# Patient Record
Sex: FEMALE | Race: WHITE | NOT HISPANIC OR LATINO | Employment: OTHER | URBAN - NONMETROPOLITAN AREA
[De-identification: names, ages, dates, MRNs, and addresses within clinical notes are randomized per-mention and may not be internally consistent; named-entity substitution may affect disease eponyms.]

---

## 2017-06-09 ENCOUNTER — OFFICE VISIT (OUTPATIENT)
Dept: ONCOLOGY | Facility: CLINIC | Age: 77
End: 2017-06-09

## 2017-06-09 VITALS
HEART RATE: 63 BPM | RESPIRATION RATE: 18 BRPM | DIASTOLIC BLOOD PRESSURE: 57 MMHG | TEMPERATURE: 98.3 F | SYSTOLIC BLOOD PRESSURE: 134 MMHG | HEIGHT: 66 IN | BODY MASS INDEX: 27.64 KG/M2 | WEIGHT: 172 LBS

## 2017-06-09 DIAGNOSIS — C85.90 LYMPHOMA, LOW GRADE (HCC): Primary | ICD-10-CM

## 2017-06-09 PROCEDURE — 99213 OFFICE O/P EST LOW 20 MIN: CPT | Performed by: NURSE PRACTITIONER

## 2017-06-09 RX ORDER — FERROUS SULFATE 325(65) MG
325 TABLET ORAL DAILY
Status: ON HOLD | COMMUNITY
End: 2022-07-28

## 2017-06-09 NOTE — PROGRESS NOTES
CHIEF COMPLAINT: Follow-up for lymphoma    Problem List:  Oncology/Hematology History    1. Low-grade lymphoma:  a) Splenomegaly on hospitalization 02/2016. She had a week of nausea, vomiting  and diarrhea with dehydration. The nausea, vomiting, and dehydration have  resolved but on CT they found retroperitoneal adenopathy with some splenomegaly  that was not bulky. She denies any ongoing fevers or chills or bed drenching  night sweats or unexplained weight loss and no change in the color, caliber or  consistency of her stools. She had a slightly elevated beta-2 microglobulin of  3.5 but no monoclonal spike and a white count 5300, hemoglobin 11.5, platelets  158,000, sedimentation rate 45, C-reactive protein 13.6 and a normal LDH of 318  with normal liver enzymes. Slight hyponatremia with sodium of 134 and  hypokalemia with potassium of 3.3 on 02/18/2016. She had slight decrease in  immunoglobulin G to 580 and immunoglobulin A to 35 on testing while in the  hospital.   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 There were no features to suggest hairy  cell lymphoma or a mantle cell lymphoma and no large cell population. This was  CD5 negative, CD10 negative, kappa clonal negative and bright for CD20 with dim  surface light chain expression and bright cytoplasm, CD45 bright. The CD38  negative for plasmacytic differentiation with a hemoglobin of 11.4, white count  7200 with normal differential, and platelets of 163,000.   c) Liver, spleen ultrasound showed splenomegaly of 19.6 cm maximum dimension  with 1.6 cm slightly dilated portal vein.  2. History of iron deficiency anemia with last colonoscopy 11/2014, according  to the patient, with a history of ulcerative colitis treated by Dr. Edge.     3.  History of basal cell carcinomas.   4. Cataracts.   5. Remote history of deep vein thrombosis with vena cava filter in place.   6. Diverticulitis by history.   7. History of gout.   8. Hypertension.   9. History of 3 different kidney surgeries.   10. Cholecystectomy.   11. Chronic neuropathy.   12. History of ARMAAN.   13. History of back surgeries.   14. History of total hip replacement.   15. History of ankle fracture.   16. History of appendectomy.   17. 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 Edge.        Lymphoma, low grade    4/25/2016 Initial Diagnosis    Lymphoma, low grade      5/30/2017 Imaging    CT chest/abdomen/pelvis no obvious pulmonary nodules identified, no pleural effusions.  Stable appearance of adenopathy within the abdomen compared to earlier study as well as splenomegaly.         HISTORY OF PRESENT ILLNESS:  The patient is a 76 y.o. female, here for follow up on management of low-grade lymphoma.  The patient returns today for follow-up and to go over the results of her recent CT scans and laboratory results.  Since we saw her last, she states that she has been doing overall fairly well.  She has had no frequent infections although she is currently being treated for a sinus infection, she has no bed drenching night sweats.  Appetite is fair, she has not had any unusual weight loss.  She denies any change in her bowel habits, no constipation diarrhea hematochezia or melena.      Past Medical History:   Diagnosis Date   • Anemia 09/18/2015   • Cataract     h/o   • Diverticulitis    • DVT (deep venous thrombosis)    • Gout    • History of basal cell carcinoma    • Hypertension           Past Surgical History:   Procedure Laterality Date   • APPENDECTOMY     • BACK SURGERY      h/o   • CHOLECYSTECTOMY     • HYSTERECTOMY      armaan   • KIDNEY SURGERY      x3    • ORIF ANKLE FRACTURE      h/o   • TOTAL HIP ARTHROPLASTY      h/o  "      Allergies   Allergen Reactions   • Hydrocodone    • Keflex [Cephalexin]    • Lortab [Hydrocodone-Acetaminophen]    • Magnesium Citrate    • Penicillins        Family History and Social History reviewed and changed as necessary      REVIEW OF SYSTEM:   Review of Systems   Constitutional: Negative for appetite change, chills, diaphoresis, fatigue, fever and unexpected weight change.   HENT:   Negative for mouth sores, sore throat and trouble swallowing.    Eyes: Negative for icterus.   Respiratory: Negative for cough, hemoptysis and shortness of breath.    Cardiovascular: Negative for chest pain, leg swelling and palpitations.   Gastrointestinal: Negative for abdominal distention, abdominal pain, blood in stool, constipation, diarrhea, nausea and vomiting.   Endocrine: Negative for hot flashes.   Genitourinary: Negative for bladder incontinence, difficulty urinating, dysuria, frequency and hematuria.    Musculoskeletal: Negative for gait problem, neck pain and neck stiffness.   Skin: Negative for rash.   Neurological: Negative for dizziness, gait problem, headaches, light-headedness and numbness.   Hematological: Negative for adenopathy. Does not bruise/bleed easily.   Psychiatric/Behavioral: Negative for depression. The patient is not nervous/anxious.    All other systems reviewed and are negative, except as stated above in the history of present illness.       PHYSICAL EXAM    Vitals:    06/09/17 1027   BP: 134/57   Pulse: 63   Resp: 18   Temp: 98.3 °F (36.8 °C)   Weight: 172 lb (78 kg)   Height: 66\" (167.6 cm)     Constitutional: Appears well-developed and well-nourished. No distress.   ECOG: (1) Restricted in physically strenuous activity, ambulatory and able to do work of light nature  HENT:   Head: Normocephalic.   Mouth/Throat: Oropharynx is clear and moist.   Eyes: Conjunctivae are normal. Pupils are equal, round, and reactive to light. No scleral icterus.   Neck: Neck supple. No JVD present. No " thyromegaly present.   Cardiovascular: Normal rate, regular rhythm and normal heart sounds.    Pulmonary/Chest: Breath sounds normal. No respiratory distress.   Abdominal: Soft. Exhibits no distension and no mass. There is no hepatosplenomegaly. There is no tenderness.   Musculoskeletal:Exhibits no edema, tenderness or deformity.   Neurological: Alert and oriented to person, place, and time. Exhibits normal muscle tone.   Skin: No ecchymosis, no petechiae and no rash noted. Not diaphoretic. No cyanosis. Nails show no clubbing.   Psychiatric: Normal mood and affect.   Vitals reviewed.    Diagnostic data: All previous office notes, current radiology reports and laboratory data were reviewed at time of visit and are outlined above in the oncology history, these were accessed via physician portal through [facility].  Current CBC WBC 6400, hemoglobin 12.3, hematocrit 37.1%, platelet count 115,000.  CMP sodium 132 potassium 4.4 CO2 27 BUN 12 creatinine 1.0 glucose 92 calcium 9.1 total bilirubin 0.5 AST 35 ALT 27 alkaline phosphatase 75.      Assessment/Plan     1.  Low-grade lymphoma: Current labs are overall stable, her platelet count is a little bit lower at 115,000.  CMP was normal.  Her CT scans are stable with no progression.  We will continue with 6 month interval with continued surveillance.  The patient will notify us sooner if she has any new concerns prior to return.    10 minutes of today's 15 minute appointment was spent in counseling and education regarding the above diagnosis and plan of care.        Chantell Kyle, IVÁN    06/09/2017

## 2017-06-12 ENCOUNTER — TELEPHONE (OUTPATIENT)
Dept: ONCOLOGY | Facility: CLINIC | Age: 77
End: 2017-06-12

## 2017-06-12 NOTE — TELEPHONE ENCOUNTER
----- Message from Tiarra Torre sent at 6/12/2017  9:30 AM EDT -----  Regarding: SIL - PATIENT IS BLEEDING  Contact: [phone]  PATIENT CALLED AND SAID SHE STARTED BLEEDING THIS MORNING.     PLEASE CALL PATIENT TO LET HER KNOW WHAT SHE SHOULD DO. PATIENT ASKED FOR WES

## 2017-06-12 NOTE — TELEPHONE ENCOUNTER
Chantell would like for patient to call PCP related to bright red vaginal bleeding that patient is reporting.  Patient did state that it could be possible for irritated hemorrhoids.  Patient to call PCP.

## 2017-12-08 ENCOUNTER — OFFICE VISIT (OUTPATIENT)
Dept: ONCOLOGY | Facility: CLINIC | Age: 77
End: 2017-12-08

## 2017-12-08 VITALS
HEART RATE: 86 BPM | HEIGHT: 66 IN | TEMPERATURE: 101.3 F | DIASTOLIC BLOOD PRESSURE: 61 MMHG | BODY MASS INDEX: 27.8 KG/M2 | RESPIRATION RATE: 16 BRPM | SYSTOLIC BLOOD PRESSURE: 148 MMHG | WEIGHT: 173 LBS

## 2017-12-08 DIAGNOSIS — R50.9 FEVER, UNSPECIFIED FEVER CAUSE: ICD-10-CM

## 2017-12-08 DIAGNOSIS — C85.90 LYMPHOMA, LOW GRADE (HCC): Primary | ICD-10-CM

## 2017-12-08 PROCEDURE — 99214 OFFICE O/P EST MOD 30 MIN: CPT | Performed by: INTERNAL MEDICINE

## 2017-12-08 RX ORDER — SULFAMETHOXAZOLE AND TRIMETHOPRIM 800; 160 MG/1; MG/1
1 TABLET ORAL 2 TIMES DAILY
Qty: 14 TABLET | Refills: 0 | Status: SHIPPED | OUTPATIENT
Start: 2017-12-08 | End: 2020-07-23 | Stop reason: ALTCHOICE

## 2017-12-08 RX ORDER — DOXYCYCLINE HYCLATE 100 MG/1
CAPSULE ORAL
COMMUNITY
Start: 2017-12-04 | End: 2017-12-08 | Stop reason: SDUPTHER

## 2017-12-08 RX ORDER — POTASSIUM CHLORIDE 750 MG/1
20 CAPSULE, EXTENDED RELEASE ORAL DAILY
COMMUNITY
Start: 2017-11-13 | End: 2022-08-04 | Stop reason: HOSPADM

## 2017-12-08 RX ORDER — RANITIDINE 150 MG/1
TABLET ORAL
COMMUNITY
Start: 2017-11-06 | End: 2020-07-23 | Stop reason: ALTCHOICE

## 2017-12-08 NOTE — PROGRESS NOTES
CHIEF COMPLAINT: Low-grade fevers    Problem List:  Oncology/Hematology History    1. Low-grade lymphoma:  a) Splenomegaly on hospitalization 02/2016. She had a week of nausea, vomiting  and diarrhea with dehydration. The nausea, vomiting, and dehydration have  resolved but on CT they found retroperitoneal adenopathy with some splenomegaly  that was not bulky. She denies any ongoing fevers or chills or bed drenching  night sweats or unexplained weight loss and no change in the color, caliber or  consistency of her stools. She had a slightly elevated beta-2 microglobulin of  3.5 but no monoclonal spike and a white count 5300, hemoglobin 11.5, platelets  158,000, sedimentation rate 45, C-reactive protein 13.6 and a normal LDH of 318  with normal liver enzymes. Slight hyponatremia with sodium of 134 and  hypokalemia with potassium of 3.3 on 02/18/2016. She had slight decrease in  immunoglobulin G to 580 and immunoglobulin A to 35 on testing while in the  hospital.   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 There were no features to suggest hairy  cell lymphoma or a mantle cell lymphoma and no large cell population. This was  CD5 negative, CD10 negative, kappa clonal negative and bright for CD20 with dim  surface light chain expression and bright cytoplasm, CD45 bright. The CD38  negative for plasmacytic differentiation with a hemoglobin of 11.4, white count  7200 with normal differential, and platelets of 163,000.   c) Liver, spleen ultrasound showed splenomegaly of 19.6 cm maximum dimension  with 1.6 cm slightly dilated portal vein.  2. History of iron deficiency anemia with last colonoscopy 11/2014, according  to the patient, with a history of ulcerative colitis treated by Dr. Edge.     3. History  of basal cell carcinomas.   4. Cataracts.   5. Remote history of deep vein thrombosis with vena cava filter in place.   6. Diverticulitis by history.   7. History of gout.   8. Hypertension.   9. History of 3 different kidney surgeries.   10. Cholecystectomy.   11. Chronic neuropathy.   12. History of ARMAAN.   13. History of back surgeries.   14. History of total hip replacement.   15. History of ankle fracture.   16. History of appendectomy.   17. 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 Edge.        Lymphoma, low grade    4/25/2016 Initial Diagnosis     Lymphoma, low grade         5/30/2017 Imaging     CT chest/abdomen/pelvis no obvious pulmonary nodules identified, no pleural effusions.  Stable appearance of adenopathy within the abdomen compared to earlier study as well as splenomegaly.         12/6/2017 Imaging     CT chest/abdomen/pelvis stable, essentially unchanged prominent mediastinal lymph nodes, unchanged right lower lobe medial basal segmental focal fibrotic findings.  Scattered noncalcified small lung nodules appear unchanged.  Stable spine or megaly.  Scattered prominent retroperitoneal lymph nodes now appear smaller.  Unchanged spinal findings consistent with diffuse idiopathic skeletal hyperostosis and prior operative intervention at L4-5.              HISTORY OF PRESENT ILLNESS:  The patient is a 77 y.o. female, here for follow up on management of Low-grade fevers with history of lymphoma.  Been on some doxycycline and is slowly improving but still has some low-grade temps and nonspecific fatigue.  No recent blood work but current CAT scans and images thereof I reviewed above and there is no significant changes.      Past Medical History:   Diagnosis Date   • Anemia 09/18/2015   • Cataract     h/o   • Diverticulitis    • DVT (deep venous thrombosis)    • Gout    • History of basal cell carcinoma    • Hypertension        "    Past Surgical History:   Procedure Laterality Date   • APPENDECTOMY     • BACK SURGERY      h/o   • CHOLECYSTECTOMY     • HYSTERECTOMY      jaja   • KIDNEY SURGERY      x3    • ORIF ANKLE FRACTURE      h/o   • TOTAL HIP ARTHROPLASTY      h/o       Allergies   Allergen Reactions   • Hydrocodone    • Keflex [Cephalexin]    • Lortab [Hydrocodone-Acetaminophen]    • Magnesium Citrate    • Penicillins        Family History and Social History reviewed and changed as necessary      REVIEW OF SYSTEM:   Review of Systems   Constitutional: Negative for appetite change, chills, diaphoresis, fatigue, fever and unexpected weight change.   HENT:   Negative for mouth sores, sore throat and trouble swallowing.    Eyes: Negative for icterus.   Respiratory: Negative for cough, hemoptysis and shortness of breath.    Cardiovascular: Negative for chest pain, leg swelling and palpitations.   Gastrointestinal: Negative for abdominal distention, abdominal pain, blood in stool, constipation, diarrhea, nausea and vomiting.   Endocrine: Negative for hot flashes.   Genitourinary: Negative for bladder incontinence, difficulty urinating, dysuria, frequency and hematuria.    Musculoskeletal: Negative for gait problem, neck pain and neck stiffness.   Skin: Negative for rash.   Neurological: Negative for dizziness, gait problem, headaches, light-headedness and numbness.   Hematological: Negative for adenopathy. Does not bruise/bleed easily.   Psychiatric/Behavioral: Negative for depression. The patient is not nervous/anxious.    All other systems reviewed and are negative.       PHYSICAL EXAM    Vitals:    12/08/17 1433   BP: 148/61   Pulse: 86   Resp: 16   Temp: (!) 101.3 °F (38.5 °C)   Weight: 78.5 kg (173 lb)   Height: 167.6 cm (66\")     Constitutional: Appears well-developed and well-nourished. No distress.   ECOG: (2) Ambulatory and capable of self care, unable to carry out work activity, up and about > 50% or waking hours  HENT:   Head: " Normocephalic. Facial redness  Mouth/Throat: Oropharynx is clear and moist.   Eyes: Conjunctivae are normal. Pupils are equal, round, and reactive to light. No scleral icterus.   Neck: Neck supple. No JVD present. No thyromegaly present.   Cardiovascular: Normal rate, regular rhythm and normal heart sounds.    Pulmonary/Chest: Breath sounds normal. No respiratory distress.   Abdominal: Soft. Exhibits no distension and no mass. There is no hepatosplenomegaly. There is no tenderness. There is no rebound and no guarding.   Musculoskeletal:Exhibits no edema, tenderness or deformity.   Neurological: Alert and oriented to person, place, and time. Exhibits normal muscle tone.   Skin: No ecchymosis, no petechiae and no rash noted. Not diaphoretic. No cyanosis. Nails show no clubbing.   Psychiatric: Normal mood and affect.   Vitals reviewed.      No results found for any previous visit.    Assessment/Plan     1. Low Grade lymphoma  2. Fevers  3. Facial erythema    Discussion: she will finish her doxycycline and I'm going to get her blood culture and urine cultures and give her Bactrim as she is still fevers coming to the end of her doxycycline.  I'll check her quantitative immunoglobulins along with blood count chemistry now.  We'll repeat her CTs and blood work prior to return in 6 months otherwise.  Consider intravenous immunoglobulins as has recurrent frequent infections with an immunoglobulin level below 300.  Her face is quite red.  She is on steroids and I instructed her to stop her doxycycline today.  I'm not sure if this is a son reaction or reaction just to the doxycycline itself or whether this could even be an early desquamative process.  If this worsens in the next day I asked her to move on to the emergency room.  She states that she has tolerated Bactrim in the past though it is been a long time ago.      Nelson Castañeda MD    12/08/2017

## 2017-12-13 ENCOUNTER — TELEPHONE (OUTPATIENT)
Dept: ONCOLOGY | Facility: CLINIC | Age: 77
End: 2017-12-13

## 2017-12-13 NOTE — TELEPHONE ENCOUNTER
SPOKE WITH PATIENT AND REVIEW LAB RESULTS (CBC AND CMP)  ALSO REVIEW BLOOD CULTURES AND IMMUNOGLOBLIN LEVELS.  EXPLAINED THAT URINALYSIS RESULTS IS NOT BACK YET.  EXPLAINED I HAVE REQUESTED MEDICAL RECORDS TO CALL [facility] TO GET RESULTS.  WILL CALL IF ABNORMAL.  PATIENT STATED THAT SHE IS STILL RUNNING A FEVER.

## 2017-12-13 NOTE — TELEPHONE ENCOUNTER
----- Message from Tiarra Torre sent at 12/13/2017 10:56 AM EST -----  Regarding: SIL - LAB RESULTS   Contact: [phone]  PATIENT CALLED FOR LAB AND URINALYSIS RESULTS

## 2017-12-13 NOTE — TELEPHONE ENCOUNTER
----- Message from Tiarra Torre sent at 12/13/2017 10:56 AM EST -----  Regarding: SIL - LAB RESULTS   Contact: [phone]  PATIENT CALLED FOR LAB AND URINALYSIS RESULTS

## 2017-12-20 ENCOUNTER — TELEPHONE (OUTPATIENT)
Dept: ONCOLOGY | Facility: CLINIC | Age: 77
End: 2017-12-20

## 2017-12-20 NOTE — TELEPHONE ENCOUNTER
Notified patient that all urine and blood cultures were negative and IgG level normal at 729.  She is feeling much better.

## 2018-06-13 ENCOUNTER — OFFICE VISIT (OUTPATIENT)
Dept: ONCOLOGY | Facility: CLINIC | Age: 78
End: 2018-06-13

## 2018-06-13 VITALS
RESPIRATION RATE: 18 BRPM | DIASTOLIC BLOOD PRESSURE: 62 MMHG | HEIGHT: 66 IN | TEMPERATURE: 98.2 F | SYSTOLIC BLOOD PRESSURE: 143 MMHG | BODY MASS INDEX: 28.28 KG/M2 | WEIGHT: 176 LBS | HEART RATE: 59 BPM

## 2018-06-13 DIAGNOSIS — C85.90 LYMPHOMA, LOW GRADE (HCC): Primary | ICD-10-CM

## 2018-06-13 PROCEDURE — 99213 OFFICE O/P EST LOW 20 MIN: CPT | Performed by: NURSE PRACTITIONER

## 2018-06-13 RX ORDER — ALLOPURINOL 300 MG/1
300 TABLET ORAL DAILY
COMMUNITY
End: 2019-03-27 | Stop reason: DRUGHIGH

## 2018-06-13 RX ORDER — LEVOTHYROXINE SODIUM 0.05 MG/1
50 TABLET ORAL DAILY
COMMUNITY

## 2018-06-13 NOTE — PROGRESS NOTES
CHIEF COMPLAINT:   Follow-up for lymphoma    Problem List:  Oncology/Hematology History    1. Low-grade lymphoma:  a) Splenomegaly on hospitalization 02/2016. She had a week of nausea, vomiting  and diarrhea with dehydration. The nausea, vomiting, and dehydration have  resolved but on CT they found retroperitoneal adenopathy with some splenomegaly  that was not bulky. She denies any ongoing fevers or chills or bed drenching  night sweats or unexplained weight loss and no change in the color, caliber or  consistency of her stools. She had a slightly elevated beta-2 microglobulin of  3.5 but no monoclonal spike and a white count 5300, hemoglobin 11.5, platelets  158,000, sedimentation rate 45, C-reactive protein 13.6 and a normal LDH of 318  with normal liver enzymes. Slight hyponatremia with sodium of 134 and  hypokalemia with potassium of 3.3 on 02/18/2016. She had slight decrease in  immunoglobulin G to 580 and immunoglobulin A to 35 on testing while in the  hospital.   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 There were no features to suggest hairy  cell lymphoma or a mantle cell lymphoma and no large cell population. This was  CD5 negative, CD10 negative, kappa clonal negative and bright for CD20 with dim  surface light chain expression and bright cytoplasm, CD45 bright. The CD38  negative for plasmacytic differentiation with a hemoglobin of 11.4, white count  7200 with normal differential, and platelets of 163,000.   c) Liver, spleen ultrasound showed splenomegaly of 19.6 cm maximum dimension  with 1.6 cm slightly dilated portal vein.  2. History of iron deficiency anemia with last colonoscopy 11/2014, according  to the patient, with a history of ulcerative colitis treated by Dr. Edge.     3.  History of basal cell carcinomas.   4. Cataracts.   5. Remote history of deep vein thrombosis with vena cava filter in place.   6. Diverticulitis by history.   7. History of gout.   8. Hypertension.   9. History of 3 different kidney surgeries.   10. Cholecystectomy.   11. Chronic neuropathy.   12. History of ARMAAN.   13. History of back surgeries.   14. History of total hip replacement.   15. History of ankle fracture.   16. History of appendectomy.   17. 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 Edge.        Lymphoma, low grade    4/25/2016 Initial Diagnosis     Lymphoma, low grade         5/30/2017 Imaging     CT chest/abdomen/pelvis no obvious pulmonary nodules identified, no pleural effusions.  Stable appearance of adenopathy within the abdomen compared to earlier study as well as splenomegaly.         12/6/2017 Imaging     CT chest/abdomen/pelvis stable, essentially unchanged prominent mediastinal lymph nodes, unchanged right lower lobe medial basal segmental focal fibrotic findings.  Scattered noncalcified small lung nodules appear unchanged.  Stable spine or megaly.  Scattered prominent retroperitoneal lymph nodes now appear smaller.  Unchanged spinal findings consistent with diffuse idiopathic skeletal hyperostosis and prior operative intervention at L4-5.           6/8/2018 Imaging     CT chest abdomen and pelvis without contrast: Stable, no significant change in the appearance compared to earlier study from December 6, 2017.            HISTORY OF PRESENT ILLNESS:  The patient is a 77 y.o. female, here for follow up on management of low-grade lymphoma.  The patient returns today for follow-up and to go over the results of her recent CT scans and laboratory results.  Since we saw her last, she states that she has been doing overall fairly well.  She states that she has been dealing with gout in her left ankle and is now on  allopurinol and is feeling a little better.  She also has recently been diagnosed with hypothyroidism and has started levothyroxine.  Had some fatigue but feels that it is improving.  She has had no frequent infections, she has no bed drenching night sweats.  Appetite is fair, she has not had any unusual weight loss.  She denies any change in her bowel habits, no constipation diarrhea hematochezia or melena.  Denies any pain other than for the pain associated with gout in her left ankle.    Past Medical History:   Diagnosis Date   • Anemia 09/18/2015   • Cataract     h/o   • Diverticulitis    • DVT (deep venous thrombosis)    • Gout    • History of basal cell carcinoma    • Hypertension           Past Surgical History:   Procedure Laterality Date   • APPENDECTOMY     • BACK SURGERY      h/o   • CHOLECYSTECTOMY     • HYSTERECTOMY      jaja   • KIDNEY SURGERY      x3    • ORIF ANKLE FRACTURE      h/o   • TOTAL HIP ARTHROPLASTY      h/o       Allergies   Allergen Reactions   • Hydrocodone    • Keflex [Cephalexin]    • Lortab [Hydrocodone-Acetaminophen]    • Magnesium Citrate    • Penicillins        Family History and Social History reviewed and changed as necessary      REVIEW OF SYSTEM:   Review of Systems   Constitutional: Negative for appetite change, chills, diaphoresis, fatigue, fever and unexpected weight change.   HENT:   Negative for mouth sores, sore throat and trouble swallowing.    Eyes: Negative for icterus.   Respiratory: Negative for cough, hemoptysis and shortness of breath.    Cardiovascular: Negative for chest pain, leg swelling and palpitations.   Gastrointestinal: Negative for abdominal distention, abdominal pain, blood in stool, constipation, diarrhea, nausea and vomiting.   Endocrine: Negative for hot flashes.   Genitourinary: Negative for bladder incontinence, difficulty urinating, dysuria, frequency and hematuria.    Musculoskeletal: Negative for gait problem, neck pain and neck stiffness.   Skin:  "Negative for rash.   Neurological: Negative for dizziness, gait problem, headaches, light-headedness and numbness.   Hematological: Negative for adenopathy. Does not bruise/bleed easily.   Psychiatric/Behavioral: Negative for depression. The patient is not nervous/anxious.    All other systems reviewed and are negative, except as stated above in the history of present illness.       PHYSICAL EXAM    Vitals:    06/13/18 1145   BP: 143/62   Pulse: 59   Resp: 18   Temp: 98.2 °F (36.8 °C)   Weight: 79.8 kg (176 lb)   Height: 167.6 cm (66\")     Constitutional: Appears well-developed and well-nourished. No distress.   ECOG: (1) Restricted in physically strenuous activity, ambulatory and able to do work of light nature  HENT:   Head: Normocephalic.   Mouth/Throat: Oropharynx is clear and moist.   Eyes: Conjunctivae are normal. Pupils are equal, round, and reactive to light. No scleral icterus.   Neck: Neck supple. No JVD present. No thyromegaly present.   Cardiovascular: Normal rate, regular rhythm and normal heart sounds.    Pulmonary/Chest: Breath sounds normal. No respiratory distress.   Nodes: No palpable lymphadenopathy  Abdominal: Soft. Exhibits no distension and no mass. There is no hepatosplenomegaly. There is no tenderness.   Musculoskeletal:Exhibits no edema, tenderness or deformity.   Neurological: Alert and oriented to person, place, and time. Exhibits normal muscle tone.   Skin: No ecchymosis, no petechiae and no rash noted. Not diaphoretic. No cyanosis. Nails show no clubbing.   Psychiatric: Normal mood and affect.   Vitals reviewed.    Diagnostic data: All previous office notes, current radiology reports and laboratory data were reviewed at time of visit and are outlined above in the oncology history.  6/8/2018 labs: CMP sodium 134 potassium 4.6 chloride 95 CO2 26 BUN 20 creatinine 1.1 glucose 89 calcium 9.3 total bilirubin 0.5 AST 38 ALT 29 albumin 4.2 alkaline phosphatase 73.  CBC WBC 8100, hemoglobin " "11.8, hematocrit 34.7%, platelet count 131,000.    Assessment/Plan     Low-grade lymphoma: Current labs are overall stable, her platelet count 131,000, normal white blood cell count, no significant anemia.  CMP was unremarkable.  Her CT scans are stable with no progression.  She has been stable and required no treatment on observation for over 2 years now, I will now go to 9 months for follow-up with repeat CBC, CMP and CT scans without contrast prior to return.  We reviewed the indications for treatment which would be:  if she has 3 more than 3 cm nodes, 1 more than 7 cm node or an enlarging spleen that is symptomatic,\"B\"  Symptoms, anemia, thrombocytopenia, leukopenia, or conversely rapidly rising white blood cell count.  The patient will notify us sooner if she has any new concerns prior to return.     I spent > 50% percent of today's 15 minute appointment in counseling and education discussing diagnosis, diagnostic testing and current status.      Chantell Kyle, IVÁN    06/13/2018        "

## 2019-03-27 ENCOUNTER — OFFICE VISIT (OUTPATIENT)
Dept: ONCOLOGY | Facility: CLINIC | Age: 79
End: 2019-03-27

## 2019-03-27 VITALS
DIASTOLIC BLOOD PRESSURE: 53 MMHG | SYSTOLIC BLOOD PRESSURE: 122 MMHG | BODY MASS INDEX: 27.48 KG/M2 | TEMPERATURE: 98.8 F | RESPIRATION RATE: 18 BRPM | WEIGHT: 171 LBS | HEART RATE: 72 BPM | HEIGHT: 66 IN

## 2019-03-27 DIAGNOSIS — R50.9 FEVER AND CHILLS: ICD-10-CM

## 2019-03-27 DIAGNOSIS — R16.1 SPLENOMEGALY: ICD-10-CM

## 2019-03-27 DIAGNOSIS — C85.87: ICD-10-CM

## 2019-03-27 DIAGNOSIS — R10.12 ABDOMINAL PAIN, LEFT UPPER QUADRANT: ICD-10-CM

## 2019-03-27 DIAGNOSIS — C85.90 LYMPHOMA, LOW GRADE (HCC): Primary | ICD-10-CM

## 2019-03-27 PROCEDURE — 99215 OFFICE O/P EST HI 40 MIN: CPT | Performed by: NURSE PRACTITIONER

## 2019-03-27 RX ORDER — ALLOPURINOL 100 MG/1
TABLET ORAL
COMMUNITY
Start: 2019-02-25 | End: 2019-04-09 | Stop reason: DRUGHIGH

## 2019-03-27 NOTE — PROGRESS NOTES
CHIEF COMPLAINT:   1.  Abdominal pain  2.  Fatigue  3.  Fevers and chills at night  4.  Nose bleeds  5.  Follow-up for low grade lymphoma    Problem List:  Oncology/Hematology History    1. Low-grade lymphoma:  a) Splenomegaly on hospitalization 02/2016. She had a week of nausea, vomiting  and diarrhea with dehydration. The nausea, vomiting, and dehydration have  resolved but on CT they found retroperitoneal adenopathy with some splenomegaly  that was not bulky. She denies any ongoing fevers or chills or bed drenching  night sweats or unexplained weight loss and no change in the color, caliber or  consistency of her stools. She had a slightly elevated beta-2 microglobulin of  3.5 but no monoclonal spike and a white count 5300, hemoglobin 11.5, platelets  158,000, sedimentation rate 45, C-reactive protein 13.6 and a normal LDH of 318  with normal liver enzymes. Slight hyponatremia with sodium of 134 and  hypokalemia with potassium of 3.3 on 02/18/2016. She had slight decrease in  immunoglobulin G to 580 and immunoglobulin A to 35 on testing while in the  hospital.   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 There were no features to suggest hairy  cell lymphoma or a mantle cell lymphoma and no large cell population. This was  CD5 negative, CD10 negative, kappa clonal negative and bright for CD20 with dim  surface light chain expression and bright cytoplasm, CD45 bright. The CD38  negative for plasmacytic differentiation with a hemoglobin of 11.4, white count  7200 with normal differential, and platelets of 163,000.   c) Liver, spleen ultrasound showed splenomegaly of 19.6 cm maximum dimension  with 1.6 cm slightly dilated portal vein.  2. History of iron deficiency anemia with last colonoscopy 11/2014,  according  to the patient, with a history of ulcerative colitis treated by Dr. Edge.     3. History of basal cell carcinomas.   4. Cataracts.   5. Remote history of deep vein thrombosis with vena cava filter in place.   6. Diverticulitis by history.   7. History of gout.   8. Hypertension.   9. History of 3 different kidney surgeries.   10. Cholecystectomy.   11. Chronic neuropathy.   12. History of ARMAAN.   13. History of back surgeries.   14. History of total hip replacement.   15. History of ankle fracture.   16. History of appendectomy.   17. 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 Edge.        Lymphoma, low grade (CMS/HCC)    4/25/2016 Initial Diagnosis     Lymphoma, low grade         5/30/2017 Imaging     CT chest/abdomen/pelvis no obvious pulmonary nodules identified, no pleural effusions.  Stable appearance of adenopathy within the abdomen compared to earlier study as well as splenomegaly.         12/6/2017 Imaging     CT chest/abdomen/pelvis stable, essentially unchanged prominent mediastinal lymph nodes, unchanged right lower lobe medial basal segmental focal fibrotic findings.  Scattered noncalcified small lung nodules appear unchanged.  Stable spine or megaly.  Scattered prominent retroperitoneal lymph nodes now appear smaller.  Unchanged spinal findings consistent with diffuse idiopathic skeletal hyperostosis and prior operative intervention at L4-5.           6/8/2018 Imaging     CT chest abdomen and pelvis without contrast: Stable, no significant change in the appearance compared to earlier study from December 6, 2017.         3/1/2019 Imaging     CT chest, abdomen and pelvis impression: No significant interval change from last year.  Stable appearance of the significantly enlarged spleen which measures up to nearly 20 x 12 cm.  Moderate, scattered upper abdominal adenopathy with innumerable scattered gastrohepatic,  retroperitoneal, and mesenteric nodes measuring roughly between 1-2 cm are stable as well.  No significant progression.  Mild mediastinal adenopathy appears stable as well.            HISTORY OF PRESENT ILLNESS:  The patient is a 78 y.o. female, here for follow up on management of low-grade lymphoma.  The patient reports that she has been having increasing diffuse abdominal pain.  She underwent colonoscopy with Dr. Edge last week, awaiting results of biopsies.  States that she has had consistent low-grade temperatures almost every evening along with chilling, temperatures typically .  She did develop a fever of 103 after her colonoscopy last week and return to the hospital for further evaluation and states that on workup no other abnormalities were noted related to her colonoscopy.  She has had several nosebleeds in the past few months also, they are spontaneous and last for several minutes.  None for the past week.  Denies any headaches or dizziness or vision changes.  Has moderate fatigue.  States her appetite is fair.  No nausea or vomiting.  Also reports occasional night sweats where she wakes up and her pajamas and bed sheets are wet.    Past Medical History:   Diagnosis Date   • Anemia 09/18/2015   • Cataract     h/o   • Diverticulitis    • DVT (deep venous thrombosis) (CMS/HCC)    • Gout    • History of basal cell carcinoma    • Hypertension           Past Surgical History:   Procedure Laterality Date   • APPENDECTOMY     • BACK SURGERY      h/o   • CHOLECYSTECTOMY     • HYSTERECTOMY      jaja   • KIDNEY SURGERY      x3    • ORIF ANKLE FRACTURE      h/o   • TOTAL HIP ARTHROPLASTY      h/o       Allergies   Allergen Reactions   • Hydrocodone    • Keflex [Cephalexin]    • Lortab [Hydrocodone-Acetaminophen]    • Magnesium Citrate    • Penicillins        Family History and Social History reviewed and changed as necessary      REVIEW OF SYSTEM:   Review of Systems   Constitutional: .   HENT:   Negative  "for mouth sores, sore throat and trouble swallowing.    Eyes: Negative for icterus.   Respiratory: Negative for cough, hemoptysis and shortness of breath.    Cardiovascular: Negative for chest pain, leg swelling and palpitations.   Gastrointestinal: Negative for abdominal distention, abdominal pain, blood in stool, constipation, diarrhea, nausea and vomiting.   Endocrine: Negative for hot flashes.   Genitourinary: Negative for bladder incontinence, difficulty urinating, dysuria, frequency and hematuria.    Musculoskeletal: Negative for gait problem, neck pain and neck stiffness.   Skin: Negative for rash.   Neurological: Negative for dizziness, gait problem, headaches, light-headedness and numbness.   Hematological: Negative for adenopathy. Does not bruise/bleed easily.   Psychiatric/Behavioral: Negative for depression. The patient is not nervous/anxious.    All other systems reviewed and are negative, except as stated above in the history of present illness.       PHYSICAL EXAM    Vitals:    03/27/19 1006   BP: 122/53   Pulse: 72   Resp: 18   Temp: 98.8 °F (37.1 °C)   Weight: 77.6 kg (171 lb)   Height: 167.6 cm (66\")     Constitutional: Appears well-developed and well-nourished. No distress.   ECOG: (1) Restricted in physically strenuous activity, ambulatory and able to do work of light nature  HENT:   Head: Normocephalic.   Mouth/Throat: Oropharynx is clear and moist.   Eyes: Conjunctivae are normal. Pupils are equal, round, and reactive to light. No scleral icterus.   Neck: Neck supple. No JVD present. No thyromegaly present.   Cardiovascular: Normal rate, regular rhythm and normal heart sounds.    Pulmonary/Chest: Breath sounds normal. No respiratory distress.   Nodes: No palpable lymphadenopathy  Abdominal: Soft. Exhibits no distension and no mass.  Tenderness on the left upper and lower quadrants, splenomegaly.   Musculoskeletal:Exhibits bilateral lower extremity edema, patient wearing compression stockings. "   Neurological: Alert and oriented to person, place, and time. Exhibits normal muscle tone.   Skin: No ecchymosis, no petechiae and no rash noted. Not diaphoretic. No cyanosis. Nails show no clubbing.   Psychiatric: Normal mood and affect.   Vitals reviewed.  Diagnostic data: All previous office notes current radiology reports as outlined above in the oncology history that showed stable CT chest abdomen and pelvis along with labs that patient brought with her to today's visit were all reviewed at time of visit.  3/18/2019 CBC WBC 6300, hemoglobin 11.4, hematocrit 33.2%, platelet count 166,000.  CMP BUN 22 creatinine 0.98 sodium 133 potassium 4.6 calcium 9.3 AST 21 ALT 12 alkaline phosphatase 68.  Uric acid was elevated at 9.3.  I also retrieved records from our old computer system, [facility] to get her flow cytometry, bone marrow biopsy and ultrasound of the spleen and liver results that I also reviewed at today's visit.  I also called and discussed the patient with Dr. Nelson Castañeda via telephone today.    Assessment/Plan     1.  Low-grade lymphoma  2.  Frequent low-grade temperature  3.  Abdominal pain  4.  Splenomegaly  5.  Fatigue  6.  Nosebleeds  7.  Night sweats    Discussion: The patient is having frequent low grade fevers in the evening, states it happens most every night.  She is also having occasional night sweats.  More fatigue, she has left upper and lower quadrant abdominal pain and tenderness on exam along with splenomegaly that is stable on current CT.  She is also having intermittent nosebleeds, current platelet count is normal.  I am concerned with her new symptoms over possible progression of disease even though her CT scans are stable.  She does have significant splenomegaly.  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  She had a colonoscopy last week, I did call Dr. Edge's office however pathology reports from biopsy was not available at this time.  I will try to get a PET/CT for further evaluation, would like to evaluate see if her spleen lights up.  We will see her back in 1-2 weeks after her PET scan to go over the results.  We will should also have the results of her colonoscopy at that time.  She may end up needing to undergo EGD if that has not already been done for further assessment of her lymphoma to look for any gastric involvement.      I spent 40 minutes with the patient. I spent > 50% percent of this time counseling and discussing prognosis, diagnostic testing, evaluation, current status and management.    Chantell Kyle, APRN    03/27/2019

## 2019-04-09 ENCOUNTER — OFFICE VISIT (OUTPATIENT)
Dept: ONCOLOGY | Facility: CLINIC | Age: 79
End: 2019-04-09

## 2019-04-09 VITALS
BODY MASS INDEX: 27.64 KG/M2 | DIASTOLIC BLOOD PRESSURE: 53 MMHG | SYSTOLIC BLOOD PRESSURE: 128 MMHG | WEIGHT: 172 LBS | HEIGHT: 66 IN | TEMPERATURE: 98.6 F | HEART RATE: 66 BPM | RESPIRATION RATE: 18 BRPM

## 2019-04-09 DIAGNOSIS — C85.90 LYMPHOMA, LOW GRADE (HCC): Primary | ICD-10-CM

## 2019-04-09 PROCEDURE — 99214 OFFICE O/P EST MOD 30 MIN: CPT | Performed by: INTERNAL MEDICINE

## 2019-04-09 RX ORDER — ALLOPURINOL 300 MG/1
TABLET ORAL
COMMUNITY
Start: 2019-04-02 | End: 2020-07-23 | Stop reason: ALTCHOICE

## 2019-04-09 NOTE — PROGRESS NOTES
CHIEF COMPLAINT: Low-grade lymphoma with Negrete's esophagus    Problem List:  Oncology/Hematology History    1. Low-grade lymphoma:  a) Splenomegaly on hospitalization 02/2016. She had a week of nausea, vomiting  and diarrhea with dehydration. The nausea, vomiting, and dehydration have  resolved but on CT they found retroperitoneal adenopathy with some splenomegaly  that was not bulky. She denies any ongoing fevers or chills or bed drenching  night sweats or unexplained weight loss and no change in the color, caliber or  consistency of her stools. She had a slightly elevated beta-2 microglobulin of  3.5 but no monoclonal spike and a white count 5300, hemoglobin 11.5, platelets  158,000, sedimentation rate 45, C-reactive protein 13.6 and a normal LDH of 318  with normal liver enzymes. Slight hyponatremia with sodium of 134 and  hypokalemia with potassium of 3.3 on 02/18/2016. She had slight decrease in  immunoglobulin G to 580 and immunoglobulin A to 35 on testing while in the  hospital.   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 There were no features to suggest hairy  cell lymphoma or a mantle cell lymphoma and no large cell population. This was  CD5 negative, CD10 negative, kappa clonal negative and bright for CD20 with dim  surface light chain expression and bright cytoplasm, CD45 bright. The CD38  negative for plasmacytic differentiation with a hemoglobin of 11.4, white count  7200 with normal differential, and platelets of 163,000.   c) Liver, spleen ultrasound showed splenomegaly of 19.6 cm maximum dimension  with 1.6 cm slightly dilated portal vein.    D)-3/20/2019 Dr. 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  Colonoscopy showed hemorrhoids,  diverticuli, 11 mm proximal ascending colon flat polyp.  Dr. 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   -3/27/2019 PET shows mild hypermetabolism within the lymph nodes to the left of the proximal third of the esophagus as well as near the tracheal bifurcation and no other area is of abnormal hypermetabolism.  Splenomegaly stable.  She has significant carotid stenosis especially on the right.    2. History of iron deficiency anemia with last colonoscopy 11/2014, according  to the patient, with a history of ulcerative colitis treated by Dr. Edge.     3. History of basal cell carcinomas.   4. Cataracts.   5. Remote history of deep vein thrombosis with vena cava filter in place.   6. Diverticulitis by history.   7. History of gout.   8. Hypertension.   9. History of 3 different kidney surgeries.   10. Cholecystectomy.   11. Chronic neuropathy.   12. History of ARMAAN.   13. History of back surgeries.   14. History of total hip replacement.   15. History of ankle fracture.   16. History of appendectomy.   17. 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 Edge.        Lymphoma, low grade (CMS/HCC)    4/25/2016 Initial Diagnosis     Lymphoma, low grade         5/30/2017 Imaging     CT chest/abdomen/pelvis no obvious pulmonary nodules identified, no pleural effusions.  Stable appearance of adenopathy within the abdomen compared to earlier study as well as splenomegaly.         12/6/2017 Imaging     CT chest/abdomen/pelvis stable, essentially unchanged prominent mediastinal lymph nodes, unchanged right lower lobe medial basal segmental focal fibrotic findings.  Scattered noncalcified small lung nodules appear unchanged.  Stable spine or megaly.  Scattered prominent  retroperitoneal lymph nodes now appear smaller.  Unchanged spinal findings consistent with diffuse idiopathic skeletal hyperostosis and prior operative intervention at L4-5.           6/8/2018 Imaging     CT chest abdomen and pelvis without contrast: Stable, no significant change in the appearance compared to earlier study from December 6, 2017.         3/1/2019 Imaging     CT chest, abdomen and pelvis impression: No significant interval change from last year.  Stable appearance of the significantly enlarged spleen which measures up to nearly 20 x 12 cm.  Moderate, scattered upper abdominal adenopathy with innumerable scattered gastrohepatic, retroperitoneal, and mesenteric nodes measuring roughly between 1-2 cm are stable as well.  No significant progression.  Mild mediastinal adenopathy appears stable as well.         3/20/2019 -  Other Event     EGD showed Negrete's esophagus in the lower third of the esophagus 6 cm in length.  Colonoscopy showed hemorrhoids, diverticuli, 11 mm proximal ascending colon flat polyp.  Dr. 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         3/27/2019 Imaging     PET shows mild hypermetabolism within the lymph nodes to the left of the proximal third of the esophagus as well as near the tracheal bifurcation and no other area is of abnormal hypermetabolism.  Splenomegaly stable.  She has significant carotid stenosis especially on the right.            HISTORY OF PRESENT ILLNESS:  The patient is a 78 y.o. female, here for follow up on management of low-grade lymphoma with Negrete's esophagus.  3/20/2019 labs revealed hemoglobin of 10.2 slightly lower for her but with normochromic normocytic indices and a stable platelet count of 125,000 with white count of 6300 and normal differential.      Past Medical History:  "  Diagnosis Date   • Anemia 09/18/2015   • Cataract     h/o   • Diverticulitis    • DVT (deep venous thrombosis) (CMS/HCC)    • Gout    • History of basal cell carcinoma    • Hypertension           Past Surgical History:   Procedure Laterality Date   • APPENDECTOMY     • BACK SURGERY      h/o   • CHOLECYSTECTOMY     • HYSTERECTOMY      jaja   • KIDNEY SURGERY      x3    • ORIF ANKLE FRACTURE      h/o   • TOTAL HIP ARTHROPLASTY      h/o       Allergies   Allergen Reactions   • Hydrocodone    • Keflex [Cephalexin]    • Lortab [Hydrocodone-Acetaminophen]    • Magnesium Citrate    • Penicillins        Family History and Social History reviewed and changed as necessary      REVIEW OF SYSTEM:   Review of Systems   Constitutional: Negative for appetite change, chills, diaphoresis, fatigue, fever and unexpected weight change.   HENT:   Negative for mouth sores, sore throat and trouble swallowing.    Eyes: Negative for icterus.   Respiratory: Negative for cough, hemoptysis and shortness of breath.    Cardiovascular: Negative for chest pain, leg swelling and palpitations.   Gastrointestinal: Negative for abdominal distention, abdominal pain, blood in stool, constipation, diarrhea, nausea and vomiting.   Endocrine: Negative for hot flashes.   Genitourinary: Negative for bladder incontinence, difficulty urinating, dysuria, frequency and hematuria.    Musculoskeletal: Negative for gait problem, neck pain and neck stiffness.   Skin: Negative for rash.   Neurological: Negative for dizziness, gait problem, headaches, light-headedness and numbness.   Hematological: Negative for adenopathy. Does not bruise/bleed easily.   Psychiatric/Behavioral: Negative for depression. The patient is not nervous/anxious.    All other systems reviewed and are negative.       PHYSICAL EXAM    Vitals:    04/09/19 1044   BP: 128/53   Pulse: 66   Resp: 18   Temp: 98.6 °F (37 °C)   Weight: 78 kg (172 lb)   Height: 167.6 cm (66\")     Constitutional: Appears " well-developed and well-nourished. No distress.   ECOG: (1) Restricted in physically strenuous activity, ambulatory and able to do work of light nature  HENT:   Head: Normocephalic.   Mouth/Throat: Oropharynx is clear and moist.   Eyes: Conjunctivae are normal. Pupils are equal, round, and reactive to light. No scleral icterus.   Neck: Neck supple. No JVD present. No thyromegaly present.   Cardiovascular: Normal rate, regular rhythm and normal heart sounds.    Pulmonary/Chest: Breath sounds normal. No respiratory distress.   Abdominal: Soft. Exhibits no distension and no mass. There is no hepatosplenomegaly. There is no tenderness. There is no rebound and no guarding.   Musculoskeletal:Exhibits no edema, tenderness or deformity.   Neurological: Alert and oriented to person, place, and time. Exhibits normal muscle tone.   Skin: No ecchymosis, no petechiae and no rash noted. Not diaphoretic. No cyanosis. Nails show no clubbing.   Psychiatric: Normal mood and affect.   Vitals reviewed.      No results found for: HGB, HCT, MCV, PLT, WBC, NEUTROABS, LYMPHSABS, MONOSABS, EOSABS, BASOSABS    No results found for: GLUCOSE, BUN, CREATININE, NA, K, CL, CO2, CALCIUM, PROTEINTOT, ALBUMIN, BILITOT, ALKPHOS, AST, ALT                ASSESSMENT & PLAN:    1. Abdominal pain  2. Negrete's esophagus  3. 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 History of low-grade lymphoma    Discussion: I reviewed the PET and images thereof and went over those results.  Though there are some nodes next to the esophagus slightly PET avid, these may well be relatively inflamed nodes to the Negrete's esophagus.  Even if this is lymphoma, these are not bulky nodes and are not symptomatic.  She has no GI involvement from her lymphoma and her counts are stable with no significant thrombocytopenia and her anemia is a little worse now but we will watch that and I suspect in part may be related to anemia of inflammation and/or blood loss from this  Negrete's.  She is having a halo ablation as well as removal of tubulovillous adenoma scheduled at [facility].  We will see her back in 3 months with blood count just prior to return and no plans for scans except as dictated by symptoms.  Discussed with patient 25 minutes greater than 50% spent counseling      Nelson Castañeda MD    04/09/2019

## 2019-07-18 ENCOUNTER — OFFICE VISIT (OUTPATIENT)
Dept: ONCOLOGY | Facility: CLINIC | Age: 79
End: 2019-07-18

## 2019-07-18 VITALS
SYSTOLIC BLOOD PRESSURE: 119 MMHG | TEMPERATURE: 98.3 F | DIASTOLIC BLOOD PRESSURE: 58 MMHG | BODY MASS INDEX: 27.32 KG/M2 | WEIGHT: 170 LBS | HEART RATE: 61 BPM | HEIGHT: 66 IN | RESPIRATION RATE: 18 BRPM

## 2019-07-18 DIAGNOSIS — C85.90 LYMPHOMA, LOW GRADE (HCC): Primary | ICD-10-CM

## 2019-07-18 PROCEDURE — 99213 OFFICE O/P EST LOW 20 MIN: CPT | Performed by: NURSE PRACTITIONER

## 2019-07-18 NOTE — PROGRESS NOTES
CHIEF COMPLAINT: 1.  Low-grade lymphoma with Negrete's esophagus  2.  Scalp lesion    Problem List:  Oncology/Hematology History    1. Low-grade lymphoma:  a) Splenomegaly on hospitalization 02/2016. She had a week of nausea, vomiting  and diarrhea with dehydration. The nausea, vomiting, and dehydration have  resolved but on CT they found retroperitoneal adenopathy with some splenomegaly  that was not bulky. She denies any ongoing fevers or chills or bed drenching  night sweats or unexplained weight loss and no change in the color, caliber or  consistency of her stools. She had a slightly elevated beta-2 microglobulin of  3.5 but no monoclonal spike and a white count 5300, hemoglobin 11.5, platelets  158,000, sedimentation rate 45, C-reactive protein 13.6 and a normal LDH of 318  with normal liver enzymes. Slight hyponatremia with sodium of 134 and  hypokalemia with potassium of 3.3 on 02/18/2016. She had slight decrease in  immunoglobulin G to 580 and immunoglobulin A to 35 on testing while in the  hospital.   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 There were no features to suggest hairy  cell lymphoma or a mantle cell lymphoma and no large cell population. This was  CD5 negative, CD10 negative, kappa clonal negative and bright for CD20 with dim  surface light chain expression and bright cytoplasm, CD45 bright. The CD38  negative for plasmacytic differentiation with a hemoglobin of 11.4, white count  7200 with normal differential, and platelets of 163,000.   c) Liver, spleen ultrasound showed splenomegaly of 19.6 cm maximum dimension  with 1.6 cm slightly dilated portal vein.    D)-3/20/2019 Dr. 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  Colonoscopy  showed hemorrhoids, diverticuli, 11 mm proximal ascending colon flat polyp.  Dr. 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   -3/27/2019 PET shows mild hypermetabolism within the lymph nodes to the left of the proximal third of the esophagus as well as near the tracheal bifurcation and no other area is of abnormal hypermetabolism.  Splenomegaly stable.  She has significant carotid stenosis especially on the right.    2. History of iron deficiency anemia with last colonoscopy 11/2014, according  to the patient, with a history of ulcerative colitis treated by Dr. Edge.     3. History of basal cell carcinomas.   4. Cataracts.   5. Remote history of deep vein thrombosis with vena cava filter in place.   6. Diverticulitis by history.   7. History of gout.   8. Hypertension.   9. History of 3 different kidney surgeries.   10. Cholecystectomy.   11. Chronic neuropathy.   12. History of ARMAAN.   13. History of back surgeries.   14. History of total hip replacement.   15. History of ankle fracture.   16. History of appendectomy.   17. 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 Edge.        Lymphoma, low grade (CMS/HCC)    4/25/2016 Initial Diagnosis     Lymphoma, low grade         5/30/2017 Imaging     CT chest/abdomen/pelvis no obvious pulmonary nodules identified, no pleural effusions.  Stable appearance of adenopathy within the abdomen compared to earlier study as well as splenomegaly.         12/6/2017 Imaging     CT chest/abdomen/pelvis stable, essentially unchanged prominent mediastinal lymph nodes, unchanged right lower lobe medial basal segmental focal fibrotic findings.  Scattered noncalcified small lung nodules appear unchanged.  Stable spine or megaly.   Scattered prominent retroperitoneal lymph nodes now appear smaller.  Unchanged spinal findings consistent with diffuse idiopathic skeletal hyperostosis and prior operative intervention at L4-5.           6/8/2018 Imaging     CT chest abdomen and pelvis without contrast: Stable, no significant change in the appearance compared to earlier study from December 6, 2017.         3/1/2019 Imaging     CT chest, abdomen and pelvis impression: No significant interval change from last year.  Stable appearance of the significantly enlarged spleen which measures up to nearly 20 x 12 cm.  Moderate, scattered upper abdominal adenopathy with innumerable scattered gastrohepatic, retroperitoneal, and mesenteric nodes measuring roughly between 1-2 cm are stable as well.  No significant progression.  Mild mediastinal adenopathy appears stable as well.         3/20/2019 -  Other Event     EGD showed Negrete's esophagus in the lower third of the esophagus 6 cm in length.  Colonoscopy showed hemorrhoids, diverticuli, 11 mm proximal ascending colon flat polyp.  Dr. 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         3/27/2019 Imaging     PET shows mild hypermetabolism within the lymph nodes to the left of the proximal third of the esophagus as well as near the tracheal bifurcation and no other area is of abnormal hypermetabolism.  Splenomegaly stable.  She has significant carotid stenosis especially on the right.            HISTORY OF PRESENT ILLNESS:  The patient is a 78 y.o. female, here for follow up on management of low-grade lymphoma with Negrete's esophagus.  The patient states that she is feeling better than when we saw her last.  She did undergo halo procedure for her Negrete's esophagus, tolerated without any significant complications other than for mild  discomfort.  States that she has a repeat procedure planned in the upcoming months.  She has a lesion on her scalp, states that she had a squamous cell skin lesion removed a few months ago but feels that it has been oozing lately.    Past Medical History:   Diagnosis Date   • Anemia 09/18/2015   • Cataract     h/o   • Diverticulitis    • DVT (deep venous thrombosis) (CMS/HCC)    • Gout    • History of basal cell carcinoma    • Hypertension           Past Surgical History:   Procedure Laterality Date   • APPENDECTOMY     • BACK SURGERY      h/o   • CHOLECYSTECTOMY     • HYSTERECTOMY      jaja   • KIDNEY SURGERY      x3    • ORIF ANKLE FRACTURE      h/o   • TOTAL HIP ARTHROPLASTY      h/o       Allergies   Allergen Reactions   • Hydrocodone    • Keflex [Cephalexin]    • Lortab [Hydrocodone-Acetaminophen]    • Magnesium Citrate    • Penicillins        Family History and Social History reviewed and changed as necessary      REVIEW OF SYSTEM:   Review of Systems   Constitutional: Negative for appetite change, chills, diaphoresis, fatigue, fever and unexpected weight change.   HENT:   Negative for mouth sores, sore throat and trouble swallowing.    Eyes: Negative for icterus.   Respiratory: Negative for cough, hemoptysis and shortness of breath.    Cardiovascular: Negative for chest pain, leg swelling and palpitations.   Gastrointestinal: Negative for abdominal distention, abdominal pain, blood in stool, constipation, diarrhea, nausea and vomiting.   Endocrine: Negative for hot flashes.   Genitourinary: Negative for bladder incontinence, difficulty urinating, dysuria, frequency and hematuria.    Musculoskeletal: Negative for gait problem, neck pain and neck stiffness.   Skin: Negative for rash.   Neurological: Negative for dizziness, gait problem, headaches, light-headedness and numbness.   Hematological: Negative for adenopathy. Does not bruise/bleed easily.   Psychiatric/Behavioral: Negative for depression. The patient  "is not nervous/anxious.    All other systems reviewed and are negative.       PHYSICAL EXAM    Vitals:    07/18/19 1031   BP: 119/58   Pulse: 61   Resp: 18   Temp: 98.3 °F (36.8 °C)   Weight: 77.1 kg (170 lb)   Height: 167.6 cm (66\")     Constitutional: Appears well-developed and well-nourished. No distress.   ECOG: (1) Restricted in physically strenuous activity, ambulatory and able to do work of light nature  HENT:   Head: Normocephalic.   Mouth/Throat: Oropharynx is clear and moist.   Eyes: Conjunctivae are normal. Pupils are equal, round, and reactive to light. No scleral icterus.   Neck: Neck supple. No JVD present. No thyromegaly present.   Cardiovascular: Normal rate, regular rhythm and normal heart sounds.    Pulmonary/Chest: Breath sounds normal. No respiratory distress.   Nodes: No cervical, supraclavicular or axillary nodes palpable on exam.  Abdominal: Soft. Exhibits no distension and no mass. There is no hepatosplenomegaly. There is no tenderness. There is no rebound and no guarding.   Musculoskeletal:Exhibits no edema, tenderness or deformity.   Neurological: Alert and oriented to person, place, and time. Exhibits normal muscle tone.   Skin: No ecchymosis, no petechiae and no rash noted. Not diaphoretic. No cyanosis. Nails show no clubbing. There is an ulcerated lesion approximately 3 cm on the top of her scalp.  Psychiatric: Normal mood and affect.   Vitals reviewed.  Labs reviewed.  7/17/2019 CBC WBC 5700, hemoglobin 10.7, hematocrit 32.6%, platelet count 128,000.    ASSESSMENT & PLAN:    1. History of low-grade lymphoma  2. Negrete's esophagus  3. Scalp lesion    Discussion: The patient is doing well, CBC stable with WBC of 5700, hemoglobin 10.7 and platelet count of 128,000.  No bulky adenopathy on exam.  No indication for treatment at this time.  Indications for treatment would be: 3 more than 3 cm nodes, 1 more than 7 cm node or an enlarging spleen that is symptomatic,\"B\" symptoms, anemia, " thrombocytopenia, leukopenia, or conversely rapidly rising white blood cell count.   We will see her back in 6 months with blood count just prior to return and no plans for scans except as dictated by symptoms.    She will continue to follow with the [facility] regarding her Negrete's esophagus.  She has a second halo procedure planned in the upcoming months.  We have gotten her an appointment for follow-up with her dermatologist, Dr. Overton next week regarding scalp lesion.    I spent 15 minutes with the patient. I spent > 50% percent of this time counseling and discussing prognosis, diagnostic testing, evaluation, current status and management.    Chantell Kyle, APRN    07/18/2019

## 2020-01-21 ENCOUNTER — OFFICE VISIT (OUTPATIENT)
Dept: ONCOLOGY | Facility: CLINIC | Age: 80
End: 2020-01-21

## 2020-01-21 VITALS
BODY MASS INDEX: 27.97 KG/M2 | HEART RATE: 65 BPM | WEIGHT: 174 LBS | SYSTOLIC BLOOD PRESSURE: 127 MMHG | DIASTOLIC BLOOD PRESSURE: 61 MMHG | RESPIRATION RATE: 20 BRPM | TEMPERATURE: 98.9 F | HEIGHT: 66 IN

## 2020-01-21 DIAGNOSIS — C85.90 LYMPHOMA, LOW GRADE (HCC): Primary | ICD-10-CM

## 2020-01-21 PROCEDURE — 99213 OFFICE O/P EST LOW 20 MIN: CPT | Performed by: INTERNAL MEDICINE

## 2020-01-21 NOTE — PROGRESS NOTES
CHIEF COMPLAINT: Follow-up low-grade lymphoma    Problem List:  Oncology/Hematology History    1. Low-grade lymphoma:  a) Splenomegaly on hospitalization 02/2016. She had a week of nausea, vomiting  and diarrhea with dehydration. The nausea, vomiting, and dehydration have  resolved but on CT they found retroperitoneal adenopathy with some splenomegaly  that was not bulky. She denies any ongoing fevers or chills or bed drenching  night sweats or unexplained weight loss and no change in the color, caliber or  consistency of her stools. She had a slightly elevated beta-2 microglobulin of  3.5 but no monoclonal spike and a white count 5300, hemoglobin 11.5, platelets  158,000, sedimentation rate 45, C-reactive protein 13.6 and a normal LDH of 318  with normal liver enzymes. Slight hyponatremia with sodium of 134 and  hypokalemia with potassium of 3.3 on 02/18/2016. She had slight decrease in  immunoglobulin G to 580 and immunoglobulin A to 35 on testing while in the  hospital.   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 There were no features to suggest hairy  cell lymphoma or a mantle cell lymphoma and no large cell population. This was  CD5 negative, CD10 negative, kappa clonal negative and bright for CD20 with dim  surface light chain expression and bright cytoplasm, CD45 bright. The CD38  negative for plasmacytic differentiation with a hemoglobin of 11.4, white count  7200 with normal differential, and platelets of 163,000.   c) Liver, spleen ultrasound showed splenomegaly of 19.6 cm maximum dimension  with 1.6 cm slightly dilated portal vein.    D)-3/20/2019 Dr. 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  Colonoscopy showed hemorrhoids, diverticuli, 11  mm proximal ascending colon flat polyp.  Dr. 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   -3/27/2019 PET shows mild hypermetabolism within the lymph nodes to the left of the proximal third of the esophagus as well as near the tracheal bifurcation and no other area is of abnormal hypermetabolism.  Splenomegaly stable.  She has significant carotid stenosis especially on the right.    2. History of iron deficiency anemia with last colonoscopy 11/2014, according  to the patient, with a history of ulcerative colitis treated by Dr. Edge.     3. History of basal cell carcinomas.   4. Cataracts.   5. Remote history of deep vein thrombosis with vena cava filter in place.   6. Diverticulitis by history.   7. History of gout.   8. Hypertension.   9. History of 3 different kidney surgeries.   10. Cholecystectomy.   11. Chronic neuropathy.   12. History of ARMAAN.   13. History of back surgeries.   14. History of total hip replacement.   15. History of ankle fracture.   16. History of appendectomy.   17. 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 Edge.        Lymphoma, low grade (CMS/HCC)    4/25/2016 Initial Diagnosis     Lymphoma, low grade      5/30/2017 Imaging     CT chest/abdomen/pelvis no obvious pulmonary nodules identified, no pleural effusions.  Stable appearance of adenopathy within the abdomen compared to earlier study as well as splenomegaly.      12/6/2017 Imaging     CT chest/abdomen/pelvis stable, essentially unchanged prominent mediastinal lymph nodes, unchanged right lower lobe medial basal segmental focal fibrotic findings.  Scattered noncalcified small lung nodules appear unchanged.  Stable spine or megaly.  Scattered prominent retroperitoneal lymph nodes  now appear smaller.  Unchanged spinal findings consistent with diffuse idiopathic skeletal hyperostosis and prior operative intervention at L4-5.        6/8/2018 Imaging     CT chest abdomen and pelvis without contrast: Stable, no significant change in the appearance compared to earlier study from December 6, 2017.      3/1/2019 Imaging     CT chest, abdomen and pelvis impression: No significant interval change from last year.  Stable appearance of the significantly enlarged spleen which measures up to nearly 20 x 12 cm.  Moderate, scattered upper abdominal adenopathy with innumerable scattered gastrohepatic, retroperitoneal, and mesenteric nodes measuring roughly between 1-2 cm are stable as well.  No significant progression.  Mild mediastinal adenopathy appears stable as well.      3/20/2019 -  Other Event     EGD showed Negrete's esophagus in the lower third of the esophagus 6 cm in length.  Colonoscopy showed hemorrhoids, diverticuli, 11 mm proximal ascending colon flat polyp.  Dr. 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      3/27/2019 Imaging     PET shows mild hypermetabolism within the lymph nodes to the left of the proximal third of the esophagus as well as near the tracheal bifurcation and no other area is of abnormal hypermetabolism.  Splenomegaly stable.  She has significant carotid stenosis especially on the right.         HISTORY OF PRESENT ILLNESS:  The patient is a 79 y.o. female, here for follow up on management of low-grade lymphoma with splenomegaly.  12/30/2019 white count 7800 hemoglobin 10.9 with platelets 123,000.  These counts are virtually identical to July 2019.  The wound to her scalp treated by Dr. Overton in July has improved.  EGD 1/9/2020 at  showed rare goblet cells with pseudo-goblet cells consistent  with Negrete's esophagus with intestinal metaplasia but no dysplasia by Dr. Saba.      Past Medical History:   Diagnosis Date   • Anemia 09/18/2015   • Cataract     h/o   • Diverticulitis    • DVT (deep venous thrombosis) (CMS/HCC)    • Gout    • History of basal cell carcinoma    • Hypertension           Past Surgical History:   Procedure Laterality Date   • APPENDECTOMY     • BACK SURGERY      h/o   • CHOLECYSTECTOMY     • HYSTERECTOMY      jaja   • KIDNEY SURGERY      x3    • ORIF ANKLE FRACTURE      h/o   • TOTAL HIP ARTHROPLASTY      h/o       Allergies   Allergen Reactions   • Hydrocodone    • Keflex [Cephalexin]    • Lortab [Hydrocodone-Acetaminophen]    • Magnesium Citrate    • Penicillins        Family History and Social History reviewed and changed as necessary      REVIEW OF SYSTEM:   Review of Systems   Constitutional: Negative for appetite change, chills, diaphoresis, fatigue, fever and unexpected weight change.   HENT:   Negative for mouth sores, sore throat and trouble swallowing.    Eyes: Negative for icterus.   Respiratory: Negative for cough, hemoptysis and shortness of breath.    Cardiovascular: Negative for chest pain, leg swelling and palpitations.   Gastrointestinal: Negative for abdominal distention, abdominal pain, blood in stool, constipation, diarrhea, nausea and vomiting.   Endocrine: Negative for hot flashes.   Genitourinary: Negative for bladder incontinence, difficulty urinating, dysuria, frequency and hematuria.    Musculoskeletal: Negative for gait problem, neck pain and neck stiffness.   Skin: Negative for rash.   Neurological: Negative for dizziness, gait problem, headaches, light-headedness and numbness.   Hematological: Negative for adenopathy. Does not bruise/bleed easily.   Psychiatric/Behavioral: Negative for depression. The patient is not nervous/anxious.    All other systems reviewed and are negative.       PHYSICAL EXAM    Vitals:    01/21/20 1149   BP: 127/61   Pulse: 65  "  Resp: 20   Temp: 98.9 °F (37.2 °C)   Weight: 78.9 kg (174 lb)   Height: 167.6 cm (66\")     Constitutional: Appears well-developed and well-nourished. No distress.   ECOG: (1) Restricted in physically strenuous activity, ambulatory and able to do work of light nature  HENT:   Head: Normocephalic.   Mouth/Throat: Oropharynx is clear and moist.   Eyes: Conjunctivae are normal. Pupils are equal, round, and reactive to light. No scleral icterus.   Neck: Neck supple. No JVD present. No thyromegaly present.   Cardiovascular: Normal rate, regular rhythm and normal heart sounds.    Pulmonary/Chest: Breath sounds normal. No respiratory distress.   Abdominal: Soft. Exhibits no distension and no mass. There is no hepatosplenomegaly. There is no tenderness. There is no rebound and no guarding.   Musculoskeletal:Exhibits no edema, tenderness or deformity.   Neurological: Alert and oriented to person, place, and time. Exhibits normal muscle tone.   Skin: No ecchymosis, no petechiae and no rash noted. Not diaphoretic. No cyanosis. Nails show no clubbing.   Psychiatric: Normal mood and affect.   Vitals reviewed.      No results found for: HGB, HCT, MCV, PLT, WBC, NEUTROABS, LYMPHSABS, MONOSABS, EOSABS, BASOSABS    No results found for: GLUCOSE, BUN, CREATININE, NA, K, CL, CO2, CALCIUM, PROTEINTOT, ALBUMIN, BILITOT, ALKPHOS, AST, ALT                ASSESSMENT & PLAN:  1. Low-grade lymphoma as above with splenomegaly  2. 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 Tubulovillous adenoma    Discussion: I reviewed her blood counts as above.  While her hemoglobin and platelets are little low, they have not decreased in the last 6 months and she has no bulky adenopathy on exam and no symptoms from her splenomegaly.  As long as counts are stable and there is no progressive significant anemia, thrombocytopenia, bulky nodes, or constitutional complaints I will continue watchful waiting.  She will see my nurse " practitioner back in 6 months with CBC prior to return.  Continues to get every 6 months EGD at .  Discussed with patient face-to-face 15 minutes greater than 50% spent counseling        Nelson Castañeda MD    01/21/2020

## 2020-07-23 ENCOUNTER — OFFICE VISIT (OUTPATIENT)
Dept: ONCOLOGY | Facility: CLINIC | Age: 80
End: 2020-07-23

## 2020-07-23 VITALS
DIASTOLIC BLOOD PRESSURE: 67 MMHG | WEIGHT: 173 LBS | HEART RATE: 67 BPM | RESPIRATION RATE: 18 BRPM | SYSTOLIC BLOOD PRESSURE: 144 MMHG | HEIGHT: 66 IN | TEMPERATURE: 98.7 F | BODY MASS INDEX: 27.8 KG/M2

## 2020-07-23 DIAGNOSIS — C85.90 LYMPHOMA, LOW GRADE (HCC): Primary | ICD-10-CM

## 2020-07-23 PROCEDURE — 99213 OFFICE O/P EST LOW 20 MIN: CPT | Performed by: NURSE PRACTITIONER

## 2020-07-23 RX ORDER — ASPIRIN 81 MG/1
81 TABLET, CHEWABLE ORAL DAILY
COMMUNITY

## 2020-07-23 RX ORDER — SULFAMETHOXAZOLE AND TRIMETHOPRIM 800; 160 MG/1; MG/1
1 TABLET ORAL 2 TIMES DAILY
COMMUNITY
End: 2021-01-20

## 2020-07-23 NOTE — PROGRESS NOTES
CHIEF COMPLAINT: 1.  Scalp lesion  2.  Low-grade lymphoma    Problem List:  Oncology/Hematology History    1. Low-grade lymphoma:  a) Splenomegaly on hospitalization 02/2016. She had a week of nausea, vomiting  and diarrhea with dehydration. The nausea, vomiting, and dehydration have  resolved but on CT they found retroperitoneal adenopathy with some splenomegaly  that was not bulky. She denies any ongoing fevers or chills or bed drenching  night sweats or unexplained weight loss and no change in the color, caliber or  consistency of her stools. She had a slightly elevated beta-2 microglobulin of  3.5 but no monoclonal spike and a white count 5300, hemoglobin 11.5, platelets  158,000, sedimentation rate 45, C-reactive protein 13.6 and a normal LDH of 318  with normal liver enzymes. Slight hyponatremia with sodium of 134 and  hypokalemia with potassium of 3.3 on 02/18/2016. She had slight decrease in  immunoglobulin G to 580 and immunoglobulin A to 35 on testing while in the  hospital.   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 There were no features to suggest hairy  cell lymphoma or a mantle cell lymphoma and no large cell population. This was  CD5 negative, CD10 negative, kappa clonal negative and bright for CD20 with dim  surface light chain expression and bright cytoplasm, CD45 bright. The CD38  negative for plasmacytic differentiation with a hemoglobin of 11.4, white count  7200 with normal differential, and platelets of 163,000.   c) Liver, spleen ultrasound showed splenomegaly of 19.6 cm maximum dimension  with 1.6 cm slightly dilated portal vein.    D)-3/20/2019 Dr. 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  Colonoscopy showed hemorrhoids,  diverticuli, 11 mm proximal ascending colon flat polyp.  Dr. 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   -3/27/2019 PET shows mild hypermetabolism within the lymph nodes to the left of the proximal third of the esophagus as well as near the tracheal bifurcation and no other area is of abnormal hypermetabolism.  Splenomegaly stable.  She has significant carotid stenosis especially on the right.    2. History of iron deficiency anemia with last colonoscopy 11/2014, according  to the patient, with a history of ulcerative colitis treated by Dr. Edge.     3. History of basal cell carcinomas.   4. Cataracts.   5. Remote history of deep vein thrombosis with vena cava filter in place.   6. Diverticulitis by history.   7. History of gout.   8. Hypertension.   9. History of 3 different kidney surgeries.   10. Cholecystectomy.   11. Chronic neuropathy.   12. History of ARMAAN.   13. History of back surgeries.   14. History of total hip replacement.   15. History of ankle fracture.   16. History of appendectomy.   17. 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 Edge.        Lymphoma, low grade (CMS/HCC)    4/25/2016 Initial Diagnosis     Lymphoma, low grade      5/30/2017 Imaging     CT chest/abdomen/pelvis no obvious pulmonary nodules identified, no pleural effusions.  Stable appearance of adenopathy within the abdomen compared to earlier study as well as splenomegaly.      12/6/2017 Imaging     CT chest/abdomen/pelvis stable, essentially unchanged prominent mediastinal lymph nodes, unchanged right lower lobe medial basal segmental focal fibrotic findings.  Scattered noncalcified small lung nodules appear unchanged.  Stable spine or megaly.  Scattered prominent  retroperitoneal lymph nodes now appear smaller.  Unchanged spinal findings consistent with diffuse idiopathic skeletal hyperostosis and prior operative intervention at L4-5.        6/8/2018 Imaging     CT chest abdomen and pelvis without contrast: Stable, no significant change in the appearance compared to earlier study from December 6, 2017.      3/1/2019 Imaging     CT chest, abdomen and pelvis impression: No significant interval change from last year.  Stable appearance of the significantly enlarged spleen which measures up to nearly 20 x 12 cm.  Moderate, scattered upper abdominal adenopathy with innumerable scattered gastrohepatic, retroperitoneal, and mesenteric nodes measuring roughly between 1-2 cm are stable as well.  No significant progression.  Mild mediastinal adenopathy appears stable as well.      3/20/2019 -  Other Event     EGD showed Negrete's esophagus in the lower third of the esophagus 6 cm in length.  Colonoscopy showed hemorrhoids, diverticuli, 11 mm proximal ascending colon flat polyp.  Dr. 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      3/27/2019 Imaging     PET shows mild hypermetabolism within the lymph nodes to the left of the proximal third of the esophagus as well as near the tracheal bifurcation and no other area is of abnormal hypermetabolism.  Splenomegaly stable.  She has significant carotid stenosis especially on the right.         HISTORY OF PRESENT ILLNESS:  The patient is a 79 y.o. female, here for follow up on management of low-grade lymphoma.  The patient reports since we saw her last she is being treated for a lesion on her scalp that she has had for quite some time but reports that it is now infected.  She sees dermatology, followed by Barbara Corea PA-C.  Also reports a fall about a month ago  at her home due to peripheral neuropathy, she just lost her footing.  She is a little sore from that but otherwise no new complaints.  Appetite is normal, weight is stable.  No new pain.  No change in her bowel or bladder habits.      Past Medical History:   Diagnosis Date   • Anemia 09/18/2015   • Cataract     h/o   • Diverticulitis    • DVT (deep venous thrombosis) (CMS/HCC)    • Gout    • History of basal cell carcinoma    • Hypertension           Past Surgical History:   Procedure Laterality Date   • APPENDECTOMY     • BACK SURGERY      h/o   • CHOLECYSTECTOMY     • HYSTERECTOMY      jaja   • KIDNEY SURGERY      x3    • ORIF ANKLE FRACTURE      h/o   • TOTAL HIP ARTHROPLASTY      h/o       Allergies   Allergen Reactions   • Hydrocodone    • Keflex [Cephalexin]    • Lortab [Hydrocodone-Acetaminophen]    • Magnesium Citrate    • Penicillins        Family History and Social History reviewed and changed as necessary      REVIEW OF SYSTEM:   Review of Systems   Constitutional: Negative for appetite change, chills, diaphoresis, fatigue, fever and unexpected weight change.   HENT:   Negative for mouth sores, sore throat and trouble swallowing.    Eyes: Negative for icterus.   Respiratory: Negative for cough, hemoptysis and shortness of breath.    Cardiovascular: Negative for chest pain, leg swelling and palpitations.   Gastrointestinal: Negative for abdominal distention, abdominal pain, blood in stool, constipation, diarrhea, nausea and vomiting.   Endocrine: Negative for hot flashes.   Genitourinary: Negative for bladder incontinence, difficulty urinating, dysuria, frequency and hematuria.    Musculoskeletal: Negative for gait problem, neck pain and neck stiffness.   Skin: Negative for rash.  Positive for scalp lesion.  Neurological: Negative for dizziness, gait problem, headaches, light-headedness.  Positive for peripheral neuropathy of her feet.  Hematological: Negative for adenopathy. Does not bruise/bleed easily.  "  Psychiatric/Behavioral: Negative for depression. The patient is not nervous/anxious.    All other systems reviewed and are negative.       PHYSICAL EXAM    Vitals:    07/23/20 1124   BP: 144/67   Pulse: 67   Resp: 18   Temp: 98.7 °F (37.1 °C)   Weight: 78.5 kg (173 lb)   Height: 167.6 cm (66\")     Vitals:    07/23/20 1124   PainSc: 0-No pain        Constitutional: Appears well-developed and well-nourished. No distress.   ECOG: (1) Restricted in Physically Strenuous Activity, Ambulatory & Able to Do Work of Light Nature  HENT:   Head: Normocephalic.   Mouth/Throat: Oropharynx is clear and moist.   Eyes: Conjunctivae are normal. Pupils are equal, round, and reactive to light. No scleral icterus.   Neck: Neck supple. No JVD present. No thyromegaly present.   Cardiovascular: Normal rate, regular rhythm and normal heart sounds.    Pulmonary/Chest: Breath sounds normal. No respiratory distress.   Nodes: No cervical, supraclavicular or axillary nodes palpable on exam.  Abdominal: Soft. Exhibits no distension and no mass. There is no hepatosplenomegaly. There is no tenderness. There is no rebound and no guarding.   Musculoskeletal:Exhibits no edema, tenderness or deformity.   Neurological: Alert and oriented to person, place, and time. Exhibits normal muscle tone.   Skin: No ecchymosis, no petechiae and no rash noted. Not diaphoretic. No cyanosis. Nails show no clubbing.  Right scalp lesion approximately 4 cm with some excoriation of the skin, area is moist.  Psychiatric: Normal mood and affect.   Vitals reviewed.  Labs reviewed.  7/16/2020 CBC WBC 6400, hemoglobin 11.1, hematocrit 34.9%, platelet count 115,000, ANC 3200.      ASSESSMENT & PLAN:    1.  Low-grade lymphoma  2.  Splenomegaly  3.  Scalp lesion  4.  Negrete's esophagus    Discussion: Overall the patient is feeling well.  CBC is stable as shown above.  She has no bulky adenopathy on exam, and no worrisome symptoms from her splenomegaly.  No indications for " treatment as long as her counts are stable, she has no progressing or significant anemia, thrombocytopenia, no bulky nodes or constitutional complaints.  We will continue with watchful waiting.  She will continue to follow with dermatology regarding her scalp lesion.  Has follow-up with Dr. Edge regarding her Negrete's esophagus.  I will see her back in 6 months for follow-up with CBC prior to return and I have ordered that today.    I spent a total of 15 minutes in direct patient care, greater than 10  minutes face to face, time was spent in coordination of care, and counseling the patient regarding review of current labs, assessment and plan of care going forward.  Answered any questions patient had regarding medications and plan of care.     Chantell Kyle, APRN    07/23/2020

## 2021-01-12 ENCOUNTER — TELEPHONE (OUTPATIENT)
Dept: ONCOLOGY | Facility: CLINIC | Age: 81
End: 2021-01-12

## 2021-01-12 NOTE — TELEPHONE ENCOUNTER
CALLER: PT    REASON:    PT HAS AN UPCOMING F/U APPT WITH US AND HAS LABS SCHEDULED. PT IS WONDERING IF SHE HAD THE LABS THAT WERE DONE AT PCP OFFICE FAXED OVER WOULD SHE NEED TO STILL DO LABS FOR US.    PLEASE ADVISE    [facility] C/B: [phone]

## 2021-01-12 NOTE — TELEPHONE ENCOUNTER
Called patient back, she states labs were drawn on Saturday.  Informed her that as long as the results contained a CBC and a CMP that she did not have to have labs redrawn. She is going to contact her PCP's office and have them send results to us.

## 2021-01-20 ENCOUNTER — OFFICE VISIT (OUTPATIENT)
Dept: ONCOLOGY | Facility: CLINIC | Age: 81
End: 2021-01-20

## 2021-01-20 VITALS
HEART RATE: 69 BPM | RESPIRATION RATE: 20 BRPM | BODY MASS INDEX: 27.32 KG/M2 | DIASTOLIC BLOOD PRESSURE: 53 MMHG | TEMPERATURE: 98.5 F | WEIGHT: 170 LBS | SYSTOLIC BLOOD PRESSURE: 142 MMHG | HEIGHT: 66 IN

## 2021-01-20 DIAGNOSIS — R10.9 LEFT SIDED ABDOMINAL PAIN: ICD-10-CM

## 2021-01-20 DIAGNOSIS — R16.1 SPLENOMEGALY: ICD-10-CM

## 2021-01-20 DIAGNOSIS — C85.88 MARGINAL ZONE LYMPHOMA OF LYMPH NODES OF MULTIPLE SITES (HCC): ICD-10-CM

## 2021-01-20 DIAGNOSIS — R50.9 FEVER IN ADULT: ICD-10-CM

## 2021-01-20 DIAGNOSIS — C85.90 LYMPHOMA, LOW GRADE (HCC): Primary | ICD-10-CM

## 2021-01-20 DIAGNOSIS — R18.8 OTHER ASCITES: ICD-10-CM

## 2021-01-20 PROCEDURE — 99215 OFFICE O/P EST HI 40 MIN: CPT | Performed by: NURSE PRACTITIONER

## 2021-01-20 RX ORDER — MESALAMINE 400 MG/1
1 CAPSULE, DELAYED RELEASE ORAL DAILY
COMMUNITY

## 2021-01-20 RX ORDER — ALLOPURINOL 300 MG/1
300 TABLET ORAL DAILY
COMMUNITY
Start: 2020-10-14

## 2021-01-20 NOTE — PROGRESS NOTES
CHIEF COMPLAINT: 1.  Fevers   2.  Left sided abdominal pain   3.  Lymphoma    Problem List:  Oncology/Hematology History Overview Note   1. Low-grade lymphoma:  a) Splenomegaly on hospitalization 02/2016. She had a week of nausea, vomiting  and diarrhea with dehydration. The nausea, vomiting, and dehydration have  resolved but on CT they found retroperitoneal adenopathy with some splenomegaly  that was not bulky. She denies any ongoing fevers or chills or bed drenching  night sweats or unexplained weight loss and no change in the color, caliber or  consistency of her stools. She had a slightly elevated beta-2 microglobulin of  3.5 but no monoclonal spike and a white count 5300, hemoglobin 11.5, platelets  158,000, sedimentation rate 45, C-reactive protein 13.6 and a normal LDH of 318  with normal liver enzymes. Slight hyponatremia with sodium of 134 and  hypokalemia with potassium of 3.3 on 02/18/2016. She had slight decrease in  immunoglobulin G to 580 and immunoglobulin A to 35 on testing while in the  hospital.   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 There were no features to suggest hairy  cell lymphoma or a mantle cell lymphoma and no large cell population. This was  CD5 negative, CD10 negative, kappa clonal negative and bright for CD20 with dim  surface light chain expression and bright cytoplasm, CD45 bright. The CD38  negative for plasmacytic differentiation with a hemoglobin of 11.4, white count  7200 with normal differential, and platelets of 163,000.   c) Liver, spleen ultrasound showed splenomegaly of 19.6 cm maximum dimension  with 1.6 cm slightly dilated portal vein.    D)-3/20/2019 Dr. 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   Colonoscopy showed hemorrhoids, diverticuli, 11 mm proximal ascending colon flat polyp.  Dr. 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   -3/27/2019 PET shows mild hypermetabolism within the lymph nodes to the left of the proximal third of the esophagus as well as near the tracheal bifurcation and no other area is of abnormal hypermetabolism.  Splenomegaly stable.  She has significant carotid stenosis especially on the right.    2. History of iron deficiency anemia with last colonoscopy 11/2014, according  to the patient, with a history of ulcerative colitis treated by Dr. Edge.     3. History of basal cell carcinomas.   4. Cataracts.   5. Remote history of deep vein thrombosis with vena cava filter in place.   6. Diverticulitis by history.   7. History of gout.   8. Hypertension.   9. History of 3 different kidney surgeries.   10. Cholecystectomy.   11. Chronic neuropathy.   12. History of ARMAAN.   13. History of back surgeries.   14. History of total hip replacement.   15. History of ankle fracture.   16. History of appendectomy.   17. 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 Edge.     Lymphoma, low grade (CMS/HCC)   4/25/2016 Initial Diagnosis    Lymphoma, low grade     5/30/2017 Imaging    CT chest/abdomen/pelvis no obvious pulmonary nodules identified, no pleural effusions.  Stable appearance of adenopathy within the abdomen compared to earlier study as well as splenomegaly.     12/6/2017 Imaging    CT chest/abdomen/pelvis stable, essentially unchanged prominent mediastinal lymph nodes, unchanged right lower lobe medial basal segmental focal fibrotic findings.  Scattered noncalcified small lung nodules appear unchanged.  Stable spine or megaly.  Scattered  prominent retroperitoneal lymph nodes now appear smaller.  Unchanged spinal findings consistent with diffuse idiopathic skeletal hyperostosis and prior operative intervention at L4-5.       6/8/2018 Imaging    CT chest abdomen and pelvis without contrast: Stable, no significant change in the appearance compared to earlier study from December 6, 2017.     3/1/2019 Imaging    CT chest, abdomen and pelvis impression: No significant interval change from last year.  Stable appearance of the significantly enlarged spleen which measures up to nearly 20 x 12 cm.  Moderate, scattered upper abdominal adenopathy with innumerable scattered gastrohepatic, retroperitoneal, and mesenteric nodes measuring roughly between 1-2 cm are stable as well.  No significant progression.  Mild mediastinal adenopathy appears stable as well.     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  Colonoscopy showed hemorrhoids, diverticuli, 11 mm proximal ascending colon flat polyp.  Dr. 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     3/27/2019 Imaging    PET shows mild hypermetabolism within the lymph nodes to the left of the proximal third of the esophagus as well as near the tracheal bifurcation and no other area is of abnormal hypermetabolism.  Splenomegaly stable.  She has significant carotid stenosis especially on the right.         HISTORY OF PRESENT ILLNESS:  The patient is a 80 y.o. 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  And she has been on watchful waiting  since.  She reports over the last several weeks she has been having fevers in the evening, up to 101.  She saw her primary care provider last week and they did do Covid testing that was negative.  She also is having more left sided abdominal pain.  She saw Dr. Edge and she is scheduled for an EGD and colonoscopy on February 2.  She has not noted any black tarry stools or blood in her stools.  Appetite is about the same, she states that she eats small frequent meals as she has early satiety with large meals.  She feels more bloated, she thinks she has lost some weight.  She had a fall this summer while working outside in the garden when she lost her footing, had CT of the head in [place] that was negative.  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  No lymphadenopathy that she is aware of.  No nausea or vomiting.  No pain other than for the left abdominal pain.  She does have diarrhea most mornings but this is not new.    Past Medical History:   Diagnosis Date   • Anemia 09/18/2015   • Cataract     h/o   • Diverticulitis    • DVT (deep venous thrombosis) (CMS/HCC)    • Gout    • History of basal cell carcinoma    • Hypertension           Past Surgical History:   Procedure Laterality Date   • APPENDECTOMY     • BACK SURGERY      h/o   • CHOLECYSTECTOMY     • HYSTERECTOMY      jaja   • KIDNEY SURGERY      x3    • ORIF ANKLE FRACTURE      h/o   • TOTAL HIP ARTHROPLASTY      h/o       Allergies   Allergen Reactions   • Hydrocodone    • Keflex [Cephalexin]    • Lortab [Hydrocodone-Acetaminophen]    • Magnesium Citrate    • Penicillins        Family History and Social History reviewed and changed as necessary    REVIEW OF SYSTEM:   Positive for left sided abdominal pain and tenderness.    Positive for intermittent diarrhea.    Positive for fevers.    Positive for early satiety.    PHYSICAL EXAM:  General: Pleasant, elderly female in no distress.  She is here with her daughter  "today.  She appears well-developed, she is thin appearing from the chest up however she has some ascites of her abdomen which makes her appear heavier than she is.  She also has chronic at least 2+ lower extremity swelling, she is wearing bilateral compression stockings.  Abdomen:  Positive splenomegaly 3-4 fingerbreadths below rib cage, left upper quadrant tender to palpation over spleen.  There is fluid fluctuation consistent with mild ascites of the abdomen.  Skin: Patient with lesion on the top of her scalp with confluent area of erythema and nonhealing skin lesion, currently under treatment for squamous cell skin cancer.    Vitals:    01/20/21 1255   BP: 142/53   Pulse: 69   Resp: 20   Temp: 98.5 °F (36.9 °C)   Weight: 77.1 kg (170 lb)   Height: 167.6 cm (66\")     Vitals:    01/20/21 1255   PainSc:   2   PainLoc: Abdomen  Comment: left side of abdomen        Labs reviewed.  CBC from 1/12/2021 with WBC 4200, hemoglobin 10, hematocrit 31.3%, platelet count 114,000, ANC 1.9.  I also reviewed labs from her primary care provider from 1/9/2021 that included urinalysis, CMP with glucose 157, BUN 20, creatinine 1.04, sodium 133, calcium 9.2, total protein 6.0, total bilirubin 0.5, alkaline phosphatase 69, AST 17, ALT 9.  Vitals reviewed.    ECOG: (1) Restricted in Physically Strenuous Activity, Ambulatory & Able to Do Work of Light Nature        ASSESSMENT & PLAN:      1.  Low-grade lymphoma per bone marrow biopsy April 2016 that showed bone marrow involvement by low-grade B-cell lymphoma with indeterminate phenotype, pathologist favored low-grade marginal zone lymphoma.  2.  Splenomegaly  3.  Fevers of unknown origin  4.  Left-sided abdominal pain  5.  Squamous cell carcinoma of the scalp  6.  Negrete's esophagus    Discussion: The patient is having fevers of unknown origin, she did have Covid testing about a week ago that was negative.  She has no respiratory or infectious symptoms.  She has left-sided tenderness and " known splenomegaly and some ascites.  I am concerned for progression of her low-grade lymphoma.  I will get a PET/CT for further evaluation, if there were something to light up on PET scan we would want to biopsy to make sure this is not converting to high-grade lymphoma.  She is scheduled for an EGD and colonoscopy on February 2 with Dr. Edge.  And mild thrombocytopenia, white count was normal.  She has a squamous cell carcinoma of her scalp that was biopsied by dermatology, this has been present since at least when I saw her in July, currently being treated with topical cream.    We will see her back for follow-up in about 2 weeks to go over the results of her PET scan.    This was a level 5 [facility] visit with 1 chronic illness that potentially poses a threat to life or bodily function with low-grade lymphoma possibly converting to high-grade with new symptoms and fevers, discussion of management with the patient and her daughter today and ordering of PET/CT for further evaluation.  Chantell Kyle, APRN    01/20/2021

## 2021-02-01 ENCOUNTER — HOSPITAL ENCOUNTER (OUTPATIENT)
Dept: PET IMAGING | Facility: HOSPITAL | Age: 81
Discharge: HOME | End: 2021-02-01

## 2021-02-01 DIAGNOSIS — C85.88 MARGINAL ZONE LYMPHOMA OF LYMPH NODES OF MULTIPLE SITES (HCC): ICD-10-CM

## 2021-02-01 DIAGNOSIS — R50.9 FEVER IN ADULT: ICD-10-CM

## 2021-02-01 DIAGNOSIS — R10.9 LEFT SIDED ABDOMINAL PAIN: ICD-10-CM

## 2021-02-01 DIAGNOSIS — R18.8 OTHER ASCITES: ICD-10-CM

## 2021-02-01 DIAGNOSIS — R16.1 SPLENOMEGALY: ICD-10-CM

## 2021-02-01 DIAGNOSIS — C85.90 LYMPHOMA, LOW GRADE (HCC): ICD-10-CM

## 2021-02-01 LAB — GLUCOSE BLDC GLUCOMTR-MCNC: 99 MG/DL (ref 70–130)

## 2021-02-01 PROCEDURE — 82962 GLUCOSE BLOOD TEST: CPT

## 2021-02-01 PROCEDURE — 0 FLUDEOXYGLUCOSE F18 SOLUTION: Performed by: NURSE PRACTITIONER

## 2021-02-01 PROCEDURE — 78815 PET IMAGE W/CT SKULL-THIGH: CPT

## 2021-02-01 PROCEDURE — A9552 F18 FDG: HCPCS | Performed by: NURSE PRACTITIONER

## 2021-02-01 RX ADMIN — FLUDEOXYGLUCOSE F18 1 DOSE: 300 INJECTION INTRAVENOUS at 14:19

## 2021-02-09 ENCOUNTER — OFFICE VISIT (OUTPATIENT)
Dept: ONCOLOGY | Facility: CLINIC | Age: 81
End: 2021-02-09

## 2021-02-09 VITALS
RESPIRATION RATE: 18 BRPM | TEMPERATURE: 97.6 F | HEIGHT: 66 IN | BODY MASS INDEX: 27 KG/M2 | DIASTOLIC BLOOD PRESSURE: 56 MMHG | HEART RATE: 71 BPM | SYSTOLIC BLOOD PRESSURE: 146 MMHG | WEIGHT: 168 LBS

## 2021-02-09 DIAGNOSIS — C85.90 LYMPHOMA, LOW GRADE (HCC): Primary | ICD-10-CM

## 2021-02-09 PROCEDURE — 99215 OFFICE O/P EST HI 40 MIN: CPT | Performed by: INTERNAL MEDICINE

## 2021-02-09 NOTE — PROGRESS NOTES
CHIEF COMPLAINT: History of low-grade lymphoma with stable splenomegaly    Problem List:  Oncology/Hematology History Overview Note   1. Low-grade lymphoma:  a) Splenomegaly on hospitalization 02/2016. She had a week of nausea, vomiting  and diarrhea with dehydration. The nausea, vomiting, and dehydration have  resolved but on CT they found retroperitoneal adenopathy with some splenomegaly  that was not bulky. She denies any ongoing fevers or chills or bed drenching  night sweats or unexplained weight loss and no change in the color, caliber or  consistency of her stools. She had a slightly elevated beta-2 microglobulin of  3.5 but no monoclonal spike and a white count 5300, hemoglobin 11.5, platelets  158,000, sedimentation rate 45, C-reactive protein 13.6 and a normal LDH of 318  with normal liver enzymes. Slight hyponatremia with sodium of 134 and  hypokalemia with potassium of 3.3 on 02/18/2016. She had slight decrease in  immunoglobulin G to 580 and immunoglobulin A to 35 on testing while in the  hospital.   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 There were no features to suggest hairy  cell lymphoma or a mantle cell lymphoma and no large cell population. This was  CD5 negative, CD10 negative, kappa clonal negative and bright for CD20 with dim  surface light chain expression and bright cytoplasm, CD45 bright. The CD38  negative for plasmacytic differentiation with a hemoglobin of 11.4, white count  7200 with normal differential, and platelets of 163,000.   c) Liver, spleen ultrasound showed splenomegaly of 19.6 cm maximum dimension  with 1.6 cm slightly dilated portal vein.    D)-3/20/2019 Dr. 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   Colonoscopy showed hemorrhoids, diverticuli, 11 mm proximal ascending colon flat polyp.  Dr. 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   -3/27/2019 PET shows mild hypermetabolism within the lymph nodes to the left of the proximal third of the esophagus as well as near the tracheal bifurcation and no other area is of abnormal hypermetabolism.  Splenomegaly stable.  She has significant carotid stenosis especially on the right.  -1/12/2021 white count 4200 hemoglobin 10 platelets 114,000 unremarkable differential.  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    -2/1/2021 PET negative.  Spleen is unremarkable with homogeneous uptake.    -2/5/2021 EGD and colonoscopy with Dr. Edge showed Negrete's esophagus, hemorrhoids, diverticuli, proximal ascending colon polyp.  No mention of any colitis.    2. History of iron deficiency anemia with last colonoscopy 11/2014, according  to the patient, with a history of ulcerative colitis treated by Dr. Edge.     3. History of basal cell carcinomas.   4. Cataracts.   5. Remote history of deep vein thrombosis with vena cava filter in place.   6. Diverticulitis by history.   7. History of gout.   8. Hypertension.   9. History of 3 different kidney surgeries.   10. Cholecystectomy.   11. Chronic neuropathy.   12. History of ARMAAN.   13. History of back surgeries.   14. History of total hip replacement.   15. History of ankle fracture.   16. History of appendectomy.   17. 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 Edge.     Lymphoma, low grade (CMS/HCC)   4/25/2016 Initial Diagnosis    Lymphoma, low grade     5/30/2017 Imaging    CT  chest/abdomen/pelvis no obvious pulmonary nodules identified, no pleural effusions.  Stable appearance of adenopathy within the abdomen compared to earlier study as well as splenomegaly.     12/6/2017 Imaging    CT chest/abdomen/pelvis stable, essentially unchanged prominent mediastinal lymph nodes, unchanged right lower lobe medial basal segmental focal fibrotic findings.  Scattered noncalcified small lung nodules appear unchanged.  Stable spine or megaly.  Scattered prominent retroperitoneal lymph nodes now appear smaller.  Unchanged spinal findings consistent with diffuse idiopathic skeletal hyperostosis and prior operative intervention at L4-5.       6/8/2018 Imaging    CT chest abdomen and pelvis without contrast: Stable, no significant change in the appearance compared to earlier study from December 6, 2017.     3/1/2019 Imaging    CT chest, abdomen and pelvis impression: No significant interval change from last year.  Stable appearance of the significantly enlarged spleen which measures up to nearly 20 x 12 cm.  Moderate, scattered upper abdominal adenopathy with innumerable scattered gastrohepatic, retroperitoneal, and mesenteric nodes measuring roughly between 1-2 cm are stable as well.  No significant progression.  Mild mediastinal adenopathy appears stable as well.     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  Colonoscopy showed hemorrhoids, diverticuli, 11 mm proximal ascending colon flat polyp.  Dr. 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     3/27/2019 Imaging    PET shows mild hypermetabolism within the lymph nodes to the left of the proximal third of the esophagus as well as near the tracheal bifurcation and no other area is of abnormal hypermetabolism.   Splenomegaly stable.  She has significant carotid stenosis especially on the right.     2/1/2021 Imaging    PET IMPRESSION:  Negative PET/CT scan. No evidence of recurrent or active  lymphoma. Enlargement seen of the spleen.         HISTORY OF PRESENT ILLNESS:  The patient is a 80 y.o. female, here for follow up on management of history of low-grade lymphoma as outlined above with stable splenomegaly and no uptake on PET.  CBC unremarkable.  No significant change in splenomegaly since March of last year though it is quite large at 24 cm.  It is not PET avid.    Past Medical History:   Diagnosis Date   • Anemia 09/18/2015   • Cataract     h/o   • Diverticulitis    • DVT (deep venous thrombosis) (CMS/HCC)    • Gout    • History of basal cell carcinoma    • Hypertension           Past Surgical History:   Procedure Laterality Date   • APPENDECTOMY     • BACK SURGERY      h/o   • CHOLECYSTECTOMY     • HYSTERECTOMY      jaja   • KIDNEY SURGERY      x3    • ORIF ANKLE FRACTURE      h/o   • TOTAL HIP ARTHROPLASTY      h/o       Allergies   Allergen Reactions   • Hydrocodone    • Keflex [Cephalexin]    • Lortab [Hydrocodone-Acetaminophen]    • Magnesium Citrate    • Penicillins        Family History and Social History reviewed and changed as necessary    REVIEW OF SYSTEM:   Early satiety but less pain now when she saw my nurse practitioner    PHYSICAL EXAM:  No palpable adenopathy    There were no vitals filed for this visit.  There were no vitals filed for this visit.                   Vitals reviewed.    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  Spleen has slowly enlarged from about 16 cm at 24 cm over the last 5 years but no significant change over the last year and it is not PET avid nor does she have additional sites of PET avidity.  Endoscopic evaluation did not show lymphomatous involvement but there is abnormality that Dr. Edge could not remove with the scope and she is due to see Dr. Augustine with whom I spoke.  He cannot get the colonic abnormality removed endoscopically, we will likely perform a partial colon resection and I suggested considering splenectomy at that junction given that she does have some early satiety and abdominal fullness on her splenomegaly.  She would not consider Rituxan or chemotherapy.  She would not consider surgery unless she otherwise needed her colon operated on.  I did discuss the possibility of palliative radiation which sometimes can be beneficial for a brief time or we can just go to continue watchful waiting.  If she is however otherwise going to go to surgery because of prolonged reasons independently lymphoma, then I will order doing splenectomy at that time.  Asked to, I am not sure that she is going to be a great deal there, and terms of her immunocompetence with removal of the splint versus having her spleen likely replaced by low-grade lymphoma.  Total time of care today between discussion with Dr. Augustine, patient, reviewing her scans and data from Dr. Edge took over an hour of patient care time today  Nelson Castañeda MD    02/09/2021

## 2021-03-16 ENCOUNTER — OFFICE VISIT (OUTPATIENT)
Dept: ONCOLOGY | Facility: CLINIC | Age: 81
End: 2021-03-16

## 2021-03-16 VITALS — HEIGHT: 66 IN | WEIGHT: 168 LBS | BODY MASS INDEX: 27 KG/M2

## 2021-03-16 DIAGNOSIS — C85.90 LYMPHOMA, LOW GRADE (HCC): Primary | ICD-10-CM

## 2021-03-16 PROCEDURE — 99214 OFFICE O/P EST MOD 30 MIN: CPT | Performed by: INTERNAL MEDICINE

## 2021-03-16 RX ORDER — GABAPENTIN 600 MG/1
600 TABLET ORAL
COMMUNITY
Start: 2021-01-12

## 2021-03-16 NOTE — PROGRESS NOTES
Telehealth follow-up visit  This was an audio and video enabled telemedicine encounter.  Verbal consent given.  Video attempted including Doximitybut failed so converted to audio for COVID-19 risk reduction.  CHIEF COMPLAINT: Low-grade lymphoma/colitis    Problem List:  Oncology/Hematology History Overview Note   1. Low-grade lymphoma:  a) Splenomegaly on hospitalization 02/2016. She had a week of nausea, vomiting  and diarrhea with dehydration. The nausea, vomiting, and dehydration have  resolved but on CT they found retroperitoneal adenopathy with some splenomegaly  that was not bulky. She denies any ongoing fevers or chills or bed drenching  night sweats or unexplained weight loss and no change in the color, caliber or  consistency of her stools. She had a slightly elevated beta-2 microglobulin of  3.5 but no monoclonal spike and a white count 5300, hemoglobin 11.5, platelets  158,000, sedimentation rate 45, C-reactive protein 13.6 and a normal LDH of 318  with normal liver enzymes. Slight hyponatremia with sodium of 134 and  hypokalemia with potassium of 3.3 on 02/18/2016. She had slight decrease in  immunoglobulin G to 580 and immunoglobulin A to 35 on testing while in the  hospital.   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 There were no features to suggest hairy  cell lymphoma or a mantle cell lymphoma and no large cell population. This was  CD5 negative, CD10 negative, kappa clonal negative and bright for CD20 with dim  surface light chain expression and bright cytoplasm, CD45 bright. The CD38  negative for plasmacytic differentiation with a hemoglobin of 11.4, white count  7200 with normal differential, and platelets of 163,000.   c) Liver, spleen ultrasound showed splenomegaly of 19.6 cm  maximum dimension  with 1.6 cm slightly dilated portal vein.    D)-3/20/2019 Dr. 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  Colonoscopy showed hemorrhoids, diverticuli, 11 mm proximal ascending colon flat polyp.  Dr. 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   -3/27/2019 PET shows mild hypermetabolism within the lymph nodes to the left of the proximal third of the esophagus as well as near the tracheal bifurcation and no other area is of abnormal hypermetabolism.  Splenomegaly stable.  She has significant carotid stenosis especially on the right.  -1/12/2021 white count 4200 hemoglobin 10 platelets 114,000 unremarkable differential.  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    -2/1/2021 PET negative.  Spleen is unremarkable with homogeneous uptake.    -2/5/2021 EGD and colonoscopy with Dr. Edge showed Negrete's esophagus, hemorrhoids, diverticuli, proximal ascending colon polyp.  No mention of any colitis.    2. History of iron deficiency anemia with last colonoscopy 11/2014, according  to the patient, with a history of ulcerative colitis treated by Dr. Edge.     3. History of basal cell carcinomas.   4. Cataracts.   5. Remote history of deep vein thrombosis with vena cava filter in place.   6. Diverticulitis by history.   7. History of gout.   8. Hypertension.   9. History of 3 different kidney surgeries.   10. Cholecystectomy.   11. Chronic neuropathy.   12. History of ARMAAN.   13. History of back surgeries.   14. History of total hip replacement.   15. History of ankle fracture.   16. History of appendectomy.   17. History of iron deficiency anemia, resolved as of 09/18/2015 with a history  of  Negrete's esophagus on EGD by Dr. Edge.     Lymphoma, low grade (CMS/HCC)   4/25/2016 Initial Diagnosis    Lymphoma, low grade     5/30/2017 Imaging    CT chest/abdomen/pelvis no obvious pulmonary nodules identified, no pleural effusions.  Stable appearance of adenopathy within the abdomen compared to earlier study as well as splenomegaly.     12/6/2017 Imaging    CT chest/abdomen/pelvis stable, essentially unchanged prominent mediastinal lymph nodes, unchanged right lower lobe medial basal segmental focal fibrotic findings.  Scattered noncalcified small lung nodules appear unchanged.  Stable spine or megaly.  Scattered prominent retroperitoneal lymph nodes now appear smaller.  Unchanged spinal findings consistent with diffuse idiopathic skeletal hyperostosis and prior operative intervention at L4-5.       6/8/2018 Imaging    CT chest abdomen and pelvis without contrast: Stable, no significant change in the appearance compared to earlier study from December 6, 2017.     3/1/2019 Imaging    CT chest, abdomen and pelvis impression: No significant interval change from last year.  Stable appearance of the significantly enlarged spleen which measures up to nearly 20 x 12 cm.  Moderate, scattered upper abdominal adenopathy with innumerable scattered gastrohepatic, retroperitoneal, and mesenteric nodes measuring roughly between 1-2 cm are stable as well.  No significant progression.  Mild mediastinal adenopathy appears stable as well.     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  Colonoscopy showed hemorrhoids, diverticuli, 11 mm proximal ascending colon flat polyp.  Dr. Edge relayed to the patient that this was a high-grade dysplasia in the Negrete's for which he is sending to [facility] for ablation as well as needing removal of a tubulovillous adenoma unable to be removed endoscopically.     3/27/2019 Imaging    PET shows mild hypermetabolism within  "the lymph nodes to the left of the proximal third of the esophagus as well as near the tracheal bifurcation and no other area is of abnormal hypermetabolism.  Splenomegaly stable.  She has significant carotid stenosis especially on the right.     2/1/2021 Imaging    PET IMPRESSION:  Negative PET/CT scan. No evidence of recurrent or active  lymphoma. Enlargement seen of the spleen.         HISTORY OF PRESENT ILLNESS:  The patient is a 80 y.o. female, here for follow up on management of low-grade lymphoma/colitis    Past Medical History:   Diagnosis Date   • Anemia 09/18/2015   • Cataract     h/o   • Diverticulitis    • DVT (deep venous thrombosis) (CMS/HCC)    • Gout    • History of basal cell carcinoma    • Hypertension           Past Surgical History:   Procedure Laterality Date   • APPENDECTOMY     • BACK SURGERY      h/o   • CHOLECYSTECTOMY     • HYSTERECTOMY      jaja   • KIDNEY SURGERY      x3    • ORIF ANKLE FRACTURE      h/o   • TOTAL HIP ARTHROPLASTY      h/o       Allergies   Allergen Reactions   • Hydrocodone    • Keflex [Cephalexin]    • Lortab [Hydrocodone-Acetaminophen]    • Magnesium Citrate    • Penicillins        Family History and Social History reviewed and changed as necessary    REVIEW OF SYSTEM:   Overall feeling fairly fit    PHYSICAL EXAM:  Phone visit    Vitals:    03/16/21 1026   Weight: 76.2 kg (168 lb)   Height: 167.6 cm (66\")     Vitals:    03/16/21 1026   PainSc: 0-No pain              No results found for: HGB, HCT, MCV, PLT, WBC, NEUTROABS, LYMPHSABS, MONOSABS, EOSABS, BASOSABS    No results found for: GLUCOSE, BUN, CREATININE, NA, K, CL, CO2, CALCIUM, PROTEINTOT, ALBUMIN, BILITOT, ALKPHOS, AST, ALT          ASSESSMENT & PLAN:  1.  Marginal zone lymphoma with splenomegaly  2.  Ulcerative colitis    Discussion: She has seen Dr. Augustine.  He does not think he can safely come consult to remove the spleen and do a partial colonic resection in the same operation.  She gets her second Covid " vaccination in 11 days and then following that Dr. Augustine will proceed with surgery.  I will see her back in a month to see what he finds pathologically.  I doubt we will find anything in the way of lymphoma in the bowel on colonic resection but we shall see.  It is entirely possible that the splenomegaly is splenic marginal zone lymphoma but as long as the risk of splenectomy at the time of colonic resection is too high, I would not put her through splenectomy at this junction if that is considered a high risk per Dr. Augustine.  As stated previously, she is not desirous of considering Rituxan or systemic chemotherapy.  Total time of care today and discussion with her as well as communicating with Dr. Augustine was 30 minutes.  Nelson Castañeda MD    03/16/2021

## 2021-05-18 ENCOUNTER — OFFICE VISIT (OUTPATIENT)
Dept: ONCOLOGY | Facility: CLINIC | Age: 81
End: 2021-05-18

## 2021-05-18 VITALS
RESPIRATION RATE: 20 BRPM | HEIGHT: 66 IN | HEART RATE: 64 BPM | DIASTOLIC BLOOD PRESSURE: 65 MMHG | TEMPERATURE: 98.4 F | SYSTOLIC BLOOD PRESSURE: 107 MMHG | BODY MASS INDEX: 26.36 KG/M2 | WEIGHT: 164 LBS

## 2021-05-18 DIAGNOSIS — C85.90 LYMPHOMA, LOW GRADE (HCC): Primary | ICD-10-CM

## 2021-05-18 PROCEDURE — 99213 OFFICE O/P EST LOW 20 MIN: CPT | Performed by: INTERNAL MEDICINE

## 2021-05-18 NOTE — PROGRESS NOTES
CHIEF COMPLAINT: Low-grade marginal zone lymphoma possibly splenic    Problem List:  Oncology/Hematology History Overview Note   1. Low-grade lymphoma:  a) Splenomegaly on hospitalization 02/2016. She had a week of nausea, vomiting  and diarrhea with dehydration. The nausea, vomiting, and dehydration have  resolved but on CT they found retroperitoneal adenopathy with some splenomegaly  that was not bulky. She denies any ongoing fevers or chills or bed drenching  night sweats or unexplained weight loss and no change in the color, caliber or  consistency of her stools. She had a slightly elevated beta-2 microglobulin of  3.5 but no monoclonal spike and a white count 5300, hemoglobin 11.5, platelets  158,000, sedimentation rate 45, C-reactive protein 13.6 and a normal LDH of 318  with normal liver enzymes. Slight hyponatremia with sodium of 134 and  hypokalemia with potassium of 3.3 on 02/18/2016. She had slight decrease in  immunoglobulin G to 580 and immunoglobulin A to 35 on testing while in the  hospital.   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 There were no features to suggest hairy  cell lymphoma or a mantle cell lymphoma and no large cell population. This was  CD5 negative, CD10 negative, kappa clonal negative and bright for CD20 with dim  surface light chain expression and bright cytoplasm, CD45 bright. The CD38  negative for plasmacytic differentiation with a hemoglobin of 11.4, white count  7200 with normal differential, and platelets of 163,000.   c) Liver, spleen ultrasound showed splenomegaly of 19.6 cm maximum dimension  with 1.6 cm slightly dilated portal vein.    D)-3/20/2019 Dr. 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  Colonoscopy  showed hemorrhoids, diverticuli, 11 mm proximal ascending colon flat polyp.  Dr. 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   -3/27/2019 PET shows mild hypermetabolism within the lymph nodes to the left of the proximal third of the esophagus as well as near the tracheal bifurcation and no other area is of abnormal hypermetabolism.  Splenomegaly stable.  She has significant carotid stenosis especially on the right.  -1/12/2021 white count 4200 hemoglobin 10 platelets 114,000 unremarkable differential.  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    -2/1/2021 PET negative.  Spleen is unremarkable with homogeneous uptake.    -2/5/2021 EGD and colonoscopy with Dr. Edge showed Negrete's esophagus, hemorrhoids, diverticuli, proximal ascending colon polyp.  No mention of any colitis.    -4/28/2021 attempted laparoscopic or colectomy with subsequent open right hemicolectomy with ileocolonic stenosis but no residual polyp found in the proximal ascending colon after resection of the right colon and a small polyp in the distal part of the ascending colon.  Pathology showed residual adenomatous polyp tubular adenoma without high-grade dysplasia or invasive carcinoma and 13 benign lymph nodes.  Additional colonic segment benign with 4 benign lymph nodes.    2. History of iron deficiency anemia with last colonoscopy 11/2014, according  to the patient, with a history of ulcerative colitis treated by Dr. Edge.     3. History of basal cell carcinomas.   4. Cataracts.   5. Remote history of deep vein thrombosis with vena cava filter in place.   6. Diverticulitis by history.   7. History of gout.   8. Hypertension.   9. History of 3 different kidney surgeries.   10.  Cholecystectomy.   11. Chronic neuropathy.   12. History of ARMAAN.   13. History of back surgeries.   14. History of total hip replacement.   15. History of ankle fracture.   16. History of appendectomy.   17. 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 Edge.     Lymphoma, low grade (CMS/HCC)   4/25/2016 Initial Diagnosis    Lymphoma, low grade     5/30/2017 Imaging    CT chest/abdomen/pelvis no obvious pulmonary nodules identified, no pleural effusions.  Stable appearance of adenopathy within the abdomen compared to earlier study as well as splenomegaly.     12/6/2017 Imaging    CT chest/abdomen/pelvis stable, essentially unchanged prominent mediastinal lymph nodes, unchanged right lower lobe medial basal segmental focal fibrotic findings.  Scattered noncalcified small lung nodules appear unchanged.  Stable spine or megaly.  Scattered prominent retroperitoneal lymph nodes now appear smaller.  Unchanged spinal findings consistent with diffuse idiopathic skeletal hyperostosis and prior operative intervention at L4-5.       6/8/2018 Imaging    CT chest abdomen and pelvis without contrast: Stable, no significant change in the appearance compared to earlier study from December 6, 2017.     3/1/2019 Imaging    CT chest, abdomen and pelvis impression: No significant interval change from last year.  Stable appearance of the significantly enlarged spleen which measures up to nearly 20 x 12 cm.  Moderate, scattered upper abdominal adenopathy with innumerable scattered gastrohepatic, retroperitoneal, and mesenteric nodes measuring roughly between 1-2 cm are stable as well.  No significant progression.  Mild mediastinal adenopathy appears stable as well.     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  Colonoscopy showed hemorrhoids, diverticuli, 11 mm proximal ascending colon flat polyp.   "Dr. Edge relayed to the patient that this was a high-grade dysplasia in the Negrete's for which he is sending to [facility] for ablation as well as needing removal of a tubulovillous adenoma unable to be removed endoscopically.     3/27/2019 Imaging    PET shows mild hypermetabolism within the lymph nodes to the left of the proximal third of the esophagus as well as near the tracheal bifurcation and no other area is of abnormal hypermetabolism.  Splenomegaly stable.  She has significant carotid stenosis especially on the right.     2/1/2021 Imaging    PET IMPRESSION:  Negative PET/CT scan. No evidence of recurrent or active  lymphoma. Enlargement seen of the spleen.         HISTORY OF PRESENT ILLNESS:  The patient is a 80 y.o. female, here for follow up on management of low-grade marginal zone lymphoma/splenic lymphoma status post partial colectomy benign polyp completely resected at time of prior colonoscopy    Past Medical History:   Diagnosis Date   • Anemia 09/18/2015   • Cataract     h/o   • Diverticulitis    • DVT (deep venous thrombosis) (CMS/HCC)    • Gout    • History of basal cell carcinoma    • Hypertension           Past Surgical History:   Procedure Laterality Date   • APPENDECTOMY     • BACK SURGERY      h/o   • CHOLECYSTECTOMY     • HYSTERECTOMY      jaja   • KIDNEY SURGERY      x3    • ORIF ANKLE FRACTURE      h/o   • TOTAL HIP ARTHROPLASTY      h/o       Allergies   Allergen Reactions   • Hydrocodone    • Keflex [Cephalexin]    • Lortab [Hydrocodone-Acetaminophen]    • Magnesium Citrate    • Penicillins        Family History and Social History reviewed and changed as necessary    REVIEW OF SYSTEM:   No new somatic complaints    PHYSICAL EXAM:  No palpable cervical axillary or inguinal adenopathy    Vitals:    05/18/21 1306   BP: 107/65   Pulse: 64   Resp: 20   Temp: 98.4 °F (36.9 °C)   Weight: 74.4 kg (164 lb)   Height: 167.6 cm (66\")     Vitals:    05/18/21 1306   PainSc: 0-No pain          ECOG score: " 2     Karnofsky score: 80     Vitals reviewed.    ECOG: (2) Ambulatory & Capable of Self Care, Unable to Carry Out Work Activity, Up & About Greater Than 50% of Waking Hours    No results found for: HGB, HCT, MCV, PLT, WBC, NEUTROABS, LYMPHSABS, MONOSABS, EOSABS, BASOSABS    No results found for: GLUCOSE, BUN, CREATININE, NA, K, CL, CO2, CALCIUM, PROTEINTOT, ALBUMIN, BILITOT, ALKPHOS, AST, ALT          ASSESSMENT & PLAN:  1.  Marginal zone lymphoma with splenomegaly  2.  History of ulcerative colitis none found on recent partial colectomy  3.  Recent partial colectomy for retained polyp incompletely resected at time of colonoscopic polypectomy; no malignancy    Discussion: I reviewed the following with the patient  -4/28/2021 Dr. Augustine performed attempted laparoscopic partial colectomy with subsequent open right hemicolectomy with ileocolonic stenosis but no residual polyp found in the proximal ascending colon after resection of the right colon and a small polyp in the distal part of the ascending colon.  Pathology showed residual adenomatous polyp tubular adenoma without high-grade dysplasia or invasive carcinoma and 13 benign lymph nodes.  Additional colonic segment benign with 4 benign lymph nodes.  - As stated previously, she has no desires for any form of systemic therapy for her marginal zone lymphoma and risk of splenomegaly as outlined on previous notes not just an operative risk but subsequent postoperative risks are such that she would forego that for now.  Get her blood counts today and just prior to a minute evaluation in 3 months.  No plans for scans in the absence of symptoms.  I will keep him about MRI as the potential of palliative splenic radiation if she develops early satiety or other abdominal complaints from splenomegaly.    Total time of care today inclusive of time spent prior to her arrival reviewing operative note and pathology reports from surgery with Dr. Augustine, during her visit  translating this information to the patient, and after her visit setting for the plan is above took 30 minutes of patient care time today    Nelson Castañeda MD    05/18/2021

## 2021-08-23 ENCOUNTER — TELEPHONE (OUTPATIENT)
Dept: ONCOLOGY | Facility: CLINIC | Age: 81
End: 2021-08-23

## 2021-08-23 NOTE — TELEPHONE ENCOUNTER
Fax[place] to [facility].  Called patient and she verbalized understanding.

## 2021-08-23 NOTE — TELEPHONE ENCOUNTER
Caller: Mahi Coronel    Relationship: Self    Best call back number: [phone]    What orders are you requesting (i.e. lab or imaging): LABS FOR 8/26 VISIT    In what timeframe would the patient need to come in: PT IS THERE TODAY BUT HAS LOST PAPER W/ ORDERS ON IT    Where will you receive your lab/imaging services: [facility] . PT DID NOT HAVE CONTACT INFO AVAILABLE    Additional notes:

## 2021-08-26 ENCOUNTER — OFFICE VISIT (OUTPATIENT)
Dept: ONCOLOGY | Facility: CLINIC | Age: 81
End: 2021-08-26

## 2021-08-26 VITALS
SYSTOLIC BLOOD PRESSURE: 124 MMHG | WEIGHT: 162 LBS | HEART RATE: 69 BPM | BODY MASS INDEX: 26.03 KG/M2 | DIASTOLIC BLOOD PRESSURE: 61 MMHG | TEMPERATURE: 97.1 F | HEIGHT: 66 IN | RESPIRATION RATE: 20 BRPM

## 2021-08-26 DIAGNOSIS — C85.90 LYMPHOMA, LOW GRADE (HCC): Primary | ICD-10-CM

## 2021-08-26 PROCEDURE — 99213 OFFICE O/P EST LOW 20 MIN: CPT | Performed by: NURSE PRACTITIONER

## 2021-08-26 RX ORDER — LISINOPRIL 2.5 MG/1
2.5 TABLET ORAL DAILY
COMMUNITY
Start: 2021-08-14

## 2021-08-26 NOTE — PROGRESS NOTES
CHIEF COMPLAINT: History of low-grade lymphoma    Problem List:  Oncology/Hematology History Overview Note   1. Low-grade lymphoma:  a) Splenomegaly on hospitalization 02/2016. She had a week of nausea, vomiting  and diarrhea with dehydration. The nausea, vomiting, and dehydration have  resolved but on CT they found retroperitoneal adenopathy with some splenomegaly  that was not bulky. She denies any ongoing fevers or chills or bed drenching  night sweats or unexplained weight loss and no change in the color, caliber or  consistency of her stools. She had a slightly elevated beta-2 microglobulin of  3.5 but no monoclonal spike and a white count 5300, hemoglobin 11.5, platelets  158,000, sedimentation rate 45, C-reactive protein 13.6 and a normal LDH of 318  with normal liver enzymes. Slight hyponatremia with sodium of 134 and  hypokalemia with potassium of 3.3 on 02/18/2016. She had slight decrease in  immunoglobulin G to 580 and immunoglobulin A to 35 on testing while in the  hospital.   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 There were no features to suggest hairy  cell lymphoma or a mantle cell lymphoma and no large cell population. This was  CD5 negative, CD10 negative, kappa clonal negative and bright for CD20 with dim  surface light chain expression and bright cytoplasm, CD45 bright. The CD38  negative for plasmacytic differentiation with a hemoglobin of 11.4, white count  7200 with normal differential, and platelets of 163,000.   c) Liver, spleen ultrasound showed splenomegaly of 19.6 cm maximum dimension  with 1.6 cm slightly dilated portal vein.    D)-3/20/2019 Dr. 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  Colonoscopy showed hemorrhoids,  diverticuli, 11 mm proximal ascending colon flat polyp.  Dr. 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   -3/27/2019 PET shows mild hypermetabolism within the lymph nodes to the left of the proximal third of the esophagus as well as near the tracheal bifurcation and no other area is of abnormal hypermetabolism.  Splenomegaly stable.  She has significant carotid stenosis especially on the right.  -1/12/2021 white count 4200 hemoglobin 10 platelets 114,000 unremarkable differential.  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    -2/1/2021 PET negative.  Spleen is unremarkable with homogeneous uptake.    -2/5/2021 EGD and colonoscopy with Dr. Edge showed Negrete's esophagus, hemorrhoids, diverticuli, proximal ascending colon polyp.  No mention of any colitis.    -4/28/2021 attempted laparoscopic partial colectomy with subsequent open right hemicolectomy with ileocolonic stenosis but no residual polyp found in the proximal ascending colon after resection of the right colon and a small polyp in the distal part of the ascending colon.  Pathology showed residual adenomatous polyp tubular adenoma without high-grade dysplasia or invasive carcinoma and 13 benign lymph nodes.  Additional colonic segment benign with 4 benign lymph nodes.    2. History of iron deficiency anemia with last colonoscopy 11/2014, according  to the patient, with a history of ulcerative colitis treated by Dr. Edge.     3. History of basal cell carcinomas.   4. Cataracts.   5. Remote history of deep vein thrombosis with vena cava filter in place.   6. Diverticulitis by history.   7. History of gout.   8. Hypertension.   9. History of 3 different kidney surgeries.   10. Cholecystectomy.    11. Chronic neuropathy.   12. History of ARMAAN.   13. History of back surgeries.   14. History of total hip replacement.   15. History of ankle fracture.   16. History of appendectomy.   17. 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 Edge.     Lymphoma, low grade (CMS/HCC)   4/25/2016 Initial Diagnosis    Lymphoma, low grade     5/30/2017 Imaging    CT chest/abdomen/pelvis no obvious pulmonary nodules identified, no pleural effusions.  Stable appearance of adenopathy within the abdomen compared to earlier study as well as splenomegaly.     12/6/2017 Imaging    CT chest/abdomen/pelvis stable, essentially unchanged prominent mediastinal lymph nodes, unchanged right lower lobe medial basal segmental focal fibrotic findings.  Scattered noncalcified small lung nodules appear unchanged.  Stable spine or megaly.  Scattered prominent retroperitoneal lymph nodes now appear smaller.  Unchanged spinal findings consistent with diffuse idiopathic skeletal hyperostosis and prior operative intervention at L4-5.       6/8/2018 Imaging    CT chest abdomen and pelvis without contrast: Stable, no significant change in the appearance compared to earlier study from December 6, 2017.     3/1/2019 Imaging    CT chest, abdomen and pelvis impression: No significant interval change from last year.  Stable appearance of the significantly enlarged spleen which measures up to nearly 20 x 12 cm.  Moderate, scattered upper abdominal adenopathy with innumerable scattered gastrohepatic, retroperitoneal, and mesenteric nodes measuring roughly between 1-2 cm are stable as well.  No significant progression.  Mild mediastinal adenopathy appears stable as well.     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  Colonoscopy showed hemorrhoids, diverticuli, 11 mm proximal ascending colon flat polyp.  Dr. Edge  relayed to the patient that this was a high-grade dysplasia in the Negrete's for which he is sending to [facility] for ablation as well as needing removal of a tubulovillous adenoma unable to be removed endoscopically.     3/27/2019 Imaging    PET shows mild hypermetabolism within the lymph nodes to the left of the proximal third of the esophagus as well as near the tracheal bifurcation and no other area is of abnormal hypermetabolism.  Splenomegaly stable.  She has significant carotid stenosis especially on the right.     2/1/2021 Imaging    PET IMPRESSION:  Negative PET/CT scan. No evidence of recurrent or active  lymphoma. Enlargement seen of the spleen.         HISTORY OF PRESENT ILLNESS:  The patient is a 80 y.o. female, here for follow up on management of history of low-grade lymphoma.  Mahi has been doing well since we saw her last with no new concerns.  Continues to struggle with decreased appetite but reports that she eats regularly as she knows that she needs to.  Her weight is stable for the most part.  Occasional mild abdominal discomfort but no worsening abdominal pain.  No nausea or vomiting.  Reports her bowels are moving regularly.  No fevers or chills.  No lymphadenopathy that she has been aware of.    Past Medical History:   Diagnosis Date   • Anemia 09/18/2015   • Cataract     h/o   • Diverticulitis    • DVT (deep venous thrombosis) (CMS/HCC)    • Gout    • History of basal cell carcinoma    • Hypertension           Past Surgical History:   Procedure Laterality Date   • APPENDECTOMY     • BACK SURGERY      h/o   • CHOLECYSTECTOMY     • HYSTERECTOMY      jaja   • KIDNEY SURGERY      x3    • ORIF ANKLE FRACTURE      h/o   • TOTAL HIP ARTHROPLASTY      h/o       Allergies   Allergen Reactions   • Hydrocodone    • Keflex [Cephalexin]    • Lortab [Hydrocodone-Acetaminophen]    • Magnesium Citrate    • Penicillins        Family History and Social History reviewed and changed as necessary    REVIEW OF  "SYSTEM:   Positive for decreased appetite    PHYSICAL EXAM:  General: Chronically ill-appearing female in no distress, very thin appearing from the chest up, some abdominal protrusion  Nodes: No cervical, supraclavicular or axillary nodes palpable on exam    Vitals:    08/26/21 1320   BP: 124/61   Pulse: 69   Resp: 20   Temp: 97.1 °F (36.2 °C)   Weight: 73.5 kg (162 lb)   Height: 167.6 cm (66\")     Vitals:    08/26/21 1320   PainSc: 0-No pain       Vitals reviewed.  Labs reviewed, CBC from 8/23/2021 WBC 6200, hemoglobin 9.5, hematocrit 30.5%, platelet count 129,000, ANC 3.4.    ECOG: (1) Restricted in Physically Strenuous Activity, Ambulatory & Able to Do Work of Light Nature      ASSESSMENT & PLAN:    ASSESSMENT & PLAN:  1.  Marginal zone lymphoma with splenomegaly  2.  History of ulcerative colitis none found on recent partial colectomy  3.  Recent partial colectomy for retained polyp incompletely resected at time of colonoscopic polypectomy; no malignancy    Discussion: Mahi overall is doing well.  Her CBC from 8/23/2021 is stable.  She has some mild thrombocytopenia likely due from her splenomegaly.  Hemoglobin is stable at 9.5 with hematocrit of 30.5% and a normal WBC of 6200.  She currently has no new or worrisome symptoms.  We will continue with watchful waiting.  I will repeat CBC again in 6 months prior to return and I have ordered that today.    As previously reported, she has no desire for any form of systemic therapy for her marginal zone lymphoma and likely would not want to undergo splenomegaly due to previous noted operative and potential postoperative risks.  No plans for scans in the absence of symptoms.  Would possibly do MRI if needed for potential of palliative splenic radiation if she developed early satiety or other abdominal complaints from her splenomegaly.    Return to clinic in 6 months for follow-up.    I spent 21 minutes caring for Mahi on this date of service. This time includes time " spent by me in the following activities: preparing for the visit, reviewing tests, obtaining and/or reviewing a separately obtained history, performing a medically appropriate examination and/or evaluation, ordering medications, tests, or procedures and documenting information in the medical record.     Chantell Kyle, APRN    08/26/2021

## 2022-03-03 ENCOUNTER — OFFICE VISIT (OUTPATIENT)
Dept: ONCOLOGY | Facility: CLINIC | Age: 82
End: 2022-03-03

## 2022-03-03 VITALS
SYSTOLIC BLOOD PRESSURE: 138 MMHG | HEIGHT: 66 IN | TEMPERATURE: 98 F | WEIGHT: 169 LBS | OXYGEN SATURATION: 98 % | BODY MASS INDEX: 27.16 KG/M2 | HEART RATE: 67 BPM | RESPIRATION RATE: 20 BRPM | DIASTOLIC BLOOD PRESSURE: 53 MMHG

## 2022-03-03 DIAGNOSIS — C85.90 LYMPHOMA, LOW GRADE: Primary | ICD-10-CM

## 2022-03-03 DIAGNOSIS — R04.0 FREQUENT NOSEBLEEDS: ICD-10-CM

## 2022-03-03 DIAGNOSIS — R10.84 GENERALIZED ABDOMINAL PAIN: ICD-10-CM

## 2022-03-03 PROCEDURE — 99215 OFFICE O/P EST HI 40 MIN: CPT | Performed by: NURSE PRACTITIONER

## 2022-03-03 NOTE — PROGRESS NOTES
CHIEF COMPLAINT:   1.  Frequent nosebleeds    2.  Headaches    3.  Abdominal pain    4.  History of low-grade lymphoma    Problem List:  Oncology/Hematology History Overview Note   1. Low-grade lymphoma:  a) Splenomegaly on hospitalization 02/2016. She had a week of nausea, vomiting  and diarrhea with dehydration. The nausea, vomiting, and dehydration have  resolved but on CT they found retroperitoneal adenopathy with some splenomegaly  that was not bulky. She denies any ongoing fevers or chills or bed drenching  night sweats or unexplained weight loss and no change in the color, caliber or  consistency of her stools. She had a slightly elevated beta-2 microglobulin of  3.5 but no monoclonal spike and a white count 5300, hemoglobin 11.5, platelets  158,000, sedimentation rate 45, C-reactive protein 13.6 and a normal LDH of 318  with normal liver enzymes. Slight hyponatremia with sodium of 134 and  hypokalemia with potassium of 3.3 on 02/18/2016. She had slight decrease in  immunoglobulin G to 580 and immunoglobulin A to 35 on testing while in the  hospital.   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 There were no features to suggest hairy  cell lymphoma or a mantle cell lymphoma and no large cell population. This was  CD5 negative, CD10 negative, kappa clonal negative and bright for CD20 with dim  surface light chain expression and bright cytoplasm, CD45 bright. The CD38  negative for plasmacytic differentiation with a hemoglobin of 11.4, white count  7200 with normal differential, and platelets of 163,000.   c) Liver, spleen ultrasound showed splenomegaly of 19.6 cm maximum dimension  with 1.6 cm slightly dilated portal vein.    D)-3/20/2019 Dr. Edge EGD showed Negrete's esophagus in the lower  third of the esophagus 6 cm in length.  Colonoscopy showed hemorrhoids, diverticuli, 11 mm proximal ascending colon flat polyp.  Dr. 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   -3/27/2019 PET shows mild hypermetabolism within the lymph nodes to the left of the proximal third of the esophagus as well as near the tracheal bifurcation and no other area is of abnormal hypermetabolism.  Splenomegaly stable.  She has significant carotid stenosis especially on the right.  -1/12/2021 white count 4200 hemoglobin 10 platelets 114,000 unremarkable differential.  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    -2/1/2021 PET negative.  Spleen is unremarkable with homogeneous uptake.    -2/5/2021 EGD and colonoscopy with Dr. Edge showed Negrete's esophagus, hemorrhoids, diverticuli, proximal ascending colon polyp.  No mention of any colitis.    -4/28/2021 attempted laparoscopic partial colectomy with subsequent open right hemicolectomy with ileocolonic stenosis but no residual polyp found in the proximal ascending colon after resection of the right colon and a small polyp in the distal part of the ascending colon.  Pathology showed residual adenomatous polyp tubular adenoma without high-grade dysplasia or invasive carcinoma and 13 benign lymph nodes.  Additional colonic segment benign with 4 benign lymph nodes.    2. History of iron deficiency anemia with last colonoscopy 11/2014, according  to the patient, with a history of ulcerative colitis treated by Dr. Edge.     3. History of basal cell carcinomas.   4. Cataracts.   5. Remote history of deep vein thrombosis with vena cava filter in place.   6. Diverticulitis by history.   7. History of gout.   8. Hypertension.    9. History of 3 different kidney surgeries.   10. Cholecystectomy.   11. Chronic neuropathy.   12. History of ARMAAN.   13. History of back surgeries.   14. History of total hip replacement.   15. History of ankle fracture.   16. History of appendectomy.   17. 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 Edge.     Lymphoma, low grade (HCC)   4/25/2016 Initial Diagnosis    Lymphoma, low grade     5/30/2017 Imaging    CT chest/abdomen/pelvis no obvious pulmonary nodules identified, no pleural effusions.  Stable appearance of adenopathy within the abdomen compared to earlier study as well as splenomegaly.     12/6/2017 Imaging    CT chest/abdomen/pelvis stable, essentially unchanged prominent mediastinal lymph nodes, unchanged right lower lobe medial basal segmental focal fibrotic findings.  Scattered noncalcified small lung nodules appear unchanged.  Stable spine or megaly.  Scattered prominent retroperitoneal lymph nodes now appear smaller.  Unchanged spinal findings consistent with diffuse idiopathic skeletal hyperostosis and prior operative intervention at L4-5.       6/8/2018 Imaging    CT chest abdomen and pelvis without contrast: Stable, no significant change in the appearance compared to earlier study from December 6, 2017.     3/1/2019 Imaging    CT chest, abdomen and pelvis impression: No significant interval change from last year.  Stable appearance of the significantly enlarged spleen which measures up to nearly 20 x 12 cm.  Moderate, scattered upper abdominal adenopathy with innumerable scattered gastrohepatic, retroperitoneal, and mesenteric nodes measuring roughly between 1-2 cm are stable as well.  No significant progression.  Mild mediastinal adenopathy appears stable as well.     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  Colonoscopy showed hemorrhoids,  diverticuli, 11 mm proximal ascending colon flat polyp.  Dr. Edge relayed to the patient that this was a high-grade dysplasia in the Negrete's for which he is sending to [facility] for ablation as well as needing removal of a tubulovillous adenoma unable to be removed endoscopically.     3/27/2019 Imaging    PET shows mild hypermetabolism within the lymph nodes to the left of the proximal third of the esophagus as well as near the tracheal bifurcation and no other area is of abnormal hypermetabolism.  Splenomegaly stable.  She has significant carotid stenosis especially on the right.     2/1/2021 Imaging    PET IMPRESSION:  Negative PET/CT scan. No evidence of recurrent or active  lymphoma. Enlargement seen of the spleen.         HISTORY OF PRESENT ILLNESS:  The patient is a 81 y.o. female, here for follow up on management of history of low-grade lymphoma.  Mahi reports that she has not been feeling well these past few weeks.  She also reports persistent nosebleeds that seem to be occurring more often, she also has had a headache on most days.  She has fatigue.  She has diffuse abdominal pain.  No nausea or vomiting.  Reports her bowels are moving regularly.  No fevers or chills.  No lymphadenopathy that she has been aware of.  Her weight is stable for the most part.  She saw Dr. Isidro yesterday regarding the nosebleeds and has an appointment with ENT this afternoon.    Past Medical History:   Diagnosis Date   • Anemia 09/18/2015   • Cataract     h/o   • Diverticulitis    • DVT (deep venous thrombosis) (HCC)    • Gout    • History of basal cell carcinoma    • Hypertension           Past Surgical History:   Procedure Laterality Date   • APPENDECTOMY     • BACK SURGERY      h/o   • CHOLECYSTECTOMY     • HYSTERECTOMY      jaja   • KIDNEY SURGERY      x3    • ORIF ANKLE FRACTURE      h/o   • TOTAL HIP ARTHROPLASTY      h/o       Allergies   Allergen Reactions   • Hydrocodone    • Keflex [Cephalexin]    • Lortab  "[Hydrocodone-Acetaminophen]    • Magnesium Citrate    • Penicillins        Family History and Social History reviewed and changed as necessary    REVIEW OF SYSTEM:   Positive for persistent nosebleeds  Positive for headache almost daily  Positive for abdominal pain    PHYSICAL EXAM:  General: Chronically ill-appearing female in no distress, very thin appearing from the chest up, some abdominal protrusion  Nodes: No cervical, supraclavicular or axillary nodes palpable on exam  Lungs clear  Heart regular  Abdomen is soft, tender to palpation right and left upper quadrants with some guarding noted on the right to deep palpation.  Palpable spleen.  No distention or appreciable fluctuance.    Vitals:    03/03/22 1108   BP: 138/53   Pulse: 67   Resp: 20   Temp: 98 °F (36.7 °C)   SpO2: 98%   Weight: 76.7 kg (169 lb)   Height: 167.6 cm (66\")     Vitals:    03/03/22 1108   PainSc: 0-No pain       Vitals reviewed.  Labs reviewed, CBC from 3/2/2022 WBC 5900, hemoglobin 9.5, hematocrit 29.8 %, platelet count 149,000, ANC 2596.    ECOG: (1) Restricted in Physically Strenuous Activity, Ambulatory & Able to Do Work of Light Nature      ASSESSMENT & PLAN:    ASSESSMENT & PLAN:  1.  Marginal zone lymphoma with splenomegaly  2.  Frequent nosebleeds  3.  Headaches  4.  Diffuse abdominal pain  5.  History of ulcerative colitis none found on partial colectomy April 2021  3.  Partial colectomy April 2021 for retained polyp incompletely resected at time of colonoscopic polypectomy; no malignancy    Discussion:   Mahi has been having persistent nosebleeds that are occurring more often over the past several weeks along with headaches that are almost daily.  She also has diffuse abdominal pain.  I spoke with Dr. Castañeda regarding her symptoms and there is concern for possible Waldenstrom Macroglobulinemia in light of her history with previous bone marrow biopsy results showing low-grade lymphoma of undetermined phenotype most likely a variant " of marginal zone lymphoma.  I discussed our concerns with Mahi and her daughter who is with her today, Mahi has expressed sentiments in the past that she did not want an aggressive work-up as she would not likely consider further systemic therapies.  I discussed with her and her daughter that this information will be helpful as if she in fact has Waldenstrom macroglobulinemia there may be treatment that she could tolerate and hopefully we could improve her quality of life.  I discussed that once we have the information then she can make an informed decision and we will support her either way.  We will check serum viscosity as if it is high this could be causing her nosebleeds and headache.  We will also check PT, PTT, sedimentation rate, CRP, serum immunoglobulin free light chains, serum immunoglobulins, serum immunoelectrophoresis, 24-hour urine free light chains, beta-2 microglobulin, repeat her CBC and CMP.  We will also repeat her bone marrow biopsy.  I will also get CT chest, abdomen and pelvis.  She does have a history of diverticulosis but I do not think that her abdominal pain is diverticulitis as she has had no fevers and her white blood cell count is normal.    Return to clinic in about 2 weeks for follow-up.    I spent 43 minutes caring for Mahi on this date of service. This time includes time spent by me in the following activities: preparing for the visit, reviewing tests, obtaining and/or reviewing a separately obtained history, performing a medically appropriate examination and/or evaluation, counseling and educating the patient/family/caregiver, ordering medications, tests, or procedures, referring and communicating with other health care professionals and documenting information in the medical record.     Chantell Kyle, APRN    03/03/2022

## 2022-03-07 ENCOUNTER — TELEPHONE (OUTPATIENT)
Dept: ONCOLOGY | Facility: CLINIC | Age: 82
End: 2022-03-07

## 2022-03-07 NOTE — TELEPHONE ENCOUNTER
Discussed with IVÁN Abdul she changed CT scan to without contrast due to kidney function being slightly elevated on labs that she ordered. Informed her that results that have come back so far there is nothing glaringly abnormal, CT scans are stable but all labs have not resulted yet. The reason the bone marrow biopsy was ordered is to give us more answers without it we won't have a definitive answer to why she is feeling so poorly with abdominal pain, nosebleeds etc. Informed her it is ultimately her decision and we will not force her to have this procedure. She verbalized understanding.

## 2022-03-07 NOTE — TELEPHONE ENCOUNTER
Provider: DALE HARTMAN  Caller: DONTE  Relationship to Patient: DAUGHTER    Reason for Call: KRISTY IS WANTING TO KNOW WHY THAT FREDDY HARTMAN DID NOT WANT KRISTY TO HAVE HER CT WITH CONTRAST.    ALSO DOES SHE REALLY NEED THE BONE MARROW TEST. KRISTY AND HER FAMILY  REALLY DOESN'T  WANT HER TO HAVE THE TEST IF SHE DOES NOT NEED IT.    PLEASE ADVISE

## 2022-03-15 ENCOUNTER — OFFICE VISIT (OUTPATIENT)
Dept: ONCOLOGY | Facility: CLINIC | Age: 82
End: 2022-03-15

## 2022-03-15 VITALS
TEMPERATURE: 98.2 F | RESPIRATION RATE: 20 BRPM | HEART RATE: 72 BPM | OXYGEN SATURATION: 98 % | SYSTOLIC BLOOD PRESSURE: 138 MMHG | HEIGHT: 66 IN | BODY MASS INDEX: 27.16 KG/M2 | DIASTOLIC BLOOD PRESSURE: 65 MMHG | WEIGHT: 169 LBS

## 2022-03-15 DIAGNOSIS — C85.90 LYMPHOMA, LOW GRADE: ICD-10-CM

## 2022-03-15 DIAGNOSIS — C85.90 LYMPHOMA, LOW GRADE: Primary | ICD-10-CM

## 2022-03-15 PROCEDURE — 99215 OFFICE O/P EST HI 40 MIN: CPT | Performed by: INTERNAL MEDICINE

## 2022-03-15 RX ORDER — FAMOTIDINE 10 MG/ML
20 INJECTION, SOLUTION INTRAVENOUS AS NEEDED
Status: CANCELLED | OUTPATIENT
Start: 2022-04-06

## 2022-03-15 RX ORDER — DIPHENHYDRAMINE HYDROCHLORIDE 50 MG/ML
50 INJECTION INTRAMUSCULAR; INTRAVENOUS AS NEEDED
Status: CANCELLED | OUTPATIENT
Start: 2022-03-30

## 2022-03-15 RX ORDER — DIPHENHYDRAMINE HYDROCHLORIDE 50 MG/ML
50 INJECTION INTRAMUSCULAR; INTRAVENOUS AS NEEDED
Status: CANCELLED | OUTPATIENT
Start: 2022-03-22

## 2022-03-15 RX ORDER — SODIUM CHLORIDE 9 MG/ML
250 INJECTION, SOLUTION INTRAVENOUS ONCE
Status: CANCELLED | OUTPATIENT
Start: 2022-03-30

## 2022-03-15 RX ORDER — MEPERIDINE HYDROCHLORIDE 25 MG/ML
25 INJECTION INTRAMUSCULAR; INTRAVENOUS; SUBCUTANEOUS
Status: CANCELLED | OUTPATIENT
Start: 2022-03-30

## 2022-03-15 RX ORDER — SODIUM CHLORIDE 9 MG/ML
250 INJECTION, SOLUTION INTRAVENOUS ONCE
Status: CANCELLED | OUTPATIENT
Start: 2022-04-13

## 2022-03-15 RX ORDER — DIPHENHYDRAMINE HYDROCHLORIDE 50 MG/ML
50 INJECTION INTRAMUSCULAR; INTRAVENOUS AS NEEDED
Status: CANCELLED | OUTPATIENT
Start: 2022-04-06

## 2022-03-15 RX ORDER — ACETAMINOPHEN 325 MG/1
650 TABLET ORAL ONCE
Status: CANCELLED | OUTPATIENT
Start: 2022-04-13

## 2022-03-15 RX ORDER — FAMOTIDINE 10 MG/ML
20 INJECTION, SOLUTION INTRAVENOUS AS NEEDED
Status: CANCELLED | OUTPATIENT
Start: 2022-03-30

## 2022-03-15 RX ORDER — ACETAMINOPHEN 325 MG/1
650 TABLET ORAL ONCE
Status: CANCELLED | OUTPATIENT
Start: 2022-04-06

## 2022-03-15 RX ORDER — ACETAMINOPHEN 325 MG/1
650 TABLET ORAL ONCE
Status: CANCELLED | OUTPATIENT
Start: 2022-03-30

## 2022-03-15 RX ORDER — FAMOTIDINE 10 MG/ML
20 INJECTION, SOLUTION INTRAVENOUS AS NEEDED
Status: CANCELLED | OUTPATIENT
Start: 2022-03-22

## 2022-03-15 RX ORDER — MEPERIDINE HYDROCHLORIDE 25 MG/ML
25 INJECTION INTRAMUSCULAR; INTRAVENOUS; SUBCUTANEOUS
Status: CANCELLED | OUTPATIENT
Start: 2022-03-22

## 2022-03-15 RX ORDER — SODIUM CHLORIDE 9 MG/ML
250 INJECTION, SOLUTION INTRAVENOUS ONCE
Status: CANCELLED | OUTPATIENT
Start: 2022-04-06

## 2022-03-15 RX ORDER — DIPHENHYDRAMINE HYDROCHLORIDE 50 MG/ML
50 INJECTION INTRAMUSCULAR; INTRAVENOUS AS NEEDED
Status: CANCELLED | OUTPATIENT
Start: 2022-04-13

## 2022-03-15 RX ORDER — ACETAMINOPHEN 325 MG/1
650 TABLET ORAL ONCE
Status: CANCELLED | OUTPATIENT
Start: 2022-03-22

## 2022-03-15 RX ORDER — MEPERIDINE HYDROCHLORIDE 25 MG/ML
25 INJECTION INTRAMUSCULAR; INTRAVENOUS; SUBCUTANEOUS
Status: CANCELLED | OUTPATIENT
Start: 2022-04-06

## 2022-03-15 RX ORDER — MEPERIDINE HYDROCHLORIDE 25 MG/ML
25 INJECTION INTRAMUSCULAR; INTRAVENOUS; SUBCUTANEOUS
Status: CANCELLED | OUTPATIENT
Start: 2022-04-13

## 2022-03-15 RX ORDER — SODIUM CHLORIDE 9 MG/ML
250 INJECTION, SOLUTION INTRAVENOUS ONCE
Status: CANCELLED | OUTPATIENT
Start: 2022-03-22

## 2022-03-15 RX ORDER — FAMOTIDINE 10 MG/ML
20 INJECTION, SOLUTION INTRAVENOUS AS NEEDED
Status: CANCELLED | OUTPATIENT
Start: 2022-04-13

## 2022-03-16 LAB
HBV CORE AB SERPL QL IA: NEGATIVE
HBV SURFACE AB SER QL: NON REACTIVE
HBV SURFACE AG SERPL QL IA: NEGATIVE

## 2022-03-17 ENCOUNTER — OFFICE VISIT (OUTPATIENT)
Dept: ONCOLOGY | Facility: CLINIC | Age: 82
End: 2022-03-17

## 2022-03-17 VITALS
BODY MASS INDEX: 27.16 KG/M2 | DIASTOLIC BLOOD PRESSURE: 61 MMHG | TEMPERATURE: 97.8 F | HEIGHT: 66 IN | RESPIRATION RATE: 20 BRPM | SYSTOLIC BLOOD PRESSURE: 125 MMHG | HEART RATE: 67 BPM | WEIGHT: 169 LBS | OXYGEN SATURATION: 97 %

## 2022-03-17 DIAGNOSIS — D63.0 ANEMIA IN NEOPLASTIC DISEASE: ICD-10-CM

## 2022-03-17 DIAGNOSIS — R16.1 SPLENOMEGALY: ICD-10-CM

## 2022-03-17 DIAGNOSIS — C85.90 LYMPHOMA, LOW GRADE: Primary | ICD-10-CM

## 2022-03-17 DIAGNOSIS — D69.6 THROMBOCYTOPENIA: ICD-10-CM

## 2022-03-17 PROBLEM — D64.89 OTHER SPECIFIED ANEMIAS: Status: ACTIVE | Noted: 2022-03-17

## 2022-03-17 PROCEDURE — 99215 OFFICE O/P EST HI 40 MIN: CPT | Performed by: NURSE PRACTITIONER

## 2022-03-17 NOTE — PROGRESS NOTES
CHEMOTHERAPY PREPARATION    Mahi Coronel  [id number]  1940    Subjective   Chief Complaint: Treatment Preparation and Needs Assessment    History of present illness:  Mahi Coronel is a 81 y.o. year old female who is here today for chemotherapy preparation and needs assessment. 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     Oncology History:    Oncology/Hematology History Overview Note   1. Low-grade lymphoma:  2. History of iron deficiency anemia with last colonoscopy 11/2014, accordingto the patient, with a history of ulcerative colitis treated by Dr. Edge.  3. History of basal cell carcinomas.   4. Cataracts.   5. Remote history of deep vein thrombosis with vena cava filter in place.   6. Diverticulitis by history.   7. History of gout.   8. Hypertension.   9. History of 3 different kidney surgeries.   10. Cholecystectomy.   11. Chronic neuropathy.   12. History of ARMAAN.   13. History of back surgeries.   14. History of total hip replacement.   15. History of ankle fracture.   16. History of appendectomy.   17. 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 Edge.  18.  Epistaxis    Oncology history timeline:  a) Splenomegaly on hospitalization 02/2016. She had a week of nausea, vomiting and diarrhea with dehydration. The nausea, vomiting, and dehydration have resolved but on CT they found retroperitoneal adenopathy with some splenomegaly that was not bulky. She denies any ongoing fevers or chills or bed drenching night sweats or unexplained weight loss and no change in the color, caliber or consistency of her stools. She had a slightly elevated beta-2 microglobulin of 3.5 but no monoclonal spike and a white count 5300, hemoglobin 11.5, platelets 158,000, sedimentation rate 45, C-reactive protein 13.6 and a normal LDH of 318 with  normal liver enzymes. Slight hyponatremia with sodium of 134 and hypokalemia with potassium of 3.3 on 02/18/2016. She had slight decrease in immunoglobulin G to 580 and immunoglobulin A to 35 on testing while in the hospital.   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 There were no features to suggest hairy cell lymphoma or a mantle cell lymphoma and no large cell population. This was CD5 negative, CD10 negative, kappa clonal negative and bright for CD20 with dim surface light chain expression and bright cytoplasm, CD45 bright. The CD38 negative for plasmacytic differentiation with a hemoglobin of 11.4, white count 7200 with normal differential, and platelets of 163,000.   c) Liver, spleen ultrasound showed splenomegaly of 19.6 cm maximum dimension with 1.6 cm slightly dilated portal vein.  D)-3/20/2019 Dr. 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  Colonoscopy showed hemorrhoids, diverticuli, 11 mm proximal ascending colon flat polyp.  Dr. 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   -3/27/2019 PET shows mild hypermetabolism within the lymph nodes to the left of the proximal third of the esophagus as well as near the tracheal bifurcation and no other area is of abnormal hypermetabolism.  Splenomegaly stable.  She has significant carotid stenosis especially on the right.  -1/12/2021 white count 4200 hemoglobin 10 platelets 114,000 unremarkable differential.  CMP unremarkable  -4/28/2021 Dr. Augustine performed attempted laparoscopic partial colectomy with subsequent open right  hemicolectomy with ileocolonic stenosis but no residual polyp found in the proximal ascending colon after resection of the right colon and a small polyp in the distal part of the ascending colon.  Pathology showed residual adenomatous polyp tubular adenoma without high-grade dysplasia or invasive carcinoma and 13 benign lymph nodes.  Additional colonic segment benign with 4 benign lymph nodes.  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  Get her blood counts today and just prior to a minute evaluation in 3 months.  No plans for scans in the absence of symptoms.  I will keep in mind the potential of palliative splenic radiation if she develops early satiety or other abdominal complaints from splenomegaly.    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    -2/1/2021 PET negative.  Spleen is unremarkable with homogeneous uptake.    -2/5/2021 EGD and colonoscopy with Dr. Edge showed Negrete's esophagus, hemorrhoids, diverticuli, proximal ascending colon polyp.  No mention of any colitis.    -4/28/2021 attempted laparoscopic partial colectomy with subsequent open right hemicolectomy with ileocolonic stenosis but no residual polyp found in the proximal ascending colon after resection of the right colon and a small polyp in the distal part of the ascending colon.  Pathology showed residual adenomatous polyp tubular adenoma without high-grade dysplasia or invasive carcinoma and 13 benign lymph nodes.  Additional colonic segment benign with 4 benign lymph nodes.    -3/3/2022 data:  Hemoglobin hemoglobin 9.8 with MCV of 91.9 and platelets slightly low 128,000 with normal white count 7100.  Unremarkable  differential.    Creatinine 1.5 glucose 160 sodium 129 with potassium 4.7 and chloride 94 otherwise unremarkable CMP.  C-reactive protein normal 1.7.  Sedimentation rate 31, upper limit 30.  INR normal 0.99 with PTT 26.1.  100 mg/dL M spike on SPEP.  Normal quantitative immunoglobulins except for decreased IgE.  Serum immunofixation electrophoresis was unclear as to monoclonality.  Serum kappa 33.8 lambda 27.4 both minimally elevated with normal ratio 1.23.  Serum viscosity normal 1.6.    -3/4/2022 CT chest abdomen pelvis without contrast: 2.5 mm noncalcified nodule right upper lobe and right lower lobe for which follow-up in 6 months for stability or resolution is suggested.  No suspicious lung nodules to suggest metastasis.  Massive enlargement of the spleen which is stable compared to June 2021.  1.4 cm and 1.5 cm juan luis hepatic adenopathy.  Stable retroperitoneal adenopathy largest 1.5 cm.  1.8 cm stable IVC node.    -3/15/2022 [facility] medical oncology follow-up visit: She is not hyperviscous and I doubt her epistaxis is related to the lymphoma or hyperviscosity and I do not think she has Waldenstrom's.  However, I do think she has massive splenomegaly and while that has been present for quite some time, I nonetheless think that a lot of her abdominal discomfort is due to the massive splenomegaly pushing her bowel to the right and she could have some bowel involvement of lymphoma that we could put her through endoscopy again but she had this back a little over a year ago with no obvious bowel involvement and no obvious colitis.  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  She would not consent to anything stronger than that regardless and it took a little convincing to get her to go along with  this plan but she is willing to try provided that she has no major allergic reactions etc.  We will give first dose in [place] after chemo preparation visit we will use peripheral veins and I will have her follow-up in a few weeks with my nurse practitioner in [place] to make sure she is tolerating this well and to set up follow-up visits in the weeks and months ahead.  Repeat CTs I would ask my nurse practitioner to get ordered for approximately 4 months out from the end of Rituxan.  In the meantime, from the epistaxis standpoint I asked her to follow-up with ENT.   We discussed the protracted immune suppression that would be associated with the Rituxan and the fact that it would likely make vaccinations less helpful but that does not mean she should not get them.  We also discussed the possibility of allergic reactions.  Once all of this is done, she will need to follow-up with whoever is going to take over from Dr. 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     Lymphoma, low grade (HCC)   4/25/2016 Initial Diagnosis    Lymphoma, low grade     5/30/2017 Imaging    CT chest/abdomen/pelvis no obvious pulmonary nodules identified, no pleural effusions.  Stable appearance of adenopathy within the abdomen compared to earlier study as well as splenomegaly.     12/6/2017 Imaging    CT chest/abdomen/pelvis stable, essentially unchanged prominent mediastinal lymph nodes, unchanged right lower lobe medial basal segmental focal fibrotic findings.  Scattered noncalcified small lung nodules appear unchanged.  Stable spine or megaly.  Scattered prominent retroperitoneal lymph nodes now appear smaller.  Unchanged spinal findings consistent with diffuse idiopathic skeletal hyperostosis and prior operative intervention at L4-5.       6/8/2018 Imaging    CT chest abdomen and pelvis without contrast: Stable, no significant change in the appearance  compared to earlier study from December 6, 2017.     3/1/2019 Imaging    CT chest, abdomen and pelvis impression: No significant interval change from last year.  Stable appearance of the significantly enlarged spleen which measures up to nearly 20 x 12 cm.  Moderate, scattered upper abdominal adenopathy with innumerable scattered gastrohepatic, retroperitoneal, and mesenteric nodes measuring roughly between 1-2 cm are stable as well.  No significant progression.  Mild mediastinal adenopathy appears stable as well.     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  Colonoscopy showed hemorrhoids, diverticuli, 11 mm proximal ascending colon flat polyp.  Dr. 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     3/27/2019 Imaging    PET shows mild hypermetabolism within the lymph nodes to the left of the proximal third of the esophagus as well as near the tracheal bifurcation and no other area is of abnormal hypermetabolism.  Splenomegaly stable.  She has significant carotid stenosis especially on the right.     2/1/2021 Imaging    PET IMPRESSION:  Negative PET/CT scan. No evidence of recurrent or active  lymphoma. Enlargement seen of the spleen.     3/4/2022 Imaging    -3/3/2022 data:  Hemoglobin hemoglobin 9.8 with MCV of 91.9 and platelets slightly low 128,000 with normal white count 7100.  Unremarkable differential.    Creatinine 1.5 glucose 160 sodium 129 with potassium 4.7 and chloride 94 otherwise unremarkable CMP.  C-reactive protein normal 1.7.  Sedimentation rate 31, upper limit 30.  INR normal 0.99 with PTT 26.1.  100 mg/dL M spike on SPEP.  Normal quantitative immunoglobulins except for decreased IgE.  Serum immunofixation electrophoresis was unclear as to  "monoclonality.  Serum kappa 33.8 lambda 27.4 both minimally elevated with normal ratio 1.23.  Serum viscosity normal 1.6.    -3/4/2022 CT chest abdomen pelvis without contrast: 2.5 mm noncalcified nodule right upper lobe and right lower lobe for which follow-up in 6 months for stability or resolution is suggested.  No suspicious lung nodules to suggest metastasis.  Massive enlargement of the spleen which is stable compared to June 2021.  1.4 cm and 1.5 cm juan luis hepatic adenopathy.  Stable retroperitoneal adenopathy largest 1.5 cm.  1.8 cm stable IVC node.     3/22/2022 -  Chemotherapy    OP LYMPHOMA (CLL) RiTUXimab (Weekly X 4)         The current medication list and allergy list were reviewed and reconciled.     Past Medical History, Past Surgical History, Social History, Family History have been reviewed and are without significant changes except as mentioned.    Review of Systems   Constitutional: Positive for fatigue.   HENT: Negative.    Gastrointestinal: Positive for abdominal pain.       Objective   Physical Exam  Vital Signs: /61   Pulse 67   Temp 97.8 °F (36.6 °C)   Resp 20   Ht 167.6 cm (66\")   Wt 76.7 kg (169 lb)   SpO2 97%   BMI 27.28 kg/m²   Vitals:    03/17/22 1033   PainSc: 0-No pain           General Appearance:  alert, cooperative, no apparent distress and appears stated age   Neurologic/Psychiatric: A&O x 3, gait steady, appropriate affect   HEENT:  Normocephalic, without obvious abnormality, mucous membranes moist   Lungs:   Clear to auscultation bilaterally; respirations regular, even, and unlabored bilaterally           ECOG Performance Status: 2 - Symptomatic, <50% confined to bed            NEEDS ASSESSMENTS    Genetics  The patient's new diagnosis and family history have been reviewed for genetic counseling needs. A genetic referral is not recommended.       Psychosocial  The patient has completed a PHQ-9 Depression Screening and the Distress Thermometer (DT) today.   PHQ-9 Total " "Score:  . PHQ-9 results show 1-4 (Minimal Depression). The patient scored their distress today as 0 on a scale of 0-10 with 0 being no distress and 10 being extreme distress.   Problems marked by the patient as being an issue for them within the last week include physical problems.   Results were reviewed along with psychosocial resources offered by our cancer center. Our oncology social worker will be flagged for a DT score of 4 or above, and a same day call will be made for a score of 9 or 10. A mental health referral is not recommended at this time. The patient is not accepting of a referral to [facility].   Copies of patient's questionnaires will be scanned into EMR for details and further reference.    Barriers to care  A barriers form was also completed by the patient today. We discussed services offered by our facility to help her have adequate access to care. The patient was given the name and card for our Oncology Social Worker, Joelle Corea. Based upon barriers assessment today, the patient will not require a follow-up call from the  to further discuss needs.   A copy of the barriers form will also be scanned into EMR for details and further reference.     VAD Assessment  The patient and I discussed planned intervenous chemotherapy as well as other IV treatments that are often needed throughout the course of treatment. These may include, but are not limited to blood transfusions, antibiotics, and IV hydration. The vasculature does appear to be adequate for multiple peripheral IVs throughout their treatment course. Discussed risks and benefits of VADs. The patient would not like to pursue Port-A-Cath insertion prior to initiation of treatment.     Advance Care Planning   ACP discussion was held with the patient during this visit. Patient has an advance directive (not in EMR), copy requested.  The patient and I discussed advanced care planning, \"Conversations that Matter\".   This " service was offered, free of charge, for development of advance directives with a certified ACP facilitator.  The patient does have an up-to-date advanced directive. This document is not on file with our office. The patient is not interested in an appointment with one of our facilitators to create or update their advanced directives.         Palliative Care  The patient and I discussed palliative care services. Palliative care is not the same as Hospice care. This is specialized medical care for people living with serious illness with the goal of improving quality of life for the patient and their family. [facility] has partnered with [facility] to offer our patients outpatient palliative care early along with their treatment to assist in coordination of care, symptom management, pain management, and medical decision making.  Oncology criteria for palliative care referral is not met at this time. The patient is not interested in a palliative care consultation.     Additional Referral needs  none      CHEMOTHERAPY EDUCATION    Booklets Given: Chemotherapy and You []  Eating Hints []    Sexuality/Fertility Books []      Chemotherapy/Biotherapy Education Sheets: (list all that apply)  nausea management, Cancer resourse contacts information and vaccination information                                                                                                                                                                 Chemotherapy Regimen:   Treatment Plans     Name Type Plan Dates Plan Provider         Active    OP LYMPHOMA (CLL) RiTUXimab (Weekly X 4) ONCOLOGY TREATMENT  3/15/2022 - Present Nelson Castañeda MD                    DETAILED CHEMOTHERAPY TEACHING COMPLETED BY PHARMACY. CHEMOTHERAPY CONSENT COMPLETED BY PHARMACY. SEE PHARMACY EDUCATION FOR DOCUMENTATION.     Chemotherapy education comprehension reviewed. Questions answered and additional information discussed on topics including:  Anemia,  Thrombocytopenia, Neutropenia, Nutrition and appetite changes, Organ toxicities and potential for infustion reaction        Assessment and Plan:    Diagnoses and all orders for this visit:    1. Lymphoma, low grade (HCC) (Primary)    2. Splenomegaly    3. 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  We will get CBC and CMP on the day of her treatment.  She is scheduled to receive her first treatment with Rituxan in our [place] office and if tolerated will receive the next 3 weeks here in [place].  If tolerated we discussed that after her first treatment subsequent treatments will be given as rapid infusion.    The patient and I have reviewed their cancer diagnosis and scheduled treatment plan. Needs assessment was completed including genetics, psychosocial needs, barriers to care, VAD evaluation, advanced care planning, and palliative care services. Referrals have been ordered as appropriate based upon our evaluation and patient desires.     Chemotherapy teaching was also completed today as documented above. Adequate time was given to answer all questions to her satisfaction. Patient and family are aware of their care team members and contact information if they have questions or problems throughout the treatment course. Needs assessments and education has been completed. The patient is adequately prepared to begin treatment as scheduled.     I spent 40 minutes caring for Mahi on this date of service. This time includes time spent by me in the following activities: preparing for the visit, reviewing tests, obtaining and/or reviewing a separately obtained history, performing a medically appropriate examination and/or evaluation, counseling and educating the patient/family/caregiver, referring and communicating with other health care professionals and documenting information in the medical record.     Electronically signed by  Chantell Kyle, APRN on 03/17/22 at 13:21 EDT.

## 2022-03-22 ENCOUNTER — DOCUMENTATION (OUTPATIENT)
Dept: NUTRITION | Facility: HOSPITAL | Age: 82
End: 2022-03-22

## 2022-03-22 ENCOUNTER — APPOINTMENT (OUTPATIENT)
Dept: GENERAL RADIOLOGY | Facility: HOSPITAL | Age: 82
End: 2022-03-22

## 2022-03-22 ENCOUNTER — HOSPITAL ENCOUNTER (INPATIENT)
Facility: HOSPITAL | Age: 82
LOS: 5 days | Discharge: HOME | End: 2022-03-27
Attending: STUDENT IN AN ORGANIZED HEALTH CARE EDUCATION/TRAINING PROGRAM | Admitting: INTERNAL MEDICINE

## 2022-03-22 ENCOUNTER — HOSPITAL ENCOUNTER (OUTPATIENT)
Dept: ONCOLOGY | Facility: HOSPITAL | Age: 82
Setting detail: INFUSION SERIES
Discharge: HOME | End: 2022-03-22

## 2022-03-22 ENCOUNTER — APPOINTMENT (OUTPATIENT)
Dept: CT IMAGING | Facility: HOSPITAL | Age: 82
End: 2022-03-22

## 2022-03-22 VITALS
DIASTOLIC BLOOD PRESSURE: 58 MMHG | BODY MASS INDEX: 27.32 KG/M2 | TEMPERATURE: 97.3 F | OXYGEN SATURATION: 98 % | SYSTOLIC BLOOD PRESSURE: 128 MMHG | WEIGHT: 170 LBS | HEIGHT: 66 IN | HEART RATE: 69 BPM | RESPIRATION RATE: 18 BRPM

## 2022-03-22 DIAGNOSIS — D63.0 ANEMIA IN NEOPLASTIC DISEASE: ICD-10-CM

## 2022-03-22 DIAGNOSIS — R50.9 FEVER, UNSPECIFIED FEVER CAUSE: Primary | ICD-10-CM

## 2022-03-22 DIAGNOSIS — C85.90 LYMPHOMA, LOW GRADE: ICD-10-CM

## 2022-03-22 DIAGNOSIS — D69.6 THROMBOCYTOPENIA: ICD-10-CM

## 2022-03-22 DIAGNOSIS — E87.20 LACTIC ACIDOSIS: ICD-10-CM

## 2022-03-22 DIAGNOSIS — R79.89 ELEVATED PROCALCITONIN: ICD-10-CM

## 2022-03-22 DIAGNOSIS — C85.90 LYMPHOMA, LOW GRADE: Primary | ICD-10-CM

## 2022-03-22 PROBLEM — A41.9 SEPSIS (HCC): Status: ACTIVE | Noted: 2022-03-22

## 2022-03-22 LAB
ALBUMIN SERPL-MCNC: 4 G/DL (ref 3.5–5.2)
ALBUMIN SERPL-MCNC: 4.1 G/DL (ref 3.5–5.2)
ALBUMIN/GLOB SERPL: 1.9 G/DL
ALBUMIN/GLOB SERPL: 2.4 G/DL
ALP SERPL-CCNC: 73 U/L (ref 39–117)
ALP SERPL-CCNC: 78 U/L (ref 39–117)
ALT SERPL W P-5'-P-CCNC: 10 U/L (ref 1–33)
ALT SERPL W P-5'-P-CCNC: 20 U/L (ref 1–33)
ANION GAP SERPL CALCULATED.3IONS-SCNC: 11 MMOL/L (ref 5–15)
ANION GAP SERPL CALCULATED.3IONS-SCNC: 14 MMOL/L (ref 5–15)
AST SERPL-CCNC: 19 U/L (ref 1–32)
AST SERPL-CCNC: 46 U/L (ref 1–32)
BACTERIA UR QL AUTO: ABNORMAL /HPF
BASOPHILS # BLD AUTO: 0.01 10*3/MM3 (ref 0–0.2)
BASOPHILS NFR BLD AUTO: 0.2 % (ref 0–1.5)
BILIRUB SERPL-MCNC: 0.3 MG/DL (ref 0–1.2)
BILIRUB SERPL-MCNC: 0.3 MG/DL (ref 0–1.2)
BILIRUB UR QL STRIP: NEGATIVE
BUN SERPL-MCNC: 33 MG/DL (ref 8–23)
BUN SERPL-MCNC: 38 MG/DL (ref 8–23)
BUN/CREAT SERPL: 24.3 (ref 7–25)
BUN/CREAT SERPL: 27.7 (ref 7–25)
CALCIUM SPEC-SCNC: 8.9 MG/DL (ref 8.6–10.5)
CALCIUM SPEC-SCNC: 9 MG/DL (ref 8.6–10.5)
CHLORIDE SERPL-SCNC: 95 MMOL/L (ref 98–107)
CHLORIDE SERPL-SCNC: 95 MMOL/L (ref 98–107)
CLARITY UR: CLEAR
CO2 SERPL-SCNC: 20 MMOL/L (ref 22–29)
CO2 SERPL-SCNC: 23 MMOL/L (ref 22–29)
COLOR UR: YELLOW
CREAT SERPL-MCNC: 1.36 MG/DL (ref 0.57–1)
CREAT SERPL-MCNC: 1.37 MG/DL (ref 0.57–1)
CRP SERPL-MCNC: 3.65 MG/DL (ref 0–0.5)
D-LACTATE SERPL-SCNC: 3.3 MMOL/L (ref 0.5–2)
DEPRECATED RDW RBC AUTO: 48.8 FL (ref 37–54)
EGFRCR SERPLBLD CKD-EPI 2021: 38.4 ML/MIN/1.73
EGFRCR SERPLBLD CKD-EPI 2021: 38.7 ML/MIN/1.73
EOSINOPHIL # BLD AUTO: 0.01 10*3/MM3 (ref 0–0.4)
EOSINOPHIL NFR BLD AUTO: 0.2 % (ref 0.3–6.2)
ERYTHROCYTE [DISTWIDTH] IN BLOOD BY AUTOMATED COUNT: 15.3 % (ref 12.3–15.4)
ERYTHROCYTE [DISTWIDTH] IN BLOOD BY AUTOMATED COUNT: 16.5 % (ref 12.3–15.4)
GLOBULIN UR ELPH-MCNC: 1.7 GM/DL
GLOBULIN UR ELPH-MCNC: 2.1 GM/DL
GLUCOSE SERPL-MCNC: 116 MG/DL (ref 65–99)
GLUCOSE SERPL-MCNC: 150 MG/DL (ref 65–99)
GLUCOSE UR STRIP-MCNC: NEGATIVE MG/DL
HCT VFR BLD AUTO: 29.3 % (ref 34–46.6)
HCT VFR BLD AUTO: 29.6 % (ref 34–46.6)
HGB BLD-MCNC: 9.6 G/DL (ref 12–15.9)
HGB BLD-MCNC: 9.7 G/DL (ref 12–15.9)
HGB UR QL STRIP.AUTO: ABNORMAL
HOLD SPECIMEN: NORMAL
HOLD SPECIMEN: NORMAL
HYALINE CASTS UR QL AUTO: ABNORMAL /LPF
IMM GRANULOCYTES # BLD AUTO: 0.03 10*3/MM3 (ref 0–0.05)
IMM GRANULOCYTES NFR BLD AUTO: 0.7 % (ref 0–0.5)
KETONES UR QL STRIP: NEGATIVE
LEUKOCYTE ESTERASE UR QL STRIP.AUTO: NEGATIVE
LYMPHOCYTES # BLD AUTO: 0.24 10*3/MM3 (ref 0.7–3.1)
LYMPHOCYTES # BLD AUTO: 2.3 10*3/MM3 (ref 0.7–3.1)
LYMPHOCYTES NFR BLD AUTO: 37.3 % (ref 19.6–45.3)
LYMPHOCYTES NFR BLD AUTO: 5.3 % (ref 19.6–45.3)
MAGNESIUM SERPL-MCNC: 1.8 MG/DL (ref 1.6–2.4)
MCH RBC QN AUTO: 28.7 PG (ref 26.6–33)
MCH RBC QN AUTO: 29.2 PG (ref 26.6–33)
MCHC RBC AUTO-ENTMCNC: 32.8 G/DL (ref 31.5–35.7)
MCHC RBC AUTO-ENTMCNC: 32.8 G/DL (ref 31.5–35.7)
MCV RBC AUTO: 87.7 FL (ref 79–97)
MCV RBC AUTO: 89.1 FL (ref 79–97)
MONOCYTES # BLD AUTO: 0.18 10*3/MM3 (ref 0.1–0.9)
MONOCYTES # BLD AUTO: 0.4 10*3/MM3 (ref 0.1–0.9)
MONOCYTES NFR BLD AUTO: 4 % (ref 5–12)
MONOCYTES NFR BLD AUTO: 6.9 % (ref 5–12)
NEUTROPHILS NFR BLD AUTO: 3.4 10*3/MM3 (ref 1.7–7)
NEUTROPHILS NFR BLD AUTO: 4.06 10*3/MM3 (ref 1.7–7)
NEUTROPHILS NFR BLD AUTO: 55.8 % (ref 42.7–76)
NEUTROPHILS NFR BLD AUTO: 89.6 % (ref 42.7–76)
NITRITE UR QL STRIP: NEGATIVE
NRBC BLD AUTO-RTO: 0 /100 WBC (ref 0–0.2)
NT-PROBNP SERPL-MCNC: 1891 PG/ML (ref 0–1800)
PH UR STRIP.AUTO: <=5 [PH] (ref 5–8)
PLAT MORPH BLD: NORMAL
PLATELET # BLD AUTO: 142 10*3/MM3 (ref 140–450)
PLATELET # BLD AUTO: 36 10*3/MM3 (ref 140–450)
PMV BLD AUTO: 11 FL (ref 6–12)
PMV BLD AUTO: 5.6 FL (ref 6–12)
POTASSIUM SERPL-SCNC: 4.4 MMOL/L (ref 3.5–5.2)
POTASSIUM SERPL-SCNC: 5.1 MMOL/L (ref 3.5–5.2)
PROCALCITONIN SERPL-MCNC: 8.84 NG/ML (ref 0–0.25)
PROT SERPL-MCNC: 5.8 G/DL (ref 6–8.5)
PROT SERPL-MCNC: 6.1 G/DL (ref 6–8.5)
PROT UR QL STRIP: ABNORMAL
RBC # BLD AUTO: 3.32 10*6/MM3 (ref 3.77–5.28)
RBC # BLD AUTO: 3.34 10*6/MM3 (ref 3.77–5.28)
RBC # UR STRIP: ABNORMAL /HPF
RBC MORPH BLD: NORMAL
REF LAB TEST METHOD: ABNORMAL
SARS-COV-2 RDRP RESP QL NAA+PROBE: NORMAL
SODIUM SERPL-SCNC: 129 MMOL/L (ref 136–145)
SODIUM SERPL-SCNC: 129 MMOL/L (ref 136–145)
SP GR UR STRIP: 1.01 (ref 1–1.03)
SQUAMOUS #/AREA URNS HPF: ABNORMAL /HPF
UROBILINOGEN UR QL STRIP: ABNORMAL
WBC # UR STRIP: ABNORMAL /HPF
WBC MORPH BLD: NORMAL
WBC NRBC COR # BLD: 4.53 10*3/MM3 (ref 3.4–10.8)
WBC NRBC COR # BLD: 6.1 10*3/MM3 (ref 3.4–10.8)
WHOLE BLOOD HOLD SPECIMEN: NORMAL
WHOLE BLOOD HOLD SPECIMEN: NORMAL

## 2022-03-22 PROCEDURE — 82570 ASSAY OF URINE CREATININE: CPT | Performed by: NURSE PRACTITIONER

## 2022-03-22 PROCEDURE — 25010000002 RITUXIMAB 10 MG/ML SOLUTION 10 ML VIAL: Performed by: INTERNAL MEDICINE

## 2022-03-22 PROCEDURE — 71045 X-RAY EXAM CHEST 1 VIEW: CPT

## 2022-03-22 PROCEDURE — 86140 C-REACTIVE PROTEIN: CPT | Performed by: STUDENT IN AN ORGANIZED HEALTH CARE EDUCATION/TRAINING PROGRAM

## 2022-03-22 PROCEDURE — 96375 TX/PRO/DX INJ NEW DRUG ADDON: CPT

## 2022-03-22 PROCEDURE — 74176 CT ABD & PELVIS W/O CONTRAST: CPT

## 2022-03-22 PROCEDURE — 99284 EMERGENCY DEPT VISIT MOD MDM: CPT

## 2022-03-22 PROCEDURE — 80053 COMPREHEN METABOLIC PANEL: CPT | Performed by: STUDENT IN AN ORGANIZED HEALTH CARE EDUCATION/TRAINING PROGRAM

## 2022-03-22 PROCEDURE — 83605 ASSAY OF LACTIC ACID: CPT | Performed by: STUDENT IN AN ORGANIZED HEALTH CARE EDUCATION/TRAINING PROGRAM

## 2022-03-22 PROCEDURE — 25010000002 HYDROCORTISONE SODIUM SUCCINATE 100 MG RECONSTITUTED SOLUTION: Performed by: INTERNAL MEDICINE

## 2022-03-22 PROCEDURE — 83735 ASSAY OF MAGNESIUM: CPT | Performed by: STUDENT IN AN ORGANIZED HEALTH CARE EDUCATION/TRAINING PROGRAM

## 2022-03-22 PROCEDURE — 85025 COMPLETE CBC W/AUTO DIFF WBC: CPT | Performed by: INTERNAL MEDICINE

## 2022-03-22 PROCEDURE — 96415 CHEMO IV INFUSION ADDL HR: CPT

## 2022-03-22 PROCEDURE — 81001 URINALYSIS AUTO W/SCOPE: CPT | Performed by: STUDENT IN AN ORGANIZED HEALTH CARE EDUCATION/TRAINING PROGRAM

## 2022-03-22 PROCEDURE — 25010000002 RITUXIMAB 10 MG/ML SOLUTION 50 ML VIAL: Performed by: INTERNAL MEDICINE

## 2022-03-22 PROCEDURE — 25010000002 ONDANSETRON PER 1 MG

## 2022-03-22 PROCEDURE — 96413 CHEMO IV INFUSION 1 HR: CPT

## 2022-03-22 PROCEDURE — 84145 PROCALCITONIN (PCT): CPT | Performed by: STUDENT IN AN ORGANIZED HEALTH CARE EDUCATION/TRAINING PROGRAM

## 2022-03-22 PROCEDURE — 84300 ASSAY OF URINE SODIUM: CPT | Performed by: NURSE PRACTITIONER

## 2022-03-22 PROCEDURE — 85025 COMPLETE CBC W/AUTO DIFF WBC: CPT

## 2022-03-22 PROCEDURE — 84550 ASSAY OF BLOOD/URIC ACID: CPT | Performed by: INTERNAL MEDICINE

## 2022-03-22 PROCEDURE — 85007 BL SMEAR W/DIFF WBC COUNT: CPT

## 2022-03-22 PROCEDURE — 87040 BLOOD CULTURE FOR BACTERIA: CPT | Performed by: STUDENT IN AN ORGANIZED HEALTH CARE EDUCATION/TRAINING PROGRAM

## 2022-03-22 PROCEDURE — 0 MEPERIDINE PER 100 MG: Performed by: INTERNAL MEDICINE

## 2022-03-22 PROCEDURE — 87635 SARS-COV-2 COVID-19 AMP PRB: CPT | Performed by: STUDENT IN AN ORGANIZED HEALTH CARE EDUCATION/TRAINING PROGRAM

## 2022-03-22 PROCEDURE — 25010000002 KETOROLAC TROMETHAMINE PER 15 MG: Performed by: STUDENT IN AN ORGANIZED HEALTH CARE EDUCATION/TRAINING PROGRAM

## 2022-03-22 PROCEDURE — 83880 ASSAY OF NATRIURETIC PEPTIDE: CPT | Performed by: STUDENT IN AN ORGANIZED HEALTH CARE EDUCATION/TRAINING PROGRAM

## 2022-03-22 PROCEDURE — 25010000002 LEVOFLOXACIN PER 250 MG: Performed by: STUDENT IN AN ORGANIZED HEALTH CARE EDUCATION/TRAINING PROGRAM

## 2022-03-22 PROCEDURE — 80053 COMPREHEN METABOLIC PANEL: CPT | Performed by: INTERNAL MEDICINE

## 2022-03-22 PROCEDURE — 83930 ASSAY OF BLOOD OSMOLALITY: CPT | Performed by: NURSE PRACTITIONER

## 2022-03-22 PROCEDURE — 25010000002 ONDANSETRON PER 1 MG: Performed by: INTERNAL MEDICINE

## 2022-03-22 PROCEDURE — 25010000002 DIPHENHYDRAMINE PER 50 MG: Performed by: INTERNAL MEDICINE

## 2022-03-22 PROCEDURE — 83615 LACTATE (LD) (LDH) ENZYME: CPT | Performed by: INTERNAL MEDICINE

## 2022-03-22 RX ORDER — ONDANSETRON 2 MG/ML
INJECTION INTRAMUSCULAR; INTRAVENOUS
Status: COMPLETED
Start: 2022-03-22 | End: 2022-03-22

## 2022-03-22 RX ORDER — MEPERIDINE HYDROCHLORIDE 25 MG/ML
25 INJECTION INTRAMUSCULAR; INTRAVENOUS; SUBCUTANEOUS
Status: COMPLETED | OUTPATIENT
Start: 2022-03-22 | End: 2022-03-22

## 2022-03-22 RX ORDER — SODIUM CHLORIDE 0.9 % (FLUSH) 0.9 %
10 SYRINGE (ML) INJECTION AS NEEDED
Status: DISCONTINUED | OUTPATIENT
Start: 2022-03-22 | End: 2022-03-27 | Stop reason: HOSPADM

## 2022-03-22 RX ORDER — KETOROLAC TROMETHAMINE 15 MG/ML
15 INJECTION, SOLUTION INTRAMUSCULAR; INTRAVENOUS ONCE
Status: COMPLETED | OUTPATIENT
Start: 2022-03-22 | End: 2022-03-22

## 2022-03-22 RX ORDER — ONDANSETRON 2 MG/ML
8 INJECTION INTRAMUSCULAR; INTRAVENOUS ONCE
Status: CANCELLED
Start: 2022-03-22 | End: 2022-03-22

## 2022-03-22 RX ORDER — ONDANSETRON 2 MG/ML
8 INJECTION INTRAMUSCULAR; INTRAVENOUS ONCE
Status: COMPLETED | OUTPATIENT
Start: 2022-03-22 | End: 2022-03-22

## 2022-03-22 RX ORDER — FAMOTIDINE 10 MG/ML
20 INJECTION, SOLUTION INTRAVENOUS AS NEEDED
Status: COMPLETED | OUTPATIENT
Start: 2022-03-22 | End: 2022-03-22

## 2022-03-22 RX ORDER — LEVOFLOXACIN 5 MG/ML
750 INJECTION, SOLUTION INTRAVENOUS ONCE
Status: COMPLETED | OUTPATIENT
Start: 2022-03-22 | End: 2022-03-23

## 2022-03-22 RX ORDER — SODIUM CHLORIDE 9 MG/ML
250 INJECTION, SOLUTION INTRAVENOUS ONCE
Status: COMPLETED | OUTPATIENT
Start: 2022-03-22 | End: 2022-03-22

## 2022-03-22 RX ORDER — METRONIDAZOLE 500 MG/100ML
500 INJECTION, SOLUTION INTRAVENOUS EVERY 8 HOURS
Status: DISCONTINUED | OUTPATIENT
Start: 2022-03-23 | End: 2022-03-23

## 2022-03-22 RX ORDER — DIPHENHYDRAMINE HYDROCHLORIDE 50 MG/ML
50 INJECTION INTRAMUSCULAR; INTRAVENOUS AS NEEDED
Status: DISCONTINUED | OUTPATIENT
Start: 2022-03-22 | End: 2022-03-24 | Stop reason: HOSPADM

## 2022-03-22 RX ORDER — ACETAMINOPHEN 325 MG/1
650 TABLET ORAL ONCE
Status: COMPLETED | OUTPATIENT
Start: 2022-03-22 | End: 2022-03-22

## 2022-03-22 RX ADMIN — MEPERIDINE HYDROCHLORIDE 25 MG: 25 INJECTION INTRAMUSCULAR; INTRAVENOUS; SUBCUTANEOUS at 11:18

## 2022-03-22 RX ADMIN — HYDROCORTISONE SODIUM SUCCINATE 100 MG: 100 INJECTION, POWDER, FOR SOLUTION INTRAMUSCULAR; INTRAVENOUS at 10:48

## 2022-03-22 RX ADMIN — SODIUM CHLORIDE 250 ML: 9 INJECTION, SOLUTION INTRAVENOUS at 09:33

## 2022-03-22 RX ADMIN — ONDANSETRON 8 MG: 2 INJECTION INTRAMUSCULAR; INTRAVENOUS at 11:25

## 2022-03-22 RX ADMIN — FAMOTIDINE 20 MG: 10 INJECTION, SOLUTION INTRAVENOUS at 10:46

## 2022-03-22 RX ADMIN — DIPHENHYDRAMINE HYDROCHLORIDE 50 MG: 50 INJECTION, SOLUTION INTRAMUSCULAR; INTRAVENOUS at 09:34

## 2022-03-22 RX ADMIN — SODIUM CHLORIDE 500 ML: 9 INJECTION, SOLUTION INTRAVENOUS at 21:59

## 2022-03-22 RX ADMIN — ONDANSETRON 4 MG: 2 INJECTION INTRAMUSCULAR; INTRAVENOUS at 20:44

## 2022-03-22 RX ADMIN — ACETAMINOPHEN 650 MG: 325 TABLET ORAL at 09:34

## 2022-03-22 RX ADMIN — MEPERIDINE HYDROCHLORIDE 25 MG: 25 INJECTION INTRAMUSCULAR; INTRAVENOUS; SUBCUTANEOUS at 14:40

## 2022-03-22 RX ADMIN — KETOROLAC TROMETHAMINE 15 MG: 15 INJECTION, SOLUTION INTRAMUSCULAR; INTRAVENOUS at 21:36

## 2022-03-22 RX ADMIN — RITUXIMAB 700 MG: 10 INJECTION, SOLUTION INTRAVENOUS at 10:25

## 2022-03-22 RX ADMIN — LEVOFLOXACIN 750 MG: 750 INJECTION, SOLUTION INTRAVENOUS at 22:28

## 2022-03-22 NOTE — PROGRESS NOTES
3/22/2022 Approximately 20 minutes after the start of first Rituxan(25 ml/hr)  patient complained of nauseau that quickly advanced to back pain (8).  Stopped infusion/bolus of Saline.  Vital signs stable. Premeds 20 mg of Pepcid/ 100 mg of Solucortef.  Dr Castañeda notified and ordered to start back in 30 min at half rate (12.5 ml/hr).  Thirty minutes later pt started rigors- 25 mg of Demerol given. Pt improved in 3 minutes.  Resrtarted Rituxan at 12.5 ml/hr. Pt tolerated up to 150 ml/hr with minor shaking until 1530 when she had nausea/vomitting.   Ordered to stop Rituxan. Come back in one week in [place].She received approximately  275 ml of Rituxan.  Pt was stable on discharge to her daughter.  Spoke with daughter several times during infusion.

## 2022-03-22 NOTE — PROGRESS NOTES
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    Chemotherapy: Rituxan - weekly x 4     Patient Active Problem List:    Patient Active Problem List   Diagnosis   • Lymphoma, low grade (HCC)   • Neuropathy   • Splenomegaly   • Other specified anemias   • Thrombocytopenia (HCC)       Food / Nutrition Related History       Hydration Status     Goal: 64-80 ounces     Enteral Feeding       Anthropometric Measurements     Height:    Ht Readings from Last 1 Encounters:   22 167.6 cm (66\")       Weight:    Wt Readings from Last 1 Encounters:   22 77.1 kg (170 lb)       BMI:  27.5 - Overweight     Weight Change: ~8# weight gain over the past ~7 months     Review of Lab Data (Time Frame - 1 month / 2 month)   Labs reviewed - 3/22/22    Medication Review   MAR reviewed    Nutrition Focused Physical Findings       Nutrition Impact Symptoms   Diarrhea - intermittent (per MD Office visit note)  Abdominal pain / bloating (per MD Office visit note)    Physical Activity   Not feeling up to most things, but in bed less than half the day    Current Nutritional Intake     Oral diet:  Regular     Malnutrition Risk Assessment     Recent weight loss over the past 6 months:  0 = No    Eating poorly because of a decreased appetite:  Other - will assess at time of consult    Malnutrition Screening Score:     MST = 0 or 1 Patient not at risk for malnutrition    Nutrition Diagnosis     Problem    Etiology    Signs / Symptoms      Nutrition Intervention   Initial nutritional screening completed as above.  Unable to meet with patient during her infusion appointment as she had a reaction to Rituxan and received Demerol which made her very sleepy.    Goal       Monitoring / Evaluation   Will " plan to meet with patient during upcoming infusion appointment for nutritional assessment / education.  RD available to assist prn.    Zuleika Lorenzana MS, RD, LD

## 2022-03-23 ENCOUNTER — APPOINTMENT (OUTPATIENT)
Dept: CT IMAGING | Facility: HOSPITAL | Age: 82
End: 2022-03-23

## 2022-03-23 PROBLEM — R79.89 ELEVATED PROCALCITONIN: Status: ACTIVE | Noted: 2022-03-23

## 2022-03-23 PROBLEM — E87.1 HYPONATREMIA: Status: ACTIVE | Noted: 2022-03-23

## 2022-03-23 PROBLEM — R79.82 ELEVATED C-REACTIVE PROTEIN (CRP): Status: ACTIVE | Noted: 2022-03-23

## 2022-03-23 PROBLEM — E87.20 LACTIC ACIDOSIS: Status: ACTIVE | Noted: 2022-03-23

## 2022-03-23 PROBLEM — N18.30 CKD (CHRONIC KIDNEY DISEASE) STAGE 3, GFR 30-59 ML/MIN: Status: ACTIVE | Noted: 2022-03-23

## 2022-03-23 PROBLEM — D64.9 ANEMIA: Status: ACTIVE | Noted: 2022-03-17

## 2022-03-23 LAB
ABO GROUP BLD: NORMAL
ABO GROUP BLD: NORMAL
ALBUMIN SERPL-MCNC: 2.9 G/DL (ref 3.5–5.2)
ALBUMIN/GLOB SERPL: 1.6 G/DL
ALP SERPL-CCNC: 49 U/L (ref 39–117)
ALT SERPL W P-5'-P-CCNC: 16 U/L (ref 1–33)
ANION GAP SERPL CALCULATED.3IONS-SCNC: 10 MMOL/L (ref 5–15)
ANION GAP SERPL CALCULATED.3IONS-SCNC: 11 MMOL/L (ref 5–15)
APTT PPP: 30.6 SECONDS (ref 22–39)
AST SERPL-CCNC: 36 U/L (ref 1–32)
BASOPHILS # BLD AUTO: 0 10*3/MM3 (ref 0–0.2)
BASOPHILS # BLD AUTO: 0.01 10*3/MM3 (ref 0–0.2)
BASOPHILS # BLD AUTO: 0.02 10*3/MM3 (ref 0–0.2)
BASOPHILS NFR BLD AUTO: 0 % (ref 0–1.5)
BASOPHILS NFR BLD AUTO: 0.3 % (ref 0–1.5)
BASOPHILS NFR BLD AUTO: 0.5 % (ref 0–1.5)
BILIRUB SERPL-MCNC: 0.2 MG/DL (ref 0–1.2)
BLD GP AB SCN SERPL QL: NEGATIVE
BUN SERPL-MCNC: 34 MG/DL (ref 8–23)
BUN SERPL-MCNC: 38 MG/DL (ref 8–23)
BUN/CREAT SERPL: 22.4 (ref 7–25)
BUN/CREAT SERPL: 28.6 (ref 7–25)
BURR CELLS BLD QL SMEAR: NORMAL
CALCIUM SPEC-SCNC: 7.8 MG/DL (ref 8.6–10.5)
CALCIUM SPEC-SCNC: 7.8 MG/DL (ref 8.6–10.5)
CHLORIDE SERPL-SCNC: 100 MMOL/L (ref 98–107)
CHLORIDE SERPL-SCNC: 100 MMOL/L (ref 98–107)
CO2 SERPL-SCNC: 18 MMOL/L (ref 22–29)
CO2 SERPL-SCNC: 19 MMOL/L (ref 22–29)
CREAT SERPL-MCNC: 1.33 MG/DL (ref 0.57–1)
CREAT SERPL-MCNC: 1.52 MG/DL (ref 0.57–1)
CREAT UR-MCNC: 69.7 MG/DL
CYTOLOGIST CVX/VAG CYTO: NORMAL
D DIMER PPP FEU-MCNC: >20 MCGFEU/ML (ref 0.01–0.5)
D-LACTATE SERPL-SCNC: 1.2 MMOL/L (ref 0.5–2)
D-LACTATE SERPL-SCNC: 2.3 MMOL/L (ref 0.5–2)
D-LACTATE SERPL-SCNC: 3 MMOL/L (ref 0.5–2)
DEPRECATED RDW RBC AUTO: 49.1 FL (ref 37–54)
DEPRECATED RDW RBC AUTO: 50.4 FL (ref 37–54)
DEPRECATED RDW RBC AUTO: 51.1 FL (ref 37–54)
EGFRCR SERPLBLD CKD-EPI 2021: 33.9 ML/MIN/1.73
EGFRCR SERPLBLD CKD-EPI 2021: 39.8 ML/MIN/1.73
EOSINOPHIL # BLD AUTO: 0 10*3/MM3 (ref 0–0.4)
EOSINOPHIL # BLD AUTO: 0 10*3/MM3 (ref 0–0.4)
EOSINOPHIL # BLD AUTO: 0.05 10*3/MM3 (ref 0–0.4)
EOSINOPHIL NFR BLD AUTO: 0 % (ref 0.3–6.2)
EOSINOPHIL NFR BLD AUTO: 0 % (ref 0.3–6.2)
EOSINOPHIL NFR BLD AUTO: 1.3 % (ref 0.3–6.2)
ERYTHROCYTE [DISTWIDTH] IN BLOOD BY AUTOMATED COUNT: 15 % (ref 12.3–15.4)
ERYTHROCYTE [DISTWIDTH] IN BLOOD BY AUTOMATED COUNT: 15.2 % (ref 12.3–15.4)
ERYTHROCYTE [DISTWIDTH] IN BLOOD BY AUTOMATED COUNT: 15.3 % (ref 12.3–15.4)
FIBRINOGEN PPP-MCNC: 299 MG/DL (ref 220–470)
FIBRINOGEN PPP-MCNC: 331 MG/DL (ref 220–470)
FSP PPP LA-ACNC: ABNORMAL
GLOBULIN UR ELPH-MCNC: 1.8 GM/DL
GLUCOSE SERPL-MCNC: 158 MG/DL (ref 65–99)
GLUCOSE SERPL-MCNC: 87 MG/DL (ref 65–99)
HCT VFR BLD AUTO: 22.4 % (ref 34–46.6)
HCT VFR BLD AUTO: 23.5 % (ref 34–46.6)
HCT VFR BLD AUTO: 27.2 % (ref 34–46.6)
HEMOCCULT STL QL: NEGATIVE
HGB BLD-MCNC: 7.2 G/DL (ref 12–15.9)
HGB BLD-MCNC: 7.5 G/DL (ref 12–15.9)
HGB BLD-MCNC: 8.6 G/DL (ref 12–15.9)
HOLD SPECIMEN: NORMAL
IMM GRANULOCYTES # BLD AUTO: 0.01 10*3/MM3 (ref 0–0.05)
IMM GRANULOCYTES # BLD AUTO: 0.01 10*3/MM3 (ref 0–0.05)
IMM GRANULOCYTES # BLD AUTO: 0.03 10*3/MM3 (ref 0–0.05)
IMM GRANULOCYTES NFR BLD AUTO: 0.3 % (ref 0–0.5)
IMM GRANULOCYTES NFR BLD AUTO: 0.4 % (ref 0–0.5)
IMM GRANULOCYTES NFR BLD AUTO: 0.9 % (ref 0–0.5)
INR PPP: 1.63 (ref 0.85–1.16)
LDH SERPL-CCNC: 196 U/L (ref 135–214)
LYMPHOCYTES # BLD AUTO: 0.3 10*3/MM3 (ref 0.7–3.1)
LYMPHOCYTES # BLD AUTO: 0.39 10*3/MM3 (ref 0.7–3.1)
LYMPHOCYTES # BLD AUTO: 0.78 10*3/MM3 (ref 0.7–3.1)
LYMPHOCYTES NFR BLD AUTO: 15.9 % (ref 19.6–45.3)
LYMPHOCYTES NFR BLD AUTO: 21 % (ref 19.6–45.3)
LYMPHOCYTES NFR BLD AUTO: 9 % (ref 19.6–45.3)
MAGNESIUM SERPL-MCNC: 1.6 MG/DL (ref 1.6–2.4)
MCH RBC QN AUTO: 28.5 PG (ref 26.6–33)
MCH RBC QN AUTO: 28.8 PG (ref 26.6–33)
MCH RBC QN AUTO: 29.4 PG (ref 26.6–33)
MCHC RBC AUTO-ENTMCNC: 31.6 G/DL (ref 31.5–35.7)
MCHC RBC AUTO-ENTMCNC: 31.9 G/DL (ref 31.5–35.7)
MCHC RBC AUTO-ENTMCNC: 32.1 G/DL (ref 31.5–35.7)
MCV RBC AUTO: 89.4 FL (ref 79–97)
MCV RBC AUTO: 89.6 FL (ref 79–97)
MCV RBC AUTO: 92.8 FL (ref 79–97)
MONOCYTES # BLD AUTO: 0.15 10*3/MM3 (ref 0.1–0.9)
MONOCYTES # BLD AUTO: 0.24 10*3/MM3 (ref 0.1–0.9)
MONOCYTES # BLD AUTO: 0.36 10*3/MM3 (ref 0.1–0.9)
MONOCYTES NFR BLD AUTO: 6.1 % (ref 5–12)
MONOCYTES NFR BLD AUTO: 7.2 % (ref 5–12)
MONOCYTES NFR BLD AUTO: 9.7 % (ref 5–12)
NEUTROPHILS NFR BLD AUTO: 1.9 10*3/MM3 (ref 1.7–7)
NEUTROPHILS NFR BLD AUTO: 2.5 10*3/MM3 (ref 1.7–7)
NEUTROPHILS NFR BLD AUTO: 2.75 10*3/MM3 (ref 1.7–7)
NEUTROPHILS NFR BLD AUTO: 67.2 % (ref 42.7–76)
NEUTROPHILS NFR BLD AUTO: 77.6 % (ref 42.7–76)
NEUTROPHILS NFR BLD AUTO: 82.6 % (ref 42.7–76)
NRBC BLD AUTO-RTO: 0 /100 WBC (ref 0–0.2)
OSMOLALITY SERPL: 281 MOSM/KG (ref 275–295)
OVALOCYTES BLD QL SMEAR: NORMAL
PATH INTERP BLD-IMP: NORMAL
PHOSPHATE SERPL-MCNC: 3.5 MG/DL (ref 2.5–4.5)
PLAT MORPH BLD: NORMAL
PLAT MORPH BLD: NORMAL
PLATELET # BLD AUTO: 28 10*3/MM3 (ref 140–450)
PLATELET # BLD AUTO: 30 10*3/MM3 (ref 140–450)
PLATELET # BLD AUTO: 32 10*3/MM3 (ref 140–450)
PMV BLD AUTO: 10 FL (ref 6–12)
PMV BLD AUTO: 10.1 FL (ref 6–12)
PMV BLD AUTO: 11.3 FL (ref 6–12)
POTASSIUM SERPL-SCNC: 4.7 MMOL/L (ref 3.5–5.2)
POTASSIUM SERPL-SCNC: 5.1 MMOL/L (ref 3.5–5.2)
PROT SERPL-MCNC: 4.7 G/DL (ref 6–8.5)
PROTHROMBIN TIME: 18.7 SECONDS (ref 11.4–14.4)
QT INTERVAL: 478 MS
QTC INTERVAL: 489 MS
RBC # BLD AUTO: 2.5 10*6/MM3 (ref 3.77–5.28)
RBC # BLD AUTO: 2.63 10*6/MM3 (ref 3.77–5.28)
RBC # BLD AUTO: 2.93 10*6/MM3 (ref 3.77–5.28)
RBC MORPH BLD: NORMAL
RBC MORPH BLD: NORMAL
RH BLD: POSITIVE
RH BLD: POSITIVE
SMALL PLATELETS BLD QL SMEAR: NORMAL
SODIUM SERPL-SCNC: 129 MMOL/L (ref 136–145)
SODIUM SERPL-SCNC: 129 MMOL/L (ref 136–145)
SODIUM UR-SCNC: <20 MMOL/L
T&S EXPIRATION DATE: NORMAL
URATE SERPL-MCNC: 5.8 MG/DL (ref 2.4–5.7)
WBC MORPH BLD: NORMAL
WBC NRBC COR # BLD: 2.45 10*3/MM3 (ref 3.4–10.8)
WBC NRBC COR # BLD: 3.33 10*3/MM3 (ref 3.4–10.8)
WBC NRBC COR # BLD: 3.72 10*3/MM3 (ref 3.4–10.8)

## 2022-03-23 PROCEDURE — 85025 COMPLETE CBC W/AUTO DIFF WBC: CPT | Performed by: INTERNAL MEDICINE

## 2022-03-23 PROCEDURE — 86923 COMPATIBILITY TEST ELECTRIC: CPT

## 2022-03-23 PROCEDURE — 99223 1ST HOSP IP/OBS HIGH 75: CPT | Performed by: INTERNAL MEDICINE

## 2022-03-23 PROCEDURE — 84100 ASSAY OF PHOSPHORUS: CPT | Performed by: NURSE PRACTITIONER

## 2022-03-23 PROCEDURE — 99222 1ST HOSP IP/OBS MODERATE 55: CPT | Performed by: INTERNAL MEDICINE

## 2022-03-23 PROCEDURE — 83605 ASSAY OF LACTIC ACID: CPT | Performed by: STUDENT IN AN ORGANIZED HEALTH CARE EDUCATION/TRAINING PROGRAM

## 2022-03-23 PROCEDURE — 83935 ASSAY OF URINE OSMOLALITY: CPT | Performed by: NURSE PRACTITIONER

## 2022-03-23 PROCEDURE — 85007 BL SMEAR W/DIFF WBC COUNT: CPT | Performed by: INTERNAL MEDICINE

## 2022-03-23 PROCEDURE — 86901 BLOOD TYPING SEROLOGIC RH(D): CPT | Performed by: INTERNAL MEDICINE

## 2022-03-23 PROCEDURE — 80053 COMPREHEN METABOLIC PANEL: CPT | Performed by: NURSE PRACTITIONER

## 2022-03-23 PROCEDURE — 83735 ASSAY OF MAGNESIUM: CPT | Performed by: NURSE PRACTITIONER

## 2022-03-23 PROCEDURE — 85730 THROMBOPLASTIN TIME PARTIAL: CPT | Performed by: INTERNAL MEDICINE

## 2022-03-23 PROCEDURE — 85384 FIBRINOGEN ACTIVITY: CPT | Performed by: NURSE PRACTITIONER

## 2022-03-23 PROCEDURE — 25010000002 DIPHENHYDRAMINE PER 50 MG: Performed by: INTERNAL MEDICINE

## 2022-03-23 PROCEDURE — 85060 BLOOD SMEAR INTERPRETATION: CPT | Performed by: INTERNAL MEDICINE

## 2022-03-23 PROCEDURE — 71250 CT THORAX DX C-: CPT

## 2022-03-23 PROCEDURE — 85379 FIBRIN DEGRADATION QUANT: CPT | Performed by: INTERNAL MEDICINE

## 2022-03-23 PROCEDURE — 93010 ELECTROCARDIOGRAM REPORT: CPT | Performed by: INTERNAL MEDICINE

## 2022-03-23 PROCEDURE — 82272 OCCULT BLD FECES 1-3 TESTS: CPT | Performed by: INTERNAL MEDICINE

## 2022-03-23 PROCEDURE — 83605 ASSAY OF LACTIC ACID: CPT | Performed by: NURSE PRACTITIONER

## 2022-03-23 PROCEDURE — 86850 RBC ANTIBODY SCREEN: CPT | Performed by: INTERNAL MEDICINE

## 2022-03-23 PROCEDURE — 36430 TRANSFUSION BLD/BLD COMPNT: CPT

## 2022-03-23 PROCEDURE — P9016 RBC LEUKOCYTES REDUCED: HCPCS

## 2022-03-23 PROCEDURE — 25010000002 MAGNESIUM SULFATE 2 GM/50ML SOLUTION

## 2022-03-23 PROCEDURE — 25010000002 VANCOMYCIN: Performed by: NURSE PRACTITIONER

## 2022-03-23 PROCEDURE — 93005 ELECTROCARDIOGRAM TRACING: CPT | Performed by: NURSE PRACTITIONER

## 2022-03-23 PROCEDURE — 85384 FIBRINOGEN ACTIVITY: CPT | Performed by: INTERNAL MEDICINE

## 2022-03-23 PROCEDURE — 85362 FIBRIN DEGRADATION PRODUCTS: CPT | Performed by: NURSE PRACTITIONER

## 2022-03-23 PROCEDURE — 86901 BLOOD TYPING SEROLOGIC RH(D): CPT

## 2022-03-23 PROCEDURE — 86900 BLOOD TYPING SEROLOGIC ABO: CPT

## 2022-03-23 PROCEDURE — 86900 BLOOD TYPING SEROLOGIC ABO: CPT | Performed by: INTERNAL MEDICINE

## 2022-03-23 PROCEDURE — 83605 ASSAY OF LACTIC ACID: CPT | Performed by: INTERNAL MEDICINE

## 2022-03-23 PROCEDURE — 85610 PROTHROMBIN TIME: CPT | Performed by: NURSE PRACTITIONER

## 2022-03-23 RX ORDER — SIMETHICONE 80 MG
80 TABLET,CHEWABLE ORAL 4 TIMES DAILY PRN
Status: DISCONTINUED | OUTPATIENT
Start: 2022-03-23 | End: 2022-03-27 | Stop reason: HOSPADM

## 2022-03-23 RX ORDER — ACETAMINOPHEN 325 MG/1
650 TABLET ORAL ONCE
Status: COMPLETED | OUTPATIENT
Start: 2022-03-23 | End: 2022-03-23

## 2022-03-23 RX ORDER — SODIUM CHLORIDE 0.9 % (FLUSH) 0.9 %
10 SYRINGE (ML) INJECTION EVERY 12 HOURS SCHEDULED
Status: DISCONTINUED | OUTPATIENT
Start: 2022-03-23 | End: 2022-03-27 | Stop reason: HOSPADM

## 2022-03-23 RX ORDER — PANTOPRAZOLE SODIUM 40 MG/1
40 TABLET, DELAYED RELEASE ORAL EVERY MORNING
Status: DISCONTINUED | OUTPATIENT
Start: 2022-03-23 | End: 2022-03-23

## 2022-03-23 RX ORDER — PANTOPRAZOLE SODIUM 40 MG/10ML
40 INJECTION, POWDER, LYOPHILIZED, FOR SOLUTION INTRAVENOUS EVERY 12 HOURS SCHEDULED
Status: DISCONTINUED | OUTPATIENT
Start: 2022-03-23 | End: 2022-03-27 | Stop reason: HOSPADM

## 2022-03-23 RX ORDER — LEVOTHYROXINE SODIUM 0.05 MG/1
50 TABLET ORAL DAILY
Status: DISCONTINUED | OUTPATIENT
Start: 2022-03-23 | End: 2022-03-27 | Stop reason: HOSPADM

## 2022-03-23 RX ORDER — GABAPENTIN 300 MG/1
600 CAPSULE ORAL DAILY
Status: DISCONTINUED | OUTPATIENT
Start: 2022-03-23 | End: 2022-03-24

## 2022-03-23 RX ORDER — ALLOPURINOL 300 MG/1
300 TABLET ORAL DAILY
Status: DISCONTINUED | OUTPATIENT
Start: 2022-03-23 | End: 2022-03-27 | Stop reason: HOSPADM

## 2022-03-23 RX ORDER — SODIUM CHLORIDE 9 MG/ML
75 INJECTION, SOLUTION INTRAVENOUS CONTINUOUS
Status: DISCONTINUED | OUTPATIENT
Start: 2022-03-23 | End: 2022-03-24

## 2022-03-23 RX ORDER — FUROSEMIDE 40 MG/1
40 TABLET ORAL 3 TIMES WEEKLY
Status: DISCONTINUED | OUTPATIENT
Start: 2022-03-23 | End: 2022-03-23

## 2022-03-23 RX ORDER — FERROUS SULFATE 325(65) MG
325 TABLET ORAL 3 TIMES WEEKLY
Status: DISCONTINUED | OUTPATIENT
Start: 2022-03-23 | End: 2022-03-27 | Stop reason: HOSPADM

## 2022-03-23 RX ORDER — DIPHENHYDRAMINE HYDROCHLORIDE 50 MG/ML
12.5 INJECTION INTRAMUSCULAR; INTRAVENOUS ONCE
Status: COMPLETED | OUTPATIENT
Start: 2022-03-23 | End: 2022-03-23

## 2022-03-23 RX ORDER — ACETAMINOPHEN 325 MG/1
650 TABLET ORAL EVERY 4 HOURS PRN
Status: DISCONTINUED | OUTPATIENT
Start: 2022-03-23 | End: 2022-03-27 | Stop reason: HOSPADM

## 2022-03-23 RX ORDER — ATENOLOL 50 MG/1
50 TABLET ORAL
Status: DISCONTINUED | OUTPATIENT
Start: 2022-03-23 | End: 2022-03-27 | Stop reason: HOSPADM

## 2022-03-23 RX ORDER — SODIUM CHLORIDE 0.9 % (FLUSH) 0.9 %
10 SYRINGE (ML) INJECTION AS NEEDED
Status: DISCONTINUED | OUTPATIENT
Start: 2022-03-23 | End: 2022-03-27 | Stop reason: HOSPADM

## 2022-03-23 RX ORDER — MAGNESIUM SULFATE HEPTAHYDRATE 40 MG/ML
2 INJECTION, SOLUTION INTRAVENOUS ONCE
Status: DISCONTINUED | OUTPATIENT
Start: 2022-03-23 | End: 2022-03-23

## 2022-03-23 RX ORDER — SODIUM CHLORIDE, SODIUM LACTATE, POTASSIUM CHLORIDE, CALCIUM CHLORIDE 600; 310; 30; 20 MG/100ML; MG/100ML; MG/100ML; MG/100ML
75 INJECTION, SOLUTION INTRAVENOUS CONTINUOUS
Status: DISCONTINUED | OUTPATIENT
Start: 2022-03-23 | End: 2022-03-23

## 2022-03-23 RX ORDER — LISINOPRIL 5 MG/1
2.5 TABLET ORAL
Status: DISCONTINUED | OUTPATIENT
Start: 2022-03-23 | End: 2022-03-23

## 2022-03-23 RX ORDER — ACETAMINOPHEN 650 MG/1
650 SUPPOSITORY RECTAL EVERY 4 HOURS PRN
Status: DISCONTINUED | OUTPATIENT
Start: 2022-03-23 | End: 2022-03-27 | Stop reason: HOSPADM

## 2022-03-23 RX ORDER — ACETAMINOPHEN 160 MG/5ML
650 SOLUTION ORAL EVERY 4 HOURS PRN
Status: DISCONTINUED | OUTPATIENT
Start: 2022-03-23 | End: 2022-03-27 | Stop reason: HOSPADM

## 2022-03-23 RX ORDER — MAGNESIUM SULFATE HEPTAHYDRATE 40 MG/ML
2 INJECTION, SOLUTION INTRAVENOUS ONCE
Status: COMPLETED | OUTPATIENT
Start: 2022-03-23 | End: 2022-03-23

## 2022-03-23 RX ADMIN — SODIUM CHLORIDE 75 ML/HR: 9 INJECTION, SOLUTION INTRAVENOUS at 03:20

## 2022-03-23 RX ADMIN — LEVOTHYROXINE SODIUM 50 MCG: 50 TABLET ORAL at 08:32

## 2022-03-23 RX ADMIN — AZTREONAM 2 G: 2 INJECTION, POWDER, LYOPHILIZED, FOR SOLUTION INTRAMUSCULAR; INTRAVENOUS at 03:13

## 2022-03-23 RX ADMIN — GABAPENTIN 600 MG: 300 CAPSULE ORAL at 08:32

## 2022-03-23 RX ADMIN — PANTOPRAZOLE SODIUM 40 MG: 40 INJECTION, POWDER, LYOPHILIZED, FOR SOLUTION INTRAVENOUS at 20:11

## 2022-03-23 RX ADMIN — PANTOPRAZOLE SODIUM 40 MG: 40 INJECTION, POWDER, LYOPHILIZED, FOR SOLUTION INTRAVENOUS at 08:46

## 2022-03-23 RX ADMIN — Medication 10 ML: at 09:03

## 2022-03-23 RX ADMIN — SIMETHICONE 80 MG: 80 TABLET, CHEWABLE ORAL at 17:13

## 2022-03-23 RX ADMIN — Medication 10 ML: at 03:13

## 2022-03-23 RX ADMIN — METRONIDAZOLE 500 MG: 500 INJECTION, SOLUTION INTRAVENOUS at 08:33

## 2022-03-23 RX ADMIN — MAGNESIUM SULFATE HEPTAHYDRATE 2 G: 2 INJECTION, SOLUTION INTRAVENOUS at 08:31

## 2022-03-23 RX ADMIN — SODIUM CHLORIDE 1000 ML: 9 INJECTION, SOLUTION INTRAVENOUS at 09:02

## 2022-03-23 RX ADMIN — ACETAMINOPHEN 650 MG: 325 TABLET ORAL at 23:34

## 2022-03-23 RX ADMIN — METRONIDAZOLE 500 MG: 500 INJECTION, SOLUTION INTRAVENOUS at 14:51

## 2022-03-23 RX ADMIN — Medication 325 MG: at 08:32

## 2022-03-23 RX ADMIN — METRONIDAZOLE 500 MG: 500 INJECTION, SOLUTION INTRAVENOUS at 00:05

## 2022-03-23 RX ADMIN — AZTREONAM 2 G: 2 INJECTION, POWDER, LYOPHILIZED, FOR SOLUTION INTRAMUSCULAR; INTRAVENOUS at 10:09

## 2022-03-23 RX ADMIN — VANCOMYCIN HYDROCHLORIDE 1750 MG: 10 INJECTION, POWDER, LYOPHILIZED, FOR SOLUTION INTRAVENOUS at 03:20

## 2022-03-23 RX ADMIN — ACETAMINOPHEN 650 MG: 325 TABLET ORAL at 05:39

## 2022-03-23 RX ADMIN — ALLOPURINOL 300 MG: 300 TABLET ORAL at 08:35

## 2022-03-23 RX ADMIN — ACETAMINOPHEN 650 MG: 325 TABLET ORAL at 10:09

## 2022-03-23 RX ADMIN — Medication 10 ML: at 20:11

## 2022-03-23 RX ADMIN — AZTREONAM 2 G: 2 INJECTION, POWDER, LYOPHILIZED, FOR SOLUTION INTRAMUSCULAR; INTRAVENOUS at 16:47

## 2022-03-23 RX ADMIN — SODIUM CHLORIDE 500 ML: 9 INJECTION, SOLUTION INTRAVENOUS at 02:40

## 2022-03-23 RX ADMIN — DIPHENHYDRAMINE HYDROCHLORIDE 12.5 MG: 50 INJECTION INTRAMUSCULAR; INTRAVENOUS at 05:40

## 2022-03-24 LAB
ALBUMIN SERPL-MCNC: 3.2 G/DL (ref 3.5–5.2)
ALBUMIN/GLOB SERPL: 1.5 G/DL
ALP SERPL-CCNC: 66 U/L (ref 39–117)
ALT SERPL W P-5'-P-CCNC: 16 U/L (ref 1–33)
ANION GAP SERPL CALCULATED.3IONS-SCNC: 8 MMOL/L (ref 5–15)
APTT PPP: 26.4 SECONDS (ref 22–39)
AST SERPL-CCNC: 31 U/L (ref 1–32)
BASOPHILS # BLD AUTO: 0.03 10*3/MM3 (ref 0–0.2)
BASOPHILS NFR BLD AUTO: 0.8 % (ref 0–1.5)
BILIRUB SERPL-MCNC: 0.4 MG/DL (ref 0–1.2)
BUN SERPL-MCNC: 26 MG/DL (ref 8–23)
BUN/CREAT SERPL: 22.8 (ref 7–25)
BURR CELLS BLD QL SMEAR: NORMAL
CALCIUM SPEC-SCNC: 8.5 MG/DL (ref 8.6–10.5)
CHLORIDE SERPL-SCNC: 106 MMOL/L (ref 98–107)
CO2 SERPL-SCNC: 19 MMOL/L (ref 22–29)
CREAT SERPL-MCNC: 1.14 MG/DL (ref 0.57–1)
D DIMER PPP FEU-MCNC: 13.71 MCGFEU/ML (ref 0.01–0.5)
D DIMER PPP FEU-MCNC: 17.9 MCGFEU/ML (ref 0.01–0.5)
DEPRECATED RDW RBC AUTO: 53.5 FL (ref 37–54)
EGFRCR SERPLBLD CKD-EPI 2021: 47.9 ML/MIN/1.73
EOSINOPHIL # BLD AUTO: 0.1 10*3/MM3 (ref 0–0.4)
EOSINOPHIL NFR BLD AUTO: 2.8 % (ref 0.3–6.2)
ERYTHROCYTE [DISTWIDTH] IN BLOOD BY AUTOMATED COUNT: 15.7 % (ref 12.3–15.4)
FIBRINOGEN PPP-MCNC: 425 MG/DL (ref 220–470)
FSP PPP LA-ACNC: ABNORMAL
GLOBULIN UR ELPH-MCNC: 2.2 GM/DL
GLUCOSE SERPL-MCNC: 95 MG/DL (ref 65–99)
HCT VFR BLD AUTO: 30.7 % (ref 34–46.6)
HGB BLD-MCNC: 9.8 G/DL (ref 12–15.9)
IMM GRANULOCYTES # BLD AUTO: 0.01 10*3/MM3 (ref 0–0.05)
IMM GRANULOCYTES NFR BLD AUTO: 0.3 % (ref 0–0.5)
LYMPHOCYTES # BLD AUTO: 0.88 10*3/MM3 (ref 0.7–3.1)
LYMPHOCYTES NFR BLD AUTO: 24.5 % (ref 19.6–45.3)
MAGNESIUM SERPL-MCNC: 2.1 MG/DL (ref 1.6–2.4)
MCH RBC QN AUTO: 29.8 PG (ref 26.6–33)
MCHC RBC AUTO-ENTMCNC: 31.9 G/DL (ref 31.5–35.7)
MCV RBC AUTO: 93.3 FL (ref 79–97)
MONOCYTES # BLD AUTO: 0.33 10*3/MM3 (ref 0.1–0.9)
MONOCYTES NFR BLD AUTO: 9.2 % (ref 5–12)
NEUTROPHILS NFR BLD AUTO: 2.24 10*3/MM3 (ref 1.7–7)
NEUTROPHILS NFR BLD AUTO: 62.4 % (ref 42.7–76)
NRBC BLD AUTO-RTO: 0 /100 WBC (ref 0–0.2)
OSMOLALITY UR: 264 MOSM/KG (ref 300–1100)
OVALOCYTES BLD QL SMEAR: NORMAL
PLATELET # BLD AUTO: 36 10*3/MM3 (ref 140–450)
PMV BLD AUTO: 10.5 FL (ref 6–12)
POTASSIUM SERPL-SCNC: 5 MMOL/L (ref 3.5–5.2)
PROT SERPL-MCNC: 5.4 G/DL (ref 6–8.5)
RBC # BLD AUTO: 3.29 10*6/MM3 (ref 3.77–5.28)
SMALL PLATELETS BLD QL SMEAR: NORMAL
SODIUM SERPL-SCNC: 133 MMOL/L (ref 136–145)
VANCOMYCIN SERPL-MCNC: 9.8 MCG/ML (ref 5–40)
WBC MORPH BLD: NORMAL
WBC NRBC COR # BLD: 3.59 10*3/MM3 (ref 3.4–10.8)

## 2022-03-24 PROCEDURE — 99233 SBSQ HOSP IP/OBS HIGH 50: CPT | Performed by: INTERNAL MEDICINE

## 2022-03-24 PROCEDURE — 85730 THROMBOPLASTIN TIME PARTIAL: CPT | Performed by: INTERNAL MEDICINE

## 2022-03-24 PROCEDURE — 85379 FIBRIN DEGRADATION QUANT: CPT | Performed by: INTERNAL MEDICINE

## 2022-03-24 PROCEDURE — 80202 ASSAY OF VANCOMYCIN: CPT

## 2022-03-24 PROCEDURE — 85362 FIBRIN DEGRADATION PRODUCTS: CPT | Performed by: INTERNAL MEDICINE

## 2022-03-24 PROCEDURE — 83735 ASSAY OF MAGNESIUM: CPT | Performed by: INTERNAL MEDICINE

## 2022-03-24 PROCEDURE — 25010000002 VANCOMYCIN PER 500 MG

## 2022-03-24 PROCEDURE — 85025 COMPLETE CBC W/AUTO DIFF WBC: CPT | Performed by: INTERNAL MEDICINE

## 2022-03-24 PROCEDURE — 80053 COMPREHEN METABOLIC PANEL: CPT | Performed by: INTERNAL MEDICINE

## 2022-03-24 PROCEDURE — 85007 BL SMEAR W/DIFF WBC COUNT: CPT | Performed by: INTERNAL MEDICINE

## 2022-03-24 PROCEDURE — 85384 FIBRINOGEN ACTIVITY: CPT | Performed by: INTERNAL MEDICINE

## 2022-03-24 PROCEDURE — 97162 PT EVAL MOD COMPLEX 30 MIN: CPT

## 2022-03-24 PROCEDURE — 99232 SBSQ HOSP IP/OBS MODERATE 35: CPT | Performed by: INTERNAL MEDICINE

## 2022-03-24 RX ORDER — L.ACID,PARA/B.BIFIDUM/S.THERM 8B CELL
1 CAPSULE ORAL DAILY
Status: DISCONTINUED | OUTPATIENT
Start: 2022-03-24 | End: 2022-03-27 | Stop reason: HOSPADM

## 2022-03-24 RX ORDER — GABAPENTIN 300 MG/1
600 CAPSULE ORAL NIGHTLY
Status: DISCONTINUED | OUTPATIENT
Start: 2022-03-24 | End: 2022-03-27 | Stop reason: HOSPADM

## 2022-03-24 RX ORDER — BISMUTH SUBSALICYLATE 262 MG/1
524 TABLET, CHEWABLE ORAL
Status: DISCONTINUED | OUTPATIENT
Start: 2022-03-24 | End: 2022-03-27 | Stop reason: HOSPADM

## 2022-03-24 RX ORDER — VANCOMYCIN HYDROCHLORIDE 1 G/200ML
1000 INJECTION, SOLUTION INTRAVENOUS
Status: DISCONTINUED | OUTPATIENT
Start: 2022-03-24 | End: 2022-03-25

## 2022-03-24 RX ADMIN — PANTOPRAZOLE SODIUM 40 MG: 40 INJECTION, POWDER, LYOPHILIZED, FOR SOLUTION INTRAVENOUS at 08:02

## 2022-03-24 RX ADMIN — BISMUTH SUBSALICYLATE 524 MG: 262 TABLET, CHEWABLE ORAL at 13:33

## 2022-03-24 RX ADMIN — AZTREONAM 2 G: 2 INJECTION, POWDER, LYOPHILIZED, FOR SOLUTION INTRAMUSCULAR; INTRAVENOUS at 17:37

## 2022-03-24 RX ADMIN — ATENOLOL 50 MG: 50 TABLET ORAL at 08:03

## 2022-03-24 RX ADMIN — GABAPENTIN 600 MG: 300 CAPSULE ORAL at 08:02

## 2022-03-24 RX ADMIN — ACETAMINOPHEN 650 MG: 325 TABLET ORAL at 20:21

## 2022-03-24 RX ADMIN — VANCOMYCIN HYDROCHLORIDE 1000 MG: 1 INJECTION, SOLUTION INTRAVENOUS at 10:26

## 2022-03-24 RX ADMIN — Medication 10 ML: at 20:21

## 2022-03-24 RX ADMIN — Medication 10 ML: at 08:02

## 2022-03-24 RX ADMIN — AZTREONAM 2 G: 2 INJECTION, POWDER, LYOPHILIZED, FOR SOLUTION INTRAMUSCULAR; INTRAVENOUS at 10:12

## 2022-03-24 RX ADMIN — LEVOTHYROXINE SODIUM 50 MCG: 50 TABLET ORAL at 05:03

## 2022-03-24 RX ADMIN — Medication 1 CAPSULE: at 10:32

## 2022-03-24 RX ADMIN — AZTREONAM 2 G: 2 INJECTION, POWDER, LYOPHILIZED, FOR SOLUTION INTRAMUSCULAR; INTRAVENOUS at 01:02

## 2022-03-24 RX ADMIN — GABAPENTIN 600 MG: 300 CAPSULE ORAL at 20:21

## 2022-03-24 RX ADMIN — ALLOPURINOL 300 MG: 300 TABLET ORAL at 08:02

## 2022-03-24 RX ADMIN — PANTOPRAZOLE SODIUM 40 MG: 40 INJECTION, POWDER, LYOPHILIZED, FOR SOLUTION INTRAVENOUS at 20:21

## 2022-03-24 RX ADMIN — SODIUM CHLORIDE 75 ML/HR: 9 INJECTION, SOLUTION INTRAVENOUS at 01:02

## 2022-03-25 ENCOUNTER — TELEPHONE (OUTPATIENT)
Dept: ONCOLOGY | Facility: CLINIC | Age: 82
End: 2022-03-25

## 2022-03-25 LAB
ALBUMIN SERPL-MCNC: 3.5 G/DL (ref 3.5–5.2)
ALBUMIN/GLOB SERPL: 2.2 G/DL
ALP SERPL-CCNC: 86 U/L (ref 39–117)
ALT SERPL W P-5'-P-CCNC: 13 U/L (ref 1–33)
ANION GAP SERPL CALCULATED.3IONS-SCNC: 9 MMOL/L (ref 5–15)
APTT PPP: 27.3 SECONDS (ref 22–39)
AST SERPL-CCNC: 16 U/L (ref 1–32)
BASOPHILS # BLD MANUAL: 0.11 10*3/MM3 (ref 0–0.2)
BASOPHILS NFR BLD MANUAL: 2 % (ref 0–1.5)
BILIRUB SERPL-MCNC: 0.3 MG/DL (ref 0–1.2)
BUN SERPL-MCNC: 17 MG/DL (ref 8–23)
BUN/CREAT SERPL: 20.2 (ref 7–25)
CALCIUM SPEC-SCNC: 8.7 MG/DL (ref 8.6–10.5)
CHLORIDE SERPL-SCNC: 104 MMOL/L (ref 98–107)
CO2 SERPL-SCNC: 20 MMOL/L (ref 22–29)
CREAT SERPL-MCNC: 0.84 MG/DL (ref 0.57–1)
DEPRECATED RDW RBC AUTO: 51.2 FL (ref 37–54)
EGFRCR SERPLBLD CKD-EPI 2021: 69 ML/MIN/1.73
EOSINOPHIL # BLD MANUAL: 0.06 10*3/MM3 (ref 0–0.4)
EOSINOPHIL NFR BLD MANUAL: 1 % (ref 0.3–6.2)
ERYTHROCYTE [DISTWIDTH] IN BLOOD BY AUTOMATED COUNT: 15.8 % (ref 12.3–15.4)
FSP PPP LA-ACNC: ABNORMAL
GLOBULIN UR ELPH-MCNC: 1.6 GM/DL
GLUCOSE SERPL-MCNC: 122 MG/DL (ref 65–99)
HCT VFR BLD AUTO: 30.6 % (ref 34–46.6)
HGB BLD-MCNC: 9.9 G/DL (ref 12–15.9)
INR PPP: 1.26 (ref 0.85–1.16)
LYMPHOCYTES # BLD MANUAL: 1.01 10*3/MM3 (ref 0.7–3.1)
LYMPHOCYTES NFR BLD MANUAL: 6 % (ref 5–12)
MCH RBC QN AUTO: 28.7 PG (ref 26.6–33)
MCHC RBC AUTO-ENTMCNC: 32.4 G/DL (ref 31.5–35.7)
MCV RBC AUTO: 88.7 FL (ref 79–97)
MONOCYTES # BLD: 0.34 10*3/MM3 (ref 0.1–0.9)
NEUTROPHILS # BLD AUTO: 3.98 10*3/MM3 (ref 1.7–7)
NEUTROPHILS NFR BLD MANUAL: 69 % (ref 42.7–76)
NEUTS BAND NFR BLD MANUAL: 2 % (ref 0–5)
OVALOCYTES BLD QL SMEAR: ABNORMAL
PLAT MORPH BLD: NORMAL
PLATELET # BLD AUTO: 59 10*3/MM3 (ref 140–450)
PMV BLD AUTO: 9.2 FL (ref 6–12)
POTASSIUM SERPL-SCNC: 4.8 MMOL/L (ref 3.5–5.2)
PROLYMPHOCYTES NFR BLD MANUAL: 2 % (ref 0–0)
PROT SERPL-MCNC: 5.1 G/DL (ref 6–8.5)
PROTHROMBIN TIME: 15.4 SECONDS (ref 11.4–14.4)
RBC # BLD AUTO: 3.45 10*6/MM3 (ref 3.77–5.28)
SMUDGE CELLS BLD QL SMEAR: ABNORMAL
SODIUM SERPL-SCNC: 133 MMOL/L (ref 136–145)
VARIANT LYMPHS NFR BLD MANUAL: 10 % (ref 0–5)
VARIANT LYMPHS NFR BLD MANUAL: 8 % (ref 19.6–45.3)
WBC NRBC COR # BLD: 5.6 10*3/MM3 (ref 3.4–10.8)

## 2022-03-25 PROCEDURE — 85610 PROTHROMBIN TIME: CPT | Performed by: INTERNAL MEDICINE

## 2022-03-25 PROCEDURE — 85025 COMPLETE CBC W/AUTO DIFF WBC: CPT | Performed by: INTERNAL MEDICINE

## 2022-03-25 PROCEDURE — 85730 THROMBOPLASTIN TIME PARTIAL: CPT | Performed by: INTERNAL MEDICINE

## 2022-03-25 PROCEDURE — 85362 FIBRIN DEGRADATION PRODUCTS: CPT | Performed by: INTERNAL MEDICINE

## 2022-03-25 PROCEDURE — 99233 SBSQ HOSP IP/OBS HIGH 50: CPT | Performed by: INTERNAL MEDICINE

## 2022-03-25 PROCEDURE — 80053 COMPREHEN METABOLIC PANEL: CPT | Performed by: INTERNAL MEDICINE

## 2022-03-25 PROCEDURE — 99232 SBSQ HOSP IP/OBS MODERATE 35: CPT | Performed by: INTERNAL MEDICINE

## 2022-03-25 PROCEDURE — 85007 BL SMEAR W/DIFF WBC COUNT: CPT | Performed by: INTERNAL MEDICINE

## 2022-03-25 RX ADMIN — ALLOPURINOL 300 MG: 300 TABLET ORAL at 09:00

## 2022-03-25 RX ADMIN — ACETAMINOPHEN 650 MG: 325 TABLET ORAL at 20:50

## 2022-03-25 RX ADMIN — GABAPENTIN 600 MG: 300 CAPSULE ORAL at 20:50

## 2022-03-25 RX ADMIN — PANTOPRAZOLE SODIUM 40 MG: 40 INJECTION, POWDER, LYOPHILIZED, FOR SOLUTION INTRAVENOUS at 09:00

## 2022-03-25 RX ADMIN — Medication 1 CAPSULE: at 09:00

## 2022-03-25 RX ADMIN — AZTREONAM 2 G: 2 INJECTION, POWDER, LYOPHILIZED, FOR SOLUTION INTRAMUSCULAR; INTRAVENOUS at 02:20

## 2022-03-25 RX ADMIN — Medication 325 MG: at 09:00

## 2022-03-25 RX ADMIN — ACETAMINOPHEN 650 MG: 325 TABLET ORAL at 05:47

## 2022-03-25 RX ADMIN — PANTOPRAZOLE SODIUM 40 MG: 40 INJECTION, POWDER, LYOPHILIZED, FOR SOLUTION INTRAVENOUS at 20:50

## 2022-03-25 RX ADMIN — LEVOTHYROXINE SODIUM 50 MCG: 50 TABLET ORAL at 05:47

## 2022-03-25 RX ADMIN — Medication 10 ML: at 20:50

## 2022-03-25 RX ADMIN — ATENOLOL 50 MG: 50 TABLET ORAL at 09:00

## 2022-03-25 NOTE — TELEPHONE ENCOUNTER
Provider: SIL  Caller: TIN  Relationship to Patient: [facility]     Phone Number: [phone]  Reason for Call: [facility] NEEDS PT INSURANCE INFORMATION   REF# [id number]

## 2022-03-26 LAB
ALBUMIN SERPL-MCNC: 2.8 G/DL (ref 3.5–5.2)
ALBUMIN/GLOB SERPL: 1.3 G/DL
ALP SERPL-CCNC: 57 U/L (ref 39–117)
ALT SERPL W P-5'-P-CCNC: 10 U/L (ref 1–33)
ANION GAP SERPL CALCULATED.3IONS-SCNC: 8 MMOL/L (ref 5–15)
AST SERPL-CCNC: 9 U/L (ref 1–32)
BASOPHILS # BLD AUTO: 0.03 10*3/MM3 (ref 0–0.2)
BASOPHILS NFR BLD AUTO: 0.6 % (ref 0–1.5)
BILIRUB SERPL-MCNC: 0.6 MG/DL (ref 0–1.2)
BUN SERPL-MCNC: 14 MG/DL (ref 8–23)
BUN/CREAT SERPL: 16.3 (ref 7–25)
BURR CELLS BLD QL SMEAR: NORMAL
CALCIUM SPEC-SCNC: 8.6 MG/DL (ref 8.6–10.5)
CHLORIDE SERPL-SCNC: 106 MMOL/L (ref 98–107)
CO2 SERPL-SCNC: 19 MMOL/L (ref 22–29)
CREAT SERPL-MCNC: 0.86 MG/DL (ref 0.57–1)
DEPRECATED RDW RBC AUTO: 50.6 FL (ref 37–54)
EGFRCR SERPLBLD CKD-EPI 2021: 67.1 ML/MIN/1.73
EOSINOPHIL # BLD AUTO: 0.1 10*3/MM3 (ref 0–0.4)
EOSINOPHIL NFR BLD AUTO: 2 % (ref 0.3–6.2)
ERYTHROCYTE [DISTWIDTH] IN BLOOD BY AUTOMATED COUNT: 15.4 % (ref 12.3–15.4)
FLUAV RNA RESP QL NAA+PROBE: NOT DETECTED
FLUBV RNA RESP QL NAA+PROBE: NOT DETECTED
GLOBULIN UR ELPH-MCNC: 2.1 GM/DL
GLUCOSE SERPL-MCNC: 96 MG/DL (ref 65–99)
HCT VFR BLD AUTO: 27.6 % (ref 34–46.6)
HGB BLD-MCNC: 8.8 G/DL (ref 12–15.9)
HYPOCHROMIA BLD QL: NORMAL
IMM GRANULOCYTES # BLD AUTO: 0.02 10*3/MM3 (ref 0–0.05)
IMM GRANULOCYTES NFR BLD AUTO: 0.4 % (ref 0–0.5)
LYMPHOCYTES # BLD AUTO: 1.91 10*3/MM3 (ref 0.7–3.1)
LYMPHOCYTES NFR BLD AUTO: 37.3 % (ref 19.6–45.3)
MCH RBC QN AUTO: 28.9 PG (ref 26.6–33)
MCHC RBC AUTO-ENTMCNC: 31.9 G/DL (ref 31.5–35.7)
MCV RBC AUTO: 90.5 FL (ref 79–97)
MONOCYTES # BLD AUTO: 0.45 10*3/MM3 (ref 0.1–0.9)
MONOCYTES NFR BLD AUTO: 8.8 % (ref 5–12)
NEUTROPHILS NFR BLD AUTO: 2.61 10*3/MM3 (ref 1.7–7)
NEUTROPHILS NFR BLD AUTO: 50.9 % (ref 42.7–76)
NRBC BLD AUTO-RTO: 0 /100 WBC (ref 0–0.2)
OVALOCYTES BLD QL SMEAR: NORMAL
PLAT MORPH BLD: NORMAL
PLATELET # BLD AUTO: 59 10*3/MM3 (ref 140–450)
PMV BLD AUTO: 9.7 FL (ref 6–12)
POTASSIUM SERPL-SCNC: 4.6 MMOL/L (ref 3.5–5.2)
PROT SERPL-MCNC: 4.9 G/DL (ref 6–8.5)
RBC # BLD AUTO: 3.05 10*6/MM3 (ref 3.77–5.28)
SARS-COV-2 RNA RESP QL NAA+PROBE: NOT DETECTED
SODIUM SERPL-SCNC: 133 MMOL/L (ref 136–145)
WBC MORPH BLD: NORMAL
WBC NRBC COR # BLD: 5.12 10*3/MM3 (ref 3.4–10.8)

## 2022-03-26 PROCEDURE — 87636 SARSCOV2 & INF A&B AMP PRB: CPT | Performed by: INTERNAL MEDICINE

## 2022-03-26 PROCEDURE — 85025 COMPLETE CBC W/AUTO DIFF WBC: CPT | Performed by: INTERNAL MEDICINE

## 2022-03-26 PROCEDURE — 99232 SBSQ HOSP IP/OBS MODERATE 35: CPT | Performed by: INTERNAL MEDICINE

## 2022-03-26 PROCEDURE — 80053 COMPREHEN METABOLIC PANEL: CPT | Performed by: INTERNAL MEDICINE

## 2022-03-26 PROCEDURE — 85007 BL SMEAR W/DIFF WBC COUNT: CPT | Performed by: INTERNAL MEDICINE

## 2022-03-26 RX ORDER — LOPERAMIDE HYDROCHLORIDE 2 MG/1
2 CAPSULE ORAL 4 TIMES DAILY PRN
Status: DISCONTINUED | OUTPATIENT
Start: 2022-03-26 | End: 2022-03-27 | Stop reason: HOSPADM

## 2022-03-26 RX ADMIN — ACETAMINOPHEN 650 MG: 325 TABLET ORAL at 20:15

## 2022-03-26 RX ADMIN — LOPERAMIDE HYDROCHLORIDE 2 MG: 2 CAPSULE ORAL at 11:38

## 2022-03-26 RX ADMIN — PANTOPRAZOLE SODIUM 40 MG: 40 INJECTION, POWDER, LYOPHILIZED, FOR SOLUTION INTRAVENOUS at 08:59

## 2022-03-26 RX ADMIN — ALLOPURINOL 300 MG: 300 TABLET ORAL at 08:59

## 2022-03-26 RX ADMIN — ATENOLOL 50 MG: 50 TABLET ORAL at 08:59

## 2022-03-26 RX ADMIN — LEVOTHYROXINE SODIUM 50 MCG: 50 TABLET ORAL at 05:26

## 2022-03-26 RX ADMIN — GABAPENTIN 600 MG: 300 CAPSULE ORAL at 20:15

## 2022-03-26 RX ADMIN — PANTOPRAZOLE SODIUM 40 MG: 40 INJECTION, POWDER, LYOPHILIZED, FOR SOLUTION INTRAVENOUS at 20:15

## 2022-03-26 RX ADMIN — Medication 1 CAPSULE: at 08:59

## 2022-03-26 RX ADMIN — Medication 10 ML: at 20:16

## 2022-03-27 ENCOUNTER — READMISSION MANAGEMENT (OUTPATIENT)
Dept: CALL CENTER | Facility: HOSPITAL | Age: 82
End: 2022-03-27

## 2022-03-27 VITALS
RESPIRATION RATE: 18 BRPM | BODY MASS INDEX: 28.51 KG/M2 | HEART RATE: 71 BPM | HEIGHT: 66 IN | WEIGHT: 177.4 LBS | DIASTOLIC BLOOD PRESSURE: 59 MMHG | SYSTOLIC BLOOD PRESSURE: 138 MMHG | TEMPERATURE: 98.1 F | OXYGEN SATURATION: 96 %

## 2022-03-27 LAB
ALBUMIN SERPL-MCNC: 3.2 G/DL (ref 3.5–5.2)
ALBUMIN/GLOB SERPL: 1.5 G/DL
ALP SERPL-CCNC: 59 U/L (ref 39–117)
ALT SERPL W P-5'-P-CCNC: 9 U/L (ref 1–33)
ANION GAP SERPL CALCULATED.3IONS-SCNC: 10 MMOL/L (ref 5–15)
AST SERPL-CCNC: 13 U/L (ref 1–32)
BACTERIA SPEC AEROBE CULT: NORMAL
BACTERIA SPEC AEROBE CULT: NORMAL
BASOPHILS # BLD AUTO: 0.04 10*3/MM3 (ref 0–0.2)
BASOPHILS NFR BLD AUTO: 0.8 % (ref 0–1.5)
BH BB BLOOD EXPIRATION DATE: NORMAL
BH BB BLOOD EXPIRATION DATE: NORMAL
BH BB BLOOD TYPE BARCODE: 5100
BH BB BLOOD TYPE BARCODE: 5100
BH BB DISPENSE STATUS: NORMAL
BH BB DISPENSE STATUS: NORMAL
BH BB PRODUCT CODE: NORMAL
BH BB PRODUCT CODE: NORMAL
BH BB UNIT NUMBER: NORMAL
BH BB UNIT NUMBER: NORMAL
BILIRUB SERPL-MCNC: 0.7 MG/DL (ref 0–1.2)
BUN SERPL-MCNC: 14 MG/DL (ref 8–23)
BUN/CREAT SERPL: 14.9 (ref 7–25)
CALCIUM SPEC-SCNC: 8.7 MG/DL (ref 8.6–10.5)
CHLORIDE SERPL-SCNC: 102 MMOL/L (ref 98–107)
CO2 SERPL-SCNC: 20 MMOL/L (ref 22–29)
CREAT SERPL-MCNC: 0.94 MG/DL (ref 0.57–1)
CROSSMATCH INTERPRETATION: NORMAL
CROSSMATCH INTERPRETATION: NORMAL
DACRYOCYTES BLD QL SMEAR: NORMAL
DEPRECATED RDW RBC AUTO: 50.1 FL (ref 37–54)
EGFRCR SERPLBLD CKD-EPI 2021: 60.3 ML/MIN/1.73
EOSINOPHIL # BLD AUTO: 0.11 10*3/MM3 (ref 0–0.4)
EOSINOPHIL NFR BLD AUTO: 2.1 % (ref 0.3–6.2)
ERYTHROCYTE [DISTWIDTH] IN BLOOD BY AUTOMATED COUNT: 15.5 % (ref 12.3–15.4)
GLOBULIN UR ELPH-MCNC: 2.1 GM/DL
GLUCOSE SERPL-MCNC: 100 MG/DL (ref 65–99)
HCT VFR BLD AUTO: 28.8 % (ref 34–46.6)
HGB BLD-MCNC: 9.3 G/DL (ref 12–15.9)
IMM GRANULOCYTES # BLD AUTO: 0.03 10*3/MM3 (ref 0–0.05)
IMM GRANULOCYTES NFR BLD AUTO: 0.6 % (ref 0–0.5)
LYMPHOCYTES # BLD AUTO: 1.72 10*3/MM3 (ref 0.7–3.1)
LYMPHOCYTES NFR BLD AUTO: 33.4 % (ref 19.6–45.3)
MCH RBC QN AUTO: 28.8 PG (ref 26.6–33)
MCHC RBC AUTO-ENTMCNC: 32.3 G/DL (ref 31.5–35.7)
MCV RBC AUTO: 89.2 FL (ref 79–97)
MONOCYTES # BLD AUTO: 0.41 10*3/MM3 (ref 0.1–0.9)
MONOCYTES NFR BLD AUTO: 8 % (ref 5–12)
NEUTROPHILS NFR BLD AUTO: 2.84 10*3/MM3 (ref 1.7–7)
NEUTROPHILS NFR BLD AUTO: 55.1 % (ref 42.7–76)
NRBC BLD AUTO-RTO: 0 /100 WBC (ref 0–0.2)
OVALOCYTES BLD QL SMEAR: NORMAL
PLAT MORPH BLD: NORMAL
PLATELET # BLD AUTO: 72 10*3/MM3 (ref 140–450)
PMV BLD AUTO: 9.6 FL (ref 6–12)
POTASSIUM SERPL-SCNC: 4.6 MMOL/L (ref 3.5–5.2)
PROT SERPL-MCNC: 5.3 G/DL (ref 6–8.5)
RBC # BLD AUTO: 3.23 10*6/MM3 (ref 3.77–5.28)
SODIUM SERPL-SCNC: 132 MMOL/L (ref 136–145)
UNIT  ABO: NORMAL
UNIT  ABO: NORMAL
UNIT  RH: NORMAL
UNIT  RH: NORMAL
WBC MORPH BLD: NORMAL
WBC NRBC COR # BLD: 5.15 10*3/MM3 (ref 3.4–10.8)

## 2022-03-27 PROCEDURE — 85025 COMPLETE CBC W/AUTO DIFF WBC: CPT | Performed by: INTERNAL MEDICINE

## 2022-03-27 PROCEDURE — 80053 COMPREHEN METABOLIC PANEL: CPT | Performed by: INTERNAL MEDICINE

## 2022-03-27 PROCEDURE — 99239 HOSP IP/OBS DSCHRG MGMT >30: CPT | Performed by: INTERNAL MEDICINE

## 2022-03-27 PROCEDURE — 85007 BL SMEAR W/DIFF WBC COUNT: CPT | Performed by: INTERNAL MEDICINE

## 2022-03-27 RX ADMIN — Medication 1 CAPSULE: at 09:00

## 2022-03-27 RX ADMIN — LEVOTHYROXINE SODIUM 50 MCG: 50 TABLET ORAL at 05:33

## 2022-03-27 RX ADMIN — PANTOPRAZOLE SODIUM 40 MG: 40 INJECTION, POWDER, LYOPHILIZED, FOR SOLUTION INTRAVENOUS at 09:01

## 2022-03-27 RX ADMIN — ALLOPURINOL 300 MG: 300 TABLET ORAL at 09:00

## 2022-03-27 RX ADMIN — ATENOLOL 50 MG: 50 TABLET ORAL at 09:00

## 2022-03-28 NOTE — OUTREACH NOTE
Prep Survey    Flowsheet Row Responses   [facility] patient discharged from? [facility]   Is LACE score < 7 ? No   Emergency Room discharge w/ pulse ox? No   Eligibility Readm Mgmt   Discharge diagnosis Lactic acidosis severe reaction to Rituxan   Does the patient have one of the following disease processes/diagnoses(primary or secondary)? Other   Does the patient have Home health ordered? No   Is there a DME ordered? No   Prep survey completed? Yes          TITI ROTHMAN - Registered Nurse

## 2022-03-29 ENCOUNTER — TELEPHONE (OUTPATIENT)
Dept: ONCOLOGY | Facility: CLINIC | Age: 82
End: 2022-03-29

## 2022-03-29 NOTE — TELEPHONE ENCOUNTER
Called patient and left vm. Informing her to not come in to infusion that we are canceling appointments

## 2022-03-30 ENCOUNTER — APPOINTMENT (OUTPATIENT)
Dept: ONCOLOGY | Facility: HOSPITAL | Age: 82
End: 2022-03-30

## 2022-03-30 ENCOUNTER — READMISSION MANAGEMENT (OUTPATIENT)
Dept: CALL CENTER | Facility: HOSPITAL | Age: 82
End: 2022-03-30

## 2022-03-30 NOTE — OUTREACH NOTE
Medical Week 1 Survey    Flowsheet Row Responses   [facility] patient discharged from? [facility]   Does the patient have one of the following disease processes/diagnoses(primary or secondary)? Other   Week 1 attempt successful? No   Unsuccessful attempts Attempt 1          CALLUM DIAS - Registered Nurse

## 2022-04-04 ENCOUNTER — READMISSION MANAGEMENT (OUTPATIENT)
Dept: CALL CENTER | Facility: HOSPITAL | Age: 82
End: 2022-04-04

## 2022-04-04 NOTE — OUTREACH NOTE
Medical Week 1 Survey    Flowsheet Row Responses   [facility] patient discharged from? [place]   Does the patient have one of the following disease processes/diagnoses(primary or secondary)? Other   Week 1 attempt successful? No   Unsuccessful attempts Attempt 2  [UTR all 3 contact numbers]          SANTO SARAH - Registered Nurse

## 2022-04-12 ENCOUNTER — OFFICE VISIT (OUTPATIENT)
Dept: ONCOLOGY | Facility: CLINIC | Age: 82
End: 2022-04-12

## 2022-04-12 VITALS
SYSTOLIC BLOOD PRESSURE: 119 MMHG | HEART RATE: 64 BPM | DIASTOLIC BLOOD PRESSURE: 46 MMHG | RESPIRATION RATE: 20 BRPM | BODY MASS INDEX: 26.52 KG/M2 | WEIGHT: 165 LBS | TEMPERATURE: 97.3 F | OXYGEN SATURATION: 96 % | HEIGHT: 66 IN

## 2022-04-12 DIAGNOSIS — C85.90 LYMPHOMA, LOW GRADE: Primary | ICD-10-CM

## 2022-04-12 PROCEDURE — 99215 OFFICE O/P EST HI 40 MIN: CPT | Performed by: INTERNAL MEDICINE

## 2022-04-12 NOTE — PROGRESS NOTES
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  2. History of iron deficiency anemia with last colonoscopy 11/2014, accordingto the patient, with a history of ulcerative colitis treated by Dr. Edge.  3. History of basal cell carcinomas.   4. Cataracts.   5. Remote history of deep vein thrombosis with vena cava filter in place.   6. Diverticulitis by history.   7. History of gout.   8. Hypertension.   9. History of 3 different kidney surgeries.   10. Cholecystectomy.   11. Chronic neuropathy.   12. History of ARMAAN.   13. History of back surgeries.   14. History of total hip replacement.   15. History of ankle fracture.   16. History of appendectomy.   17. 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 Edge.  18.  Epistaxis    Oncology history timeline:  a) Splenomegaly on hospitalization 02/2016. She had a week of nausea, vomiting and diarrhea with dehydration. The nausea, vomiting, and dehydration have resolved but on CT they found retroperitoneal adenopathy with some splenomegaly that was not bulky. She denies any ongoing fevers or chills or bed drenching night sweats or unexplained weight loss and no change in the color, caliber or consistency of her stools. She had a slightly elevated beta-2 microglobulin of 3.5 but no monoclonal spike and a white count 5300, hemoglobin 11.5, platelets 158,000, sedimentation rate 45, C-reactive protein 13.6 and a normal LDH of 318 with normal liver enzymes. Slight hyponatremia with sodium of 134 and hypokalemia with potassium of 3.3 on 02/18/2016. She  had slight decrease in immunoglobulin G to 580 and immunoglobulin A to 35 on testing while in the hospital.   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 There were no features to suggest hairy cell lymphoma or a mantle cell lymphoma and no large cell population. This was CD5 negative, CD10 negative, kappa clonal negative and bright for CD20 with dim surface light chain expression and bright cytoplasm, CD45 bright. The CD38 negative for plasmacytic differentiation with a hemoglobin of 11.4, white count 7200 with normal differential, and platelets of 163,000.   c) Liver, spleen ultrasound showed splenomegaly of 19.6 cm maximum dimension with 1.6 cm slightly dilated portal vein.  D)-3/20/2019 Dr. 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  Colonoscopy showed hemorrhoids, diverticuli, 11 mm proximal ascending colon flat polyp.  Dr. 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   -3/27/2019 PET shows mild hypermetabolism within the lymph nodes to the left of the proximal third of the esophagus as well as near the tracheal bifurcation and no other area is of abnormal hypermetabolism.  Splenomegaly stable.  She has significant carotid stenosis especially on the right.  -1/12/2021 white count 4200 hemoglobin 10 platelets 114,000 unremarkable differential.  CMP unremarkable  -4/28/2021 Dr. Augustine performed attempted laparoscopic partial colectomy with subsequent open right hemicolectomy with ileocolonic stenosis but no residual polyp found in the proximal ascending colon after resection of  the right colon and a small polyp in the distal part of the ascending colon.  Pathology showed residual adenomatous polyp tubular adenoma without high-grade dysplasia or invasive carcinoma and 13 benign lymph nodes.  Additional colonic segment benign with 4 benign lymph nodes.  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  Get her blood counts today and just prior to a minute evaluation in 3 months.  No plans for scans in the absence of symptoms.  I will keep in mind the potential of palliative splenic radiation if she develops early satiety or other abdominal complaints from splenomegaly.    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    -2/1/2021 PET negative.  Spleen is unremarkable with homogeneous uptake.    -2/5/2021 EGD and colonoscopy with Dr. Edge showed Negrete's esophagus, hemorrhoids, diverticuli, proximal ascending colon polyp.  No mention of any colitis.    -4/28/2021 attempted laparoscopic partial colectomy with subsequent open right hemicolectomy with ileocolonic stenosis but no residual polyp found in the proximal ascending colon after resection of the right colon and a small polyp in the distal part of the ascending colon.  Pathology showed residual adenomatous polyp tubular adenoma without high-grade dysplasia or invasive carcinoma and 13 benign lymph nodes.  Additional colonic segment benign with 4 benign lymph nodes.    -3/3/2022 data:  Hemoglobin hemoglobin 9.8 with MCV of 91.9 and platelets slightly low 128,000 with normal white count 7100.  Unremarkable differential.    Creatinine 1.5 glucose 160 sodium 129 with potassium 4.7 and chloride 94 otherwise unremarkable  CMP.  C-reactive protein normal 1.7.  Sedimentation rate 31, upper limit 30.  INR normal 0.99 with PTT 26.1.  100 mg/dL M spike on SPEP.  Normal quantitative immunoglobulins except for decreased IgE.  Serum immunofixation electrophoresis was unclear as to monoclonality.  Serum kappa 33.8 lambda 27.4 both minimally elevated with normal ratio 1.23.  Serum viscosity normal 1.6.    -3/4/2022 CT chest abdomen pelvis without contrast: 2.5 mm noncalcified nodule right upper lobe and right lower lobe for which follow-up in 6 months for stability or resolution is suggested.  No suspicious lung nodules to suggest metastasis.  Massive enlargement of the spleen which is stable compared to June 2021.  1.4 cm and 1.5 cm juan luis hepatic adenopathy.  Stable retroperitoneal adenopathy largest 1.5 cm.  1.8 cm stable IVC node.    -3/15/2022 [facility] medical oncology follow-up visit: She is not hyperviscous and I doubt her epistaxis is related to the lymphoma or hyperviscosity and I do not think she has Waldenstrom's.  However, I do think she has massive splenomegaly and while that has been present for quite some time, I nonetheless think that a lot of her abdominal discomfort is due to the massive splenomegaly pushing her bowel to the right and she could have some bowel involvement of lymphoma that we could put her through endoscopy again but she had this back a little over a year ago with no obvious bowel involvement and no obvious colitis.  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  She would not consent to anything stronger than that regardless and it took a little convincing to get her to go along with this plan but she is willing to try provided that she has no major allergic reactions etc.  We will give first  dose in [place] after chemo preparation visit we will use peripheral veins and I will have her follow-up in a few weeks with my nurse practitioner in [place] to make sure she is tolerating this well and to set up follow-up visits in the weeks and months ahead.  Repeat CTs I would ask my nurse practitioner to get ordered for approximately 4 months out from the end of Rituxan.  In the meantime, from the epistaxis standpoint I asked her to follow-up with ENT.   We discussed the protracted immune suppression that would be associated with the Rituxan and the fact that it would likely make vaccinations less helpful but that does not mean she should not get them.  We also discussed the possibility of allergic reactions.  Once all of this is done, she will need to follow-up with whoever is going to take over from Dr. 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    -3/22/2022 through 3/27/2022 inpatient hospitalization for fever after Rituxan initial dose.  No growth on cultures.  CT chest abdomen pelvis other than showing marked splenomegaly and stable retroperitoneal nodes showed nothing else.  She was pancytopenic on admission.  Given 1 unit platelet packed red cell transfusion.  On 3/22/2022 her platelet count was 142,000 and by the evening had dropped to 36,001-day with lactic acidosis.  Hemoglobin dropped to 7.2 x 3 2322 and white count down to 2450 with absolute neutrophil count baldomero of 240 on 3/22/2022 with a neutrophil count that very same day earlier in the day of 2300 before the Rituxan was given.  By 3/27/2022 her white count was 5150, hemoglobin 9.3, platelets 72,000 and neutrophil count was 2840.  During her hospitalization I saw her and we discussed that I would not risk further Rituxan as it does appear that this was an unusually brisk cytokine reaction from Rituxan.  While in the hospital I had Dr. Justin Kemp see her and he  has an appointment to see her on April 12 to check her for fitness for surgery.  Avatrombopag to stimulate marrow production of platelets in preparation for surgery is not generally indicated in the face of nonportal hypertensive thrombocytopenia where her pancytopenia is both due to marrow involvement and also cancer-induced splenomegaly.    -4/12/2022 [facility] medical oncology follow-up: Patient states she saw Dr. Kemp yesterday and he apparently is coordinating the pneumococcal vaccinations and the Haemophilus influenza B and meningococcal vaccinations.  She thinks she got the Pneumovax with Dr. Isidro recently already and that Dr. Kemp is coordinating this with Dr. Isidro but I am not sure of that and I have reached out to Dr. Kemp who is currently scrubbed to make sure this is getting orchestrated.  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  I would never test her with another anti-CD20 antibody like Rituxan or its cousins.  She needs thorough vaccination presplenectomy.  I will defer to Dr. Kemp to orchestrate as I am not sure whether he is having Dr. Isidro do that or whether he himself is ordering this but I will have the patient close that loop with Dr. Kemp to be certain they understand how to proceed.  Her blood counts last week were entirely normal with Dr. Isidro.  The primary reason for splenectomy at this point is not because of her counts but because of the massive splenomegaly causing abdominal discomforts and pushing of her viscera.  Dr. Kemp is aware of the risks of adhesions but will, according to the patient, attempt laparoscopic approach but if that does not work he will convert to an open procedure.  I will see her back in about a  month with blood counts prior to return to see how she is doing postsplenectomy.     Lymphoma, low grade (HCC)   4/25/2016 Initial Diagnosis    Lymphoma, low grade     5/30/2017 Imaging    CT chest/abdomen/pelvis no obvious pulmonary nodules identified, no pleural effusions.  Stable appearance of adenopathy within the abdomen compared to earlier study as well as splenomegaly.     12/6/2017 Imaging    CT chest/abdomen/pelvis stable, essentially unchanged prominent mediastinal lymph nodes, unchanged right lower lobe medial basal segmental focal fibrotic findings.  Scattered noncalcified small lung nodules appear unchanged.  Stable spine or megaly.  Scattered prominent retroperitoneal lymph nodes now appear smaller.  Unchanged spinal findings consistent with diffuse idiopathic skeletal hyperostosis and prior operative intervention at L4-5.       6/8/2018 Imaging    CT chest abdomen and pelvis without contrast: Stable, no significant change in the appearance compared to earlier study from December 6, 2017.     3/1/2019 Imaging    CT chest, abdomen and pelvis impression: No significant interval change from last year.  Stable appearance of the significantly enlarged spleen which measures up to nearly 20 x 12 cm.  Moderate, scattered upper abdominal adenopathy with innumerable scattered gastrohepatic, retroperitoneal, and mesenteric nodes measuring roughly between 1-2 cm are stable as well.  No significant progression.  Mild mediastinal adenopathy appears stable as well.     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  Colonoscopy showed hemorrhoids, diverticuli, 11 mm proximal ascending colon flat polyp.  Dr. Edge relayed to the patient that this was a high-grade dysplasia in the Negrete's for which he is sending to [facility] for ablation as well as needing removal of a tubulovillous adenoma unable to be removed endoscopically.     3/27/2019 Imaging    PET  shows mild hypermetabolism within the lymph nodes to the left of the proximal third of the esophagus as well as near the tracheal bifurcation and no other area is of abnormal hypermetabolism.  Splenomegaly stable.  She has significant carotid stenosis especially on the right.     2/1/2021 Imaging    PET IMPRESSION:  Negative PET/CT scan. No evidence of recurrent or active  lymphoma. Enlargement seen of the spleen.     3/4/2022 Imaging    -3/3/2022 data:  Hemoglobin hemoglobin 9.8 with MCV of 91.9 and platelets slightly low 128,000 with normal white count 7100.  Unremarkable differential.    Creatinine 1.5 glucose 160 sodium 129 with potassium 4.7 and chloride 94 otherwise unremarkable CMP.  C-reactive protein normal 1.7.  Sedimentation rate 31, upper limit 30.  INR normal 0.99 with PTT 26.1.  100 mg/dL M spike on SPEP.  Normal quantitative immunoglobulins except for decreased IgE.  Serum immunofixation electrophoresis was unclear as to monoclonality.  Serum kappa 33.8 lambda 27.4 both minimally elevated with normal ratio 1.23.  Serum viscosity normal 1.6.    -3/4/2022 CT chest abdomen pelvis without contrast: 2.5 mm noncalcified nodule right upper lobe and right lower lobe for which follow-up in 6 months for stability or resolution is suggested.  No suspicious lung nodules to suggest metastasis.  Massive enlargement of the spleen which is stable compared to June 2021.  1.4 cm and 1.5 cm juan luis hepatic adenopathy.  Stable retroperitoneal adenopathy largest 1.5 cm.  1.8 cm stable IVC node.     3/22/2022 - 3/22/2022 Chemotherapy    OP LYMPHOMA (CLL) RiTUXimab (Weekly X 4)     3/22/2022 Adverse Reaction    -3/22/2022 through 3/27/2022 inpatient hospitalization for fever after Rituxan initial dose.  No growth on cultures.  CT chest abdomen pelvis other than showing marked splenomegaly and stable retroperitoneal nodes showed nothing else.  She was pancytopenic on admission.  Given 1 unit platelet packed red cell  transfusion.  On 3/22/2022 her platelet count was 142,000 and by the evening had dropped to 36,001-day with lactic acidosis.  Hemoglobin dropped to 7.2 x 3 2322 and white count down to 2450 with absolute neutrophil count baldomero of 240 on 3/22/2022 with a neutrophil count that very same day earlier in the day of 2300 before the Rituxan was given.  By 3/27/2022 her white count was 5150, hemoglobin 9.3, platelets 72,000 and neutrophil count was 2840.  During her hospitalization I saw her and we discussed that I would not risk further Rituxan as it does appear that this was an unusually brisk cytokine reaction from Rituxan.  While in the hospital I had Dr. Justin Kemp see her and he has an appointment to see her on April 12 to check her for fitness for surgery.  Avatrombopag to stimulate marrow production of platelets in preparation for surgery is not generally indicated in the face of nonportal hypertensive thrombocytopenia where her pancytopenia is both due to marrow involvement and also cancer-induced splenomegaly.         HISTORY OF PRESENT ILLNESS:  The patient is a 81 y.o. female, here for follow up on management of symptomatic splenomegaly from splenic marginal zone lymphoma with marrow involvement but with adequate counts but severe reaction to Rituxan from which she has recovered reasonably well and has seen Dr. Kemp for preparation for splenectomy in the weeks ahead.    Past Medical History:   Diagnosis Date   • Anemia 09/18/2015   • Cataract     h/o   • Diverticulitis    • DVT (deep venous thrombosis) (HCC)    • Gout    • History of basal cell carcinoma    • Hypertension         • Splenomegaly      Past Surgical History:   Procedure Laterality Date   • APPENDECTOMY     • BACK SURGERY      h/o   • CHOLECYSTECTOMY     • HYSTERECTOMY      jaja   • KIDNEY SURGERY      x3    • ORIF ANKLE FRACTURE      h/o   • TOTAL HIP ARTHROPLASTY      h/o       Allergies   Allergen Reactions   • Rituxan [Rituximab] Anaphylaxis  "  • Hydrocodone    • Keflex [Cephalexin]    • Lortab [Hydrocodone-Acetaminophen]    • Magnesium Citrate    • Penicillins        Family History and Social History reviewed and changed as necessary    REVIEW OF SYSTEM:   No new somatic complaints    PHYSICAL EXAM:  Spleen so large that it goes to the pelvic brim and you cannot really feel the bottom edge    Vitals:    04/12/22 0905   BP: 119/46   Pulse: 64   Resp: 20   Temp: 97.3 °F (36.3 °C)   SpO2: 96%   Weight: 74.8 kg (165 lb)   Height: 167.6 cm (66\")     Vitals:    04/12/22 0905   PainSc: 0-No pain          ECOG score: 2           Vitals reviewed.    ECOG: (2) Ambulatory & Capable of Self Care, Unable to Carry Out Work Activity, Up & About Greater Than 50% of Waking Hours    Lab Results   Component Value Date    HGB 9.3 (L) 03/27/2022    HCT 28.8 (L) 03/27/2022    MCV 89.2 03/27/2022    PLT 72 (L) 03/27/2022    WBC 5.15 03/27/2022    NEUTROABS 2.84 03/27/2022    LYMPHSABS 1.72 03/27/2022    MONOSABS 0.41 03/27/2022    EOSABS 0.11 03/27/2022    BASOSABS 0.04 03/27/2022       Lab Results   Component Value Date    GLUCOSE 100 (H) 03/27/2022    BUN 14 03/27/2022    CREATININE 0.94 03/27/2022     (L) 03/27/2022    K 4.6 03/27/2022     03/27/2022    CO2 20.0 (L) 03/27/2022    CALCIUM 8.7 03/27/2022    PROTEINTOT 5.3 (L) 03/27/2022    ALBUMIN 3.20 (L) 03/27/2022    BILITOT 0.7 03/27/2022    ALKPHOS 59 03/27/2022    AST 13 03/27/2022    ALT 9 03/27/2022             ASSESSMENT & PLAN:  1. Low-grade lymphoma with severe cytokine reaction with febrile hypotensive acidosis with first portion of first dose of first day of planned weekly Rituxan x4 with rapid onset pancytopenia.  2. History of iron deficiency anemia with last colonoscopy 11/2014, accordingto the patient, with a history of ulcerative colitis treated by Dr. Edge.  3. History of basal cell carcinomas.   4. Cataracts.   5. Remote history of deep vein thrombosis with vena cava filter in place.   6. " Diverticulitis by history.   7. History of gout.   8. Hypertension.   9. History of 3 different kidney surgeries.   10. Cholecystectomy.   11. Chronic neuropathy.   12. History of ARMAAN.   13. History of back surgeries.   14. History of total hip replacement.   15. History of ankle fracture.   16. History of appendectomy.   17. 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 Edge.  18.  Epistaxis    Oncology history timeline:  a) Splenomegaly on hospitalization 02/2016. She had a week of nausea, vomiting and diarrhea with dehydration. The nausea, vomiting, and dehydration have resolved but on CT they found retroperitoneal adenopathy with some splenomegaly that was not bulky. She denies any ongoing fevers or chills or bed drenching night sweats or unexplained weight loss and no change in the color, caliber or consistency of her stools. She had a slightly elevated beta-2 microglobulin of 3.5 but no monoclonal spike and a white count 5300, hemoglobin 11.5, platelets 158,000, sedimentation rate 45, C-reactive protein 13.6 and a normal LDH of 318 with normal liver enzymes. Slight hyponatremia with sodium of 134 and hypokalemia with potassium of 3.3 on 02/18/2016. She had slight decrease in immunoglobulin G to 580 and immunoglobulin A to 35 on testing while in the hospital.   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 There were no features to suggest hairy cell lymphoma or a mantle cell lymphoma and no large cell population. This was CD5 negative, CD10 negative, kappa clonal negative and bright for CD20 with dim surface light chain expression and bright cytoplasm, CD45 bright. The CD38 negative for plasmacytic differentiation with a hemoglobin  of 11.4, white count 7200 with normal differential, and platelets of 163,000.   c) Liver, spleen ultrasound showed splenomegaly of 19.6 cm maximum dimension with 1.6 cm slightly dilated portal vein.  D)-3/20/2019 Dr. 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  Colonoscopy showed hemorrhoids, diverticuli, 11 mm proximal ascending colon flat polyp.  Dr. 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   -3/27/2019 PET shows mild hypermetabolism within the lymph nodes to the left of the proximal third of the esophagus as well as near the tracheal bifurcation and no other area is of abnormal hypermetabolism.  Splenomegaly stable.  She has significant carotid stenosis especially on the right.  -1/12/2021 white count 4200 hemoglobin 10 platelets 114,000 unremarkable differential.  CMP unremarkable  -4/28/2021 Dr. Augustine performed attempted laparoscopic partial colectomy with subsequent open right hemicolectomy with ileocolonic stenosis but no residual polyp found in the proximal ascending colon after resection of the right colon and a small polyp in the distal part of the ascending colon.  Pathology showed residual adenomatous polyp tubular adenoma without high-grade dysplasia or invasive carcinoma and 13 benign lymph nodes.  Additional colonic segment benign with 4 benign lymph nodes.  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  Get her blood counts today and just prior to a minute evaluation in 3 months.  No plans for scans in the absence of  symptoms.  I will keep in mind the potential of palliative splenic radiation if she develops early satiety or other abdominal complaints from splenomegaly.    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    -2/1/2021 PET negative.  Spleen is unremarkable with homogeneous uptake.    -2/5/2021 EGD and colonoscopy with Dr. Edge showed Negrete's esophagus, hemorrhoids, diverticuli, proximal ascending colon polyp.  No mention of any colitis.    -4/28/2021 attempted laparoscopic partial colectomy with subsequent open right hemicolectomy with ileocolonic stenosis but no residual polyp found in the proximal ascending colon after resection of the right colon and a small polyp in the distal part of the ascending colon.  Pathology showed residual adenomatous polyp tubular adenoma without high-grade dysplasia or invasive carcinoma and 13 benign lymph nodes.  Additional colonic segment benign with 4 benign lymph nodes.    -3/3/2022 data:  Hemoglobin hemoglobin 9.8 with MCV of 91.9 and platelets slightly low 128,000 with normal white count 7100.  Unremarkable differential.    Creatinine 1.5 glucose 160 sodium 129 with potassium 4.7 and chloride 94 otherwise unremarkable CMP.  C-reactive protein normal 1.7.  Sedimentation rate 31, upper limit 30.  INR normal 0.99 with PTT 26.1.  100 mg/dL M spike on SPEP.  Normal quantitative immunoglobulins except for decreased IgE.  Serum immunofixation electrophoresis was unclear as to monoclonality.  Serum kappa 33.8 lambda 27.4 both minimally elevated with normal ratio 1.23.  Serum viscosity normal 1.6.    -3/4/2022 CT chest abdomen pelvis without contrast: 2.5 mm noncalcified nodule right upper lobe and right lower lobe for which follow-up in 6 months for stability or resolution is suggested.  No suspicious lung nodules to suggest metastasis.  Massive enlargement of the spleen which is stable compared to June 2021.  1.4 cm and  1.5 cm juan luis hepatic adenopathy.  Stable retroperitoneal adenopathy largest 1.5 cm.  1.8 cm stable IVC node.    -3/15/2022 [facility] medical oncology follow-up visit: She is not hyperviscous and I doubt her epistaxis is related to the lymphoma or hyperviscosity and I do not think she has Waldenstrom's.  However, I do think she has massive splenomegaly and while that has been present for quite some time, I nonetheless think that a lot of her abdominal discomfort is due to the massive splenomegaly pushing her bowel to the right and she could have some bowel involvement of lymphoma that we could put her through endoscopy again but she had this back a little over a year ago with no obvious bowel involvement and no obvious colitis.  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  She would not consent to anything stronger than that regardless and it took a little convincing to get her to go along with this plan but she is willing to try provided that she has no major allergic reactions etc.  We will give first dose in [place] after chemo preparation visit we will use peripheral veins and I will have her follow-up in a few weeks with my nurse practitioner in [place] to make sure she is tolerating this well and to set up follow-up visits in the weeks and months ahead.  Repeat CTs I would ask my nurse practitioner to get ordered for approximately 4 months out from the end of Rituxan.  In the meantime, from the epistaxis standpoint I asked her to follow-up with ENT.   We discussed the protracted immune suppression that would be associated with the Rituxan and the fact that it would likely make vaccinations less helpful but that does not mean she should not get them.  We also discussed the possibility of  allergic reactions.  Once all of this is done, she will need to follow-up with whoever is going to take over from Dr. 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    -3/22/2022 through 3/27/2022 inpatient hospitalization for fever after Rituxan initial dose.  No growth on cultures.  CT chest abdomen pelvis other than showing marked splenomegaly and stable retroperitoneal nodes showed nothing else.  She was pancytopenic on admission.  Given 1 unit platelet packed red cell transfusion.  On 3/22/2022 her platelet count was 142,000 and by the evening had dropped to 36,001-day with lactic acidosis.  Hemoglobin dropped to 7.2 x 3 2322 and white count down to 2450 with absolute neutrophil count baldomero of 240 on 3/22/2022 with a neutrophil count that very same day earlier in the day of 2300 before the Rituxan was given.  By 3/27/2022 her white count was 5150, hemoglobin 9.3, platelets 72,000 and neutrophil count was 2840.  During her hospitalization I saw her and we discussed that I would not risk further Rituxan as it does appear that this was an unusually brisk cytokine reaction from Rituxan.  While in the hospital I had Dr. Justin Kemp see her and he has an appointment to see her on April 12 to check her for fitness for surgery.  Avatrombopag to stimulate marrow production of platelets in preparation for surgery is not generally indicated in the face of nonportal hypertensive thrombocytopenia where her pancytopenia is both due to marrow involvement and also cancer-induced splenomegaly.    -4/6/2022 labs with Dr. Isidro… white count 6500 with hemoglobin 11.5 and platelets 235,000 with absolute neutrophil count 3705.  Creatinine 1.33 with normal liver enzymes.    -4/12/2022 [facility]ology follow-up: Patient states she saw Dr. Kemp yesterday and he apparently is coordinating the pneumococcal vaccinations and the Haemophilus influenza B  and meningococcal vaccinations.  She thinks she got the Pneumovax with Dr. Isidro recently already and that Dr. Kemp is coordinating this with Dr. Isidro but I am not sure of that and I have reached out to Dr. Kemp who is currently scrubbed to make sure this is getting orchestrated.  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  I would never test her with another anti-CD20 antibody like Rituxan or its cousins.  She needs thorough vaccination presplenectomy.  I will defer to Dr. Kemp to orchestrate as I am not sure whether he is having Dr. Isidro do that or whether he himself is ordering this but I will have the patient close that loop with Dr. Kemp to be certain they understand how to proceed.  Her blood counts last week were entirely normal with Dr. Isidro.  The primary reason for splenectomy at this point is not because of her counts but because of the massive splenomegaly causing abdominal discomforts and pushing of her viscera.  Dr. Kemp is aware of the risks of adhesions but will, according to the patient, attempt laparoscopic approach but if that does not work he will convert to an open procedure.  I will see her back in about a month with blood counts prior to return to see how she is doing postsplenectomy.    Total time of care today inclusive of time spent today prior to patient's arrival reviewing interval data and going over the course of events rapidly following the early institution of her Rituxan as outlined above and reaching out to Dr. Kemp for communication and orchestrating her care as outlined took 45 minutes of total patient care time throughout the day today.      Nelson Castañeda MD    04/12/2022

## 2022-07-18 ENCOUNTER — PRE-ADMISSION TESTING (OUTPATIENT)
Dept: PREADMISSION TESTING | Facility: HOSPITAL | Age: 82
End: 2022-07-18

## 2022-07-18 VITALS — BODY MASS INDEX: 27.58 KG/M2 | WEIGHT: 171.63 LBS | HEIGHT: 66 IN

## 2022-07-18 LAB
ANION GAP SERPL CALCULATED.3IONS-SCNC: 9 MMOL/L (ref 5–15)
BUN SERPL-MCNC: 32 MG/DL (ref 8–23)
BUN/CREAT SERPL: 25.6 (ref 7–25)
CALCIUM SPEC-SCNC: 9.4 MG/DL (ref 8.6–10.5)
CHLORIDE SERPL-SCNC: 91 MMOL/L (ref 98–107)
CO2 SERPL-SCNC: 28 MMOL/L (ref 22–29)
CREAT SERPL-MCNC: 1.25 MG/DL (ref 0.57–1)
DEPRECATED RDW RBC AUTO: 50.5 FL (ref 37–54)
EGFRCR SERPLBLD CKD-EPI 2021: 42.9 ML/MIN/1.73
ERYTHROCYTE [DISTWIDTH] IN BLOOD BY AUTOMATED COUNT: 14.9 % (ref 12.3–15.4)
GLUCOSE SERPL-MCNC: 143 MG/DL (ref 65–99)
HBA1C MFR BLD: 5.6 % (ref 4.8–5.6)
HCT VFR BLD AUTO: 29.9 % (ref 34–46.6)
HGB BLD-MCNC: 9.8 G/DL (ref 12–15.9)
MCH RBC QN AUTO: 30.3 PG (ref 26.6–33)
MCHC RBC AUTO-ENTMCNC: 32.8 G/DL (ref 31.5–35.7)
MCV RBC AUTO: 92.6 FL (ref 79–97)
PLATELET # BLD AUTO: 123 10*3/MM3 (ref 140–450)
PMV BLD AUTO: 8.3 FL (ref 6–12)
POTASSIUM SERPL-SCNC: 4.5 MMOL/L (ref 3.5–5.2)
RBC # BLD AUTO: 3.23 10*6/MM3 (ref 3.77–5.28)
SARS-COV-2 RNA PNL SPEC NAA+PROBE: NOT DETECTED
SODIUM SERPL-SCNC: 128 MMOL/L (ref 136–145)
WBC NRBC COR # BLD: 5.52 10*3/MM3 (ref 3.4–10.8)

## 2022-07-18 PROCEDURE — 85027 COMPLETE CBC AUTOMATED: CPT

## 2022-07-18 PROCEDURE — C9803 HOPD COVID-19 SPEC COLLECT: HCPCS

## 2022-07-18 PROCEDURE — 93005 ELECTROCARDIOGRAM TRACING: CPT

## 2022-07-18 PROCEDURE — 36415 COLL VENOUS BLD VENIPUNCTURE: CPT

## 2022-07-18 PROCEDURE — U0004 COV-19 TEST NON-CDC HGH THRU: HCPCS

## 2022-07-18 PROCEDURE — U0005 INFEC AGEN DETEC AMPLI PROBE: HCPCS

## 2022-07-18 PROCEDURE — 83036 HEMOGLOBIN GLYCOSYLATED A1C: CPT

## 2022-07-18 PROCEDURE — 93010 ELECTROCARDIOGRAM REPORT: CPT | Performed by: INTERNAL MEDICINE

## 2022-07-18 PROCEDURE — 80048 BASIC METABOLIC PNL TOTAL CA: CPT

## 2022-07-18 RX ORDER — ACETAMINOPHEN 500 MG
1000 TABLET ORAL EVERY EVENING
COMMUNITY
End: 2022-08-04 | Stop reason: HOSPADM

## 2022-07-19 ENCOUNTER — ANESTHESIA EVENT (OUTPATIENT)
Dept: PERIOP | Facility: HOSPITAL | Age: 82
End: 2022-07-19

## 2022-07-19 LAB
QT INTERVAL: 444 MS
QTC INTERVAL: 458 MS

## 2022-07-19 RX ORDER — SODIUM CHLORIDE, SODIUM LACTATE, POTASSIUM CHLORIDE, CALCIUM CHLORIDE 600; 310; 30; 20 MG/100ML; MG/100ML; MG/100ML; MG/100ML
9 INJECTION, SOLUTION INTRAVENOUS CONTINUOUS
Status: CANCELLED | OUTPATIENT
Start: 2022-07-19

## 2022-07-19 RX ORDER — SODIUM CHLORIDE 0.9 % (FLUSH) 0.9 %
10 SYRINGE (ML) INJECTION EVERY 12 HOURS SCHEDULED
Status: CANCELLED | OUTPATIENT
Start: 2022-07-19

## 2022-07-19 RX ORDER — FAMOTIDINE 10 MG/ML
20 INJECTION, SOLUTION INTRAVENOUS ONCE
Status: CANCELLED | OUTPATIENT
Start: 2022-07-19 | End: 2022-07-19

## 2022-07-19 RX ORDER — SODIUM CHLORIDE 0.9 % (FLUSH) 0.9 %
10 SYRINGE (ML) INJECTION AS NEEDED
Status: CANCELLED | OUTPATIENT
Start: 2022-07-19

## 2022-07-20 ENCOUNTER — APPOINTMENT (OUTPATIENT)
Dept: GENERAL RADIOLOGY | Facility: HOSPITAL | Age: 82
End: 2022-07-20

## 2022-07-20 ENCOUNTER — ANESTHESIA EVENT CONVERTED (OUTPATIENT)
Dept: ANESTHESIOLOGY | Facility: HOSPITAL | Age: 82
End: 2022-07-20

## 2022-07-20 ENCOUNTER — HOSPITAL ENCOUNTER (INPATIENT)
Facility: HOSPITAL | Age: 82
LOS: 14 days | Discharge: NURSING, REHAB OR LONG-TERM CARE | End: 2022-08-04
Attending: SURGERY | Admitting: HOSPITALIST

## 2022-07-20 ENCOUNTER — ANESTHESIA (OUTPATIENT)
Dept: PERIOP | Facility: HOSPITAL | Age: 82
End: 2022-07-20

## 2022-07-20 DIAGNOSIS — A41.9 SEPSIS WITHOUT ACUTE ORGAN DYSFUNCTION, DUE TO UNSPECIFIED ORGANISM: Primary | ICD-10-CM

## 2022-07-20 DIAGNOSIS — R16.1 SPLENOMEGALY: ICD-10-CM

## 2022-07-20 DIAGNOSIS — R13.12 OROPHARYNGEAL DYSPHAGIA: ICD-10-CM

## 2022-07-20 PROBLEM — J93.9 PNEUMOTHORAX, UNSPECIFIED: Status: ACTIVE | Noted: 2022-07-20

## 2022-07-20 PROBLEM — D68.9 COAGULOPATHY (HCC): Status: ACTIVE | Noted: 2022-07-20

## 2022-07-20 LAB
ABO GROUP BLD: NORMAL
ARTERIAL PATENCY WRIST A: ABNORMAL
ARTERIAL PATENCY WRIST A: ABNORMAL
ATMOSPHERIC PRESS: ABNORMAL MM[HG]
ATMOSPHERIC PRESS: ABNORMAL MM[HG]
BASE EXCESS BLDA CALC-SCNC: -6.9 MMOL/L (ref 0–2)
BASE EXCESS BLDA CALC-SCNC: -8.5 MMOL/L (ref 0–2)
BDY SITE: ABNORMAL
BDY SITE: ABNORMAL
BH BB BLOOD EXPIRATION DATE: NORMAL
BH BB BLOOD TYPE BARCODE: 5100
BH BB DISPENSE STATUS: NORMAL
BH BB PRODUCT CODE: NORMAL
BH BB UNIT NUMBER: NORMAL
BLD GP AB SCN SERPL QL: NEGATIVE
BODY TEMPERATURE: 37 C
BODY TEMPERATURE: 37 C
CO2 BLDA-SCNC: 18.2 MMOL/L (ref 22–33)
CO2 BLDA-SCNC: 19.2 MMOL/L (ref 22–33)
COHGB MFR BLD: 0.8 % (ref 0–2)
COHGB MFR BLD: 0.9 % (ref 0–2)
CORTIS SERPL-MCNC: 21.69 MCG/DL
CROSSMATCH INTERPRETATION: NORMAL
DEPRECATED RDW RBC AUTO: 47.3 FL (ref 37–54)
DEPRECATED RDW RBC AUTO: 49.4 FL (ref 37–54)
EPAP: 0
ERYTHROCYTE [DISTWIDTH] IN BLOOD BY AUTOMATED COUNT: 14.4 % (ref 12.3–15.4)
ERYTHROCYTE [DISTWIDTH] IN BLOOD BY AUTOMATED COUNT: 14.8 % (ref 12.3–15.4)
FIBRINOGEN PPP-MCNC: 160 MG/DL (ref 220–470)
FIBRINOGEN PPP-MCNC: 163 MG/DL (ref 220–470)
GLUCOSE BLDC GLUCOMTR-MCNC: 104 MG/DL (ref 70–130)
GLUCOSE BLDC GLUCOMTR-MCNC: 236 MG/DL (ref 70–130)
HCO3 BLDA-SCNC: 17.2 MMOL/L (ref 20–26)
HCO3 BLDA-SCNC: 18.2 MMOL/L (ref 20–26)
HCT VFR BLD AUTO: 27.1 % (ref 34–46.6)
HCT VFR BLD AUTO: 29.1 % (ref 34–46.6)
HCT VFR BLD CALC: 30.3 % (ref 38–51)
HCT VFR BLD CALC: 31.8 % (ref 38–51)
HGB BLD-MCNC: 10.1 G/DL (ref 12–15.9)
HGB BLD-MCNC: 9.3 G/DL (ref 12–15.9)
HGB BLDA-MCNC: 10.4 G/DL (ref 14–18)
HGB BLDA-MCNC: 9.9 G/DL (ref 14–18)
INHALED O2 CONCENTRATION: 60 %
INHALED O2 CONCENTRATION: 80 %
INR PPP: 1.51 (ref 0.84–1.13)
INR PPP: 1.88 (ref 0.84–1.13)
IPAP: 0
MCH RBC QN AUTO: 31.5 PG (ref 26.6–33)
MCH RBC QN AUTO: 31.5 PG (ref 26.6–33)
MCHC RBC AUTO-ENTMCNC: 34.3 G/DL (ref 31.5–35.7)
MCHC RBC AUTO-ENTMCNC: 34.7 G/DL (ref 31.5–35.7)
MCV RBC AUTO: 90.7 FL (ref 79–97)
MCV RBC AUTO: 91.9 FL (ref 79–97)
METHGB BLD QL: 0.4 % (ref 0–1.5)
METHGB BLD QL: 0.5 % (ref 0–1.5)
MODALITY: ABNORMAL
MODALITY: ABNORMAL
NOTE: ABNORMAL
NOTE: ABNORMAL
OXYHGB MFR BLDV: 98.7 % (ref 94–99)
OXYHGB MFR BLDV: 99 % (ref 94–99)
PAW @ PEAK INSP FLOW SETTING VENT: 0 CMH2O
PCO2 BLDA: 33.9 MM HG (ref 35–45)
PCO2 BLDA: 35.2 MM HG (ref 35–45)
PCO2 TEMP ADJ BLD: 33.9 MM HG (ref 35–45)
PCO2 TEMP ADJ BLD: 35.2 MM HG (ref 35–45)
PEEP RESPIRATORY: 5 CM[H2O]
PH BLDA: 7.3 PH UNITS (ref 7.35–7.45)
PH BLDA: 7.34 PH UNITS (ref 7.35–7.45)
PH, TEMP CORRECTED: 7.3 PH UNITS
PH, TEMP CORRECTED: 7.34 PH UNITS
PLATELET # BLD AUTO: 65 10*3/MM3 (ref 140–450)
PLATELET # BLD AUTO: 83 10*3/MM3 (ref 140–450)
PMV BLD AUTO: 10.7 FL (ref 6–12)
PMV BLD AUTO: 9.1 FL (ref 6–12)
PO2 BLDA: 277 MM HG (ref 83–108)
PO2 BLDA: 389 MM HG (ref 83–108)
PO2 TEMP ADJ BLD: 277 MM HG (ref 83–108)
PO2 TEMP ADJ BLD: 389 MM HG (ref 83–108)
POTASSIUM SERPL-SCNC: 4.6 MMOL/L (ref 3.5–5.2)
PROTHROMBIN TIME: 18.1 SECONDS (ref 11.4–14.4)
PROTHROMBIN TIME: 21.7 SECONDS (ref 11.4–14.4)
RBC # BLD AUTO: 2.95 10*6/MM3 (ref 3.77–5.28)
RBC # BLD AUTO: 3.21 10*6/MM3 (ref 3.77–5.28)
RH BLD: POSITIVE
T&S EXPIRATION DATE: NORMAL
TOTAL RATE: 0 BREATHS/MINUTE
UNIT  ABO: NORMAL
UNIT  RH: NORMAL
VENTILATOR MODE: ABNORMAL
WBC NRBC COR # BLD: 17.72 10*3/MM3 (ref 3.4–10.8)
WBC NRBC COR # BLD: 23.07 10*3/MM3 (ref 3.4–10.8)

## 2022-07-20 PROCEDURE — 86900 BLOOD TYPING SEROLOGIC ABO: CPT

## 2022-07-20 PROCEDURE — 82375 ASSAY CARBOXYHB QUANT: CPT

## 2022-07-20 PROCEDURE — 63710000001 INSULIN REGULAR HUMAN PER 5 UNITS: Performed by: NURSE ANESTHETIST, CERTIFIED REGISTERED

## 2022-07-20 PROCEDURE — 82330 ASSAY OF CALCIUM: CPT

## 2022-07-20 PROCEDURE — 85610 PROTHROMBIN TIME: CPT | Performed by: INTERNAL MEDICINE

## 2022-07-20 PROCEDURE — 85014 HEMATOCRIT: CPT

## 2022-07-20 PROCEDURE — 25010000002 FENTANYL 10 MCG/1 ML NS: Performed by: INTERNAL MEDICINE

## 2022-07-20 PROCEDURE — 25010000002 PHENYLEPHRINE 10 MG/ML SOLUTION 5 ML VIAL: Performed by: SURGERY

## 2022-07-20 PROCEDURE — 82947 ASSAY GLUCOSE BLOOD QUANT: CPT

## 2022-07-20 PROCEDURE — 86900 BLOOD TYPING SEROLOGIC ABO: CPT | Performed by: SURGERY

## 2022-07-20 PROCEDURE — 84132 ASSAY OF SERUM POTASSIUM: CPT | Performed by: ANESTHESIOLOGY

## 2022-07-20 PROCEDURE — 25010000002 PROPOFOL 10 MG/ML EMULSION

## 2022-07-20 PROCEDURE — 82805 BLOOD GASES W/O2 SATURATION: CPT

## 2022-07-20 PROCEDURE — 82962 GLUCOSE BLOOD TEST: CPT

## 2022-07-20 PROCEDURE — 86923 COMPATIBILITY TEST ELECTRIC: CPT

## 2022-07-20 PROCEDURE — P9100 PATHOGEN TEST FOR PLATELETS: HCPCS

## 2022-07-20 PROCEDURE — 82533 TOTAL CORTISOL: CPT | Performed by: NURSE PRACTITIONER

## 2022-07-20 PROCEDURE — 83735 ASSAY OF MAGNESIUM: CPT | Performed by: INTERNAL MEDICINE

## 2022-07-20 PROCEDURE — 82330 ASSAY OF CALCIUM: CPT | Performed by: INTERNAL MEDICINE

## 2022-07-20 PROCEDURE — 07TP0ZZ RESECTION OF SPLEEN, OPEN APPROACH: ICD-10-PCS | Performed by: SURGERY

## 2022-07-20 PROCEDURE — 94002 VENT MGMT INPAT INIT DAY: CPT

## 2022-07-20 PROCEDURE — 25010000002 PHENYLEPHRINE 10 MG/ML SOLUTION 1 ML VIAL

## 2022-07-20 PROCEDURE — 83050 HGB METHEMOGLOBIN QUAN: CPT

## 2022-07-20 PROCEDURE — 88364 INSITU HYBRIDIZATION (FISH): CPT | Performed by: SURGERY

## 2022-07-20 PROCEDURE — P9017 PLASMA 1 DONOR FRZ W/IN 8 HR: HCPCS

## 2022-07-20 PROCEDURE — 36430 TRANSFUSION BLD/BLD COMPNT: CPT

## 2022-07-20 PROCEDURE — C1751 CATH, INF, PER/CENT/MIDLINE: HCPCS | Performed by: ANESTHESIOLOGY

## 2022-07-20 PROCEDURE — P9059 PLASMA, FRZ BETWEEN 8-24HOUR: HCPCS

## 2022-07-20 PROCEDURE — 82803 BLOOD GASES ANY COMBINATION: CPT

## 2022-07-20 PROCEDURE — 88365 INSITU HYBRIDIZATION (FISH): CPT | Performed by: SURGERY

## 2022-07-20 PROCEDURE — P9016 RBC LEUKOCYTES REDUCED: HCPCS

## 2022-07-20 PROCEDURE — 71045 X-RAY EXAM CHEST 1 VIEW: CPT

## 2022-07-20 PROCEDURE — 88341 IMHCHEM/IMCYTCHM EA ADD ANTB: CPT | Performed by: SURGERY

## 2022-07-20 PROCEDURE — P9035 PLATELET PHERES LEUKOREDUCED: HCPCS

## 2022-07-20 PROCEDURE — 85384 FIBRINOGEN ACTIVITY: CPT | Performed by: INTERNAL MEDICINE

## 2022-07-20 PROCEDURE — 25010000002 PROPOFOL 10 MG/ML EMULSION: Performed by: INTERNAL MEDICINE

## 2022-07-20 PROCEDURE — 86927 PLASMA FRESH FROZEN: CPT

## 2022-07-20 PROCEDURE — 25010000002 DEXAMETHASONE SODIUM PHOSPHATE 10 MG/ML SOLUTION: Performed by: ANESTHESIOLOGY

## 2022-07-20 PROCEDURE — 25010000002 PHENYLEPHRINE 10 MG/ML SOLUTION

## 2022-07-20 PROCEDURE — 86850 RBC ANTIBODY SCREEN: CPT | Performed by: SURGERY

## 2022-07-20 PROCEDURE — 25010000002 ALBUMIN HUMAN 5% PER 50 ML

## 2022-07-20 PROCEDURE — 80053 COMPREHEN METABOLIC PANEL: CPT | Performed by: INTERNAL MEDICINE

## 2022-07-20 PROCEDURE — 88360 TUMOR IMMUNOHISTOCHEM/MANUAL: CPT | Performed by: SURGERY

## 2022-07-20 PROCEDURE — 0FBG0ZZ EXCISION OF PANCREAS, OPEN APPROACH: ICD-10-PCS | Performed by: SURGERY

## 2022-07-20 PROCEDURE — 84295 ASSAY OF SERUM SODIUM: CPT

## 2022-07-20 PROCEDURE — 88305 TISSUE EXAM BY PATHOLOGIST: CPT | Performed by: SURGERY

## 2022-07-20 PROCEDURE — 88342 IMHCHEM/IMCYTCHM 1ST ANTB: CPT | Performed by: SURGERY

## 2022-07-20 PROCEDURE — 85027 COMPLETE CBC AUTOMATED: CPT | Performed by: INTERNAL MEDICINE

## 2022-07-20 PROCEDURE — 99291 CRITICAL CARE FIRST HOUR: CPT | Performed by: INTERNAL MEDICINE

## 2022-07-20 PROCEDURE — P9041 ALBUMIN (HUMAN),5%, 50ML: HCPCS

## 2022-07-20 PROCEDURE — 84132 ASSAY OF SERUM POTASSIUM: CPT

## 2022-07-20 PROCEDURE — 94799 UNLISTED PULMONARY SVC/PX: CPT

## 2022-07-20 PROCEDURE — 86901 BLOOD TYPING SEROLOGIC RH(D): CPT | Performed by: SURGERY

## 2022-07-20 DEVICE — CLIP LIGAT VASC HORIZON TI MD/LG GRN 6CT: Type: IMPLANTABLE DEVICE | Site: ABDOMEN | Status: FUNCTIONAL

## 2022-07-20 DEVICE — CLIP LIGAT VASC HORIZON TI MD BLU 6CT: Type: IMPLANTABLE DEVICE | Site: ABDOMEN | Status: FUNCTIONAL

## 2022-07-20 DEVICE — CLIP LIGAT VASC HORIZON TI SM YEL 6CT: Type: IMPLANTABLE DEVICE | Site: ABDOMEN | Status: FUNCTIONAL

## 2022-07-20 DEVICE — ENDOPATH ECHELON ENDOSCOPIC LINEAR CUTTER RELOADS, WHITE, 60MM
Type: IMPLANTABLE DEVICE | Site: ABDOMEN | Status: FUNCTIONAL
Brand: ECHELON ENDOPATH

## 2022-07-20 DEVICE — CLIP LIGAT VASC HORIZON TI LG ORNG 6CT: Type: IMPLANTABLE DEVICE | Site: ABDOMEN | Status: FUNCTIONAL

## 2022-07-20 RX ORDER — DEXAMETHASONE SODIUM PHOSPHATE 10 MG/ML
INJECTION, SOLUTION INTRAMUSCULAR; INTRAVENOUS
Status: COMPLETED | OUTPATIENT
Start: 2022-07-20 | End: 2022-07-20

## 2022-07-20 RX ORDER — SODIUM CHLORIDE, SODIUM LACTATE, POTASSIUM CHLORIDE, CALCIUM CHLORIDE 600; 310; 30; 20 MG/100ML; MG/100ML; MG/100ML; MG/100ML
100 INJECTION, SOLUTION INTRAVENOUS CONTINUOUS
Status: DISCONTINUED | OUTPATIENT
Start: 2022-07-20 | End: 2022-07-21

## 2022-07-20 RX ORDER — POLYVINYL ALCOHOL 14 MG/ML
2 SOLUTION/ DROPS OPHTHALMIC
Status: DISCONTINUED | OUTPATIENT
Start: 2022-07-20 | End: 2022-08-04 | Stop reason: HOSPADM

## 2022-07-20 RX ORDER — ONDANSETRON 2 MG/ML
4 INJECTION INTRAMUSCULAR; INTRAVENOUS ONCE AS NEEDED
Status: CANCELLED | OUTPATIENT
Start: 2022-07-20

## 2022-07-20 RX ORDER — FAMOTIDINE 20 MG/1
20 TABLET, FILM COATED ORAL ONCE
Status: COMPLETED | OUTPATIENT
Start: 2022-07-20 | End: 2022-07-20

## 2022-07-20 RX ORDER — CLINDAMYCIN PHOSPHATE 900 MG/50ML
900 INJECTION INTRAVENOUS ONCE
Status: COMPLETED | OUTPATIENT
Start: 2022-07-20 | End: 2022-07-20

## 2022-07-20 RX ORDER — EPHEDRINE SULFATE 50 MG/ML
INJECTION, SOLUTION INTRAVENOUS AS NEEDED
Status: DISCONTINUED | OUTPATIENT
Start: 2022-07-20 | End: 2022-07-20 | Stop reason: SURG

## 2022-07-20 RX ORDER — PROPOFOL 10 MG/ML
VIAL (ML) INTRAVENOUS AS NEEDED
Status: DISCONTINUED | OUTPATIENT
Start: 2022-07-20 | End: 2022-07-20 | Stop reason: SURG

## 2022-07-20 RX ORDER — ALBUMIN, HUMAN INJ 5% 5 %
SOLUTION INTRAVENOUS CONTINUOUS PRN
Status: DISCONTINUED | OUTPATIENT
Start: 2022-07-20 | End: 2022-07-20 | Stop reason: SURG

## 2022-07-20 RX ORDER — PANTOPRAZOLE SODIUM 40 MG/10ML
40 INJECTION, POWDER, LYOPHILIZED, FOR SOLUTION INTRAVENOUS
Status: DISCONTINUED | OUTPATIENT
Start: 2022-07-20 | End: 2022-08-01

## 2022-07-20 RX ORDER — ROCURONIUM BROMIDE 10 MG/ML
INJECTION, SOLUTION INTRAVENOUS AS NEEDED
Status: DISCONTINUED | OUTPATIENT
Start: 2022-07-20 | End: 2022-07-20 | Stop reason: SURG

## 2022-07-20 RX ORDER — MAGNESIUM HYDROXIDE 1200 MG/15ML
LIQUID ORAL AS NEEDED
Status: DISCONTINUED | OUTPATIENT
Start: 2022-07-20 | End: 2022-07-20 | Stop reason: HOSPADM

## 2022-07-20 RX ORDER — LIDOCAINE HYDROCHLORIDE 10 MG/ML
INJECTION, SOLUTION EPIDURAL; INFILTRATION; INTRACAUDAL; PERINEURAL AS NEEDED
Status: DISCONTINUED | OUTPATIENT
Start: 2022-07-20 | End: 2022-07-20 | Stop reason: SURG

## 2022-07-20 RX ORDER — LIDOCAINE HYDROCHLORIDE 10 MG/ML
0.5 INJECTION, SOLUTION EPIDURAL; INFILTRATION; INTRACAUDAL; PERINEURAL ONCE AS NEEDED
Status: DISCONTINUED | OUTPATIENT
Start: 2022-07-20 | End: 2022-07-20 | Stop reason: HOSPADM

## 2022-07-20 RX ORDER — FENTANYL CITRATE 50 UG/ML
50 INJECTION, SOLUTION INTRAMUSCULAR; INTRAVENOUS
Status: CANCELLED | OUTPATIENT
Start: 2022-07-20

## 2022-07-20 RX ORDER — NOREPINEPHRINE BIT/0.9 % NACL 8 MG/250ML
.02-.3 INFUSION BOTTLE (ML) INTRAVENOUS
Status: DISCONTINUED | OUTPATIENT
Start: 2022-07-20 | End: 2022-07-21

## 2022-07-20 RX ORDER — SODIUM CHLORIDE 9 MG/ML
9 INJECTION, SOLUTION INTRAVENOUS CONTINUOUS
Status: DISCONTINUED | OUTPATIENT
Start: 2022-07-20 | End: 2022-07-21

## 2022-07-20 RX ORDER — PHENYLEPHRINE HYDROCHLORIDE 10 MG/ML
INJECTION INTRAVENOUS AS NEEDED
Status: DISCONTINUED | OUTPATIENT
Start: 2022-07-20 | End: 2022-07-20 | Stop reason: SURG

## 2022-07-20 RX ORDER — ACETAMINOPHEN 650 MG/1
650 SUPPOSITORY RECTAL EVERY 4 HOURS PRN
Status: DISCONTINUED | OUTPATIENT
Start: 2022-07-20 | End: 2022-08-04 | Stop reason: HOSPADM

## 2022-07-20 RX ORDER — BUPIVACAINE HYDROCHLORIDE 5 MG/ML
INJECTION, SOLUTION EPIDURAL; INTRACAUDAL
Status: COMPLETED | OUTPATIENT
Start: 2022-07-20 | End: 2022-07-20

## 2022-07-20 RX ORDER — ONDANSETRON 2 MG/ML
4 INJECTION INTRAMUSCULAR; INTRAVENOUS EVERY 6 HOURS PRN
Status: DISCONTINUED | OUTPATIENT
Start: 2022-07-20 | End: 2022-08-04 | Stop reason: HOSPADM

## 2022-07-20 RX ORDER — DIAZEPAM 5 MG/ML
5 INJECTION, SOLUTION INTRAMUSCULAR; INTRAVENOUS EVERY 4 HOURS PRN
Status: ACTIVE | OUTPATIENT
Start: 2022-07-20 | End: 2022-07-27

## 2022-07-20 RX ORDER — MIDAZOLAM HYDROCHLORIDE 1 MG/ML
0.5 INJECTION INTRAMUSCULAR; INTRAVENOUS
Status: DISCONTINUED | OUTPATIENT
Start: 2022-07-20 | End: 2022-07-20 | Stop reason: HOSPADM

## 2022-07-20 RX ORDER — CALCIUM CHLORIDE 100 MG/ML
INJECTION INTRAVENOUS; INTRAVENTRICULAR AS NEEDED
Status: DISCONTINUED | OUTPATIENT
Start: 2022-07-20 | End: 2022-07-20 | Stop reason: SURG

## 2022-07-20 RX ADMIN — SODIUM CHLORIDE: 9 INJECTION, SOLUTION INTRAVENOUS at 14:16

## 2022-07-20 RX ADMIN — EPHEDRINE SULFATE 10 MG: 50 INJECTION INTRAVENOUS at 13:33

## 2022-07-20 RX ADMIN — INSULIN HUMAN 5.7 UNITS/HR: 1 INJECTION, SOLUTION INTRAVENOUS at 16:45

## 2022-07-20 RX ADMIN — CALCIUM CHLORIDE 200 MG: 100 INJECTION INTRAVENOUS; INTRAVENTRICULAR at 14:20

## 2022-07-20 RX ADMIN — NOREPINEPHRINE BITARTRATE 0.02 MCG/KG/MIN: 1 INJECTION, SOLUTION, CONCENTRATE INTRAVENOUS at 16:44

## 2022-07-20 RX ADMIN — CALCIUM CHLORIDE 400 MG: 100 INJECTION INTRAVENOUS; INTRAVENTRICULAR at 14:41

## 2022-07-20 RX ADMIN — SODIUM BICARBONATE 50 ML: 84 INJECTION, SOLUTION INTRAVENOUS at 15:20

## 2022-07-20 RX ADMIN — PROPOFOL 150 MG: 10 INJECTION, EMULSION INTRAVENOUS at 12:34

## 2022-07-20 RX ADMIN — ROCURONIUM BROMIDE 50 MG: 10 INJECTION, SOLUTION INTRAVENOUS at 12:59

## 2022-07-20 RX ADMIN — PHENYLEPHRINE HYDROCHLORIDE 200 MCG: 10 INJECTION INTRAVENOUS at 14:19

## 2022-07-20 RX ADMIN — ALBUMIN HUMAN: 0.05 INJECTION, SOLUTION INTRAVENOUS at 14:02

## 2022-07-20 RX ADMIN — PHENYLEPHRINE HYDROCHLORIDE 0.5 MCG/KG/MIN: 10 INJECTION INTRAVENOUS at 16:42

## 2022-07-20 RX ADMIN — SODIUM BICARBONATE 50 ML: 84 INJECTION, SOLUTION INTRAVENOUS at 15:00

## 2022-07-20 RX ADMIN — PHENYLEPHRINE HYDROCHLORIDE 300 MCG: 10 INJECTION INTRAVENOUS at 14:07

## 2022-07-20 RX ADMIN — FAMOTIDINE 20 MG: 20 TABLET ORAL at 10:07

## 2022-07-20 RX ADMIN — SODIUM CHLORIDE: 9 INJECTION, SOLUTION INTRAVENOUS at 12:30

## 2022-07-20 RX ADMIN — LIDOCAINE HYDROCHLORIDE 50 MG: 10 INJECTION, SOLUTION EPIDURAL; INFILTRATION; INTRACAUDAL; PERINEURAL at 12:34

## 2022-07-20 RX ADMIN — CLINDAMYCIN PHOSPHATE 900 MG: 900 INJECTION, SOLUTION INTRAVENOUS at 12:40

## 2022-07-20 RX ADMIN — CALCIUM CHLORIDE 200 MG: 100 INJECTION INTRAVENOUS; INTRAVENTRICULAR at 14:26

## 2022-07-20 RX ADMIN — SODIUM CHLORIDE: 9 INJECTION, SOLUTION INTRAVENOUS at 14:36

## 2022-07-20 RX ADMIN — PHENYLEPHRINE HYDROCHLORIDE 200 MCG: 10 INJECTION INTRAVENOUS at 14:12

## 2022-07-20 RX ADMIN — PANTOPRAZOLE SODIUM 40 MG: 40 INJECTION, POWDER, FOR SOLUTION INTRAVENOUS at 21:52

## 2022-07-20 RX ADMIN — LIDOCAINE HYDROCHLORIDE 40 MG: 10 INJECTION, SOLUTION EPIDURAL; INFILTRATION; INTRACAUDAL; PERINEURAL at 12:35

## 2022-07-20 RX ADMIN — PHENYLEPHRINE HYDROCHLORIDE 3 MCG/KG/MIN: 10 INJECTION INTRAVENOUS at 21:16

## 2022-07-20 RX ADMIN — SODIUM CHLORIDE 1000 ML: 9 INJECTION, SOLUTION INTRAVENOUS at 23:30

## 2022-07-20 RX ADMIN — SODIUM CHLORIDE 9 ML/HR: 9 INJECTION, SOLUTION INTRAVENOUS at 09:59

## 2022-07-20 RX ADMIN — SODIUM CHLORIDE 1000 ML: 9 INJECTION, SOLUTION INTRAVENOUS at 19:56

## 2022-07-20 RX ADMIN — EPHEDRINE SULFATE 10 MG: 50 INJECTION INTRAVENOUS at 14:09

## 2022-07-20 RX ADMIN — SODIUM CHLORIDE: 9 INJECTION, SOLUTION INTRAVENOUS at 14:51

## 2022-07-20 RX ADMIN — PHENYLEPHRINE HYDROCHLORIDE 200 MCG: 10 INJECTION INTRAVENOUS at 14:14

## 2022-07-20 RX ADMIN — PHENYLEPHRINE HYDROCHLORIDE 200 MCG: 10 INJECTION INTRAVENOUS at 14:26

## 2022-07-20 RX ADMIN — PHENYLEPHRINE HYDROCHLORIDE 100 MCG: 10 INJECTION INTRAVENOUS at 14:09

## 2022-07-20 RX ADMIN — ROCURONIUM BROMIDE 50 MG: 10 INJECTION, SOLUTION INTRAVENOUS at 15:13

## 2022-07-20 RX ADMIN — SODIUM CHLORIDE 1000 ML: 9 INJECTION, SOLUTION INTRAVENOUS at 18:32

## 2022-07-20 RX ADMIN — Medication 50 MCG/HR: at 16:57

## 2022-07-20 RX ADMIN — ALBUMIN HUMAN: 0.05 INJECTION, SOLUTION INTRAVENOUS at 14:06

## 2022-07-20 RX ADMIN — PROPOFOL 5 MCG/KG/MIN: 10 INJECTION, EMULSION INTRAVENOUS at 17:03

## 2022-07-20 RX ADMIN — DEXMEDETOMIDINE HYDROCHLORIDE 0.4 MCG/KG/HR: 100 INJECTION, SOLUTION INTRAVENOUS at 15:45

## 2022-07-20 RX ADMIN — SODIUM CHLORIDE 5.7 UNITS/HR: 900 INJECTION INTRAVENOUS at 15:30

## 2022-07-20 RX ADMIN — SODIUM CHLORIDE: 9 INJECTION, SOLUTION INTRAVENOUS at 13:07

## 2022-07-20 RX ADMIN — PHENYLEPHRINE HYDROCHLORIDE 1 MCG/KG/MIN: 10 INJECTION INTRAVENOUS at 14:22

## 2022-07-20 RX ADMIN — BUPIVACAINE HYDROCHLORIDE 60 ML: 5 INJECTION, SOLUTION EPIDURAL; INTRACAUDAL; PERINEURAL at 12:45

## 2022-07-20 RX ADMIN — ALBUMIN HUMAN: 0.05 INJECTION, SOLUTION INTRAVENOUS at 14:34

## 2022-07-20 RX ADMIN — NOREPINEPHRINE BITARTRATE 0.1 MCG/KG/MIN: 1 INJECTION, SOLUTION, CONCENTRATE INTRAVENOUS at 15:23

## 2022-07-20 RX ADMIN — CALCIUM CHLORIDE 200 MG: 100 INJECTION INTRAVENOUS; INTRAVENTRICULAR at 14:23

## 2022-07-20 RX ADMIN — PHENYLEPHRINE HYDROCHLORIDE 200 MCG: 10 INJECTION INTRAVENOUS at 14:17

## 2022-07-20 RX ADMIN — SODIUM CHLORIDE, POTASSIUM CHLORIDE, SODIUM LACTATE AND CALCIUM CHLORIDE 100 ML/HR: 600; 310; 30; 20 INJECTION, SOLUTION INTRAVENOUS at 20:07

## 2022-07-20 RX ADMIN — Medication 0.02 MCG/KG/MIN: at 20:00

## 2022-07-20 RX ADMIN — DEXAMETHASONE SODIUM PHOSPHATE 2 MG: 10 INJECTION, SOLUTION INTRAMUSCULAR; INTRAVENOUS at 12:45

## 2022-07-20 RX ADMIN — CALCIUM CHLORIDE 1000 MG: 100 INJECTION INTRAVENOUS; INTRAVENTRICULAR at 15:19

## 2022-07-20 RX ADMIN — CALCIUM CHLORIDE 1000 MG: 100 INJECTION INTRAVENOUS; INTRAVENTRICULAR at 15:00

## 2022-07-20 RX ADMIN — ROCURONIUM BROMIDE 50 MG: 10 INJECTION, SOLUTION INTRAVENOUS at 12:34

## 2022-07-20 NOTE — ANESTHESIA POSTPROCEDURE EVALUATION
Patient: Mahi Coronel    Procedure Summary     Date: 07/20/22 Room / Location:  [facility]    Anesthesia Start: 1230 Anesthesia Stop: 1624    Procedure: SPLENECTOMY  OPEN, DISTAL PANCREATECTOMY (N/A Abdomen) Diagnosis:     Surgeons: Jayme Kemp MD Provider: Ralph Starr MD    Anesthesia Type: general ASA Status: 3          Anesthesia Type: general    Vitals  Vitals Value Taken Time   BP 97/60 07/20/22 1620   Temp     Pulse 72 07/20/22 1626   Resp     SpO2 100 % 07/20/22 1626   Vitals shown include unvalidated device data.        Post Anesthesia Care and Evaluation    Patient location during evaluation: ICU  Patient participation: complete - patient cannot participate  Pain management: adequate    Airway patency: patent  Anesthetic complications: No anesthetic complications  PONV Status: none  Cardiovascular status: hemodynamically stable and acceptable  Respiratory status: acceptable, ETT, intubated and ventilator  Hydration status: stable    Comments: Patient transported to ICU; VS stable on arrival and throughout transport. Totals for case: 9PRBC, 5FFP, 2PLT, 1L Albumin, 4L   No anesthesia care post op

## 2022-07-20 NOTE — ANESTHESIA PROCEDURE NOTES
Arterial Line      Patient reassessed immediately prior to procedure    Patient location during procedure: pre-op   Line placed for hemodynamic monitoring.  Performed By   CRNA/CAA: Luke Tomas CRNASRNA: Mony Norris SRNA  Preanesthetic Checklist  Completed: patient identified, IV checked, site marked, risks and benefits discussed, surgical consent, monitors and equipment checked, pre-op evaluation and timeout performed  Arterial Line Prep   Sterile Tech: cap, gloves and sterile barriers  Prep: ChloraPrep  Patient monitoring: blood pressure monitoring, continuous pulse oximetry and EKG  Arterial Line Procedure   Laterality:right  Location:  radial artery  Catheter size: 20 G   Guidance: palpation technique  Number of attempts: 1  Successful placement: yes  Post Assessment   Dressing Type: line sutured, occlusive dressing applied, secured with tape and wrist guard applied.   Complications no  Circ/Move/Sens Assessment: normal and unchanged.   Patient Tolerance: patient tolerated the procedure well with no apparent complications

## 2022-07-20 NOTE — ANESTHESIA PROCEDURE NOTES
Airway  Urgency: elective    Date/Time: 7/20/2022 12:38 PM  Airway not difficult    General Information and Staff    Patient location during procedure: OR  Anesthesiologist: Ralph Starr MD  CRNA/CAA: Valentino Reece CRNA    Indications and Patient Condition  Indications for airway management: airway protection    Preoxygenated: yes  MILS not maintained throughout  Mask difficulty assessment: 1 - vent by mask    Final Airway Details  Final airway type: endotracheal airway      Successful airway: ETT  Cuffed: yes   Successful intubation technique: direct laryngoscopy  Endotracheal tube insertion site: oral  Blade: Trever  Blade size: 3  ETT size (mm): 7.0  Cormack-Lehane Classification: grade I - full view of glottis  Placement verified by: chest auscultation and capnometry   Measured from: lips  ETT/EBT  to lips (cm): 20  Number of attempts at approach: 1  Assessment: lips, teeth, and gum same as pre-op and atraumatic intubation    Additional Comments  Negative epigastric sounds, Breath sound equal bilaterally with symmetric chest rise and fall

## 2022-07-20 NOTE — ANESTHESIA PROCEDURE NOTES
Peripheral Block      Procedure      Medications Used: dexamethasone sodium phosphate injection, 4 mg  bupivacaine PF (MARCAINE) 0.25 % injection, 60 mL

## 2022-07-20 NOTE — ANESTHESIA PROCEDURE NOTES
Peripheral Block      Patient reassessed immediately prior to procedure    Patient location during procedure: OR  Start time: 7/20/2022 12:39 PM  Stop time: 7/20/2022 12:45 PM  Reason for block: at surgeon's request and post-op pain management  Preanesthetic Checklist  Completed: patient identified, IV checked, site marked, risks and benefits discussed, surgical consent, monitors and equipment checked, pre-op evaluation and timeout performed  Prep:  Pt Position: supine  Sterile barriers:cap, gloves, sterile barriers and mask  Prep: ChloraPrep  Patient monitoring: blood pressure monitoring, continuous pulse oximetry and EKG  Procedure    Sedation: yes  Performed under: general  Guidance:ultrasound guided  Images:still images obtained, printed/placed on chart    Laterality:Bilateral  Block Type:TAP  Injection Technique:single-shot  Needle Type:short-bevel and echogenic  Needle Gauge:20 G  Resistance on Injection: none    Medications Used: bupivacaine (PF) (MARCAINE) 0.5 % injection, 60 mL  dexamethasone sodium phosphate injection, 2 mg  Med administered at 7/20/2022 12:45 PM      Medications  Comment:Block Injection:  LA dose divided between Right and Left block        Post Assessment  Injection Assessment: negative aspiration for heme, incremental injection and no paresthesia on injection  Patient Tolerance:comfortable throughout block  Complications:no  Additional Notes      Under Ultrasound guidance, a BBraun 4inch 360 degree needle was advanced with Normal Saline hydro dissection of tissue.  The Internal Oblique and Transversus Abdominus muscles where visualized.  At or before the aponeurosis of Internal Oblique, local anesthetic spread was visualized in the Transversus Abdominus Plane. Injection was made incrementally with aspiration every 5 mls.  There was no  intravascular injection,  injection pressure was normal, there was no neural injection, and the procedure was completed without difficulty.  Thank  You.

## 2022-07-20 NOTE — ANESTHESIA PROCEDURE NOTES
Central Line    Pre-sedation assessment completed: 7/20/2022 2:00 PM    Patient reassessed immediately prior to procedure    Patient location during procedure: OR  Start time: 7/20/2022 2:00 PM  Stop Time:7/20/2022 2:02 PM  Indications: vascular access  Staff  Anesthesiologist: Ralph Lozano MD  Preanesthetic Checklist  Completed: patient identified, IV checked, site marked, risks and benefits discussed, surgical consent, monitors and equipment checked, pre-op evaluation and timeout performed  Central Line Prep  Sterile Tech:cap, gloves, gown, mask and sterile barriers  Prep: chloraprep  Patient monitoring: blood pressure monitoring, continuous pulse oximetry and EKG  Central Line Procedure  Laterality:right  Location:internal jugular  Catheter Type:Cordis  Catheter Size:9 Fr  Guidance:ultrasound guided  PROCEDURE NOTE/ULTRASOUND INTERPRETATION.  Using ultrasound guidance the potential vascular sites for insertion of the catheter were visualized to determine the patency of the vessel to be used for vascular access.  After selecting the appropriate site for insertion, the needle was visualized under ultrasound being inserted into the internal jugular vein, followed by ultrasound confirmation of wire and catheter placement. There were no abnormalities seen on ultrasound; an image was taken; and the patient tolerated the procedure with no complications. Images: still images obtained, printed/placed on chart  Assessment  Post procedure:biopatch applied, line sutured, occlusive dressing applied and secured with tape  Assessement:blood return through all ports, free fluid flow, chest x-ray ordered and Hung Test  Complications:no  Patient Tolerance:patient tolerated the procedure well with no apparent complications

## 2022-07-20 NOTE — ANESTHESIA PREPROCEDURE EVALUATION
Anesthesia Evaluation     Patient summary reviewed and Nursing notes reviewed   NPO Solid Status: > 8 hours  NPO Liquid Status: > 8 hours           Airway   Mallampati: I  TM distance: >3 FB  Neck ROM: full  No difficulty expected  Dental - normal exam     Pulmonary     breath sounds clear to auscultation  Cardiovascular   Exercise tolerance: good (4-7 METS)    Rhythm: regular  Rate: normal        Neuro/Psych  GI/Hepatic/Renal/Endo      Musculoskeletal     Abdominal    Substance History      OB/GYN          Other                        Anesthesia Plan    ASA 3     general     intravenous induction     Anesthetic plan, risks, benefits, and alternatives have been provided, discussed and informed consent has been obtained with: patient.      TAP block for post op pain  CODE STATUS:

## 2022-07-21 ENCOUNTER — ANESTHESIA EVENT (OUTPATIENT)
Dept: PERIOP | Facility: HOSPITAL | Age: 82
End: 2022-07-21

## 2022-07-21 ENCOUNTER — ANESTHESIA (OUTPATIENT)
Dept: PERIOP | Facility: HOSPITAL | Age: 82
End: 2022-07-21

## 2022-07-21 ENCOUNTER — APPOINTMENT (OUTPATIENT)
Dept: GENERAL RADIOLOGY | Facility: HOSPITAL | Age: 82
End: 2022-07-21

## 2022-07-21 PROBLEM — A41.9 SEPSIS WITHOUT ACUTE ORGAN DYSFUNCTION, DUE TO UNSPECIFIED ORGANISM (HCC): Status: ACTIVE | Noted: 2022-07-21

## 2022-07-21 LAB
ALBUMIN SERPL-MCNC: 1.9 G/DL (ref 3.5–5.2)
ALBUMIN SERPL-MCNC: 2.6 G/DL (ref 3.5–5.2)
ALBUMIN/GLOB SERPL: 1.4 G/DL
ALBUMIN/GLOB SERPL: 1.5 G/DL
ALP SERPL-CCNC: 40 U/L (ref 39–117)
ALP SERPL-CCNC: 52 U/L (ref 39–117)
ALT SERPL W P-5'-P-CCNC: 707 U/L (ref 1–33)
ALT SERPL W P-5'-P-CCNC: 95 U/L (ref 1–33)
ANION GAP SERPL CALCULATED.3IONS-SCNC: 14 MMOL/L (ref 5–15)
ANION GAP SERPL CALCULATED.3IONS-SCNC: 8 MMOL/L (ref 5–15)
ARTERIAL PATENCY WRIST A: ABNORMAL
ARTERIAL PATENCY WRIST A: ABNORMAL
AST SERPL-CCNC: 1547 U/L (ref 1–32)
AST SERPL-CCNC: 188 U/L (ref 1–32)
ATMOSPHERIC PRESS: ABNORMAL MM[HG]
ATMOSPHERIC PRESS: ABNORMAL MM[HG]
BASE EXCESS BLDA CALC-SCNC: -15.9 MMOL/L (ref 0–2)
BASE EXCESS BLDA CALC-SCNC: -3.1 MMOL/L (ref 0–2)
BDY SITE: ABNORMAL
BDY SITE: ABNORMAL
BH BB BLOOD EXPIRATION DATE: NORMAL
BH BB BLOOD TYPE BARCODE: 2800
BH BB BLOOD TYPE BARCODE: 5100
BH BB BLOOD TYPE BARCODE: 6200
BH BB BLOOD TYPE BARCODE: 9500
BH BB DISPENSE STATUS: NORMAL
BH BB PRODUCT CODE: NORMAL
BH BB UNIT NUMBER: NORMAL
BILIRUB SERPL-MCNC: 1.3 MG/DL (ref 0–1.2)
BILIRUB SERPL-MCNC: 1.6 MG/DL (ref 0–1.2)
BODY TEMPERATURE: 37 C
BODY TEMPERATURE: 37 C
BUN SERPL-MCNC: 27 MG/DL (ref 8–23)
BUN SERPL-MCNC: 29 MG/DL (ref 8–23)
BUN/CREAT SERPL: 17.1 (ref 7–25)
BUN/CREAT SERPL: 20.3 (ref 7–25)
CA-I SERPL ISE-MCNC: 1.17 MMOL/L (ref 1.12–1.32)
CALCIUM SPEC-SCNC: 7.3 MG/DL (ref 8.6–10.5)
CALCIUM SPEC-SCNC: 8 MG/DL (ref 8.6–10.5)
CHLORIDE SERPL-SCNC: 109 MMOL/L (ref 98–107)
CHLORIDE SERPL-SCNC: 113 MMOL/L (ref 98–107)
CO2 BLDA-SCNC: 11.1 MMOL/L (ref 22–33)
CO2 BLDA-SCNC: 23 MMOL/L (ref 22–33)
CO2 SERPL-SCNC: 12 MMOL/L (ref 22–29)
CO2 SERPL-SCNC: 28 MMOL/L (ref 22–29)
COHGB MFR BLD: 0.7 % (ref 0–2)
COHGB MFR BLD: 0.9 % (ref 0–2)
CREAT SERPL-MCNC: 1.43 MG/DL (ref 0.57–1)
CREAT SERPL-MCNC: 1.58 MG/DL (ref 0.57–1)
CROSSMATCH INTERPRETATION: NORMAL
D-LACTATE SERPL-SCNC: 2.6 MMOL/L (ref 0.5–2)
D-LACTATE SERPL-SCNC: 3.2 MMOL/L (ref 0.5–2)
D-LACTATE SERPL-SCNC: 3.4 MMOL/L (ref 0.5–2)
D-LACTATE SERPL-SCNC: 6.2 MMOL/L (ref 0.5–2)
DEPRECATED RDW RBC AUTO: 46.5 FL (ref 37–54)
DEPRECATED RDW RBC AUTO: 50.2 FL (ref 37–54)
DEPRECATED RDW RBC AUTO: 51.2 FL (ref 37–54)
DEPRECATED RDW RBC AUTO: 53.3 FL (ref 37–54)
EGFRCR SERPLBLD CKD-EPI 2021: 32.4 ML/MIN/1.73
EGFRCR SERPLBLD CKD-EPI 2021: 36.5 ML/MIN/1.73
EPAP: 0
EPAP: 0
ERYTHROCYTE [DISTWIDTH] IN BLOOD BY AUTOMATED COUNT: 14.9 % (ref 12.3–15.4)
ERYTHROCYTE [DISTWIDTH] IN BLOOD BY AUTOMATED COUNT: 15.3 % (ref 12.3–15.4)
ERYTHROCYTE [DISTWIDTH] IN BLOOD BY AUTOMATED COUNT: 15.4 % (ref 12.3–15.4)
ERYTHROCYTE [DISTWIDTH] IN BLOOD BY AUTOMATED COUNT: 15.9 % (ref 12.3–15.4)
FIBRINOGEN PPP-MCNC: 145 MG/DL (ref 220–470)
FIBRINOGEN PPP-MCNC: 159 MG/DL (ref 220–470)
FIBRINOGEN PPP-MCNC: 268 MG/DL (ref 220–470)
FIBRINOGEN PPP-MCNC: 285 MG/DL (ref 220–470)
GLOBULIN UR ELPH-MCNC: 1.3 GM/DL
GLOBULIN UR ELPH-MCNC: 1.9 GM/DL
GLUCOSE BLDC GLUCOMTR-MCNC: 120 MG/DL (ref 70–130)
GLUCOSE BLDC GLUCOMTR-MCNC: 131 MG/DL (ref 70–130)
GLUCOSE BLDC GLUCOMTR-MCNC: 141 MG/DL (ref 70–130)
GLUCOSE BLDC GLUCOMTR-MCNC: 153 MG/DL (ref 70–130)
GLUCOSE BLDC GLUCOMTR-MCNC: 159 MG/DL (ref 70–130)
GLUCOSE BLDC GLUCOMTR-MCNC: 165 MG/DL (ref 70–130)
GLUCOSE BLDC GLUCOMTR-MCNC: 169 MG/DL (ref 70–130)
GLUCOSE BLDC GLUCOMTR-MCNC: 172 MG/DL (ref 70–130)
GLUCOSE BLDC GLUCOMTR-MCNC: 182 MG/DL (ref 70–130)
GLUCOSE BLDC GLUCOMTR-MCNC: 88 MG/DL (ref 70–130)
GLUCOSE BLDC GLUCOMTR-MCNC: 93 MG/DL (ref 70–130)
GLUCOSE SERPL-MCNC: 176 MG/DL (ref 65–99)
GLUCOSE SERPL-MCNC: 263 MG/DL (ref 65–99)
HCO3 BLDA-SCNC: 10.3 MMOL/L (ref 20–26)
HCO3 BLDA-SCNC: 21.9 MMOL/L (ref 20–26)
HCT VFR BLD AUTO: 10.9 % (ref 34–46.6)
HCT VFR BLD AUTO: 21.5 % (ref 34–46.6)
HCT VFR BLD AUTO: 22.3 % (ref 34–46.6)
HCT VFR BLD AUTO: 22.3 % (ref 34–46.6)
HCT VFR BLD AUTO: 29.7 % (ref 34–46.6)
HCT VFR BLD CALC: 17.5 % (ref 38–51)
HCT VFR BLD CALC: 23 % (ref 38–51)
HGB BLD-MCNC: 10.4 G/DL (ref 12–15.9)
HGB BLD-MCNC: 3.6 G/DL (ref 12–15.9)
HGB BLD-MCNC: 7.6 G/DL (ref 12–15.9)
HGB BLDA-MCNC: 5.7 G/DL (ref 14–18)
HGB BLDA-MCNC: 7.5 G/DL (ref 14–18)
INHALED O2 CONCENTRATION: 45 %
INHALED O2 CONCENTRATION: 45 %
INR PPP: 1.51 (ref 0.84–1.13)
INR PPP: 1.61 (ref 0.84–1.13)
INR PPP: 1.76 (ref 0.84–1.13)
INR PPP: 2.3 (ref 0.84–1.13)
INR PPP: 2.52 (ref 0.84–1.13)
IPAP: 0
IPAP: 0
Lab: ABNORMAL
MAGNESIUM SERPL-MCNC: 1.5 MG/DL (ref 1.6–2.4)
MCH RBC QN AUTO: 30.3 PG (ref 26.6–33)
MCH RBC QN AUTO: 30.8 PG (ref 26.6–33)
MCH RBC QN AUTO: 30.8 PG (ref 26.6–33)
MCH RBC QN AUTO: 31.7 PG (ref 26.6–33)
MCHC RBC AUTO-ENTMCNC: 33 G/DL (ref 31.5–35.7)
MCHC RBC AUTO-ENTMCNC: 34.1 G/DL (ref 31.5–35.7)
MCHC RBC AUTO-ENTMCNC: 34.1 G/DL (ref 31.5–35.7)
MCHC RBC AUTO-ENTMCNC: 35 G/DL (ref 31.5–35.7)
MCV RBC AUTO: 86.6 FL (ref 79–97)
MCV RBC AUTO: 90.3 FL (ref 79–97)
MCV RBC AUTO: 92.9 FL (ref 79–97)
MCV RBC AUTO: 93.2 FL (ref 79–97)
METHGB BLD QL: 0.4 % (ref 0–1.5)
METHGB BLD QL: 0.7 % (ref 0–1.5)
MODALITY: ABNORMAL
MODALITY: ABNORMAL
NOTE: ABNORMAL
NOTE: ABNORMAL
NOTIFIED BY: ABNORMAL
NOTIFIED WHO: ABNORMAL
OXYHGB MFR BLDV: 98.5 % (ref 94–99)
OXYHGB MFR BLDV: 98.7 % (ref 94–99)
PAW @ PEAK INSP FLOW SETTING VENT: 0 CMH2O
PAW @ PEAK INSP FLOW SETTING VENT: 0 CMH2O
PCO2 BLDA: 25.1 MM HG (ref 35–45)
PCO2 BLDA: 37.4 MM HG (ref 35–45)
PCO2 TEMP ADJ BLD: 25.1 MM HG (ref 35–45)
PCO2 TEMP ADJ BLD: 37.4 MM HG (ref 35–45)
PEEP RESPIRATORY: 5 CM[H2O]
PH BLDA: 7.22 PH UNITS (ref 7.35–7.45)
PH BLDA: 7.38 PH UNITS (ref 7.35–7.45)
PH, TEMP CORRECTED: 7.22 PH UNITS
PH, TEMP CORRECTED: 7.38 PH UNITS
PHOSPHATE SERPL-MCNC: 6.6 MG/DL (ref 2.5–4.5)
PLATELET # BLD AUTO: 124 10*3/MM3 (ref 140–450)
PLATELET # BLD AUTO: 131 10*3/MM3 (ref 140–450)
PLATELET # BLD AUTO: 137 10*3/MM3 (ref 140–450)
PLATELET # BLD AUTO: 60 10*3/MM3 (ref 140–450)
PLATELET # BLD AUTO: 92 10*3/MM3 (ref 140–450)
PMV BLD AUTO: 10.1 FL (ref 6–12)
PMV BLD AUTO: 10.3 FL (ref 6–12)
PMV BLD AUTO: 10.8 FL (ref 6–12)
PMV BLD AUTO: 11.8 FL (ref 6–12)
PO2 BLDA: 171 MM HG (ref 83–108)
PO2 BLDA: 178 MM HG (ref 83–108)
PO2 TEMP ADJ BLD: 171 MM HG (ref 83–108)
PO2 TEMP ADJ BLD: 178 MM HG (ref 83–108)
POTASSIUM SERPL-SCNC: 4 MMOL/L (ref 3.5–5.2)
POTASSIUM SERPL-SCNC: 5.2 MMOL/L (ref 3.5–5.2)
PROT SERPL-MCNC: 3.2 G/DL (ref 6–8.5)
PROT SERPL-MCNC: 4.5 G/DL (ref 6–8.5)
PROTHROMBIN TIME: 18.1 SECONDS (ref 11.4–14.4)
PROTHROMBIN TIME: 19.2 SECONDS (ref 11.4–14.4)
PROTHROMBIN TIME: 20.5 SECONDS (ref 11.4–14.4)
PROTHROMBIN TIME: 25.4 SECONDS (ref 11.4–14.4)
PROTHROMBIN TIME: 27.2 SECONDS (ref 11.4–14.4)
RBC # BLD AUTO: 1.17 10*6/MM3 (ref 3.77–5.28)
RBC # BLD AUTO: 2.4 10*6/MM3 (ref 3.77–5.28)
RBC # BLD AUTO: 2.47 10*6/MM3 (ref 3.77–5.28)
RBC # BLD AUTO: 3.43 10*6/MM3 (ref 3.77–5.28)
SODIUM SERPL-SCNC: 139 MMOL/L (ref 136–145)
SODIUM SERPL-SCNC: 145 MMOL/L (ref 136–145)
TOTAL RATE: 0 BREATHS/MINUTE
TOTAL RATE: 0 BREATHS/MINUTE
UNIT  ABO: NORMAL
UNIT  RH: NORMAL
VENTILATOR MODE: ABNORMAL
WBC NRBC COR # BLD: 14.11 10*3/MM3 (ref 3.4–10.8)
WBC NRBC COR # BLD: 14.84 10*3/MM3 (ref 3.4–10.8)
WBC NRBC COR # BLD: 18.2 10*3/MM3 (ref 3.4–10.8)
WBC NRBC COR # BLD: 6.6 10*3/MM3 (ref 3.4–10.8)

## 2022-07-21 PROCEDURE — 83605 ASSAY OF LACTIC ACID: CPT | Performed by: INTERNAL MEDICINE

## 2022-07-21 PROCEDURE — P9059 PLASMA, FRZ BETWEEN 8-24HOUR: HCPCS

## 2022-07-21 PROCEDURE — 86927 PLASMA FRESH FROZEN: CPT

## 2022-07-21 PROCEDURE — 3E1M38Z IRRIGATION OF PERITONEAL CAVITY USING IRRIGATING SUBSTANCE, PERCUTANEOUS APPROACH: ICD-10-PCS | Performed by: SURGERY

## 2022-07-21 PROCEDURE — 82330 ASSAY OF CALCIUM: CPT

## 2022-07-21 PROCEDURE — 85014 HEMATOCRIT: CPT | Performed by: SURGERY

## 2022-07-21 PROCEDURE — 25010000002 PHENYLEPHRINE 10 MG/ML SOLUTION 5 ML VIAL: Performed by: SURGERY

## 2022-07-21 PROCEDURE — 85014 HEMATOCRIT: CPT

## 2022-07-21 PROCEDURE — 25010000002 MAGNESIUM SULFATE 2 GM/50ML SOLUTION: Performed by: NURSE PRACTITIONER

## 2022-07-21 PROCEDURE — 36430 TRANSFUSION BLD/BLD COMPNT: CPT

## 2022-07-21 PROCEDURE — P9100 PATHOGEN TEST FOR PLATELETS: HCPCS

## 2022-07-21 PROCEDURE — P9016 RBC LEUKOCYTES REDUCED: HCPCS

## 2022-07-21 PROCEDURE — 86900 BLOOD TYPING SEROLOGIC ABO: CPT

## 2022-07-21 PROCEDURE — 82947 ASSAY GLUCOSE BLOOD QUANT: CPT

## 2022-07-21 PROCEDURE — 82803 BLOOD GASES ANY COMBINATION: CPT

## 2022-07-21 PROCEDURE — 36556 INSERT NON-TUNNEL CV CATH: CPT | Performed by: NURSE PRACTITIONER

## 2022-07-21 PROCEDURE — 25010000002 FENTANYL 10 MCG/1 ML NS: Performed by: SURGERY

## 2022-07-21 PROCEDURE — 99291 CRITICAL CARE FIRST HOUR: CPT | Performed by: INTERNAL MEDICINE

## 2022-07-21 PROCEDURE — 0WQF0ZZ REPAIR ABDOMINAL WALL, OPEN APPROACH: ICD-10-PCS | Performed by: SURGERY

## 2022-07-21 PROCEDURE — P9012 CRYOPRECIPITATE EACH UNIT: HCPCS

## 2022-07-21 PROCEDURE — 94799 UNLISTED PULMONARY SVC/PX: CPT

## 2022-07-21 PROCEDURE — 85384 FIBRINOGEN ACTIVITY: CPT | Performed by: INTERNAL MEDICINE

## 2022-07-21 PROCEDURE — 71045 X-RAY EXAM CHEST 1 VIEW: CPT

## 2022-07-21 PROCEDURE — 82805 BLOOD GASES W/O2 SATURATION: CPT

## 2022-07-21 PROCEDURE — 82962 GLUCOSE BLOOD TEST: CPT

## 2022-07-21 PROCEDURE — 85049 AUTOMATED PLATELET COUNT: CPT | Performed by: SURGERY

## 2022-07-21 PROCEDURE — P9035 PLATELET PHERES LEUKOREDUCED: HCPCS

## 2022-07-21 PROCEDURE — 83050 HGB METHEMOGLOBIN QUAN: CPT

## 2022-07-21 PROCEDURE — 02HV33Z INSERTION OF INFUSION DEVICE INTO SUPERIOR VENA CAVA, PERCUTANEOUS APPROACH: ICD-10-PCS | Performed by: SURGERY

## 2022-07-21 PROCEDURE — 0 PHYTONADIONE 10 MG/ML SOLUTION 1 ML AMPULE: Performed by: INTERNAL MEDICINE

## 2022-07-21 PROCEDURE — 76937 US GUIDE VASCULAR ACCESS: CPT | Performed by: NURSE PRACTITIONER

## 2022-07-21 PROCEDURE — 85027 COMPLETE CBC AUTOMATED: CPT | Performed by: INTERNAL MEDICINE

## 2022-07-21 PROCEDURE — 85018 HEMOGLOBIN: CPT | Performed by: SURGERY

## 2022-07-21 PROCEDURE — 85027 COMPLETE CBC AUTOMATED: CPT | Performed by: SURGERY

## 2022-07-21 PROCEDURE — 82375 ASSAY CARBOXYHB QUANT: CPT

## 2022-07-21 PROCEDURE — 85384 FIBRINOGEN ACTIVITY: CPT | Performed by: SURGERY

## 2022-07-21 PROCEDURE — 94761 N-INVAS EAR/PLS OXIMETRY MLT: CPT

## 2022-07-21 PROCEDURE — 80053 COMPREHEN METABOLIC PANEL: CPT | Performed by: NURSE PRACTITIONER

## 2022-07-21 PROCEDURE — 84295 ASSAY OF SERUM SODIUM: CPT

## 2022-07-21 PROCEDURE — P9047 ALBUMIN (HUMAN), 25%, 50ML: HCPCS | Performed by: NURSE PRACTITIONER

## 2022-07-21 PROCEDURE — 25010000002 ALBUMIN HUMAN 25% PER 50 ML: Performed by: NURSE PRACTITIONER

## 2022-07-21 PROCEDURE — 84100 ASSAY OF PHOSPHORUS: CPT | Performed by: INTERNAL MEDICINE

## 2022-07-21 PROCEDURE — 84132 ASSAY OF SERUM POTASSIUM: CPT

## 2022-07-21 PROCEDURE — 94003 VENT MGMT INPAT SUBQ DAY: CPT

## 2022-07-21 PROCEDURE — C1751 CATH, INF, PER/CENT/MIDLINE: HCPCS

## 2022-07-21 PROCEDURE — 85610 PROTHROMBIN TIME: CPT | Performed by: SURGERY

## 2022-07-21 PROCEDURE — 85610 PROTHROMBIN TIME: CPT | Performed by: INTERNAL MEDICINE

## 2022-07-21 DEVICE — SEALANT HEMO TACHOSIL FIBRIN PTCH 9.5X4.8CM: Type: IMPLANTABLE DEVICE | Site: ABDOMEN | Status: FUNCTIONAL

## 2022-07-21 DEVICE — FLOSEAL HEMOSTATIC MATRIX, 10ML
Type: IMPLANTABLE DEVICE | Site: ABDOMEN | Status: FUNCTIONAL
Brand: FLOSEAL HEMOSTATIC MATRIX

## 2022-07-21 RX ORDER — MAGNESIUM HYDROXIDE 1200 MG/15ML
LIQUID ORAL AS NEEDED
Status: DISCONTINUED | OUTPATIENT
Start: 2022-07-21 | End: 2022-07-21 | Stop reason: HOSPADM

## 2022-07-21 RX ORDER — SODIUM CHLORIDE 0.9 % (FLUSH) 0.9 %
10 SYRINGE (ML) INJECTION AS NEEDED
Status: DISCONTINUED | OUTPATIENT
Start: 2022-07-21 | End: 2022-07-23

## 2022-07-21 RX ORDER — MAGNESIUM SULFATE HEPTAHYDRATE 40 MG/ML
2 INJECTION, SOLUTION INTRAVENOUS ONCE
Status: COMPLETED | OUTPATIENT
Start: 2022-07-21 | End: 2022-07-21

## 2022-07-21 RX ORDER — CLINDAMYCIN PHOSPHATE 900 MG/50ML
900 INJECTION INTRAVENOUS ONCE
Status: COMPLETED | OUTPATIENT
Start: 2022-07-21 | End: 2022-07-21

## 2022-07-21 RX ORDER — ALBUMIN (HUMAN) 12.5 G/50ML
50 SOLUTION INTRAVENOUS ONCE
Status: COMPLETED | OUTPATIENT
Start: 2022-07-21 | End: 2022-07-21

## 2022-07-21 RX ORDER — SODIUM CHLORIDE 9 MG/ML
INJECTION, SOLUTION INTRAVENOUS CONTINUOUS PRN
Status: DISCONTINUED | OUTPATIENT
Start: 2022-07-21 | End: 2022-07-21 | Stop reason: SURG

## 2022-07-21 RX ORDER — CALCIUM CHLORIDE 100 MG/ML
INJECTION INTRAVENOUS; INTRAVENTRICULAR AS NEEDED
Status: DISCONTINUED | OUTPATIENT
Start: 2022-07-21 | End: 2022-07-21 | Stop reason: SURG

## 2022-07-21 RX ORDER — DEXTROSE MONOHYDRATE 25 G/50ML
10-50 INJECTION, SOLUTION INTRAVENOUS
Status: DISCONTINUED | OUTPATIENT
Start: 2022-07-21 | End: 2022-07-23

## 2022-07-21 RX ORDER — NICOTINE POLACRILEX 4 MG
15 LOZENGE BUCCAL
Status: DISCONTINUED | OUTPATIENT
Start: 2022-07-21 | End: 2022-07-21

## 2022-07-21 RX ORDER — SODIUM CHLORIDE 0.9 % (FLUSH) 0.9 %
10 SYRINGE (ML) INJECTION EVERY 12 HOURS SCHEDULED
Status: DISCONTINUED | OUTPATIENT
Start: 2022-07-21 | End: 2022-07-23

## 2022-07-21 RX ORDER — ROCURONIUM BROMIDE 10 MG/ML
INJECTION, SOLUTION INTRAVENOUS AS NEEDED
Status: DISCONTINUED | OUTPATIENT
Start: 2022-07-21 | End: 2022-07-21 | Stop reason: SURG

## 2022-07-21 RX ADMIN — CALCIUM CHLORIDE 0.5 G: 100 INJECTION INTRAVENOUS; INTRAVENTRICULAR at 18:10

## 2022-07-21 RX ADMIN — PHENYLEPHRINE HYDROCHLORIDE 1.6 MCG/KG/MIN: 10 INJECTION INTRAVENOUS at 15:18

## 2022-07-21 RX ADMIN — Medication 0.1 MCG/KG/MIN: at 03:43

## 2022-07-21 RX ADMIN — SODIUM BICARBONATE 150 MEQ: 84 INJECTION INTRAVENOUS at 09:14

## 2022-07-21 RX ADMIN — CALCIUM CHLORIDE 0.5 G: 100 INJECTION INTRAVENOUS; INTRAVENTRICULAR at 18:32

## 2022-07-21 RX ADMIN — SODIUM BICARBONATE 150 MEQ: 84 INJECTION INTRAVENOUS at 15:54

## 2022-07-21 RX ADMIN — CLINDAMYCIN PHOSPHATE 900 MG: 900 INJECTION, SOLUTION INTRAVENOUS at 17:35

## 2022-07-21 RX ADMIN — PHENYLEPHRINE HYDROCHLORIDE 3 MCG/KG/MIN: 10 INJECTION INTRAVENOUS at 00:47

## 2022-07-21 RX ADMIN — INSULIN HUMAN 2.4 UNITS/HR: 1 INJECTION, SOLUTION INTRAVENOUS at 10:34

## 2022-07-21 RX ADMIN — PHENYLEPHRINE HYDROCHLORIDE 1 MCG/KG/MIN: 10 INJECTION INTRAVENOUS at 06:48

## 2022-07-21 RX ADMIN — Medication 150 MCG/HR: at 11:16

## 2022-07-21 RX ADMIN — PHYTONADIONE 10 MG: 10 INJECTION, EMULSION INTRAMUSCULAR; INTRAVENOUS; SUBCUTANEOUS at 10:39

## 2022-07-21 RX ADMIN — PANTOPRAZOLE SODIUM 40 MG: 40 INJECTION, POWDER, FOR SOLUTION INTRAVENOUS at 08:31

## 2022-07-21 RX ADMIN — SODIUM CHLORIDE, POTASSIUM CHLORIDE, SODIUM LACTATE AND CALCIUM CHLORIDE 100 ML/HR: 600; 310; 30; 20 INJECTION, SOLUTION INTRAVENOUS at 07:02

## 2022-07-21 RX ADMIN — MAGNESIUM SULFATE HEPTAHYDRATE 2 G: 2 INJECTION, SOLUTION INTRAVENOUS at 01:11

## 2022-07-21 RX ADMIN — ROCURONIUM BROMIDE 50 MG: 10 INJECTION INTRAVENOUS at 18:44

## 2022-07-21 RX ADMIN — ROCURONIUM BROMIDE 50 MG: 10 INJECTION INTRAVENOUS at 17:12

## 2022-07-21 RX ADMIN — ALBUMIN HUMAN 50 G: 0.25 SOLUTION INTRAVENOUS at 01:12

## 2022-07-21 RX ADMIN — Medication 10 ML: at 10:39

## 2022-07-21 RX ADMIN — SODIUM BICARBONATE 150 MEQ: 84 INJECTION INTRAVENOUS at 22:26

## 2022-07-21 RX ADMIN — SODIUM CHLORIDE: 9 INJECTION, SOLUTION INTRAVENOUS at 17:10

## 2022-07-21 NOTE — ANESTHESIA PREPROCEDURE EVALUATION
Anesthesia Evaluation     Patient summary reviewed and Nursing notes reviewed   no history of anesthetic complications:  NPO Solid Status: > 8 hours  NPO Liquid Status: > 8 hours           Airway   Mallampati: I  TM distance: >3 FB  Neck ROM: full  No difficulty expected  Comment: intubated  Dental - normal exam     Pulmonary     breath sounds clear to auscultation    ROS comment: intubated  Cardiovascular   Exercise tolerance: good (4-7 METS)    ECG reviewed  Rhythm: regular  Rate: normal    (+) hypertension, DVT,       Neuro/Psych  GI/Hepatic/Renal/Endo    (+)  GERD,  renal disease (stage 3 CKD elevated creatinine since splenectomy) CRI, thyroid problem hypothyroidism    Musculoskeletal     Abdominal    Substance History      OB/GYN          Other   blood dyscrasia (elevated INR) anemia thrombocytopenia,   history of cancer (lymphoma)    ROS/Med Hx Other: S/p splenectomy 7/20/22  Massive blood loss requiring massive transfusion     7/21/22  HCT 21.5  PLT 60  INR 1.61  Cr 1.58                    Anesthesia Plan    ASA 4     general     intravenous induction   Postoperative Plan: Expected vent after surgery  Anesthetic plan, risks, benefits, and alternatives have been provided, discussed and informed consent has been obtained with: other.    Plan discussed with CRNA.      TAP block for post op pain  CODE STATUS:

## 2022-07-21 NOTE — ANESTHESIA POSTPROCEDURE EVALUATION
Patient: Mahi Coronel    Procedure Summary     Date: 07/21/22 Room / Location:  [facility]    Anesthesia Start: 1710 Anesthesia Stop: 1948    Procedure: ABDOMINAL WASHOUT WITH CLOSURE (N/A Abdomen) Diagnosis:     Surgeons: Jayme Kemp MD Provider: Albania Montero MD    Anesthesia Type: general ASA Status: 4          Anesthesia Type: general    Vitals  Vitals Value Taken Time   /68 07/21/22 1948   Temp     Pulse 82 07/21/22 1952   Resp     SpO2     Vitals shown include unvalidated device data.        Post Anesthesia Care and Evaluation    Patient location during evaluation: ICU  Patient participation: complete - patient cannot participate (intubated and sedated)  Post-procedure mental status: sedated.  Pain score: 0  Pain management: adequate    Airway patency: patent  Anesthetic complications: No anesthetic complications  PONV Status: none  Cardiovascular status: acceptable and hemodynamically stable  Respiratory status: acceptable, ETT, intubated and ventilator  Hydration status: acceptable    Comments: No apparent anesthesia complications noted at this time

## 2022-07-22 ENCOUNTER — APPOINTMENT (OUTPATIENT)
Dept: GENERAL RADIOLOGY | Facility: HOSPITAL | Age: 82
End: 2022-07-22

## 2022-07-22 LAB
ALBUMIN SERPL-MCNC: 2.3 G/DL (ref 3.5–5.2)
ALBUMIN/GLOB SERPL: 1.3 G/DL
ALP SERPL-CCNC: 50 U/L (ref 39–117)
ALT SERPL W P-5'-P-CCNC: 606 U/L (ref 1–33)
ANION GAP SERPL CALCULATED.3IONS-SCNC: 8 MMOL/L (ref 5–15)
ARTERIAL PATENCY WRIST A: ABNORMAL
AST SERPL-CCNC: 970 U/L (ref 1–32)
ATMOSPHERIC PRESS: ABNORMAL MM[HG]
BASE EXCESS BLDA CALC-SCNC: 9.3 MMOL/L (ref 0–2)
BDY SITE: ABNORMAL
BH BB BLOOD EXPIRATION DATE: NORMAL
BH BB BLOOD TYPE BARCODE: 5100
BH BB BLOOD TYPE BARCODE: 6200
BH BB BLOOD TYPE BARCODE: 8400
BH BB BLOOD TYPE BARCODE: 9500
BH BB DISPENSE STATUS: NORMAL
BH BB PRODUCT CODE: NORMAL
BH BB UNIT NUMBER: NORMAL
BILIRUB SERPL-MCNC: 0.7 MG/DL (ref 0–1.2)
BODY TEMPERATURE: 37 C
BUN SERPL-MCNC: 29 MG/DL (ref 8–23)
BUN/CREAT SERPL: 19 (ref 7–25)
CALCIUM SPEC-SCNC: 8 MG/DL (ref 8.6–10.5)
CHLORIDE SERPL-SCNC: 106 MMOL/L (ref 98–107)
CO2 BLDA-SCNC: 34.1 MMOL/L (ref 22–33)
CO2 SERPL-SCNC: 31 MMOL/L (ref 22–29)
COHGB MFR BLD: 1.4 % (ref 0–2)
CREAT SERPL-MCNC: 1.53 MG/DL (ref 0.57–1)
CROSSMATCH INTERPRETATION: NORMAL
D-LACTATE SERPL-SCNC: 1.9 MMOL/L (ref 0.5–2)
D-LACTATE SERPL-SCNC: 3.3 MMOL/L (ref 0.5–2)
DEPRECATED RDW RBC AUTO: 48.2 FL (ref 37–54)
DEPRECATED RDW RBC AUTO: 49.6 FL (ref 37–54)
DEPRECATED RDW RBC AUTO: 50.3 FL (ref 37–54)
EGFRCR SERPLBLD CKD-EPI 2021: 33.6 ML/MIN/1.73
ERYTHROCYTE [DISTWIDTH] IN BLOOD BY AUTOMATED COUNT: 15.2 % (ref 12.3–15.4)
ERYTHROCYTE [DISTWIDTH] IN BLOOD BY AUTOMATED COUNT: 15.6 % (ref 12.3–15.4)
ERYTHROCYTE [DISTWIDTH] IN BLOOD BY AUTOMATED COUNT: 15.6 % (ref 12.3–15.4)
FIBRINOGEN PPP-MCNC: 322 MG/DL (ref 220–470)
FIBRINOGEN PPP-MCNC: 327 MG/DL (ref 220–470)
FIBRINOGEN PPP-MCNC: 384 MG/DL (ref 220–470)
GLOBULIN UR ELPH-MCNC: 1.8 GM/DL
GLUCOSE BLDC GLUCOMTR-MCNC: 101 MG/DL (ref 70–130)
GLUCOSE BLDC GLUCOMTR-MCNC: 102 MG/DL (ref 70–130)
GLUCOSE BLDC GLUCOMTR-MCNC: 102 MG/DL (ref 70–130)
GLUCOSE BLDC GLUCOMTR-MCNC: 106 MG/DL (ref 70–130)
GLUCOSE BLDC GLUCOMTR-MCNC: 114 MG/DL (ref 70–130)
GLUCOSE BLDC GLUCOMTR-MCNC: 150 MG/DL (ref 70–130)
GLUCOSE BLDC GLUCOMTR-MCNC: 155 MG/DL (ref 70–130)
GLUCOSE BLDC GLUCOMTR-MCNC: 159 MG/DL (ref 70–130)
GLUCOSE BLDC GLUCOMTR-MCNC: 161 MG/DL (ref 70–130)
GLUCOSE BLDC GLUCOMTR-MCNC: 164 MG/DL (ref 70–130)
GLUCOSE BLDC GLUCOMTR-MCNC: 166 MG/DL (ref 70–130)
GLUCOSE BLDC GLUCOMTR-MCNC: 176 MG/DL (ref 70–130)
GLUCOSE BLDC GLUCOMTR-MCNC: 72 MG/DL (ref 70–130)
GLUCOSE BLDC GLUCOMTR-MCNC: 82 MG/DL (ref 70–130)
GLUCOSE BLDC GLUCOMTR-MCNC: 85 MG/DL (ref 70–130)
GLUCOSE BLDC GLUCOMTR-MCNC: 87 MG/DL (ref 70–130)
GLUCOSE BLDC GLUCOMTR-MCNC: 90 MG/DL (ref 70–130)
GLUCOSE BLDC GLUCOMTR-MCNC: 91 MG/DL (ref 70–130)
GLUCOSE BLDC GLUCOMTR-MCNC: 96 MG/DL (ref 70–130)
GLUCOSE BLDC GLUCOMTR-MCNC: 96 MG/DL (ref 70–130)
GLUCOSE BLDC GLUCOMTR-MCNC: 98 MG/DL (ref 70–130)
GLUCOSE SERPL-MCNC: 155 MG/DL (ref 65–99)
HCO3 BLDA-SCNC: 32.8 MMOL/L (ref 20–26)
HCT VFR BLD AUTO: 27.1 % (ref 34–46.6)
HCT VFR BLD AUTO: 27.8 % (ref 34–46.6)
HCT VFR BLD AUTO: 28.4 % (ref 34–46.6)
HCT VFR BLD CALC: 29.5 % (ref 38–51)
HGB BLD-MCNC: 9.2 G/DL (ref 12–15.9)
HGB BLD-MCNC: 9.7 G/DL (ref 12–15.9)
HGB BLD-MCNC: 9.7 G/DL (ref 12–15.9)
HGB BLDA-MCNC: 9.6 G/DL (ref 14–18)
INHALED O2 CONCENTRATION: 30 %
INR PPP: 1.3 (ref 0.84–1.13)
INR PPP: 1.39 (ref 0.84–1.13)
INR PPP: 1.44 (ref 0.84–1.13)
MCH RBC QN AUTO: 30.1 PG (ref 26.6–33)
MCH RBC QN AUTO: 30.7 PG (ref 26.6–33)
MCH RBC QN AUTO: 30.8 PG (ref 26.6–33)
MCHC RBC AUTO-ENTMCNC: 33.9 G/DL (ref 31.5–35.7)
MCHC RBC AUTO-ENTMCNC: 34.2 G/DL (ref 31.5–35.7)
MCHC RBC AUTO-ENTMCNC: 34.9 G/DL (ref 31.5–35.7)
MCV RBC AUTO: 88.3 FL (ref 79–97)
MCV RBC AUTO: 88.6 FL (ref 79–97)
MCV RBC AUTO: 89.9 FL (ref 79–97)
METHGB BLD QL: 0.4 % (ref 0–1.5)
MODALITY: ABNORMAL
NOTE: ABNORMAL
OXYHGB MFR BLDV: 94.7 % (ref 94–99)
PCO2 BLDA: 39.7 MM HG (ref 35–45)
PCO2 TEMP ADJ BLD: 39.7 MM HG (ref 35–45)
PEEP RESPIRATORY: 5 CM[H2O]
PH BLDA: 7.53 PH UNITS (ref 7.35–7.45)
PH, TEMP CORRECTED: 7.53 PH UNITS
PLATELET # BLD AUTO: 128 10*3/MM3 (ref 140–450)
PLATELET # BLD AUTO: 135 10*3/MM3 (ref 140–450)
PLATELET # BLD AUTO: 146 10*3/MM3 (ref 140–450)
PMV BLD AUTO: 10.9 FL (ref 6–12)
PMV BLD AUTO: 10.9 FL (ref 6–12)
PMV BLD AUTO: 11.2 FL (ref 6–12)
PO2 BLDA: 69.4 MM HG (ref 83–108)
PO2 TEMP ADJ BLD: 69.4 MM HG (ref 83–108)
POTASSIUM SERPL-SCNC: 3.8 MMOL/L (ref 3.5–5.2)
PROT SERPL-MCNC: 4.1 G/DL (ref 6–8.5)
PROTHROMBIN TIME: 16.1 SECONDS (ref 11.4–14.4)
PROTHROMBIN TIME: 17 SECONDS (ref 11.4–14.4)
PROTHROMBIN TIME: 17.5 SECONDS (ref 11.4–14.4)
RBC # BLD AUTO: 3.06 10*6/MM3 (ref 3.77–5.28)
RBC # BLD AUTO: 3.15 10*6/MM3 (ref 3.77–5.28)
RBC # BLD AUTO: 3.16 10*6/MM3 (ref 3.77–5.28)
SODIUM SERPL-SCNC: 145 MMOL/L (ref 136–145)
UNIT  ABO: NORMAL
UNIT  RH: NORMAL
VENTILATOR MODE: ABNORMAL
WBC NRBC COR # BLD: 16.38 10*3/MM3 (ref 3.4–10.8)
WBC NRBC COR # BLD: 18.4 10*3/MM3 (ref 3.4–10.8)
WBC NRBC COR # BLD: 20.16 10*3/MM3 (ref 3.4–10.8)

## 2022-07-22 PROCEDURE — 85610 PROTHROMBIN TIME: CPT | Performed by: SURGERY

## 2022-07-22 PROCEDURE — 87205 SMEAR GRAM STAIN: CPT | Performed by: INTERNAL MEDICINE

## 2022-07-22 PROCEDURE — 85027 COMPLETE CBC AUTOMATED: CPT | Performed by: SURGERY

## 2022-07-22 PROCEDURE — 94003 VENT MGMT INPAT SUBQ DAY: CPT

## 2022-07-22 PROCEDURE — 82962 GLUCOSE BLOOD TEST: CPT

## 2022-07-22 PROCEDURE — 25010000002 FENTANYL 10 MCG/1 ML NS: Performed by: SURGERY

## 2022-07-22 PROCEDURE — 83605 ASSAY OF LACTIC ACID: CPT | Performed by: INTERNAL MEDICINE

## 2022-07-22 PROCEDURE — 80053 COMPREHEN METABOLIC PANEL: CPT | Performed by: SURGERY

## 2022-07-22 PROCEDURE — 87070 CULTURE OTHR SPECIMN AEROBIC: CPT | Performed by: INTERNAL MEDICINE

## 2022-07-22 PROCEDURE — 71045 X-RAY EXAM CHEST 1 VIEW: CPT

## 2022-07-22 PROCEDURE — 25010000002 PROPOFOL 10 MG/ML EMULSION: Performed by: SURGERY

## 2022-07-22 PROCEDURE — 85384 FIBRINOGEN ACTIVITY: CPT | Performed by: SURGERY

## 2022-07-22 PROCEDURE — 94761 N-INVAS EAR/PLS OXIMETRY MLT: CPT

## 2022-07-22 PROCEDURE — 82805 BLOOD GASES W/O2 SATURATION: CPT

## 2022-07-22 PROCEDURE — 99291 CRITICAL CARE FIRST HOUR: CPT | Performed by: INTERNAL MEDICINE

## 2022-07-22 PROCEDURE — 94799 UNLISTED PULMONARY SVC/PX: CPT

## 2022-07-22 PROCEDURE — 83050 HGB METHEMOGLOBIN QUAN: CPT

## 2022-07-22 PROCEDURE — 36600 WITHDRAWAL OF ARTERIAL BLOOD: CPT

## 2022-07-22 PROCEDURE — 82375 ASSAY CARBOXYHB QUANT: CPT

## 2022-07-22 RX ORDER — DEXTROSE MONOHYDRATE 50 MG/ML
50 INJECTION, SOLUTION INTRAVENOUS CONTINUOUS
Status: DISCONTINUED | OUTPATIENT
Start: 2022-07-22 | End: 2022-07-24

## 2022-07-22 RX ADMIN — Medication 300 MCG/HR: at 08:38

## 2022-07-22 RX ADMIN — PROPOFOL 20 MCG/KG/MIN: 10 INJECTION, EMULSION INTRAVENOUS at 19:00

## 2022-07-22 RX ADMIN — PROPOFOL 5 MCG/KG/MIN: 10 INJECTION, EMULSION INTRAVENOUS at 10:55

## 2022-07-22 RX ADMIN — PANTOPRAZOLE SODIUM 40 MG: 40 INJECTION, POWDER, FOR SOLUTION INTRAVENOUS at 08:38

## 2022-07-22 RX ADMIN — Medication 10 ML: at 23:38

## 2022-07-22 RX ADMIN — DEXTROSE MONOHYDRATE 50 ML/HR: 50 INJECTION, SOLUTION INTRAVENOUS at 21:50

## 2022-07-22 RX ADMIN — Medication 200 MCG/HR: at 00:44

## 2022-07-22 RX ADMIN — Medication 10 ML: at 08:40

## 2022-07-22 RX ADMIN — MINERAL OIL AND PETROLATUM: 150; 830 OINTMENT OPHTHALMIC at 14:10

## 2022-07-22 RX ADMIN — SODIUM BICARBONATE 150 MEQ: 84 INJECTION INTRAVENOUS at 05:23

## 2022-07-22 RX ADMIN — Medication 300 MCG/HR: at 17:24

## 2022-07-23 ENCOUNTER — APPOINTMENT (OUTPATIENT)
Dept: GENERAL RADIOLOGY | Facility: HOSPITAL | Age: 82
End: 2022-07-23

## 2022-07-23 LAB
ALBUMIN SERPL-MCNC: 2.1 G/DL (ref 3.5–5.2)
ALBUMIN/GLOB SERPL: 1.1 G/DL
ALP SERPL-CCNC: 116 U/L (ref 39–117)
ALT SERPL W P-5'-P-CCNC: 461 U/L (ref 1–33)
ANION GAP SERPL CALCULATED.3IONS-SCNC: 7 MMOL/L (ref 5–15)
ARTERIAL PATENCY WRIST A: ABNORMAL
AST SERPL-CCNC: 457 U/L (ref 1–32)
ATMOSPHERIC PRESS: ABNORMAL MM[HG]
BASE EXCESS BLDA CALC-SCNC: 10.4 MMOL/L (ref 0–2)
BDY SITE: ABNORMAL
BILIRUB SERPL-MCNC: 0.7 MG/DL (ref 0–1.2)
BODY TEMPERATURE: 37 C
BUN SERPL-MCNC: 28 MG/DL (ref 8–23)
BUN/CREAT SERPL: 21.5 (ref 7–25)
CALCIUM SPEC-SCNC: 7.4 MG/DL (ref 8.6–10.5)
CHLORIDE SERPL-SCNC: 108 MMOL/L (ref 98–107)
CO2 BLDA-SCNC: 33.9 MMOL/L (ref 22–33)
CO2 SERPL-SCNC: 31 MMOL/L (ref 22–29)
COHGB MFR BLD: 0.9 % (ref 0–2)
CREAT SERPL-MCNC: 1.3 MG/DL (ref 0.57–1)
DEPRECATED RDW RBC AUTO: 50.3 FL (ref 37–54)
DEPRECATED RDW RBC AUTO: 50.6 FL (ref 37–54)
EGFRCR SERPLBLD CKD-EPI 2021: 40.9 ML/MIN/1.73
EPAP: 0
ERYTHROCYTE [DISTWIDTH] IN BLOOD BY AUTOMATED COUNT: 15.7 % (ref 12.3–15.4)
ERYTHROCYTE [DISTWIDTH] IN BLOOD BY AUTOMATED COUNT: 15.7 % (ref 12.3–15.4)
FIBRINOGEN PPP-MCNC: 369 MG/DL (ref 220–470)
FIBRINOGEN PPP-MCNC: 392 MG/DL (ref 220–470)
GLOBULIN UR ELPH-MCNC: 1.9 GM/DL
GLUCOSE BLDC GLUCOMTR-MCNC: 105 MG/DL (ref 70–130)
GLUCOSE BLDC GLUCOMTR-MCNC: 114 MG/DL (ref 70–130)
GLUCOSE BLDC GLUCOMTR-MCNC: 116 MG/DL (ref 70–130)
GLUCOSE BLDC GLUCOMTR-MCNC: 119 MG/DL (ref 70–130)
GLUCOSE BLDC GLUCOMTR-MCNC: 122 MG/DL (ref 70–130)
GLUCOSE BLDC GLUCOMTR-MCNC: 122 MG/DL (ref 70–130)
GLUCOSE BLDC GLUCOMTR-MCNC: 123 MG/DL (ref 70–130)
GLUCOSE BLDC GLUCOMTR-MCNC: 69 MG/DL (ref 70–130)
GLUCOSE BLDC GLUCOMTR-MCNC: 70 MG/DL (ref 70–130)
GLUCOSE BLDC GLUCOMTR-MCNC: 91 MG/DL (ref 70–130)
GLUCOSE BLDC GLUCOMTR-MCNC: 98 MG/DL (ref 70–130)
GLUCOSE SERPL-MCNC: 123 MG/DL (ref 65–99)
HCO3 BLDA-SCNC: 32.8 MMOL/L (ref 20–26)
HCT VFR BLD AUTO: 27.1 % (ref 34–46.6)
HCT VFR BLD AUTO: 28.1 % (ref 34–46.6)
HCT VFR BLD CALC: 29.5 % (ref 38–51)
HGB BLD-MCNC: 9.3 G/DL (ref 12–15.9)
HGB BLD-MCNC: 9.6 G/DL (ref 12–15.9)
HGB BLDA-MCNC: 9.6 G/DL (ref 14–18)
INHALED O2 CONCENTRATION: 30 %
INR PPP: 1.22 (ref 0.84–1.13)
INR PPP: 1.28 (ref 0.84–1.13)
IPAP: 0
Lab: ABNORMAL
MCH RBC QN AUTO: 30.6 PG (ref 26.6–33)
MCH RBC QN AUTO: 30.9 PG (ref 26.6–33)
MCHC RBC AUTO-ENTMCNC: 34.2 G/DL (ref 31.5–35.7)
MCHC RBC AUTO-ENTMCNC: 34.3 G/DL (ref 31.5–35.7)
MCV RBC AUTO: 89.5 FL (ref 79–97)
MCV RBC AUTO: 90 FL (ref 79–97)
METHGB BLD QL: -0.4 % (ref 0–1.5)
MODALITY: ABNORMAL
NOTE: ABNORMAL
NOTIFIED BY: ABNORMAL
NOTIFIED WHO: ABNORMAL
OXYHGB MFR BLDV: 98.7 % (ref 94–99)
PAW @ PEAK INSP FLOW SETTING VENT: 0 CMH2O
PCO2 BLDA: 34.5 MM HG (ref 35–45)
PCO2 TEMP ADJ BLD: 34.5 MM HG (ref 35–45)
PH BLDA: 7.59 PH UNITS (ref 7.35–7.45)
PH, TEMP CORRECTED: 7.59 PH UNITS
PLATELET # BLD AUTO: 130 10*3/MM3 (ref 140–450)
PLATELET # BLD AUTO: 138 10*3/MM3 (ref 140–450)
PMV BLD AUTO: 11.1 FL (ref 6–12)
PMV BLD AUTO: 11.5 FL (ref 6–12)
PO2 BLDA: 97.9 MM HG (ref 83–108)
PO2 TEMP ADJ BLD: 97.9 MM HG (ref 83–108)
POTASSIUM SERPL-SCNC: 3.4 MMOL/L (ref 3.5–5.2)
POTASSIUM SERPL-SCNC: 3.8 MMOL/L (ref 3.5–5.2)
PROT SERPL-MCNC: 4 G/DL (ref 6–8.5)
PROTHROMBIN TIME: 15.3 SECONDS (ref 11.4–14.4)
PROTHROMBIN TIME: 15.9 SECONDS (ref 11.4–14.4)
RBC # BLD AUTO: 3.01 10*6/MM3 (ref 3.77–5.28)
RBC # BLD AUTO: 3.14 10*6/MM3 (ref 3.77–5.28)
SODIUM SERPL-SCNC: 146 MMOL/L (ref 136–145)
TOTAL RATE: 0 BREATHS/MINUTE
WBC NRBC COR # BLD: 22.18 10*3/MM3 (ref 3.4–10.8)
WBC NRBC COR # BLD: 22.28 10*3/MM3 (ref 3.4–10.8)

## 2022-07-23 PROCEDURE — 94799 UNLISTED PULMONARY SVC/PX: CPT

## 2022-07-23 PROCEDURE — 25010000002 FUROSEMIDE PER 20 MG: Performed by: INTERNAL MEDICINE

## 2022-07-23 PROCEDURE — 36600 WITHDRAWAL OF ARTERIAL BLOOD: CPT

## 2022-07-23 PROCEDURE — 25010000002 FENTANYL 10 MCG/1 ML NS: Performed by: SURGERY

## 2022-07-23 PROCEDURE — 85610 PROTHROMBIN TIME: CPT | Performed by: SURGERY

## 2022-07-23 PROCEDURE — 82805 BLOOD GASES W/O2 SATURATION: CPT

## 2022-07-23 PROCEDURE — 85384 FIBRINOGEN ACTIVITY: CPT | Performed by: SURGERY

## 2022-07-23 PROCEDURE — 80053 COMPREHEN METABOLIC PANEL: CPT | Performed by: SURGERY

## 2022-07-23 PROCEDURE — 83050 HGB METHEMOGLOBIN QUAN: CPT

## 2022-07-23 PROCEDURE — 85027 COMPLETE CBC AUTOMATED: CPT | Performed by: SURGERY

## 2022-07-23 PROCEDURE — 82962 GLUCOSE BLOOD TEST: CPT

## 2022-07-23 PROCEDURE — 25010000002 PROPOFOL 10 MG/ML EMULSION: Performed by: SURGERY

## 2022-07-23 PROCEDURE — 71045 X-RAY EXAM CHEST 1 VIEW: CPT

## 2022-07-23 PROCEDURE — 25010000002 CEFEPIME PER 500 MG: Performed by: INTERNAL MEDICINE

## 2022-07-23 PROCEDURE — 0 POTASSIUM CHLORIDE PER 2 MEQ: Performed by: NURSE PRACTITIONER

## 2022-07-23 PROCEDURE — 74018 RADEX ABDOMEN 1 VIEW: CPT

## 2022-07-23 PROCEDURE — 94003 VENT MGMT INPAT SUBQ DAY: CPT

## 2022-07-23 PROCEDURE — 82375 ASSAY CARBOXYHB QUANT: CPT

## 2022-07-23 PROCEDURE — 94761 N-INVAS EAR/PLS OXIMETRY MLT: CPT

## 2022-07-23 PROCEDURE — 99291 CRITICAL CARE FIRST HOUR: CPT | Performed by: INTERNAL MEDICINE

## 2022-07-23 PROCEDURE — 84132 ASSAY OF SERUM POTASSIUM: CPT | Performed by: SURGERY

## 2022-07-23 RX ORDER — POTASSIUM CHLORIDE 29.8 MG/ML
20 INJECTION INTRAVENOUS
Status: COMPLETED | OUTPATIENT
Start: 2022-07-23 | End: 2022-07-23

## 2022-07-23 RX ORDER — METRONIDAZOLE 500 MG/100ML
500 INJECTION, SOLUTION INTRAVENOUS EVERY 8 HOURS SCHEDULED
Status: COMPLETED | OUTPATIENT
Start: 2022-07-23 | End: 2022-07-30

## 2022-07-23 RX ORDER — DEXTROSE MONOHYDRATE 25 G/50ML
25 INJECTION, SOLUTION INTRAVENOUS
Status: DISCONTINUED | OUTPATIENT
Start: 2022-07-23 | End: 2022-07-31

## 2022-07-23 RX ORDER — FUROSEMIDE 10 MG/ML
40 INJECTION INTRAMUSCULAR; INTRAVENOUS ONCE
Status: COMPLETED | OUTPATIENT
Start: 2022-07-23 | End: 2022-07-23

## 2022-07-23 RX ADMIN — DEXTROSE MONOHYDRATE 50 ML/HR: 50 INJECTION, SOLUTION INTRAVENOUS at 18:23

## 2022-07-23 RX ADMIN — POTASSIUM CHLORIDE 20 MEQ: 29.8 INJECTION, SOLUTION INTRAVENOUS at 07:59

## 2022-07-23 RX ADMIN — Medication 300 MCG/HR: at 01:45

## 2022-07-23 RX ADMIN — PANTOPRAZOLE SODIUM 40 MG: 40 INJECTION, POWDER, FOR SOLUTION INTRAVENOUS at 09:22

## 2022-07-23 RX ADMIN — FUROSEMIDE 40 MG: 10 INJECTION, SOLUTION INTRAMUSCULAR; INTRAVENOUS at 12:46

## 2022-07-23 RX ADMIN — METRONIDAZOLE 500 MG: 500 INJECTION, SOLUTION INTRAVENOUS at 10:16

## 2022-07-23 RX ADMIN — CEFEPIME 2 G: 2 INJECTION, POWDER, FOR SOLUTION INTRAVENOUS at 10:14

## 2022-07-23 RX ADMIN — Medication 250 MCG/HR: at 10:17

## 2022-07-23 RX ADMIN — PROPOFOL 30 MCG/KG/MIN: 10 INJECTION, EMULSION INTRAVENOUS at 04:35

## 2022-07-23 RX ADMIN — CEFEPIME HYDROCHLORIDE 1 G: 1 INJECTION, POWDER, FOR SOLUTION INTRAMUSCULAR; INTRAVENOUS at 17:56

## 2022-07-23 RX ADMIN — PROPOFOL 15 MCG/KG/MIN: 10 INJECTION, EMULSION INTRAVENOUS at 13:14

## 2022-07-23 RX ADMIN — POTASSIUM CHLORIDE 20 MEQ: 29.8 INJECTION, SOLUTION INTRAVENOUS at 06:58

## 2022-07-23 RX ADMIN — PROPOFOL 15 MCG/KG/MIN: 10 INJECTION, EMULSION INTRAVENOUS at 18:01

## 2022-07-23 RX ADMIN — METRONIDAZOLE 500 MG: 500 INJECTION, SOLUTION INTRAVENOUS at 21:55

## 2022-07-23 RX ADMIN — Medication 250 MCG/HR: at 19:40

## 2022-07-24 ENCOUNTER — APPOINTMENT (OUTPATIENT)
Dept: GENERAL RADIOLOGY | Facility: HOSPITAL | Age: 82
End: 2022-07-24

## 2022-07-24 LAB
ALBUMIN SERPL-MCNC: 2.2 G/DL (ref 3.5–5.2)
ALBUMIN/GLOB SERPL: 1.2 G/DL
ALP SERPL-CCNC: 78 U/L (ref 39–117)
ALT SERPL W P-5'-P-CCNC: 331 U/L (ref 1–33)
ANION GAP SERPL CALCULATED.3IONS-SCNC: 5 MMOL/L (ref 5–15)
ARTERIAL PATENCY WRIST A: ABNORMAL
AST SERPL-CCNC: 195 U/L (ref 1–32)
ATMOSPHERIC PRESS: ABNORMAL MM[HG]
BACTERIA SPEC RESP CULT: NORMAL
BASE EXCESS BLDA CALC-SCNC: 5.7 MMOL/L (ref 0–2)
BDY SITE: ABNORMAL
BH BB BLOOD EXPIRATION DATE: NORMAL
BH BB BLOOD EXPIRATION DATE: NORMAL
BH BB BLOOD TYPE BARCODE: 9500
BH BB BLOOD TYPE BARCODE: 9500
BH BB DISPENSE STATUS: NORMAL
BH BB DISPENSE STATUS: NORMAL
BH BB PRODUCT CODE: NORMAL
BH BB PRODUCT CODE: NORMAL
BH BB UNIT NUMBER: NORMAL
BH BB UNIT NUMBER: NORMAL
BILIRUB SERPL-MCNC: 0.5 MG/DL (ref 0–1.2)
BODY TEMPERATURE: 37 C
BUN SERPL-MCNC: 24 MG/DL (ref 8–23)
BUN/CREAT SERPL: 19.4 (ref 7–25)
CALCIUM SPEC-SCNC: 7.3 MG/DL (ref 8.6–10.5)
CHLORIDE SERPL-SCNC: 104 MMOL/L (ref 98–107)
CO2 BLDA-SCNC: 32.5 MMOL/L (ref 22–33)
CO2 SERPL-SCNC: 29 MMOL/L (ref 22–29)
COHGB MFR BLD: 1.3 % (ref 0–2)
CREAT SERPL-MCNC: 1.24 MG/DL (ref 0.57–1)
CROSSMATCH INTERPRETATION: NORMAL
CROSSMATCH INTERPRETATION: NORMAL
DEPRECATED RDW RBC AUTO: 53 FL (ref 37–54)
EGFRCR SERPLBLD CKD-EPI 2021: 43.3 ML/MIN/1.73
EPAP: 0
ERYTHROCYTE [DISTWIDTH] IN BLOOD BY AUTOMATED COUNT: 15.7 % (ref 12.3–15.4)
GLOBULIN UR ELPH-MCNC: 1.9 GM/DL
GLUCOSE BLDC GLUCOMTR-MCNC: 133 MG/DL (ref 70–130)
GLUCOSE BLDC GLUCOMTR-MCNC: 162 MG/DL (ref 70–130)
GLUCOSE BLDC GLUCOMTR-MCNC: 55 MG/DL (ref 70–130)
GLUCOSE BLDC GLUCOMTR-MCNC: 56 MG/DL (ref 70–130)
GLUCOSE BLDC GLUCOMTR-MCNC: 56 MG/DL (ref 70–130)
GLUCOSE BLDC GLUCOMTR-MCNC: 73 MG/DL (ref 70–130)
GLUCOSE BLDC GLUCOMTR-MCNC: 84 MG/DL (ref 70–130)
GLUCOSE BLDC GLUCOMTR-MCNC: 94 MG/DL (ref 70–130)
GLUCOSE SERPL-MCNC: 159 MG/DL (ref 65–99)
GRAM STN SPEC: NORMAL
HCO3 BLDA-SCNC: 31 MMOL/L (ref 20–26)
HCT VFR BLD AUTO: 28.4 % (ref 34–46.6)
HCT VFR BLD CALC: 28.2 % (ref 38–51)
HGB BLD-MCNC: 9.2 G/DL (ref 12–15.9)
HGB BLDA-MCNC: 9.2 G/DL (ref 14–18)
INHALED O2 CONCENTRATION: 30 %
IPAP: 0
MAGNESIUM SERPL-MCNC: 1.7 MG/DL (ref 1.6–2.4)
MCH RBC QN AUTO: 30.3 PG (ref 26.6–33)
MCHC RBC AUTO-ENTMCNC: 32.4 G/DL (ref 31.5–35.7)
MCV RBC AUTO: 93.4 FL (ref 79–97)
METHGB BLD QL: 0 % (ref 0–1.5)
MODALITY: ABNORMAL
NOTE: ABNORMAL
OXYHGB MFR BLDV: 96.8 % (ref 94–99)
PAW @ PEAK INSP FLOW SETTING VENT: 0 CMH2O
PCO2 BLDA: 48.6 MM HG (ref 35–45)
PCO2 TEMP ADJ BLD: 48.6 MM HG (ref 35–45)
PH BLDA: 7.41 PH UNITS (ref 7.35–7.45)
PH, TEMP CORRECTED: 7.41 PH UNITS
PHOSPHATE SERPL-MCNC: 2.9 MG/DL (ref 2.5–4.5)
PLATELET # BLD AUTO: 149 10*3/MM3 (ref 140–450)
PMV BLD AUTO: 10.9 FL (ref 6–12)
PO2 BLDA: 94.1 MM HG (ref 83–108)
PO2 TEMP ADJ BLD: 94.1 MM HG (ref 83–108)
POTASSIUM SERPL-SCNC: 3.4 MMOL/L (ref 3.5–5.2)
POTASSIUM SERPL-SCNC: 4 MMOL/L (ref 3.5–5.2)
PROT SERPL-MCNC: 4.1 G/DL (ref 6–8.5)
RBC # BLD AUTO: 3.04 10*6/MM3 (ref 3.77–5.28)
SODIUM SERPL-SCNC: 138 MMOL/L (ref 136–145)
TOTAL RATE: 0 BREATHS/MINUTE
UNIT  ABO: NORMAL
UNIT  ABO: NORMAL
UNIT  RH: NORMAL
UNIT  RH: NORMAL
WBC NRBC COR # BLD: 24.3 10*3/MM3 (ref 3.4–10.8)

## 2022-07-24 PROCEDURE — 0 MAGNESIUM SULFATE 4 GM/100ML SOLUTION: Performed by: INTERNAL MEDICINE

## 2022-07-24 PROCEDURE — 25010000002 FUROSEMIDE PER 20 MG: Performed by: INTERNAL MEDICINE

## 2022-07-24 PROCEDURE — 85027 COMPLETE CBC AUTOMATED: CPT | Performed by: INTERNAL MEDICINE

## 2022-07-24 PROCEDURE — 25010000002 PROPOFOL 10 MG/ML EMULSION: Performed by: SURGERY

## 2022-07-24 PROCEDURE — 82375 ASSAY CARBOXYHB QUANT: CPT

## 2022-07-24 PROCEDURE — 94799 UNLISTED PULMONARY SVC/PX: CPT

## 2022-07-24 PROCEDURE — 71045 X-RAY EXAM CHEST 1 VIEW: CPT

## 2022-07-24 PROCEDURE — 82962 GLUCOSE BLOOD TEST: CPT

## 2022-07-24 PROCEDURE — 63710000001 INSULIN REGULAR HUMAN PER 5 UNITS: Performed by: INTERNAL MEDICINE

## 2022-07-24 PROCEDURE — 80053 COMPREHEN METABOLIC PANEL: CPT | Performed by: SURGERY

## 2022-07-24 PROCEDURE — 99291 CRITICAL CARE FIRST HOUR: CPT | Performed by: INTERNAL MEDICINE

## 2022-07-24 PROCEDURE — 84132 ASSAY OF SERUM POTASSIUM: CPT | Performed by: INTERNAL MEDICINE

## 2022-07-24 PROCEDURE — 84100 ASSAY OF PHOSPHORUS: CPT | Performed by: INTERNAL MEDICINE

## 2022-07-24 PROCEDURE — 94003 VENT MGMT INPAT SUBQ DAY: CPT

## 2022-07-24 PROCEDURE — 74018 RADEX ABDOMEN 1 VIEW: CPT

## 2022-07-24 PROCEDURE — 36600 WITHDRAWAL OF ARTERIAL BLOOD: CPT

## 2022-07-24 PROCEDURE — 25010000002 FENTANYL 10 MCG/1 ML NS: Performed by: SURGERY

## 2022-07-24 PROCEDURE — 0 POTASSIUM CHLORIDE PER 2 MEQ: Performed by: INTERNAL MEDICINE

## 2022-07-24 PROCEDURE — 82150 ASSAY OF AMYLASE: CPT | Performed by: SURGERY

## 2022-07-24 PROCEDURE — 82805 BLOOD GASES W/O2 SATURATION: CPT

## 2022-07-24 PROCEDURE — 83050 HGB METHEMOGLOBIN QUAN: CPT

## 2022-07-24 PROCEDURE — 83735 ASSAY OF MAGNESIUM: CPT | Performed by: INTERNAL MEDICINE

## 2022-07-24 PROCEDURE — 25010000002 CEFEPIME PER 500 MG: Performed by: INTERNAL MEDICINE

## 2022-07-24 RX ORDER — POTASSIUM CHLORIDE 29.8 MG/ML
20 INJECTION INTRAVENOUS
Status: DISCONTINUED | OUTPATIENT
Start: 2022-07-24 | End: 2022-08-04 | Stop reason: HOSPADM

## 2022-07-24 RX ORDER — MAGNESIUM SULFATE HEPTAHYDRATE 40 MG/ML
2 INJECTION, SOLUTION INTRAVENOUS AS NEEDED
Status: DISCONTINUED | OUTPATIENT
Start: 2022-07-24 | End: 2022-08-04 | Stop reason: HOSPADM

## 2022-07-24 RX ORDER — POTASSIUM CHLORIDE 7.45 MG/ML
10 INJECTION INTRAVENOUS
Status: DISCONTINUED | OUTPATIENT
Start: 2022-07-24 | End: 2022-07-26

## 2022-07-24 RX ORDER — MAGNESIUM SULFATE HEPTAHYDRATE 40 MG/ML
4 INJECTION, SOLUTION INTRAVENOUS AS NEEDED
Status: DISCONTINUED | OUTPATIENT
Start: 2022-07-24 | End: 2022-08-04 | Stop reason: HOSPADM

## 2022-07-24 RX ORDER — FUROSEMIDE 10 MG/ML
40 INJECTION INTRAMUSCULAR; INTRAVENOUS ONCE
Status: COMPLETED | OUTPATIENT
Start: 2022-07-24 | End: 2022-07-24

## 2022-07-24 RX ORDER — AMINO ACIDS/PROTEIN HYDROLYS 15G-100/30
30 LIQUID (ML) ORAL DAILY
Status: DISCONTINUED | OUTPATIENT
Start: 2022-07-24 | End: 2022-07-27

## 2022-07-24 RX ADMIN — Medication 150 MCG/HR: at 13:51

## 2022-07-24 RX ADMIN — CEFEPIME HYDROCHLORIDE 1 G: 1 INJECTION, POWDER, FOR SOLUTION INTRAMUSCULAR; INTRAVENOUS at 05:31

## 2022-07-24 RX ADMIN — INSULIN HUMAN 2 UNITS: 100 INJECTION, SOLUTION PARENTERAL at 23:59

## 2022-07-24 RX ADMIN — PROPOFOL 15 MCG/KG/MIN: 10 INJECTION, EMULSION INTRAVENOUS at 05:32

## 2022-07-24 RX ADMIN — FUROSEMIDE 40 MG: 10 INJECTION, SOLUTION INTRAMUSCULAR; INTRAVENOUS at 12:06

## 2022-07-24 RX ADMIN — Medication 250 MCG/HR: at 04:48

## 2022-07-24 RX ADMIN — MAGNESIUM SULFATE HEPTAHYDRATE 4 G: 40 INJECTION, SOLUTION INTRAVENOUS at 12:06

## 2022-07-24 RX ADMIN — METRONIDAZOLE 500 MG: 500 INJECTION, SOLUTION INTRAVENOUS at 20:00

## 2022-07-24 RX ADMIN — Medication 30 ML: at 12:07

## 2022-07-24 RX ADMIN — POTASSIUM CHLORIDE 20 MEQ: 29.8 INJECTION, SOLUTION INTRAVENOUS at 09:57

## 2022-07-24 RX ADMIN — CEFEPIME HYDROCHLORIDE 1 G: 1 INJECTION, POWDER, FOR SOLUTION INTRAMUSCULAR; INTRAVENOUS at 17:59

## 2022-07-24 RX ADMIN — METRONIDAZOLE 500 MG: 500 INJECTION, SOLUTION INTRAVENOUS at 05:31

## 2022-07-24 RX ADMIN — POTASSIUM CHLORIDE 20 MEQ: 29.8 INJECTION, SOLUTION INTRAVENOUS at 08:18

## 2022-07-24 RX ADMIN — DEXTROSE MONOHYDRATE 25 G: 25 INJECTION, SOLUTION INTRAVENOUS at 02:35

## 2022-07-24 RX ADMIN — PANTOPRAZOLE SODIUM 40 MG: 40 INJECTION, POWDER, FOR SOLUTION INTRAVENOUS at 08:19

## 2022-07-24 RX ADMIN — METRONIDAZOLE 500 MG: 500 INJECTION, SOLUTION INTRAVENOUS at 13:51

## 2022-07-24 RX ADMIN — PROPOFOL 15 MCG/KG/MIN: 10 INJECTION, EMULSION INTRAVENOUS at 02:35

## 2022-07-25 ENCOUNTER — APPOINTMENT (OUTPATIENT)
Dept: GENERAL RADIOLOGY | Facility: HOSPITAL | Age: 82
End: 2022-07-25

## 2022-07-25 LAB
ALBUMIN SERPL-MCNC: 2.3 G/DL (ref 3.5–5.2)
ALBUMIN/GLOB SERPL: 1 G/DL
ALP SERPL-CCNC: 100 U/L (ref 39–117)
ALT SERPL W P-5'-P-CCNC: 241 U/L (ref 1–33)
ANION GAP SERPL CALCULATED.3IONS-SCNC: 7 MMOL/L (ref 5–15)
ARTERIAL PATENCY WRIST A: ABNORMAL
AST SERPL-CCNC: 82 U/L (ref 1–32)
ATMOSPHERIC PRESS: ABNORMAL MM[HG]
BASE EXCESS BLDA CALC-SCNC: 3.2 MMOL/L (ref 0–2)
BASOPHILS # BLD AUTO: 0.14 10*3/MM3 (ref 0–0.2)
BASOPHILS NFR BLD AUTO: 0.5 % (ref 0–1.5)
BDY SITE: ABNORMAL
BILIRUB SERPL-MCNC: 0.4 MG/DL (ref 0–1.2)
BODY TEMPERATURE: 37 C
BUN SERPL-MCNC: 29 MG/DL (ref 8–23)
BUN/CREAT SERPL: 24.2 (ref 7–25)
CALCIUM SPEC-SCNC: 8 MG/DL (ref 8.6–10.5)
CHLORIDE SERPL-SCNC: 104 MMOL/L (ref 98–107)
CO2 BLDA-SCNC: 31 MMOL/L (ref 22–33)
CO2 SERPL-SCNC: 28 MMOL/L (ref 22–29)
COHGB MFR BLD: 1.2 % (ref 0–2)
CREAT SERPL-MCNC: 1.2 MG/DL (ref 0.57–1)
CYTO UR: NORMAL
DEPRECATED RDW RBC AUTO: 54.4 FL (ref 37–54)
EGFRCR SERPLBLD CKD-EPI 2021: 45 ML/MIN/1.73
EOSINOPHIL # BLD AUTO: 0.15 10*3/MM3 (ref 0–0.4)
EOSINOPHIL NFR BLD AUTO: 0.5 % (ref 0.3–6.2)
EPAP: 0
ERYTHROCYTE [DISTWIDTH] IN BLOOD BY AUTOMATED COUNT: 15.2 % (ref 12.3–15.4)
GLOBULIN UR ELPH-MCNC: 2.4 GM/DL
GLUCOSE BLDC GLUCOMTR-MCNC: 106 MG/DL (ref 70–130)
GLUCOSE BLDC GLUCOMTR-MCNC: 119 MG/DL (ref 70–130)
GLUCOSE BLDC GLUCOMTR-MCNC: 135 MG/DL (ref 70–130)
GLUCOSE BLDC GLUCOMTR-MCNC: 163 MG/DL (ref 70–130)
GLUCOSE SERPL-MCNC: 179 MG/DL (ref 65–99)
HCO3 BLDA-SCNC: 29.4 MMOL/L (ref 20–26)
HCT VFR BLD AUTO: 31.9 % (ref 34–46.6)
HCT VFR BLD CALC: 30.9 % (ref 38–51)
HGB BLD-MCNC: 9.9 G/DL (ref 12–15.9)
HGB BLDA-MCNC: 10.1 G/DL (ref 14–18)
IMM GRANULOCYTES # BLD AUTO: 0.41 10*3/MM3 (ref 0–0.05)
IMM GRANULOCYTES NFR BLD AUTO: 1.4 % (ref 0–0.5)
INHALED O2 CONCENTRATION: 32 %
IPAP: 0
LAB AP CASE REPORT: NORMAL
LAB AP CLINICAL INFORMATION: NORMAL
LAB AP DIAGNOSIS COMMENT: NORMAL
LYMPHOCYTES # BLD AUTO: 2.12 10*3/MM3 (ref 0.7–3.1)
LYMPHOCYTES NFR BLD AUTO: 7 % (ref 19.6–45.3)
MAGNESIUM SERPL-MCNC: 2.6 MG/DL (ref 1.6–2.4)
MCH RBC QN AUTO: 30.2 PG (ref 26.6–33)
MCHC RBC AUTO-ENTMCNC: 31 G/DL (ref 31.5–35.7)
MCV RBC AUTO: 97.3 FL (ref 79–97)
METHGB BLD QL: 0.2 % (ref 0–1.5)
MODALITY: ABNORMAL
MONOCYTES # BLD AUTO: 3.5 10*3/MM3 (ref 0.1–0.9)
MONOCYTES NFR BLD AUTO: 11.6 % (ref 5–12)
NEUTROPHILS NFR BLD AUTO: 23.77 10*3/MM3 (ref 1.7–7)
NEUTROPHILS NFR BLD AUTO: 79 % (ref 42.7–76)
NOTE: ABNORMAL
NRBC BLD AUTO-RTO: 2.4 /100 WBC (ref 0–0.2)
OXYHGB MFR BLDV: 96.1 % (ref 94–99)
PATH REPORT.FINAL DX SPEC: NORMAL
PATH REPORT.GROSS SPEC: NORMAL
PAW @ PEAK INSP FLOW SETTING VENT: 0 CMH2O
PCO2 BLDA: 52.4 MM HG (ref 35–45)
PCO2 TEMP ADJ BLD: 52.4 MM HG (ref 35–45)
PH BLDA: 7.36 PH UNITS (ref 7.35–7.45)
PH, TEMP CORRECTED: 7.36 PH UNITS
PHOSPHATE SERPL-MCNC: 4 MG/DL (ref 2.5–4.5)
PLAT MORPH BLD: NORMAL
PLATELET # BLD AUTO: 193 10*3/MM3 (ref 140–450)
PMV BLD AUTO: 10.8 FL (ref 6–12)
PO2 BLDA: 87 MM HG (ref 83–108)
PO2 TEMP ADJ BLD: 87 MM HG (ref 83–108)
POTASSIUM SERPL-SCNC: 4.4 MMOL/L (ref 3.5–5.2)
PROT SERPL-MCNC: 4.7 G/DL (ref 6–8.5)
RBC # BLD AUTO: 3.28 10*6/MM3 (ref 3.77–5.28)
RBC MORPH BLD: NORMAL
SODIUM SERPL-SCNC: 139 MMOL/L (ref 136–145)
TOTAL RATE: 0 BREATHS/MINUTE
WBC MORPH BLD: NORMAL
WBC NRBC COR # BLD: 30.09 10*3/MM3 (ref 3.4–10.8)

## 2022-07-25 PROCEDURE — 92610 EVALUATE SWALLOWING FUNCTION: CPT

## 2022-07-25 PROCEDURE — 71045 X-RAY EXAM CHEST 1 VIEW: CPT

## 2022-07-25 PROCEDURE — 36600 WITHDRAWAL OF ARTERIAL BLOOD: CPT

## 2022-07-25 PROCEDURE — 84100 ASSAY OF PHOSPHORUS: CPT | Performed by: INTERNAL MEDICINE

## 2022-07-25 PROCEDURE — 80053 COMPREHEN METABOLIC PANEL: CPT | Performed by: SURGERY

## 2022-07-25 PROCEDURE — 82805 BLOOD GASES W/O2 SATURATION: CPT

## 2022-07-25 PROCEDURE — 25010000002 FUROSEMIDE PER 20 MG: Performed by: INTERNAL MEDICINE

## 2022-07-25 PROCEDURE — 99232 SBSQ HOSP IP/OBS MODERATE 35: CPT | Performed by: INTERNAL MEDICINE

## 2022-07-25 PROCEDURE — 85007 BL SMEAR W/DIFF WBC COUNT: CPT | Performed by: INTERNAL MEDICINE

## 2022-07-25 PROCEDURE — 83735 ASSAY OF MAGNESIUM: CPT | Performed by: INTERNAL MEDICINE

## 2022-07-25 PROCEDURE — 25010000002 CEFEPIME PER 500 MG: Performed by: INTERNAL MEDICINE

## 2022-07-25 PROCEDURE — 82375 ASSAY CARBOXYHB QUANT: CPT

## 2022-07-25 PROCEDURE — 85025 COMPLETE CBC W/AUTO DIFF WBC: CPT | Performed by: INTERNAL MEDICINE

## 2022-07-25 PROCEDURE — 82962 GLUCOSE BLOOD TEST: CPT

## 2022-07-25 PROCEDURE — 83050 HGB METHEMOGLOBIN QUAN: CPT

## 2022-07-25 PROCEDURE — 25010000002 ONDANSETRON PER 1 MG: Performed by: SURGERY

## 2022-07-25 PROCEDURE — 63710000001 INSULIN REGULAR HUMAN PER 5 UNITS: Performed by: INTERNAL MEDICINE

## 2022-07-25 RX ORDER — FUROSEMIDE 10 MG/ML
40 INJECTION INTRAMUSCULAR; INTRAVENOUS ONCE
Status: COMPLETED | OUTPATIENT
Start: 2022-07-25 | End: 2022-07-25

## 2022-07-25 RX ORDER — LEVOTHYROXINE SODIUM 20 UG/ML
37.5 INJECTION, SOLUTION INTRAVENOUS
Status: DISCONTINUED | OUTPATIENT
Start: 2022-07-25 | End: 2022-08-01

## 2022-07-25 RX ADMIN — METRONIDAZOLE 500 MG: 500 INJECTION, SOLUTION INTRAVENOUS at 05:50

## 2022-07-25 RX ADMIN — METRONIDAZOLE 500 MG: 500 INJECTION, SOLUTION INTRAVENOUS at 21:53

## 2022-07-25 RX ADMIN — Medication 30 ML: at 10:07

## 2022-07-25 RX ADMIN — LEVOTHYROXINE SODIUM 37.5 MCG: 20 INJECTION, SOLUTION INTRAVENOUS at 14:46

## 2022-07-25 RX ADMIN — CEFEPIME HYDROCHLORIDE 1 G: 1 INJECTION, POWDER, FOR SOLUTION INTRAMUSCULAR; INTRAVENOUS at 05:50

## 2022-07-25 RX ADMIN — PANTOPRAZOLE SODIUM 40 MG: 40 INJECTION, POWDER, FOR SOLUTION INTRAVENOUS at 08:20

## 2022-07-25 RX ADMIN — CEFEPIME HYDROCHLORIDE 1 G: 1 INJECTION, POWDER, FOR SOLUTION INTRAMUSCULAR; INTRAVENOUS at 18:02

## 2022-07-25 RX ADMIN — ONDANSETRON 4 MG: 2 INJECTION INTRAMUSCULAR; INTRAVENOUS at 14:46

## 2022-07-25 RX ADMIN — METRONIDAZOLE 500 MG: 500 INJECTION, SOLUTION INTRAVENOUS at 14:08

## 2022-07-25 RX ADMIN — FUROSEMIDE 40 MG: 10 INJECTION, SOLUTION INTRAMUSCULAR; INTRAVENOUS at 14:08

## 2022-07-25 RX ADMIN — INSULIN HUMAN 2 UNITS: 100 INJECTION, SOLUTION PARENTERAL at 12:02

## 2022-07-26 ENCOUNTER — APPOINTMENT (OUTPATIENT)
Dept: GENERAL RADIOLOGY | Facility: HOSPITAL | Age: 82
End: 2022-07-26

## 2022-07-26 LAB
ALBUMIN SERPL-MCNC: 2.5 G/DL (ref 3.5–5.2)
ALBUMIN/GLOB SERPL: 1 G/DL
ALP SERPL-CCNC: 95 U/L (ref 39–117)
ALT SERPL W P-5'-P-CCNC: 144 U/L (ref 1–33)
AMYLASE FLD-CCNC: 76 U/L
ANION GAP SERPL CALCULATED.3IONS-SCNC: 8 MMOL/L (ref 5–15)
AST SERPL-CCNC: 32 U/L (ref 1–32)
BASOPHILS # BLD AUTO: 0.13 10*3/MM3 (ref 0–0.2)
BASOPHILS NFR BLD AUTO: 0.5 % (ref 0–1.5)
BILIRUB SERPL-MCNC: 0.4 MG/DL (ref 0–1.2)
BUN SERPL-MCNC: 33 MG/DL (ref 8–23)
BUN/CREAT SERPL: 32 (ref 7–25)
CALCIUM SPEC-SCNC: 8.3 MG/DL (ref 8.6–10.5)
CHLORIDE SERPL-SCNC: 105 MMOL/L (ref 98–107)
CO2 SERPL-SCNC: 29 MMOL/L (ref 22–29)
CREAT SERPL-MCNC: 1.03 MG/DL (ref 0.57–1)
DEPRECATED RDW RBC AUTO: 52.7 FL (ref 37–54)
EGFRCR SERPLBLD CKD-EPI 2021: 54.1 ML/MIN/1.73
EOSINOPHIL # BLD AUTO: 0.57 10*3/MM3 (ref 0–0.4)
EOSINOPHIL NFR BLD AUTO: 2.3 % (ref 0.3–6.2)
ERYTHROCYTE [DISTWIDTH] IN BLOOD BY AUTOMATED COUNT: 15.1 % (ref 12.3–15.4)
GLOBULIN UR ELPH-MCNC: 2.6 GM/DL
GLUCOSE BLDC GLUCOMTR-MCNC: 109 MG/DL (ref 70–130)
GLUCOSE BLDC GLUCOMTR-MCNC: 111 MG/DL (ref 70–130)
GLUCOSE BLDC GLUCOMTR-MCNC: 127 MG/DL (ref 70–130)
GLUCOSE BLDC GLUCOMTR-MCNC: 127 MG/DL (ref 70–130)
GLUCOSE SERPL-MCNC: 141 MG/DL (ref 65–99)
HCT VFR BLD AUTO: 32 % (ref 34–46.6)
HGB BLD-MCNC: 10.1 G/DL (ref 12–15.9)
IMM GRANULOCYTES # BLD AUTO: 0.44 10*3/MM3 (ref 0–0.05)
IMM GRANULOCYTES NFR BLD AUTO: 1.8 % (ref 0–0.5)
LYMPHOCYTES # BLD AUTO: 2.73 10*3/MM3 (ref 0.7–3.1)
LYMPHOCYTES NFR BLD AUTO: 11.1 % (ref 19.6–45.3)
MAGNESIUM SERPL-MCNC: 2.1 MG/DL (ref 1.6–2.4)
MCH RBC QN AUTO: 30.1 PG (ref 26.6–33)
MCHC RBC AUTO-ENTMCNC: 31.6 G/DL (ref 31.5–35.7)
MCV RBC AUTO: 95.5 FL (ref 79–97)
MONOCYTES # BLD AUTO: 3.84 10*3/MM3 (ref 0.1–0.9)
MONOCYTES NFR BLD AUTO: 15.6 % (ref 5–12)
NEUTROPHILS NFR BLD AUTO: 16.84 10*3/MM3 (ref 1.7–7)
NEUTROPHILS NFR BLD AUTO: 68.7 % (ref 42.7–76)
NRBC BLD AUTO-RTO: 2.2 /100 WBC (ref 0–0.2)
PHOSPHATE SERPL-MCNC: 2.7 MG/DL (ref 2.5–4.5)
PLATELET # BLD AUTO: 269 10*3/MM3 (ref 140–450)
PMV BLD AUTO: 10.6 FL (ref 6–12)
POTASSIUM SERPL-SCNC: 4 MMOL/L (ref 3.5–5.2)
PROT SERPL-MCNC: 5.1 G/DL (ref 6–8.5)
RBC # BLD AUTO: 3.35 10*6/MM3 (ref 3.77–5.28)
SODIUM SERPL-SCNC: 142 MMOL/L (ref 136–145)
WBC NRBC COR # BLD: 24.55 10*3/MM3 (ref 3.4–10.8)

## 2022-07-26 PROCEDURE — 25010000002 FENTANYL CITRATE (PF) 50 MCG/ML SOLUTION: Performed by: INTERNAL MEDICINE

## 2022-07-26 PROCEDURE — 71045 X-RAY EXAM CHEST 1 VIEW: CPT

## 2022-07-26 PROCEDURE — 99232 SBSQ HOSP IP/OBS MODERATE 35: CPT | Performed by: INTERNAL MEDICINE

## 2022-07-26 PROCEDURE — 83735 ASSAY OF MAGNESIUM: CPT | Performed by: INTERNAL MEDICINE

## 2022-07-26 PROCEDURE — 85025 COMPLETE CBC W/AUTO DIFF WBC: CPT | Performed by: INTERNAL MEDICINE

## 2022-07-26 PROCEDURE — 80053 COMPREHEN METABOLIC PANEL: CPT | Performed by: SURGERY

## 2022-07-26 PROCEDURE — 84100 ASSAY OF PHOSPHORUS: CPT | Performed by: INTERNAL MEDICINE

## 2022-07-26 PROCEDURE — 74018 RADEX ABDOMEN 1 VIEW: CPT

## 2022-07-26 PROCEDURE — 25010000002 FUROSEMIDE PER 20 MG: Performed by: INTERNAL MEDICINE

## 2022-07-26 PROCEDURE — 25010000002 ONDANSETRON PER 1 MG: Performed by: SURGERY

## 2022-07-26 PROCEDURE — 92610 EVALUATE SWALLOWING FUNCTION: CPT

## 2022-07-26 PROCEDURE — 82962 GLUCOSE BLOOD TEST: CPT

## 2022-07-26 PROCEDURE — 25010000002 CEFEPIME PER 500 MG: Performed by: INTERNAL MEDICINE

## 2022-07-26 RX ORDER — METOCLOPRAMIDE HYDROCHLORIDE 5 MG/ML
5 INJECTION INTRAMUSCULAR; INTRAVENOUS EVERY 6 HOURS PRN
Status: DISCONTINUED | OUTPATIENT
Start: 2022-07-26 | End: 2022-08-04 | Stop reason: HOSPADM

## 2022-07-26 RX ORDER — FENTANYL CITRATE 50 UG/ML
25 INJECTION, SOLUTION INTRAMUSCULAR; INTRAVENOUS
Status: DISCONTINUED | OUTPATIENT
Start: 2022-07-26 | End: 2022-07-31

## 2022-07-26 RX ORDER — FUROSEMIDE 10 MG/ML
40 INJECTION INTRAMUSCULAR; INTRAVENOUS ONCE
Status: COMPLETED | OUTPATIENT
Start: 2022-07-26 | End: 2022-07-26

## 2022-07-26 RX ORDER — METOCLOPRAMIDE HYDROCHLORIDE 5 MG/ML
5 INJECTION INTRAMUSCULAR; INTRAVENOUS EVERY 6 HOURS SCHEDULED
Status: DISCONTINUED | OUTPATIENT
Start: 2022-07-26 | End: 2022-07-26

## 2022-07-26 RX ADMIN — FENTANYL CITRATE 25 MCG: 50 INJECTION, SOLUTION INTRAMUSCULAR; INTRAVENOUS at 11:59

## 2022-07-26 RX ADMIN — ONDANSETRON 4 MG: 2 INJECTION INTRAMUSCULAR; INTRAVENOUS at 07:59

## 2022-07-26 RX ADMIN — METRONIDAZOLE 500 MG: 500 INJECTION, SOLUTION INTRAVENOUS at 14:15

## 2022-07-26 RX ADMIN — METRONIDAZOLE 500 MG: 500 INJECTION, SOLUTION INTRAVENOUS at 21:34

## 2022-07-26 RX ADMIN — NYSTATIN 500000 UNITS: 100000 SUSPENSION ORAL at 20:22

## 2022-07-26 RX ADMIN — CEFEPIME HYDROCHLORIDE 1 G: 1 INJECTION, POWDER, FOR SOLUTION INTRAMUSCULAR; INTRAVENOUS at 17:45

## 2022-07-26 RX ADMIN — PANTOPRAZOLE SODIUM 40 MG: 40 INJECTION, POWDER, FOR SOLUTION INTRAVENOUS at 07:51

## 2022-07-26 RX ADMIN — CEFEPIME HYDROCHLORIDE 1 G: 1 INJECTION, POWDER, FOR SOLUTION INTRAMUSCULAR; INTRAVENOUS at 05:07

## 2022-07-26 RX ADMIN — LEVOTHYROXINE SODIUM 37.5 MCG: 20 INJECTION, SOLUTION INTRAVENOUS at 10:20

## 2022-07-26 RX ADMIN — FENTANYL CITRATE 25 MCG: 50 INJECTION, SOLUTION INTRAMUSCULAR; INTRAVENOUS at 17:04

## 2022-07-26 RX ADMIN — ONDANSETRON 4 MG: 2 INJECTION INTRAMUSCULAR; INTRAVENOUS at 22:44

## 2022-07-26 RX ADMIN — METRONIDAZOLE 500 MG: 500 INJECTION, SOLUTION INTRAVENOUS at 05:07

## 2022-07-26 RX ADMIN — Medication 30 ML: at 07:51

## 2022-07-26 RX ADMIN — FENTANYL CITRATE 25 MCG: 50 INJECTION, SOLUTION INTRAMUSCULAR; INTRAVENOUS at 14:18

## 2022-07-26 RX ADMIN — FUROSEMIDE 40 MG: 10 INJECTION, SOLUTION INTRAMUSCULAR; INTRAVENOUS at 10:16

## 2022-07-27 ENCOUNTER — APPOINTMENT (OUTPATIENT)
Dept: GENERAL RADIOLOGY | Facility: HOSPITAL | Age: 82
End: 2022-07-27

## 2022-07-27 LAB
ALBUMIN SERPL-MCNC: 2.6 G/DL (ref 3.5–5.2)
ALBUMIN/GLOB SERPL: 1 G/DL
ALP SERPL-CCNC: 86 U/L (ref 39–117)
ALT SERPL W P-5'-P-CCNC: 94 U/L (ref 1–33)
ANION GAP SERPL CALCULATED.3IONS-SCNC: 10 MMOL/L (ref 5–15)
AST SERPL-CCNC: 22 U/L (ref 1–32)
BASE EXCESS BLDA CALC-SCNC: -1 MMOL/L (ref -5–5)
BASE EXCESS BLDA CALC-SCNC: -10 MMOL/L (ref -5–5)
BASE EXCESS BLDA CALC-SCNC: -4 MMOL/L (ref -5–5)
BASE EXCESS BLDA CALC-SCNC: -8 MMOL/L (ref -5–5)
BASOPHILS # BLD AUTO: 0.13 10*3/MM3 (ref 0–0.2)
BASOPHILS NFR BLD AUTO: 0.6 % (ref 0–1.5)
BILIRUB SERPL-MCNC: 0.4 MG/DL (ref 0–1.2)
BUN SERPL-MCNC: 35 MG/DL (ref 8–23)
BUN/CREAT SERPL: 32.7 (ref 7–25)
CA-I BLDA-SCNC: 0.61 MMOL/L (ref 1.2–1.32)
CA-I BLDA-SCNC: 0.71 MMOL/L (ref 1.2–1.32)
CA-I BLDA-SCNC: 0.83 MMOL/L (ref 1.2–1.32)
CA-I BLDA-SCNC: 1.12 MMOL/L (ref 1.2–1.32)
CALCIUM SPEC-SCNC: 8.7 MG/DL (ref 8.6–10.5)
CHLORIDE SERPL-SCNC: 106 MMOL/L (ref 98–107)
CO2 BLDA-SCNC: 19 MMOL/L (ref 24–29)
CO2 BLDA-SCNC: 20 MMOL/L (ref 24–29)
CO2 BLDA-SCNC: 23 MMOL/L (ref 24–29)
CO2 BLDA-SCNC: 26 MMOL/L (ref 24–29)
CO2 SERPL-SCNC: 28 MMOL/L (ref 22–29)
CREAT SERPL-MCNC: 1.07 MG/DL (ref 0.57–1)
DEPRECATED RDW RBC AUTO: 52.2 FL (ref 37–54)
EGFRCR SERPLBLD CKD-EPI 2021: 51.6 ML/MIN/1.73
EOSINOPHIL # BLD AUTO: 0.58 10*3/MM3 (ref 0–0.4)
EOSINOPHIL NFR BLD AUTO: 2.6 % (ref 0.3–6.2)
ERYTHROCYTE [DISTWIDTH] IN BLOOD BY AUTOMATED COUNT: 15.3 % (ref 12.3–15.4)
GLOBULIN UR ELPH-MCNC: 2.5 GM/DL
GLUCOSE BLDC GLUCOMTR-MCNC: 106 MG/DL (ref 70–130)
GLUCOSE BLDC GLUCOMTR-MCNC: 111 MG/DL (ref 70–130)
GLUCOSE BLDC GLUCOMTR-MCNC: 116 MG/DL (ref 70–130)
GLUCOSE BLDC GLUCOMTR-MCNC: 132 MG/DL (ref 70–130)
GLUCOSE BLDC GLUCOMTR-MCNC: 144 MG/DL (ref 70–130)
GLUCOSE BLDC GLUCOMTR-MCNC: 166 MG/DL (ref 70–130)
GLUCOSE BLDC GLUCOMTR-MCNC: 341 MG/DL (ref 70–130)
GLUCOSE BLDC GLUCOMTR-MCNC: 347 MG/DL (ref 70–130)
GLUCOSE SERPL-MCNC: 130 MG/DL (ref 65–99)
HCO3 BLDA-SCNC: 17.7 MMOL/L (ref 22–26)
HCO3 BLDA-SCNC: 18.7 MMOL/L (ref 22–26)
HCO3 BLDA-SCNC: 21.7 MMOL/L (ref 22–26)
HCO3 BLDA-SCNC: 24.8 MMOL/L (ref 22–26)
HCT VFR BLD AUTO: 32.7 % (ref 34–46.6)
HCT VFR BLDA CALC: 16 % (ref 38–51)
HCT VFR BLDA CALC: 17 % (ref 38–51)
HCT VFR BLDA CALC: 21 % (ref 38–51)
HCT VFR BLDA CALC: 21 % (ref 38–51)
HGB BLD-MCNC: 10.6 G/DL (ref 12–15.9)
HGB BLDA-MCNC: 5.4 G/DL (ref 12–17)
HGB BLDA-MCNC: 5.8 G/DL (ref 12–17)
HGB BLDA-MCNC: 7.1 G/DL (ref 12–17)
HGB BLDA-MCNC: 7.1 G/DL (ref 12–17)
IMM GRANULOCYTES # BLD AUTO: 0.33 10*3/MM3 (ref 0–0.05)
IMM GRANULOCYTES NFR BLD AUTO: 1.5 % (ref 0–0.5)
LYMPHOCYTES # BLD AUTO: 4.07 10*3/MM3 (ref 0.7–3.1)
LYMPHOCYTES NFR BLD AUTO: 18.1 % (ref 19.6–45.3)
MAGNESIUM SERPL-MCNC: 2.1 MG/DL (ref 1.6–2.4)
MCH RBC QN AUTO: 30.4 PG (ref 26.6–33)
MCHC RBC AUTO-ENTMCNC: 32.4 G/DL (ref 31.5–35.7)
MCV RBC AUTO: 93.7 FL (ref 79–97)
MONOCYTES # BLD AUTO: 3.57 10*3/MM3 (ref 0.1–0.9)
MONOCYTES NFR BLD AUTO: 15.9 % (ref 5–12)
NEUTROPHILS NFR BLD AUTO: 13.81 10*3/MM3 (ref 1.7–7)
NEUTROPHILS NFR BLD AUTO: 61.3 % (ref 42.7–76)
NRBC BLD AUTO-RTO: 0.8 /100 WBC (ref 0–0.2)
PCO2 BLDA: 38.5 MM HG (ref 35–45)
PCO2 BLDA: 41.9 MM HG (ref 35–45)
PCO2 BLDA: 43.7 MM HG (ref 35–45)
PCO2 BLDA: 47 MM HG (ref 35–45)
PH BLDA: 7.22 PH UNITS (ref 7.35–7.6)
PH BLDA: 7.26 PH UNITS (ref 7.35–7.6)
PH BLDA: 7.33 PH UNITS (ref 7.35–7.6)
PH BLDA: 7.36 PH UNITS (ref 7.35–7.6)
PHOSPHATE SERPL-MCNC: 2.6 MG/DL (ref 2.5–4.5)
PLAT MORPH BLD: NORMAL
PLATELET # BLD AUTO: 366 10*3/MM3 (ref 140–450)
PMV BLD AUTO: 9.9 FL (ref 6–12)
PO2 BLDA: 44 MMHG (ref 80–105)
PO2 BLDA: 489 MMHG (ref 80–105)
PO2 BLDA: 558 MMHG (ref 80–105)
PO2 BLDA: 58 MMHG (ref 80–105)
POTASSIUM BLDA-SCNC: 3.9 MMOL/L (ref 3.5–4.9)
POTASSIUM BLDA-SCNC: 4.2 MMOL/L (ref 3.5–4.9)
POTASSIUM BLDA-SCNC: 4.4 MMOL/L (ref 3.5–4.9)
POTASSIUM BLDA-SCNC: 4.4 MMOL/L (ref 3.5–4.9)
POTASSIUM SERPL-SCNC: 3.5 MMOL/L (ref 3.5–5.2)
POTASSIUM SERPL-SCNC: 4 MMOL/L (ref 3.5–5.2)
PROT SERPL-MCNC: 5.1 G/DL (ref 6–8.5)
RBC # BLD AUTO: 3.49 10*6/MM3 (ref 3.77–5.28)
RBC MORPH BLD: NORMAL
SAO2 % BLDA: 100 % (ref 95–98)
SAO2 % BLDA: 100 % (ref 95–98)
SAO2 % BLDA: 78 % (ref 95–98)
SAO2 % BLDA: 87 % (ref 95–98)
SODIUM BLD-SCNC: 139 MMOL/L (ref 138–146)
SODIUM BLD-SCNC: 143 MMOL/L (ref 138–146)
SODIUM BLD-SCNC: 145 MMOL/L (ref 138–146)
SODIUM BLD-SCNC: 146 MMOL/L (ref 138–146)
SODIUM SERPL-SCNC: 144 MMOL/L (ref 136–145)
WBC MORPH BLD: NORMAL
WBC NRBC COR # BLD: 22.49 10*3/MM3 (ref 3.4–10.8)

## 2022-07-27 PROCEDURE — 82962 GLUCOSE BLOOD TEST: CPT

## 2022-07-27 PROCEDURE — 84132 ASSAY OF SERUM POTASSIUM: CPT | Performed by: INTERNAL MEDICINE

## 2022-07-27 PROCEDURE — 85007 BL SMEAR W/DIFF WBC COUNT: CPT | Performed by: INTERNAL MEDICINE

## 2022-07-27 PROCEDURE — 71045 X-RAY EXAM CHEST 1 VIEW: CPT

## 2022-07-27 PROCEDURE — 99232 SBSQ HOSP IP/OBS MODERATE 35: CPT | Performed by: INTERNAL MEDICINE

## 2022-07-27 PROCEDURE — 25010000002 FENTANYL CITRATE (PF) 50 MCG/ML SOLUTION: Performed by: INTERNAL MEDICINE

## 2022-07-27 PROCEDURE — 80053 COMPREHEN METABOLIC PANEL: CPT | Performed by: SURGERY

## 2022-07-27 PROCEDURE — 0 POTASSIUM CHLORIDE PER 2 MEQ: Performed by: INTERNAL MEDICINE

## 2022-07-27 PROCEDURE — 83735 ASSAY OF MAGNESIUM: CPT | Performed by: INTERNAL MEDICINE

## 2022-07-27 PROCEDURE — 97535 SELF CARE MNGMENT TRAINING: CPT

## 2022-07-27 PROCEDURE — 85025 COMPLETE CBC W/AUTO DIFF WBC: CPT | Performed by: INTERNAL MEDICINE

## 2022-07-27 PROCEDURE — 92610 EVALUATE SWALLOWING FUNCTION: CPT

## 2022-07-27 PROCEDURE — 25010000002 CEFEPIME PER 500 MG: Performed by: INTERNAL MEDICINE

## 2022-07-27 PROCEDURE — 97162 PT EVAL MOD COMPLEX 30 MIN: CPT

## 2022-07-27 PROCEDURE — 84100 ASSAY OF PHOSPHORUS: CPT | Performed by: INTERNAL MEDICINE

## 2022-07-27 PROCEDURE — 25010000002 FUROSEMIDE PER 20 MG: Performed by: INTERNAL MEDICINE

## 2022-07-27 PROCEDURE — 97166 OT EVAL MOD COMPLEX 45 MIN: CPT

## 2022-07-27 RX ORDER — TERAZOSIN 1 MG/1
1 CAPSULE ORAL NIGHTLY
Status: DISCONTINUED | OUTPATIENT
Start: 2022-07-27 | End: 2022-08-01

## 2022-07-27 RX ORDER — FUROSEMIDE 10 MG/ML
40 INJECTION INTRAMUSCULAR; INTRAVENOUS ONCE
Status: COMPLETED | OUTPATIENT
Start: 2022-07-27 | End: 2022-07-27

## 2022-07-27 RX ADMIN — POTASSIUM CHLORIDE 20 MEQ: 29.8 INJECTION, SOLUTION INTRAVENOUS at 09:12

## 2022-07-27 RX ADMIN — FENTANYL CITRATE 25 MCG: 50 INJECTION, SOLUTION INTRAMUSCULAR; INTRAVENOUS at 21:24

## 2022-07-27 RX ADMIN — METRONIDAZOLE 500 MG: 500 INJECTION, SOLUTION INTRAVENOUS at 21:24

## 2022-07-27 RX ADMIN — NYSTATIN 500000 UNITS: 100000 SUSPENSION ORAL at 17:19

## 2022-07-27 RX ADMIN — FENTANYL CITRATE 25 MCG: 50 INJECTION, SOLUTION INTRAMUSCULAR; INTRAVENOUS at 08:35

## 2022-07-27 RX ADMIN — CEFEPIME HYDROCHLORIDE 1 G: 1 INJECTION, POWDER, FOR SOLUTION INTRAMUSCULAR; INTRAVENOUS at 17:19

## 2022-07-27 RX ADMIN — METRONIDAZOLE 500 MG: 500 INJECTION, SOLUTION INTRAVENOUS at 05:13

## 2022-07-27 RX ADMIN — METRONIDAZOLE 500 MG: 500 INJECTION, SOLUTION INTRAVENOUS at 15:07

## 2022-07-27 RX ADMIN — NYSTATIN 500000 UNITS: 100000 SUSPENSION ORAL at 23:39

## 2022-07-27 RX ADMIN — FENTANYL CITRATE 25 MCG: 50 INJECTION, SOLUTION INTRAMUSCULAR; INTRAVENOUS at 14:06

## 2022-07-27 RX ADMIN — LEVOTHYROXINE SODIUM 37.5 MCG: 20 INJECTION, SOLUTION INTRAVENOUS at 12:50

## 2022-07-27 RX ADMIN — FUROSEMIDE 40 MG: 10 INJECTION, SOLUTION INTRAMUSCULAR; INTRAVENOUS at 17:19

## 2022-07-27 RX ADMIN — PANTOPRAZOLE SODIUM 40 MG: 40 INJECTION, POWDER, FOR SOLUTION INTRAVENOUS at 08:42

## 2022-07-27 RX ADMIN — POTASSIUM CHLORIDE 20 MEQ: 29.8 INJECTION, SOLUTION INTRAVENOUS at 10:16

## 2022-07-27 RX ADMIN — TERAZOSIN HYDROCHLORIDE 1 MG: 1 CAPSULE ORAL at 21:30

## 2022-07-27 RX ADMIN — NYSTATIN 500000 UNITS: 100000 SUSPENSION ORAL at 12:50

## 2022-07-27 RX ADMIN — CEFEPIME HYDROCHLORIDE 1 G: 1 INJECTION, POWDER, FOR SOLUTION INTRAMUSCULAR; INTRAVENOUS at 05:13

## 2022-07-27 RX ADMIN — FENTANYL CITRATE 25 MCG: 50 INJECTION, SOLUTION INTRAMUSCULAR; INTRAVENOUS at 18:48

## 2022-07-27 RX ADMIN — NYSTATIN 500000 UNITS: 100000 SUSPENSION ORAL at 08:42

## 2022-07-27 RX ADMIN — Medication 30 ML: at 08:43

## 2022-07-28 ENCOUNTER — ANCILLARY PROCEDURE (OUTPATIENT)
Dept: SPEECH THERAPY | Facility: HOSPITAL | Age: 82
End: 2022-07-28

## 2022-07-28 LAB
ALBUMIN SERPL-MCNC: 2.5 G/DL (ref 3.5–5.2)
ALBUMIN/GLOB SERPL: 1.2 G/DL
ALP SERPL-CCNC: 84 U/L (ref 39–117)
ALT SERPL W P-5'-P-CCNC: 59 U/L (ref 1–33)
ANION GAP SERPL CALCULATED.3IONS-SCNC: 6 MMOL/L (ref 5–15)
AST SERPL-CCNC: 16 U/L (ref 1–32)
BASOPHILS # BLD MANUAL: 0 10*3/MM3 (ref 0–0.2)
BASOPHILS NFR BLD MANUAL: 0 % (ref 0–1.5)
BILIRUB SERPL-MCNC: 0.3 MG/DL (ref 0–1.2)
BUN SERPL-MCNC: 39 MG/DL (ref 8–23)
BUN/CREAT SERPL: 36.1 (ref 7–25)
CALCIUM SPEC-SCNC: 8.6 MG/DL (ref 8.6–10.5)
CHLORIDE SERPL-SCNC: 112 MMOL/L (ref 98–107)
CO2 SERPL-SCNC: 30 MMOL/L (ref 22–29)
CREAT SERPL-MCNC: 1.08 MG/DL (ref 0.57–1)
DEPRECATED RDW RBC AUTO: 54.2 FL (ref 37–54)
EGFRCR SERPLBLD CKD-EPI 2021: 51.1 ML/MIN/1.73
EOSINOPHIL # BLD MANUAL: 0.69 10*3/MM3 (ref 0–0.4)
EOSINOPHIL NFR BLD MANUAL: 3 % (ref 0.3–6.2)
ERYTHROCYTE [DISTWIDTH] IN BLOOD BY AUTOMATED COUNT: 15.6 % (ref 12.3–15.4)
GLOBULIN UR ELPH-MCNC: 2.1 GM/DL
GLUCOSE BLDC GLUCOMTR-MCNC: 102 MG/DL (ref 70–130)
GLUCOSE BLDC GLUCOMTR-MCNC: 144 MG/DL (ref 70–130)
GLUCOSE BLDC GLUCOMTR-MCNC: 152 MG/DL (ref 70–130)
GLUCOSE BLDC GLUCOMTR-MCNC: 167 MG/DL (ref 70–130)
GLUCOSE SERPL-MCNC: 172 MG/DL (ref 65–99)
HCT VFR BLD AUTO: 29.6 % (ref 34–46.6)
HGB BLD-MCNC: 9.5 G/DL (ref 12–15.9)
LYMPHOCYTES # BLD MANUAL: 5.74 10*3/MM3 (ref 0.7–3.1)
LYMPHOCYTES NFR BLD MANUAL: 14 % (ref 5–12)
MAGNESIUM SERPL-MCNC: 1.9 MG/DL (ref 1.6–2.4)
MCH RBC QN AUTO: 30.6 PG (ref 26.6–33)
MCHC RBC AUTO-ENTMCNC: 32.1 G/DL (ref 31.5–35.7)
MCV RBC AUTO: 95.5 FL (ref 79–97)
METAMYELOCYTES NFR BLD MANUAL: 1 % (ref 0–0)
MONOCYTES # BLD: 3.21 10*3/MM3 (ref 0.1–0.9)
MYELOCYTES NFR BLD MANUAL: 1 % (ref 0–0)
NEUTROPHILS # BLD AUTO: 12.85 10*3/MM3 (ref 1.7–7)
NEUTROPHILS NFR BLD MANUAL: 49 % (ref 42.7–76)
NEUTS BAND NFR BLD MANUAL: 7 % (ref 0–5)
PHOSPHATE SERPL-MCNC: 1.7 MG/DL (ref 2.5–4.5)
PHOSPHATE SERPL-MCNC: 2.1 MG/DL (ref 2.5–4.5)
PLAT MORPH BLD: NORMAL
PLATELET # BLD AUTO: 410 10*3/MM3 (ref 140–450)
PMV BLD AUTO: 10 FL (ref 6–12)
POTASSIUM SERPL-SCNC: 3.4 MMOL/L (ref 3.5–5.2)
POTASSIUM SERPL-SCNC: 4.4 MMOL/L (ref 3.5–5.2)
PROT SERPL-MCNC: 4.6 G/DL (ref 6–8.5)
RBC # BLD AUTO: 3.1 10*6/MM3 (ref 3.77–5.28)
RBC MORPH BLD: NORMAL
SODIUM SERPL-SCNC: 148 MMOL/L (ref 136–145)
VARIANT LYMPHS NFR BLD MANUAL: 25 % (ref 19.6–45.3)
WBC MORPH BLD: NORMAL
WBC NRBC COR # BLD: 22.95 10*3/MM3 (ref 3.4–10.8)

## 2022-07-28 PROCEDURE — 92612 ENDOSCOPY SWALLOW (FEES) VID: CPT

## 2022-07-28 PROCEDURE — 25010000002 HEPARIN (PORCINE) PER 1000 UNITS: Performed by: INTERNAL MEDICINE

## 2022-07-28 PROCEDURE — 97530 THERAPEUTIC ACTIVITIES: CPT

## 2022-07-28 PROCEDURE — 0 MAGNESIUM SULFATE 4 GM/100ML SOLUTION: Performed by: INTERNAL MEDICINE

## 2022-07-28 PROCEDURE — 84100 ASSAY OF PHOSPHORUS: CPT | Performed by: SURGERY

## 2022-07-28 PROCEDURE — 84132 ASSAY OF SERUM POTASSIUM: CPT | Performed by: SURGERY

## 2022-07-28 PROCEDURE — 84100 ASSAY OF PHOSPHORUS: CPT | Performed by: INTERNAL MEDICINE

## 2022-07-28 PROCEDURE — 85025 COMPLETE CBC W/AUTO DIFF WBC: CPT | Performed by: INTERNAL MEDICINE

## 2022-07-28 PROCEDURE — 25010000002 FENTANYL CITRATE (PF) 50 MCG/ML SOLUTION: Performed by: INTERNAL MEDICINE

## 2022-07-28 PROCEDURE — 25010000002 FUROSEMIDE PER 20 MG: Performed by: INTERNAL MEDICINE

## 2022-07-28 PROCEDURE — 82962 GLUCOSE BLOOD TEST: CPT

## 2022-07-28 PROCEDURE — 25010000002 ALBUMIN HUMAN 25% PER 50 ML: Performed by: INTERNAL MEDICINE

## 2022-07-28 PROCEDURE — 0 POTASSIUM CHLORIDE PER 2 MEQ: Performed by: INTERNAL MEDICINE

## 2022-07-28 PROCEDURE — 63710000001 INSULIN REGULAR HUMAN PER 5 UNITS: Performed by: INTERNAL MEDICINE

## 2022-07-28 PROCEDURE — P9047 ALBUMIN (HUMAN), 25%, 50ML: HCPCS | Performed by: INTERNAL MEDICINE

## 2022-07-28 PROCEDURE — 25010000002 CEFEPIME PER 500 MG: Performed by: INTERNAL MEDICINE

## 2022-07-28 PROCEDURE — 83735 ASSAY OF MAGNESIUM: CPT | Performed by: INTERNAL MEDICINE

## 2022-07-28 PROCEDURE — 80053 COMPREHEN METABOLIC PANEL: CPT | Performed by: SURGERY

## 2022-07-28 PROCEDURE — 85007 BL SMEAR W/DIFF WBC COUNT: CPT | Performed by: INTERNAL MEDICINE

## 2022-07-28 PROCEDURE — 92610 EVALUATE SWALLOWING FUNCTION: CPT

## 2022-07-28 PROCEDURE — 99232 SBSQ HOSP IP/OBS MODERATE 35: CPT | Performed by: INTERNAL MEDICINE

## 2022-07-28 PROCEDURE — 97535 SELF CARE MNGMENT TRAINING: CPT

## 2022-07-28 RX ORDER — FLUOCINONIDE TOPICAL SOLUTION USP, 0.05% 0.5 MG/ML
SOLUTION TOPICAL 2 TIMES WEEKLY
COMMUNITY

## 2022-07-28 RX ORDER — FLUTICASONE PROPIONATE 50 MCG
2 SPRAY, SUSPENSION (ML) NASAL DAILY
COMMUNITY

## 2022-07-28 RX ORDER — ALBUMIN (HUMAN) 12.5 G/50ML
25 SOLUTION INTRAVENOUS ONCE
Status: COMPLETED | OUTPATIENT
Start: 2022-07-28 | End: 2022-07-28

## 2022-07-28 RX ORDER — HEPARIN SODIUM 5000 [USP'U]/ML
5000 INJECTION, SOLUTION INTRAVENOUS; SUBCUTANEOUS EVERY 12 HOURS SCHEDULED
Status: DISCONTINUED | OUTPATIENT
Start: 2022-07-28 | End: 2022-08-04 | Stop reason: HOSPADM

## 2022-07-28 RX ORDER — OXYCODONE AND ACETAMINOPHEN 7.5; 325 MG/1; MG/1
1 TABLET ORAL EVERY 6 HOURS PRN
Status: DISPENSED | OUTPATIENT
Start: 2022-07-28 | End: 2022-08-04

## 2022-07-28 RX ORDER — NYSTATIN 100000 U/G
1 OINTMENT TOPICAL 2 TIMES DAILY
COMMUNITY

## 2022-07-28 RX ORDER — FUROSEMIDE 10 MG/ML
40 INJECTION INTRAMUSCULAR; INTRAVENOUS ONCE
Status: COMPLETED | OUTPATIENT
Start: 2022-07-28 | End: 2022-07-28

## 2022-07-28 RX ORDER — FERROUS GLUCONATE 324(37.5)
324 TABLET ORAL DAILY
COMMUNITY

## 2022-07-28 RX ADMIN — NYSTATIN 500000 UNITS: 100000 SUSPENSION ORAL at 20:32

## 2022-07-28 RX ADMIN — LEVOTHYROXINE SODIUM 37.5 MCG: 20 INJECTION, SOLUTION INTRAVENOUS at 10:14

## 2022-07-28 RX ADMIN — CEFEPIME HYDROCHLORIDE 1 G: 1 INJECTION, POWDER, FOR SOLUTION INTRAMUSCULAR; INTRAVENOUS at 05:10

## 2022-07-28 RX ADMIN — TERAZOSIN HYDROCHLORIDE 1 MG: 1 CAPSULE ORAL at 20:33

## 2022-07-28 RX ADMIN — PANTOPRAZOLE SODIUM 40 MG: 40 INJECTION, POWDER, FOR SOLUTION INTRAVENOUS at 10:12

## 2022-07-28 RX ADMIN — METRONIDAZOLE 500 MG: 500 INJECTION, SOLUTION INTRAVENOUS at 05:09

## 2022-07-28 RX ADMIN — ALBUMIN HUMAN 25 G: 0.25 SOLUTION INTRAVENOUS at 12:35

## 2022-07-28 RX ADMIN — OXYCODONE HYDROCHLORIDE AND ACETAMINOPHEN 1 TABLET: 7.5; 325 TABLET ORAL at 12:42

## 2022-07-28 RX ADMIN — POTASSIUM & SODIUM PHOSPHATES POWDER PACK 280-160-250 MG 2 PACKET: 280-160-250 PACK at 13:32

## 2022-07-28 RX ADMIN — POTASSIUM CHLORIDE 20 MEQ: 29.8 INJECTION, SOLUTION INTRAVENOUS at 09:26

## 2022-07-28 RX ADMIN — FENTANYL CITRATE 25 MCG: 50 INJECTION, SOLUTION INTRAMUSCULAR; INTRAVENOUS at 16:49

## 2022-07-28 RX ADMIN — INSULIN HUMAN 2 UNITS: 100 INJECTION, SOLUTION PARENTERAL at 05:59

## 2022-07-28 RX ADMIN — FENTANYL CITRATE 25 MCG: 50 INJECTION, SOLUTION INTRAMUSCULAR; INTRAVENOUS at 22:21

## 2022-07-28 RX ADMIN — POTASSIUM CHLORIDE 20 MEQ: 29.8 INJECTION, SOLUTION INTRAVENOUS at 05:58

## 2022-07-28 RX ADMIN — POTASSIUM & SODIUM PHOSPHATES POWDER PACK 280-160-250 MG 2 PACKET: 280-160-250 PACK at 05:59

## 2022-07-28 RX ADMIN — METRONIDAZOLE 500 MG: 500 INJECTION, SOLUTION INTRAVENOUS at 22:21

## 2022-07-28 RX ADMIN — CEFEPIME HYDROCHLORIDE 1 G: 1 INJECTION, POWDER, FOR SOLUTION INTRAMUSCULAR; INTRAVENOUS at 18:21

## 2022-07-28 RX ADMIN — METRONIDAZOLE 500 MG: 500 INJECTION, SOLUTION INTRAVENOUS at 14:24

## 2022-07-28 RX ADMIN — FUROSEMIDE 40 MG: 10 INJECTION, SOLUTION INTRAMUSCULAR; INTRAVENOUS at 13:25

## 2022-07-28 RX ADMIN — FENTANYL CITRATE 25 MCG: 50 INJECTION, SOLUTION INTRAMUSCULAR; INTRAVENOUS at 14:25

## 2022-07-28 RX ADMIN — MAGNESIUM SULFATE HEPTAHYDRATE 4 G: 40 INJECTION, SOLUTION INTRAVENOUS at 05:58

## 2022-07-28 RX ADMIN — NYSTATIN 500000 UNITS: 100000 SUSPENSION ORAL at 18:21

## 2022-07-28 RX ADMIN — HEPARIN SODIUM 5000 UNITS: 5000 INJECTION, SOLUTION INTRAVENOUS; SUBCUTANEOUS at 20:32

## 2022-07-29 ENCOUNTER — APPOINTMENT (OUTPATIENT)
Dept: CARDIOLOGY | Facility: HOSPITAL | Age: 82
End: 2022-07-29

## 2022-07-29 LAB
ALBUMIN SERPL-MCNC: 2.9 G/DL (ref 3.5–5.2)
ALBUMIN/GLOB SERPL: 1.4 G/DL
ALP SERPL-CCNC: 78 U/L (ref 39–117)
ALT SERPL W P-5'-P-CCNC: 39 U/L (ref 1–33)
ANION GAP SERPL CALCULATED.3IONS-SCNC: 8 MMOL/L (ref 5–15)
AST SERPL-CCNC: 18 U/L (ref 1–32)
BASOPHILS # BLD MANUAL: 0 10*3/MM3 (ref 0–0.2)
BASOPHILS NFR BLD MANUAL: 0 % (ref 0–1.5)
BH CV LOWER VASCULAR LEFT COMMON FEMORAL AUGMENT: NORMAL
BH CV LOWER VASCULAR LEFT COMMON FEMORAL COMPRESS: NORMAL
BH CV LOWER VASCULAR LEFT COMMON FEMORAL PHASIC: NORMAL
BH CV LOWER VASCULAR LEFT COMMON FEMORAL SPONT: NORMAL
BH CV LOWER VASCULAR LEFT DISTAL FEMORAL COMPRESS: NORMAL
BH CV LOWER VASCULAR LEFT GASTRONEMIUS COMPRESS: NORMAL
BH CV LOWER VASCULAR LEFT GREATER SAPH AK COMPRESS: NORMAL
BH CV LOWER VASCULAR LEFT GREATER SAPH BK COMPRESS: NORMAL
BH CV LOWER VASCULAR LEFT LESSER SAPH COMPRESS: NORMAL
BH CV LOWER VASCULAR LEFT MID FEMORAL AUGMENT: NORMAL
BH CV LOWER VASCULAR LEFT MID FEMORAL COMPRESS: NORMAL
BH CV LOWER VASCULAR LEFT MID FEMORAL PHASIC: NORMAL
BH CV LOWER VASCULAR LEFT MID FEMORAL SPONT: NORMAL
BH CV LOWER VASCULAR LEFT PERONEAL COMPRESS: NORMAL
BH CV LOWER VASCULAR LEFT POPLITEAL AUGMENT: NORMAL
BH CV LOWER VASCULAR LEFT POPLITEAL COMPRESS: NORMAL
BH CV LOWER VASCULAR LEFT POPLITEAL PHASIC: NORMAL
BH CV LOWER VASCULAR LEFT POPLITEAL SPONT: NORMAL
BH CV LOWER VASCULAR LEFT POSTERIOR TIBIAL COMPRESS: NORMAL
BH CV LOWER VASCULAR LEFT PROFUNDA FEMORAL COMPRESS: NORMAL
BH CV LOWER VASCULAR LEFT PROXIMAL FEMORAL COMPRESS: NORMAL
BH CV LOWER VASCULAR LEFT SAPHENOFEMORAL JUNCTION COMPRESS: NORMAL
BH CV LOWER VASCULAR RIGHT COMMON FEMORAL AUGMENT: NORMAL
BH CV LOWER VASCULAR RIGHT COMMON FEMORAL COMPRESS: NORMAL
BH CV LOWER VASCULAR RIGHT COMMON FEMORAL PHASIC: NORMAL
BH CV LOWER VASCULAR RIGHT COMMON FEMORAL SPONT: NORMAL
BH CV LOWER VASCULAR RIGHT DISTAL FEMORAL COMPRESS: NORMAL
BH CV LOWER VASCULAR RIGHT GASTRONEMIUS COMPRESS: NORMAL
BH CV LOWER VASCULAR RIGHT GREATER SAPH AK COMPRESS: NORMAL
BH CV LOWER VASCULAR RIGHT GREATER SAPH BK COMPRESS: NORMAL
BH CV LOWER VASCULAR RIGHT LESSER SAPH COMPRESS: NORMAL
BH CV LOWER VASCULAR RIGHT MID FEMORAL AUGMENT: NORMAL
BH CV LOWER VASCULAR RIGHT MID FEMORAL COMPRESS: NORMAL
BH CV LOWER VASCULAR RIGHT MID FEMORAL PHASIC: NORMAL
BH CV LOWER VASCULAR RIGHT MID FEMORAL SPONT: NORMAL
BH CV LOWER VASCULAR RIGHT POPLITEAL AUGMENT: NORMAL
BH CV LOWER VASCULAR RIGHT POPLITEAL COMPRESS: NORMAL
BH CV LOWER VASCULAR RIGHT POPLITEAL PHASIC: NORMAL
BH CV LOWER VASCULAR RIGHT POPLITEAL SPONT: NORMAL
BH CV LOWER VASCULAR RIGHT POSTERIOR TIBIAL COMPRESS: NORMAL
BH CV LOWER VASCULAR RIGHT PROFUNDA FEMORAL COMPRESS: NORMAL
BH CV LOWER VASCULAR RIGHT PROXIMAL FEMORAL COMPRESS: NORMAL
BH CV LOWER VASCULAR RIGHT SAPHENOFEMORAL JUNCTION COMPRESS: NORMAL
BILIRUB SERPL-MCNC: 0.2 MG/DL (ref 0–1.2)
BUN SERPL-MCNC: 40 MG/DL (ref 8–23)
BUN/CREAT SERPL: 34.5 (ref 7–25)
CALCIUM SPEC-SCNC: 8.6 MG/DL (ref 8.6–10.5)
CHLORIDE SERPL-SCNC: 111 MMOL/L (ref 98–107)
CO2 SERPL-SCNC: 28 MMOL/L (ref 22–29)
CREAT SERPL-MCNC: 1.16 MG/DL (ref 0.57–1)
DEPRECATED RDW RBC AUTO: 55.5 FL (ref 37–54)
EGFRCR SERPLBLD CKD-EPI 2021: 46.9 ML/MIN/1.73
EOSINOPHIL # BLD MANUAL: 0.8 10*3/MM3 (ref 0–0.4)
EOSINOPHIL NFR BLD MANUAL: 3 % (ref 0.3–6.2)
ERYTHROCYTE [DISTWIDTH] IN BLOOD BY AUTOMATED COUNT: 15.6 % (ref 12.3–15.4)
GLOBULIN UR ELPH-MCNC: 2.1 GM/DL
GLUCOSE BLDC GLUCOMTR-MCNC: 115 MG/DL (ref 70–130)
GLUCOSE BLDC GLUCOMTR-MCNC: 129 MG/DL (ref 70–130)
GLUCOSE BLDC GLUCOMTR-MCNC: 148 MG/DL (ref 70–130)
GLUCOSE BLDC GLUCOMTR-MCNC: 174 MG/DL (ref 70–130)
GLUCOSE SERPL-MCNC: 173 MG/DL (ref 65–99)
HCT VFR BLD AUTO: 27.1 % (ref 34–46.6)
HGB BLD-MCNC: 8.3 G/DL (ref 12–15.9)
LYMPHOCYTES # BLD MANUAL: 5.89 10*3/MM3 (ref 0.7–3.1)
LYMPHOCYTES NFR BLD MANUAL: 7 % (ref 5–12)
MAGNESIUM SERPL-MCNC: 2.5 MG/DL (ref 1.6–2.4)
MAXIMAL PREDICTED HEART RATE: 137 BPM
MCH RBC QN AUTO: 29.9 PG (ref 26.6–33)
MCHC RBC AUTO-ENTMCNC: 30.6 G/DL (ref 31.5–35.7)
MCV RBC AUTO: 97.5 FL (ref 79–97)
METAMYELOCYTES NFR BLD MANUAL: 1 % (ref 0–0)
MONOCYTES # BLD: 1.87 10*3/MM3 (ref 0.1–0.9)
MYELOCYTES NFR BLD MANUAL: 3 % (ref 0–0)
NEUTROPHILS # BLD AUTO: 17.14 10*3/MM3 (ref 1.7–7)
NEUTROPHILS NFR BLD MANUAL: 60 % (ref 42.7–76)
NEUTS BAND NFR BLD MANUAL: 4 % (ref 0–5)
PHOSPHATE SERPL-MCNC: 2 MG/DL (ref 2.5–4.5)
PHOSPHATE SERPL-MCNC: 2.2 MG/DL (ref 2.5–4.5)
PHOSPHATE SERPL-MCNC: 3 MG/DL (ref 2.5–4.5)
PLAT MORPH BLD: NORMAL
PLATELET # BLD AUTO: 507 10*3/MM3 (ref 140–450)
PMV BLD AUTO: 10 FL (ref 6–12)
POTASSIUM SERPL-SCNC: 3.6 MMOL/L (ref 3.5–5.2)
POTASSIUM SERPL-SCNC: 4.4 MMOL/L (ref 3.5–5.2)
PROT SERPL-MCNC: 5 G/DL (ref 6–8.5)
RBC # BLD AUTO: 2.78 10*6/MM3 (ref 3.77–5.28)
RBC MORPH BLD: NORMAL
SODIUM SERPL-SCNC: 147 MMOL/L (ref 136–145)
STRESS TARGET HR: 116 BPM
VARIANT LYMPHS NFR BLD MANUAL: 22 % (ref 19.6–45.3)
WBC MORPH BLD: NORMAL
WBC NRBC COR # BLD: 26.78 10*3/MM3 (ref 3.4–10.8)

## 2022-07-29 PROCEDURE — 84100 ASSAY OF PHOSPHORUS: CPT | Performed by: INTERNAL MEDICINE

## 2022-07-29 PROCEDURE — 84100 ASSAY OF PHOSPHORUS: CPT | Performed by: SURGERY

## 2022-07-29 PROCEDURE — 84132 ASSAY OF SERUM POTASSIUM: CPT | Performed by: INTERNAL MEDICINE

## 2022-07-29 PROCEDURE — 99232 SBSQ HOSP IP/OBS MODERATE 35: CPT | Performed by: INTERNAL MEDICINE

## 2022-07-29 PROCEDURE — 83735 ASSAY OF MAGNESIUM: CPT | Performed by: INTERNAL MEDICINE

## 2022-07-29 PROCEDURE — 85007 BL SMEAR W/DIFF WBC COUNT: CPT | Performed by: INTERNAL MEDICINE

## 2022-07-29 PROCEDURE — 93970 EXTREMITY STUDY: CPT | Performed by: INTERNAL MEDICINE

## 2022-07-29 PROCEDURE — 25010000002 HEPARIN (PORCINE) PER 1000 UNITS: Performed by: INTERNAL MEDICINE

## 2022-07-29 PROCEDURE — 80053 COMPREHEN METABOLIC PANEL: CPT | Performed by: SURGERY

## 2022-07-29 PROCEDURE — 93970 EXTREMITY STUDY: CPT

## 2022-07-29 PROCEDURE — 85025 COMPLETE CBC W/AUTO DIFF WBC: CPT | Performed by: INTERNAL MEDICINE

## 2022-07-29 PROCEDURE — 0 POTASSIUM CHLORIDE PER 2 MEQ: Performed by: INTERNAL MEDICINE

## 2022-07-29 PROCEDURE — 63710000001 INSULIN REGULAR HUMAN PER 5 UNITS: Performed by: INTERNAL MEDICINE

## 2022-07-29 PROCEDURE — 25010000002 CEFEPIME PER 500 MG: Performed by: INTERNAL MEDICINE

## 2022-07-29 PROCEDURE — 82962 GLUCOSE BLOOD TEST: CPT

## 2022-07-29 RX ORDER — AMINO ACIDS/PROTEIN HYDROLYS 15G-100/30
30 LIQUID (ML) ORAL DAILY
Status: DISCONTINUED | OUTPATIENT
Start: 2022-07-29 | End: 2022-07-31

## 2022-07-29 RX ADMIN — INSULIN HUMAN 2 UNITS: 100 INJECTION, SOLUTION PARENTERAL at 00:02

## 2022-07-29 RX ADMIN — POTASSIUM CHLORIDE 20 MEQ: 29.8 INJECTION, SOLUTION INTRAVENOUS at 05:45

## 2022-07-29 RX ADMIN — NYSTATIN 500000 UNITS: 100000 SUSPENSION ORAL at 12:43

## 2022-07-29 RX ADMIN — NYSTATIN 500000 UNITS: 100000 SUSPENSION ORAL at 21:04

## 2022-07-29 RX ADMIN — METRONIDAZOLE 500 MG: 500 INJECTION, SOLUTION INTRAVENOUS at 13:01

## 2022-07-29 RX ADMIN — HEPARIN SODIUM 5000 UNITS: 5000 INJECTION, SOLUTION INTRAVENOUS; SUBCUTANEOUS at 21:04

## 2022-07-29 RX ADMIN — NYSTATIN 500000 UNITS: 100000 SUSPENSION ORAL at 08:02

## 2022-07-29 RX ADMIN — LEVOTHYROXINE SODIUM 37.5 MCG: 20 INJECTION, SOLUTION INTRAVENOUS at 12:43

## 2022-07-29 RX ADMIN — OXYCODONE HYDROCHLORIDE AND ACETAMINOPHEN 1 TABLET: 7.5; 325 TABLET ORAL at 21:30

## 2022-07-29 RX ADMIN — CEFEPIME HYDROCHLORIDE 1 G: 1 INJECTION, POWDER, FOR SOLUTION INTRAMUSCULAR; INTRAVENOUS at 17:59

## 2022-07-29 RX ADMIN — Medication 30 ML: at 13:02

## 2022-07-29 RX ADMIN — NYSTATIN 500000 UNITS: 100000 SUSPENSION ORAL at 17:59

## 2022-07-29 RX ADMIN — PANTOPRAZOLE SODIUM 40 MG: 40 INJECTION, POWDER, FOR SOLUTION INTRAVENOUS at 08:02

## 2022-07-29 RX ADMIN — TERAZOSIN HYDROCHLORIDE 1 MG: 1 CAPSULE ORAL at 21:04

## 2022-07-29 RX ADMIN — POTASSIUM & SODIUM PHOSPHATES POWDER PACK 280-160-250 MG 2 PACKET: 280-160-250 PACK at 14:55

## 2022-07-29 RX ADMIN — POTASSIUM & SODIUM PHOSPHATES POWDER PACK 280-160-250 MG 2 PACKET: 280-160-250 PACK at 08:02

## 2022-07-29 RX ADMIN — HEPARIN SODIUM 5000 UNITS: 5000 INJECTION, SOLUTION INTRAVENOUS; SUBCUTANEOUS at 08:02

## 2022-07-29 RX ADMIN — METRONIDAZOLE 500 MG: 500 INJECTION, SOLUTION INTRAVENOUS at 05:45

## 2022-07-29 RX ADMIN — INSULIN HUMAN 2 UNITS: 100 INJECTION, SOLUTION PARENTERAL at 12:42

## 2022-07-29 RX ADMIN — POTASSIUM CHLORIDE 20 MEQ: 29.8 INJECTION, SOLUTION INTRAVENOUS at 04:37

## 2022-07-29 RX ADMIN — CEFEPIME HYDROCHLORIDE 1 G: 1 INJECTION, POWDER, FOR SOLUTION INTRAMUSCULAR; INTRAVENOUS at 06:26

## 2022-07-29 RX ADMIN — METRONIDAZOLE 500 MG: 500 INJECTION, SOLUTION INTRAVENOUS at 21:04

## 2022-07-30 LAB
ALBUMIN SERPL-MCNC: 2.7 G/DL (ref 3.5–5.2)
ALBUMIN/GLOB SERPL: 1.3 G/DL
ALP SERPL-CCNC: 99 U/L (ref 39–117)
ALT SERPL W P-5'-P-CCNC: 28 U/L (ref 1–33)
ANION GAP SERPL CALCULATED.3IONS-SCNC: 6 MMOL/L (ref 5–15)
AST SERPL-CCNC: 20 U/L (ref 1–32)
BASOPHILS # BLD AUTO: 0.18 10*3/MM3 (ref 0–0.2)
BASOPHILS NFR BLD AUTO: 0.7 % (ref 0–1.5)
BILIRUB SERPL-MCNC: 0.3 MG/DL (ref 0–1.2)
BUN SERPL-MCNC: 32 MG/DL (ref 8–23)
BUN/CREAT SERPL: 35.2 (ref 7–25)
CALCIUM SPEC-SCNC: 8.3 MG/DL (ref 8.6–10.5)
CHLORIDE SERPL-SCNC: 113 MMOL/L (ref 98–107)
CO2 SERPL-SCNC: 27 MMOL/L (ref 22–29)
CREAT SERPL-MCNC: 0.91 MG/DL (ref 0.57–1)
DEPRECATED RDW RBC AUTO: 54.6 FL (ref 37–54)
EGFRCR SERPLBLD CKD-EPI 2021: 62.7 ML/MIN/1.73
EOSINOPHIL # BLD AUTO: 0.8 10*3/MM3 (ref 0–0.4)
EOSINOPHIL NFR BLD AUTO: 3 % (ref 0.3–6.2)
ERYTHROCYTE [DISTWIDTH] IN BLOOD BY AUTOMATED COUNT: 15.9 % (ref 12.3–15.4)
GLOBULIN UR ELPH-MCNC: 2.1 GM/DL
GLUCOSE BLDC GLUCOMTR-MCNC: 142 MG/DL (ref 70–130)
GLUCOSE BLDC GLUCOMTR-MCNC: 144 MG/DL (ref 70–130)
GLUCOSE BLDC GLUCOMTR-MCNC: 147 MG/DL (ref 70–130)
GLUCOSE BLDC GLUCOMTR-MCNC: 156 MG/DL (ref 70–130)
GLUCOSE SERPL-MCNC: 152 MG/DL (ref 65–99)
HCT VFR BLD AUTO: 27.2 % (ref 34–46.6)
HGB BLD-MCNC: 8.7 G/DL (ref 12–15.9)
IMM GRANULOCYTES # BLD AUTO: 0.64 10*3/MM3 (ref 0–0.05)
IMM GRANULOCYTES NFR BLD AUTO: 2.4 % (ref 0–0.5)
LYMPHOCYTES # BLD AUTO: 5.9 10*3/MM3 (ref 0.7–3.1)
LYMPHOCYTES NFR BLD AUTO: 22 % (ref 19.6–45.3)
MAGNESIUM SERPL-MCNC: 2.1 MG/DL (ref 1.6–2.4)
MCH RBC QN AUTO: 30.7 PG (ref 26.6–33)
MCHC RBC AUTO-ENTMCNC: 32 G/DL (ref 31.5–35.7)
MCV RBC AUTO: 96.1 FL (ref 79–97)
MONOCYTES # BLD AUTO: 3.41 10*3/MM3 (ref 0.1–0.9)
MONOCYTES NFR BLD AUTO: 12.7 % (ref 5–12)
NEUTROPHILS NFR BLD AUTO: 15.92 10*3/MM3 (ref 1.7–7)
NEUTROPHILS NFR BLD AUTO: 59.2 % (ref 42.7–76)
NRBC BLD AUTO-RTO: 0.4 /100 WBC (ref 0–0.2)
PHOSPHATE SERPL-MCNC: 2.6 MG/DL (ref 2.5–4.5)
PLAT MORPH BLD: NORMAL
PLATELET # BLD AUTO: 593 10*3/MM3 (ref 140–450)
PMV BLD AUTO: 9.9 FL (ref 6–12)
POTASSIUM SERPL-SCNC: 4.3 MMOL/L (ref 3.5–5.2)
PROT SERPL-MCNC: 4.8 G/DL (ref 6–8.5)
RBC # BLD AUTO: 2.83 10*6/MM3 (ref 3.77–5.28)
RBC MORPH BLD: NORMAL
SODIUM SERPL-SCNC: 146 MMOL/L (ref 136–145)
WBC MORPH BLD: NORMAL
WBC NRBC COR # BLD: 26.85 10*3/MM3 (ref 3.4–10.8)

## 2022-07-30 PROCEDURE — 25010000002 HEPARIN (PORCINE) PER 1000 UNITS: Performed by: INTERNAL MEDICINE

## 2022-07-30 PROCEDURE — 80053 COMPREHEN METABOLIC PANEL: CPT | Performed by: INTERNAL MEDICINE

## 2022-07-30 PROCEDURE — 99233 SBSQ HOSP IP/OBS HIGH 50: CPT | Performed by: INTERNAL MEDICINE

## 2022-07-30 PROCEDURE — 83735 ASSAY OF MAGNESIUM: CPT | Performed by: INTERNAL MEDICINE

## 2022-07-30 PROCEDURE — 85007 BL SMEAR W/DIFF WBC COUNT: CPT | Performed by: INTERNAL MEDICINE

## 2022-07-30 PROCEDURE — 25010000002 CEFEPIME PER 500 MG: Performed by: INTERNAL MEDICINE

## 2022-07-30 PROCEDURE — 85025 COMPLETE CBC W/AUTO DIFF WBC: CPT | Performed by: INTERNAL MEDICINE

## 2022-07-30 PROCEDURE — 82962 GLUCOSE BLOOD TEST: CPT

## 2022-07-30 PROCEDURE — 84100 ASSAY OF PHOSPHORUS: CPT | Performed by: INTERNAL MEDICINE

## 2022-07-30 RX ORDER — ATENOLOL 50 MG/1
50 TABLET ORAL
Status: DISCONTINUED | OUTPATIENT
Start: 2022-07-30 | End: 2022-08-04 | Stop reason: HOSPADM

## 2022-07-30 RX ADMIN — METRONIDAZOLE 500 MG: 500 INJECTION, SOLUTION INTRAVENOUS at 06:12

## 2022-07-30 RX ADMIN — NYSTATIN 500000 UNITS: 100000 SUSPENSION ORAL at 20:27

## 2022-07-30 RX ADMIN — NYSTATIN 500000 UNITS: 100000 SUSPENSION ORAL at 08:29

## 2022-07-30 RX ADMIN — TERAZOSIN HYDROCHLORIDE 1 MG: 1 CAPSULE ORAL at 20:27

## 2022-07-30 RX ADMIN — ATENOLOL 50 MG: 50 TABLET ORAL at 14:03

## 2022-07-30 RX ADMIN — NYSTATIN 500000 UNITS: 100000 SUSPENSION ORAL at 11:52

## 2022-07-30 RX ADMIN — CEFEPIME HYDROCHLORIDE 1 G: 1 INJECTION, POWDER, FOR SOLUTION INTRAMUSCULAR; INTRAVENOUS at 06:12

## 2022-07-30 RX ADMIN — HEPARIN SODIUM 5000 UNITS: 5000 INJECTION, SOLUTION INTRAVENOUS; SUBCUTANEOUS at 20:27

## 2022-07-30 RX ADMIN — HEPARIN SODIUM 5000 UNITS: 5000 INJECTION, SOLUTION INTRAVENOUS; SUBCUTANEOUS at 08:29

## 2022-07-30 RX ADMIN — LEVOTHYROXINE SODIUM 37.5 MCG: 20 INJECTION, SOLUTION INTRAVENOUS at 11:52

## 2022-07-30 RX ADMIN — NYSTATIN 500000 UNITS: 100000 SUSPENSION ORAL at 17:58

## 2022-07-30 RX ADMIN — PANTOPRAZOLE SODIUM 40 MG: 40 INJECTION, POWDER, FOR SOLUTION INTRAVENOUS at 08:28

## 2022-07-31 LAB
ALBUMIN SERPL-MCNC: 2.5 G/DL (ref 3.5–5.2)
ALBUMIN/GLOB SERPL: 1.3 G/DL
ALP SERPL-CCNC: 82 U/L (ref 39–117)
ALT SERPL W P-5'-P-CCNC: 22 U/L (ref 1–33)
ANION GAP SERPL CALCULATED.3IONS-SCNC: 7 MMOL/L (ref 5–15)
AST SERPL-CCNC: 18 U/L (ref 1–32)
BASOPHILS # BLD AUTO: 0.12 10*3/MM3 (ref 0–0.2)
BASOPHILS NFR BLD AUTO: 0.5 % (ref 0–1.5)
BILIRUB SERPL-MCNC: 0.2 MG/DL (ref 0–1.2)
BUN SERPL-MCNC: 28 MG/DL (ref 8–23)
BUN/CREAT SERPL: 32.2 (ref 7–25)
CALCIUM SPEC-SCNC: 8.3 MG/DL (ref 8.6–10.5)
CHLORIDE SERPL-SCNC: 111 MMOL/L (ref 98–107)
CO2 SERPL-SCNC: 26 MMOL/L (ref 22–29)
CREAT SERPL-MCNC: 0.87 MG/DL (ref 0.57–1)
DEPRECATED RDW RBC AUTO: 53.1 FL (ref 37–54)
EGFRCR SERPLBLD CKD-EPI 2021: 66.2 ML/MIN/1.73
EOSINOPHIL # BLD AUTO: 0.62 10*3/MM3 (ref 0–0.4)
EOSINOPHIL NFR BLD AUTO: 2.5 % (ref 0.3–6.2)
ERYTHROCYTE [DISTWIDTH] IN BLOOD BY AUTOMATED COUNT: 15.9 % (ref 12.3–15.4)
GLOBULIN UR ELPH-MCNC: 2 GM/DL
GLUCOSE BLDC GLUCOMTR-MCNC: 132 MG/DL (ref 70–130)
GLUCOSE BLDC GLUCOMTR-MCNC: 145 MG/DL (ref 70–130)
GLUCOSE BLDC GLUCOMTR-MCNC: 177 MG/DL (ref 70–130)
GLUCOSE SERPL-MCNC: 142 MG/DL (ref 65–99)
HCT VFR BLD AUTO: 26.7 % (ref 34–46.6)
HGB BLD-MCNC: 8.8 G/DL (ref 12–15.9)
IMM GRANULOCYTES # BLD AUTO: 0.59 10*3/MM3 (ref 0–0.05)
IMM GRANULOCYTES NFR BLD AUTO: 2.4 % (ref 0–0.5)
IRON 24H UR-MRATE: 20 MCG/DL (ref 37–145)
IRON SATN MFR SERPL: 11 % (ref 20–50)
LYMPHOCYTES # BLD AUTO: 4.97 10*3/MM3 (ref 0.7–3.1)
LYMPHOCYTES NFR BLD AUTO: 20.1 % (ref 19.6–45.3)
MAGNESIUM SERPL-MCNC: 2 MG/DL (ref 1.6–2.4)
MCH RBC QN AUTO: 31.1 PG (ref 26.6–33)
MCHC RBC AUTO-ENTMCNC: 33 G/DL (ref 31.5–35.7)
MCV RBC AUTO: 94.3 FL (ref 79–97)
MONOCYTES # BLD AUTO: 1.92 10*3/MM3 (ref 0.1–0.9)
MONOCYTES NFR BLD AUTO: 7.7 % (ref 5–12)
NEUTROPHILS NFR BLD AUTO: 16.56 10*3/MM3 (ref 1.7–7)
NEUTROPHILS NFR BLD AUTO: 66.8 % (ref 42.7–76)
NRBC BLD AUTO-RTO: 0.3 /100 WBC (ref 0–0.2)
PLATELET # BLD AUTO: 637 10*3/MM3 (ref 140–450)
PMV BLD AUTO: 10 FL (ref 6–12)
POTASSIUM SERPL-SCNC: 4.6 MMOL/L (ref 3.5–5.2)
PROT SERPL-MCNC: 4.5 G/DL (ref 6–8.5)
RBC # BLD AUTO: 2.83 10*6/MM3 (ref 3.77–5.28)
SODIUM SERPL-SCNC: 144 MMOL/L (ref 136–145)
TIBC SERPL-MCNC: 188 MCG/DL (ref 298–536)
TRANSFERRIN SERPL-MCNC: 126 MG/DL (ref 200–360)
WBC NRBC COR # BLD: 24.78 10*3/MM3 (ref 3.4–10.8)

## 2022-07-31 PROCEDURE — 99232 SBSQ HOSP IP/OBS MODERATE 35: CPT | Performed by: INTERNAL MEDICINE

## 2022-07-31 PROCEDURE — 83540 ASSAY OF IRON: CPT | Performed by: INTERNAL MEDICINE

## 2022-07-31 PROCEDURE — 97530 THERAPEUTIC ACTIVITIES: CPT | Performed by: PHYSICAL THERAPIST

## 2022-07-31 PROCEDURE — 85025 COMPLETE CBC W/AUTO DIFF WBC: CPT | Performed by: SURGERY

## 2022-07-31 PROCEDURE — 84466 ASSAY OF TRANSFERRIN: CPT | Performed by: INTERNAL MEDICINE

## 2022-07-31 PROCEDURE — 97110 THERAPEUTIC EXERCISES: CPT | Performed by: PHYSICAL THERAPIST

## 2022-07-31 PROCEDURE — 25010000002 HEPARIN (PORCINE) PER 1000 UNITS: Performed by: INTERNAL MEDICINE

## 2022-07-31 PROCEDURE — 83735 ASSAY OF MAGNESIUM: CPT | Performed by: INTERNAL MEDICINE

## 2022-07-31 PROCEDURE — 82962 GLUCOSE BLOOD TEST: CPT

## 2022-07-31 PROCEDURE — 25010000002 NA FERRIC GLUC CPLX PER 12.5 MG: Performed by: INTERNAL MEDICINE

## 2022-07-31 PROCEDURE — 25010000002 ONDANSETRON PER 1 MG: Performed by: INTERNAL MEDICINE

## 2022-07-31 PROCEDURE — 80053 COMPREHEN METABOLIC PANEL: CPT | Performed by: INTERNAL MEDICINE

## 2022-07-31 RX ORDER — ACETAMINOPHEN 325 MG/1
650 TABLET ORAL EVERY 6 HOURS PRN
Status: DISCONTINUED | OUTPATIENT
Start: 2022-07-31 | End: 2022-08-04 | Stop reason: HOSPADM

## 2022-07-31 RX ADMIN — HEPARIN SODIUM 5000 UNITS: 5000 INJECTION, SOLUTION INTRAVENOUS; SUBCUTANEOUS at 10:57

## 2022-07-31 RX ADMIN — NYSTATIN 500000 UNITS: 100000 SUSPENSION ORAL at 10:57

## 2022-07-31 RX ADMIN — ATENOLOL 50 MG: 50 TABLET ORAL at 10:57

## 2022-07-31 RX ADMIN — NYSTATIN 500000 UNITS: 100000 SUSPENSION ORAL at 16:51

## 2022-07-31 RX ADMIN — PANTOPRAZOLE SODIUM 40 MG: 40 INJECTION, POWDER, FOR SOLUTION INTRAVENOUS at 10:57

## 2022-07-31 RX ADMIN — TERAZOSIN HYDROCHLORIDE 1 MG: 1 CAPSULE ORAL at 20:36

## 2022-07-31 RX ADMIN — ONDANSETRON 4 MG: 2 INJECTION INTRAMUSCULAR; INTRAVENOUS at 08:54

## 2022-07-31 RX ADMIN — ACETAMINOPHEN 650 MG: 325 TABLET ORAL at 16:50

## 2022-07-31 RX ADMIN — HEPARIN SODIUM 5000 UNITS: 5000 INJECTION, SOLUTION INTRAVENOUS; SUBCUTANEOUS at 20:36

## 2022-07-31 RX ADMIN — SODIUM CHLORIDE 125 MG: 9 INJECTION, SOLUTION INTRAVENOUS at 17:26

## 2022-08-01 LAB
ALBUMIN SERPL-MCNC: 2.1 G/DL (ref 3.5–5.2)
ALBUMIN/GLOB SERPL: 0.9 G/DL
ALP SERPL-CCNC: 66 U/L (ref 39–117)
ALT SERPL W P-5'-P-CCNC: 20 U/L (ref 1–33)
ANION GAP SERPL CALCULATED.3IONS-SCNC: 9 MMOL/L (ref 5–15)
AST SERPL-CCNC: 23 U/L (ref 1–32)
BASOPHILS # BLD AUTO: 0.15 10*3/MM3 (ref 0–0.2)
BASOPHILS NFR BLD AUTO: 0.8 % (ref 0–1.5)
BILIRUB SERPL-MCNC: 0.3 MG/DL (ref 0–1.2)
BUN SERPL-MCNC: 31 MG/DL (ref 8–23)
BUN/CREAT SERPL: 32.3 (ref 7–25)
BURR CELLS BLD QL SMEAR: NORMAL
CALCIUM SPEC-SCNC: 8.3 MG/DL (ref 8.6–10.5)
CHLORIDE SERPL-SCNC: 102 MMOL/L (ref 98–107)
CO2 SERPL-SCNC: 22 MMOL/L (ref 22–29)
CREAT SERPL-MCNC: 0.96 MG/DL (ref 0.57–1)
DEPRECATED RDW RBC AUTO: 53.3 FL (ref 37–54)
EGFRCR SERPLBLD CKD-EPI 2021: 58.8 ML/MIN/1.73
EOSINOPHIL # BLD AUTO: 0.46 10*3/MM3 (ref 0–0.4)
EOSINOPHIL NFR BLD AUTO: 2.3 % (ref 0.3–6.2)
ERYTHROCYTE [DISTWIDTH] IN BLOOD BY AUTOMATED COUNT: 15.8 % (ref 12.3–15.4)
GLOBULIN UR ELPH-MCNC: 2.3 GM/DL
GLUCOSE SERPL-MCNC: 103 MG/DL (ref 65–99)
HCT VFR BLD AUTO: 27.3 % (ref 34–46.6)
HGB BLD-MCNC: 8.8 G/DL (ref 12–15.9)
IMM GRANULOCYTES # BLD AUTO: 0.34 10*3/MM3 (ref 0–0.05)
IMM GRANULOCYTES NFR BLD AUTO: 1.7 % (ref 0–0.5)
LYMPHOCYTES # BLD AUTO: 3.25 10*3/MM3 (ref 0.7–3.1)
LYMPHOCYTES NFR BLD AUTO: 16.3 % (ref 19.6–45.3)
MAGNESIUM SERPL-MCNC: 2.2 MG/DL (ref 1.6–2.4)
MCH RBC QN AUTO: 30.2 PG (ref 26.6–33)
MCHC RBC AUTO-ENTMCNC: 32.2 G/DL (ref 31.5–35.7)
MCV RBC AUTO: 93.8 FL (ref 79–97)
MONOCYTES # BLD AUTO: 2.84 10*3/MM3 (ref 0.1–0.9)
MONOCYTES NFR BLD AUTO: 14.3 % (ref 5–12)
NEUTROPHILS NFR BLD AUTO: 12.88 10*3/MM3 (ref 1.7–7)
NEUTROPHILS NFR BLD AUTO: 64.6 % (ref 42.7–76)
NRBC BLD AUTO-RTO: 0.2 /100 WBC (ref 0–0.2)
OVALOCYTES BLD QL SMEAR: NORMAL
PLAT MORPH BLD: NORMAL
PLATELET # BLD AUTO: 643 10*3/MM3 (ref 140–450)
PMV BLD AUTO: 9.8 FL (ref 6–12)
POTASSIUM SERPL-SCNC: 5.2 MMOL/L (ref 3.5–5.2)
PROT SERPL-MCNC: 4.4 G/DL (ref 6–8.5)
RBC # BLD AUTO: 2.91 10*6/MM3 (ref 3.77–5.28)
SODIUM SERPL-SCNC: 133 MMOL/L (ref 136–145)
WBC MORPH BLD: NORMAL
WBC NRBC COR # BLD: 19.92 10*3/MM3 (ref 3.4–10.8)

## 2022-08-01 PROCEDURE — 92526 ORAL FUNCTION THERAPY: CPT

## 2022-08-01 PROCEDURE — 83735 ASSAY OF MAGNESIUM: CPT | Performed by: INTERNAL MEDICINE

## 2022-08-01 PROCEDURE — 25010000002 NA FERRIC GLUC CPLX PER 12.5 MG: Performed by: INTERNAL MEDICINE

## 2022-08-01 PROCEDURE — 97535 SELF CARE MNGMENT TRAINING: CPT

## 2022-08-01 PROCEDURE — 97530 THERAPEUTIC ACTIVITIES: CPT

## 2022-08-01 PROCEDURE — 97110 THERAPEUTIC EXERCISES: CPT

## 2022-08-01 PROCEDURE — 85007 BL SMEAR W/DIFF WBC COUNT: CPT | Performed by: SURGERY

## 2022-08-01 PROCEDURE — 25010000002 HEPARIN (PORCINE) PER 1000 UNITS: Performed by: INTERNAL MEDICINE

## 2022-08-01 PROCEDURE — 80053 COMPREHEN METABOLIC PANEL: CPT | Performed by: INTERNAL MEDICINE

## 2022-08-01 PROCEDURE — 85025 COMPLETE CBC W/AUTO DIFF WBC: CPT | Performed by: SURGERY

## 2022-08-01 PROCEDURE — 82150 ASSAY OF AMYLASE: CPT | Performed by: SURGERY

## 2022-08-01 PROCEDURE — 99232 SBSQ HOSP IP/OBS MODERATE 35: CPT | Performed by: INTERNAL MEDICINE

## 2022-08-01 RX ORDER — NYSTATIN 100000 [USP'U]/G
POWDER TOPICAL EVERY 8 HOURS SCHEDULED
Status: DISCONTINUED | OUTPATIENT
Start: 2022-08-01 | End: 2022-08-04 | Stop reason: HOSPADM

## 2022-08-01 RX ORDER — TERAZOSIN 1 MG/1
1 CAPSULE ORAL NIGHTLY
Status: DISCONTINUED | OUTPATIENT
Start: 2022-08-01 | End: 2022-08-04 | Stop reason: HOSPADM

## 2022-08-01 RX ORDER — PANTOPRAZOLE SODIUM 40 MG/1
40 TABLET, DELAYED RELEASE ORAL
Status: DISCONTINUED | OUTPATIENT
Start: 2022-08-01 | End: 2022-08-04 | Stop reason: HOSPADM

## 2022-08-01 RX ORDER — LEVOTHYROXINE SODIUM 0.05 MG/1
50 TABLET ORAL
Status: DISCONTINUED | OUTPATIENT
Start: 2022-08-01 | End: 2022-08-04 | Stop reason: HOSPADM

## 2022-08-01 RX ADMIN — PANTOPRAZOLE SODIUM 40 MG: 40 TABLET, DELAYED RELEASE ORAL at 09:55

## 2022-08-01 RX ADMIN — TERAZOSIN HYDROCHLORIDE 1 MG: 1 CAPSULE ORAL at 20:26

## 2022-08-01 RX ADMIN — ATENOLOL 50 MG: 50 TABLET ORAL at 09:55

## 2022-08-01 RX ADMIN — LEVOTHYROXINE SODIUM 50 MCG: 50 TABLET ORAL at 09:56

## 2022-08-01 RX ADMIN — SODIUM CHLORIDE 125 MG: 9 INJECTION, SOLUTION INTRAVENOUS at 09:55

## 2022-08-01 RX ADMIN — HEPARIN SODIUM 5000 UNITS: 5000 INJECTION, SOLUTION INTRAVENOUS; SUBCUTANEOUS at 20:26

## 2022-08-01 RX ADMIN — HEPARIN SODIUM 5000 UNITS: 5000 INJECTION, SOLUTION INTRAVENOUS; SUBCUTANEOUS at 09:55

## 2022-08-01 NOTE — THERAPY TREATMENT NOTE
Acute Care - Speech Language Pathology   Swallow Treatment Note  David     Patient Name: Mahi Coronel  : 1940  MRN: [id number]  Today's Date: 2022               Admit Date: 2022    Visit Dx:     ICD-10-CM ICD-9-CM   1. Sepsis without acute organ dysfunction, due to unspecified organism (HCC)  A41.9 038.9     995.91   2. Splenomegaly  R16.1 789.2   3. Oropharyngeal dysphagia  R13.12 787.22     Patient Active Problem List   Diagnosis   • Lymphoma, low grade (HCC)   • Neuropathy   • Splenomegaly   • Anemia   • Thrombocytopenia (HCC)   • Sepsis (HCC)   • Lactic acidosis   • Hyponatremia   • CKD (chronic kidney disease) stage 3, GFR 30-59 ml/min (HCC)   • Elevated procalcitonin   • Elevated C-reactive protein (CRP)   • Coagulopathy (HCC)   • Pneumothorax, unspecified   • Sepsis without acute organ dysfunction, due to unspecified organism (HCC)     Past Medical History:   Diagnosis Date   • Anemia 2015   • Cataract     h/o   • Disease of thyroid gland    • Diverticulitis    • DVT (deep venous thrombosis) (HCC)     left lower leg   • GERD (gastroesophageal reflux disease)    • Gout    • History of basal cell carcinoma    • History of transfusion     1 unit at [facility], pt denies reactions   • Hypertension         • IBS (irritable bowel syndrome)    • Non Hodgkin's lymphoma (HCC)    • Splenomegaly    • Urinary incontinence      Past Surgical History:   Procedure Laterality Date   • APPENDECTOMY     • BACK SURGERY      lumbar   • CHOLECYSTECTOMY     • COLON RESECTION     • EXPLORATORY LAPAROTOMY N/A 2022    Procedure: ABDOMINAL WASHOUT WITH CLOSURE;  Surgeon: Jayme Kemp MD;  Location: [facility];  Service: General;  Laterality: N/A;   • HYSTERECTOMY      jaja   • KIDNEY SURGERY      kidney stones   • ORIF ANKLE FRACTURE Right     h/o   • SPLENECTOMY N/A 2022    Procedure: SPLENECTOMY OPEN, DISTAL PANCREATECTOMY;  Surgeon: Jayme Kemp MD;  Location: [facility];   Service: General;  Laterality: N/A;   • TOTAL HIP ARTHROPLASTY Bilateral     h/o       SLP Recommendation and Plan                                         Daily Summary of Progress (SLP): progress toward functional goals as expected (08/01/22 1600)               Treatment Assessment (SLP): Pt looks much stronger overall. No overt s/sx aspiration with multiple trials of thin liquid. Recommend [facility] tomorrow to assess readiness for diet upgrade. (08/01/22 1600)  Plan for Continued Treatment (SLP): continue treatment per plan of care (08/01/22 1600)         Plan of Care Reviewed With: patient, daughter      SWALLOW EVALUATION (last 72 hours)     SLP Adult Swallow Evaluation     Row Name 08/01/22 1600                   Rehab Evaluation    Document Type therapy note (daily note)  -DV        Subjective Information no complaints  -DV        Patient Observations alert;cooperative  -DV        Patient/Family/Caregiver Comments/Observations dtr present  -DV        Patient Effort good  -DV        Symptoms Noted During/After Treatment none  -DV                  Pain    Additional Documentation Pain Scale: Numbers Pre/Post-Treatment (Group)  -DV                  Pain Scale: Numbers Pre/Post-Treatment    Pretreatment Pain Rating 0/10 - no pain  -DV        Posttreatment Pain Rating 0/10 - no pain  -DV                  SLP Treatment Clinical Impressions    Treatment Assessment (SLP) Pt looks much stronger overall. No overt s/sx aspiration with multiple trials of thin liquid. Recommend [facility] tomorrow to assess readiness for diet upgrade.  -DV        Daily Summary of Progress (SLP) progress toward functional goals as expected  -DV        Plan for Continued Treatment (SLP) continue treatment per plan of care  -DV        Care Plan Review evaluation/treatment results reviewed;care plan/treatment goals reviewed;risks/benefits reviewed;patient/other agree to care plan  -DV              User Key  (r) = Recorded By, (t) = Taken By, (c) =  Cosigned By    Initials Name Effective Dates    DV Danika Gutierres, MS CCC-SLP 06/16/21 -                 EDUCATION  The patient has been educated in the following areas:   Dysphagia (Swallowing Impairment) Modified Diet Instruction.        SLP GOALS     Row Name 08/01/22 1600             (LTG) Patient will demonstrate functional swallow for    Diet Texture (Demonstrate functional swallow) regular textures  -DV      Liquid viscosity (Demonstrate functional swallow) thin liquids  -DV      [place] (Demonstrate functional swallow) independently (over 90% accuracy)  -DV      Time Frame (Demonstrate functional swallow) by discharge  -DV      Progress/Outcomes (Demonstrate functional swallow) continuing progress toward goal  -DV      Comment (Demonstrate functional swallow) no s/s multiple trials thin liquid  -DV              (STG) Lingual Strengthening Goal 1 (SLP)    Activity (Lingual Strengthening Goal 1, SLP) increase lingual tone/sensation/control/coordination/movement;increase tongue back strength  -DV      Increase Lingual Tone/Sensation/Control/Coordination/Movement swallow trials;lingual resistance exercises  -DV      Increase Tongue Back Strength lingual resistance exercises  -DV      [place]/Accuracy (Lingual Strengthening Goal 1, SLP) with minimal cues (75-90% accuracy)  -DV      Time Frame (Lingual Strengthening Goal 1, SLP) short term goal (STG)  -DV      Progress/Outcomes (Lingual Strengthening Goal 1, SLP) continuing progress toward goal  -DV      Comment (Lingual Strengthening Goal 1, SLP) completed  -DV              (STG) Pharyngeal Strengthening Exercise Goal 1 (SLP)    Activity (Pharyngeal Strengthening Goal 1, SLP) increase timing  -DV      Increase Timing prepping - 3 second prep or suck swallow or 3-step swallow;super-supraglottic swallow  -DV      [place]/Accuracy (Pharyngeal Strengthening Goal 1, SLP) with minimal cues (75-90% accuracy)  -DV      Time Frame (Pharyngeal Strengthening  Goal 1, SLP) short term goal (STG)  -DV      Progress/Outcomes (Pharyngeal Strengthening Goal 1, SLP) continuing progress toward goal  -DV      Comment (Pharyngeal Strengthening Goal 1, SLP) completed  -DV            User Key  (r) = Recorded By, (t) = Taken By, (c) = Cosigned By    Initials Name Provider Type    Danika Conroy MS CCC-SLP Speech and Language Pathologist                   Time Calculation:    Time Calculation- SLP     Row Name 08/01/22 1622 08/01/22 1621          Time Calculation- SLP    SLP Start Time -- 1600  -DV     SLP Received On -- 08/01/22  -DV            Untimed Charges    SLP Treatment ST Treatment Swallow Minutes  - 11205  -DV --     [id number]-UE Treatment Swallow Minutes 40  -DV --            Total Minutes    Untimed Charges Total Minutes 40  -DV --      Total Minutes 40  -DV --           User Key  (r) = Recorded By, (t) = Taken By, (c) = Cosigned By    Initials Name Provider Type    Danika Conroy MS CCC-SLP Speech and Language Pathologist                Therapy Charges for Today     Code Description Service Date Service Provider Modifiers Qty    [id number]  ST TREATMENT SWALLOW 3 8/1/2022 Danika Gutierres MS CCC-CHANA GN 1               MS MAYRA Diallo  8/1/2022

## 2022-08-01 NOTE — DISCHARGE PLACEMENT REQUEST
"Case Management [phone]    Mahi Coronel (81 y.o. Female)             Date of Birth   1940    Social Security Number       Address   [address] [place] [address]    Home Phone   [phone]    MRN   [id number]       [facility]    Marital Status                               Admission Date   7/20/22    Admission Type   Elective    Admitting Provider   Jayme Kemp MD    Attending Provider   Jayme Kemp MD    Department, Room/Bed   [address], S560/1       Discharge Date       Discharge Disposition       Discharge Destination                               Attending Provider: Jayme Kemp MD    Allergies: Penicillins, Rituxan [Rituximab], Hydrocodone, Sulfa Antibiotics, Bactrim [Sulfamethoxazole-trimethoprim], Chlorhexidine Gluconate, Keflex [Cephalexin], Latex, Lortab [Hydrocodone-acetaminophen], Magnesium Citrate, Neosporin Original [Bacitracin-neomycin-polymyxin]    Isolation: None   Infection: None   Code Status: No CPR   Advance Care Planning Activity    Ht: 167.6 cm (65.98\")   Wt: 89.4 kg (197 lb 1.5 oz)    Admission Cmt: None   Principal Problem: Lymphoma, 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                 Active Insurance as of 7/20/2022     Primary Coverage     Payor Plan Insurance Group Employer/Plan Group    MEDICARE MEDICARE A & B      Payor Plan Address Payor Plan Phone Number Payor Plan Fax Number Effective Dates    [address] [phone]  9/1/2003 - None Entered    [facility] [id number]       Subscriber Name Subscriber Birth Date Member ID       MAHI CORONEL 1940 [id number]           Secondary Coverage     Payor Plan Insurance Group Employer/Plan Group    ANTH BLUE CROSS [facility] SUPP [id number]     Payor Plan Address Payor Plan Phone Number Payor Plan Fax Number Effective Dates    [address]   12/1/2016 - None Entered    [facility] [id number]       Subscriber Name Subscriber Birth Date Member ID       " "MAHI CORONEL 1940 [id number]                 Emergency Contacts      (Rel.) Home Phone Work Phone Mobile Phone    TOBIAS BRENNAN (Daughter) [phone] -- [phone]    Luna Coronel (Sister) -- -- [phone]    James Coronel -- -- [phone]               Physician Progress Notes (most recent note)      Jayme Kemp MD at 22 0808          Patient Name:  Mahi Coronel  YOB: 1940  [id number]    Surgery Progress Note    Date of visit: 2022    Subjective   Tolerating diet without nausea or vomiting. No fevers/chills. No abdominal pain. +BM.         Objective       /51 (BP Location: Right arm, Patient Position: Lying)   Pulse 67   Temp 99.3 °F (37.4 °C) (Oral)   Resp 18   Ht 167.6 cm (65.98\")   Wt 89.4 kg (197 lb 1.5 oz)   SpO2 94%   BMI 31.83 kg/m²     Intake/Output Summary (Last 24 hours) at 2022 0808  Last data filed at 2022 0325  Gross per 24 hour   Intake 2028 ml   Output 2500 ml   Net -472 ml   SHERRILL 1625 cc    CV:  Rhythm regular and rate regular  L:  Clear to auscultation bilaterally  Abd:  Bowel sounds positive, soft, incision c/d/i, SHERRILL serous  Ext:  No cyanosis, clubbing, edema    Recent labs and imaging that are back at this time have been reviewed.   WBC 24.8 -> 20  Hgb 8.8 -> 8.8  Cr 0.96      Assessment & Plan     Pt POD#12 splenectomy and distal pancreatectomy  Pt POD#11 abdominal washout and closure  Dysphagia diet  Re-check drain fluid amylase level given increased output  OOB as able, PT/OT  DVT prlx  Pain control        Jayme Kemp MD  2022  08:08 EDT        Electronically signed by Jayme Kemp MD at 22 0811          Physical Therapy Notes (most recent note)      Jada Rice, PT at 22 1514  Version 1 of 1         Patient Name: Mahi Coronel  : 1940    MRN: [id number]                              Today's Date: 2022       Admit Date: 2022    Visit Dx:     ICD-10-CM ICD-9-CM   1. " Sepsis without acute organ dysfunction, due to unspecified organism (HCC)  A41.9 038.9     995.91   2. Splenomegaly  R16.1 789.2   3. Oropharyngeal dysphagia  R13.12 787.22     Patient Active Problem List   Diagnosis   • Lymphoma, low grade (HCC)   • Neuropathy   • Splenomegaly   • Anemia   • Thrombocytopenia (HCC)   • Sepsis (HCC)   • Lactic acidosis   • Hyponatremia   • CKD (chronic kidney disease) stage 3, GFR 30-59 ml/min (HCC)   • Elevated procalcitonin   • Elevated C-reactive protein (CRP)   • Coagulopathy (HCC)   • Pneumothorax, unspecified   • Sepsis without acute organ dysfunction, due to unspecified organism (HCC)     Past Medical History:   Diagnosis Date   • Anemia 09/18/2015   • Cataract     h/o   • Disease of thyroid gland    • Diverticulitis    • DVT (deep venous thrombosis) (HCC)     left lower leg   • GERD (gastroesophageal reflux disease)    • Gout    • History of basal cell carcinoma    • History of transfusion 2022    1 unit at [facility], pt denies reactions   • Hypertension         • IBS (irritable bowel syndrome)    • Non Hodgkin's lymphoma (HCC)    • Splenomegaly    • Urinary incontinence      Past Surgical History:   Procedure Laterality Date   • APPENDECTOMY     • BACK SURGERY      lumbar   • CHOLECYSTECTOMY     • COLON RESECTION  2021   • EXPLORATORY LAPAROTOMY N/A 7/21/2022    Procedure: ABDOMINAL WASHOUT WITH CLOSURE;  Surgeon: Jayme Kemp MD;  Location: [facility];  Service: General;  Laterality: N/A;   • HYSTERECTOMY      jaja   • KIDNEY SURGERY      kidney stones   • ORIF ANKLE FRACTURE Right     h/o   • SPLENECTOMY N/A 7/20/2022    Procedure: SPLENECTOMY OPEN, DISTAL PANCREATECTOMY;  Surgeon: Jayme Kemp MD;  Location: [facility];  Service: General;  Laterality: N/A;   • TOTAL HIP ARTHROPLASTY Bilateral     h/o      General Information     Row Name 07/31/22 1609          Physical Therapy Time and Intention    Document Type therapy note (daily note)  -     Mode of Treatment  individual therapy;physical therapy  -LM     Row Name 07/31/22 1609          General Information    Patient Profile Reviewed yes  -LM     Existing Precautions/Restrictions fall;other (see comments)  Abd Incision; SHERRILL Drain; BLE/BUE Edema  -LM     Row Name 07/31/22 1609          Cognition    Orientation Status (Cognition) oriented x 3  -LM     Row Name 07/31/22 1609          Safety Issues, Functional Mobility    Safety Issues Affecting Function (Mobility) safety precaution awareness;safety precautions follow-through/compliance;sequencing abilities  -LM     Impairments Affecting Function (Mobility) balance;cognition;endurance/activity tolerance;strength;grasp;pain  -LM           User Key  (r) = Recorded By, (t) = Taken By, (c) = Cosigned By    Initials Name Provider Type    LM Jada Rice PT Physical Therapist               Mobility     Row Name 07/31/22 1612          Bed Mobility    Bed Mobility supine-sit  -LM     Sit-Supine [place] (Bed Mobility) moderate assist (50% patient effort);1 person assist;verbal cues  -LM     Assistive Device (Bed Mobility) bed rails;head of bed elevated  -LM     Comment, (Bed Mobility) Gown changed prior to transfer.  Vc's for sequencing.  -LM     Row Name 07/31/22 1612          Bed-Chair Transfer    Bed-Chair [place] (Transfers) moderate assist (50% patient effort);2 person assist;verbal cues  -LM     Assistive Device (Bed-Chair Transfers) walker, front-wheeled  -LM     Comment, (Bed-Chair Transfer) Pt able to take ~3-4 steps over to recliner using rw.  Vc's for sequencing and upright posture.  -LM     Row Name 07/31/22 1612          Sit-Stand Transfer    Sit-Stand [place] (Transfers) moderate assist (50% patient effort);1 person assist;verbal cues  -LM     Assistive Device (Sit-Stand Transfers) walker, front-wheeled  -LM     Comment, (Sit-Stand Transfer) Stood x 2 reps from EOB.  Vc's for hand placement.  On 2nd rep, 2nd person needed to complete dependent pericare  from .  -LM           User Key  (r) = Recorded By, (t) = Taken By, (c) = Cosigned By    Initials Name Provider Type    LM Jada Rice PT Physical Therapist               Obj/Interventions     Row Name 07/31/22 1615          Motor Skills    Therapeutic Exercise hip;knee;ankle  -LM     Row Name 07/31/22 1615          Hip (Therapeutic Exercise)    Hip (Therapeutic Exercise) AAROM (active assistive range of motion);isometric exercises  -LM     Hip AAROM (Therapeutic Exercise) bilateral;aBduction;aDduction;supine;10 repetitions  -LM     Hip Isometrics (Therapeutic Exercise) bilateral;gluteal sets;supine;10 repetitions  -LM     Row Name 07/31/22 1615          Knee (Therapeutic Exercise)    Knee (Therapeutic Exercise) AROM (active range of motion);isometric exercises  -     Knee AROM (Therapeutic Exercise) bilateral;heel slides;supine;10 repetitions  -LM     Knee Isometrics (Therapeutic Exercise) bilateral;quad sets;supine;10 repetitions  -LM     Row Name 07/31/22 1615          Ankle (Therapeutic Exercise)    Ankle (Therapeutic Exercise) AROM (active range of motion)  -     Ankle AROM (Therapeutic Exercise) bilateral;dorsiflexion;plantarflexion;supine;10 repetitions;2 sets  -     Row Name 07/31/22 1615          Balance    Static Sitting Balance standby assist  -LM     Position, Sitting Balance unsupported;sitting edge of bed  -LM     Static Standing Balance moderate assist;1-person assist;verbal cues  -LM     Dynamic Standing Balance moderate assist;2-person assist;verbal cues  -LM     Position/Device Used, Standing Balance supported;walker, front-wheeled  -LM           User Key  (r) = Recorded By, (t) = Taken By, (c) = Cosigned By    Initials Name Provider Type    Jada Franz PT Physical Therapist               Goals/Plan    No documentation.                Clinical Impression     Row Name 07/31/22 1615          Pain    Pretreatment Pain Rating 2/10  -LM     Posttreatment Pain Rating 3/10  -LM     Pain  Location incisional  -LM     Pain Location - abdomen  -LM     Pain Intervention(s) Repositioned;Ambulation/increased activity  -LM     Row Name 07/31/22 1615          Plan of Care Review    Plan of Care Reviewed With patient  -LM     Progress improving  -LM     Outcome Evaluation Progressing well.  BLE ther ex completed without issue - assist needed at times to go through full range.  Pt transferred supine-->sit with ModAx1, stood x 2 reps with ModAx1, and took ~3-4 steps over to recliner with ModAx2.  Continue to recommend inpt rehab at d/c.  -LM     Row Name 07/31/22 1615          Vital Signs    Pre Systolic BP Rehab 141  -LM     Pre Treatment Diastolic BP 51  -LM     Pretreatment Heart Rate (beats/min) 67  -LM     Posttreatment Heart Rate (beats/min) 68  -LM     Pre SpO2 (%) 98  -LM     O2 Delivery Pre Treatment room air  -LM     Post SpO2 (%) 95  -LM     O2 Delivery Post Treatment room air  -LM     Pre Patient Position Supine  -LM     Post Patient Position Sitting  -LM     Row Name 07/31/22 1615          Positioning and Restraints    Pre-Treatment Position in bed  -LM     Post Treatment Position chair  -LM     In Chair reclined;call light within reach;encouraged to call for assist;exit alarm on;waffle cushion;heels elevated;RUE elevated;LUE elevated;notified nsg;with family/caregiver  -LM           User Key  (r) = Recorded By, (t) = Taken By, (c) = Cosigned By    Initials Name Provider Type    LM Jada Rice, PT Physical Therapist               Outcome Measures     Row Name 07/31/22 1617 07/31/22 0800       How much help from another person do you currently need...    Turning from your back to your side while in flat bed without using bedrails? 2  -LM 2  -JA    Moving from lying on back to sitting on the side of a flat bed without bedrails? 2  -LM 2  -JA    Moving to and from a bed to a chair (including a wheelchair)? 2  -LM 2  -JA    Standing up from a chair using your arms (e.g., wheelchair, bedside chair)?  2  -LM 2  -JA    Climbing 3-5 steps with a railing? 1  -LM 1  -JA    To walk in hospital room? 2  -LM 1  -JA    AM-PAC 6 Clicks Score (PT) 11  -LM 10  -JA    Highest level of mobility 4 --> Transferred to chair/commode  -LM 4 --> Transferred to chair/commode  -JA    Row Name 07/31/22 1617          Functional Assessment    Outcome Measure Options AM-PAC 6 Clicks Basic Mobility (PT)  -LM           User Key  (r) = Recorded By, (t) = Taken By, (c) = Cosigned By    Initials Name Provider Type    LM Jada Rice, PT Physical Therapist    Tatianna Claros, RN Registered Nurse                             Physical Therapy Education                 Title: PT OT SLP Therapies (In Progress)     Topic: Physical Therapy (Done)     Point: Mobility training (Done)     Learning Progress Summary           Patient Acceptance, E,TB,D, NR,DU by AB at 7/28/2022 1428   Family Acceptance, E,TB, VU,NR by AY at 7/27/2022 0924      Show all documentation for this point (1)                 Point: Home exercise program (Done)     Learning Progress Summary           Patient Acceptance, E,TB,D, NR,DU by AB at 7/28/2022 1428                   Point: Body mechanics (Done)     Learning Progress Summary           Patient Acceptance, E,TB,D, NR,DU by AB at 7/28/2022 1428   Family Acceptance, E,TB, VU,NR by AY at 7/27/2022 0924      Show all documentation for this point (1)                 Point: Precautions (Done)     Learning Progress Summary           Patient Acceptance, E,TB,D, NR,DU by AB at 7/28/2022 1428   Family Acceptance, E,TB, VU,NR by AY at 7/27/2022 0924      Show all documentation for this point (1)                             User Key     Initials Effective Dates Name Provider Type Discipline    AY 11/10/20 -  Opal Starr, PT Physical Therapist PT    AB 05/23/22 -  Opal Oliveira, PT Student PT Student PT              PT Recommendation and Plan     Plan of Care Reviewed With: patient  Progress: improving  Outcome Evaluation:  Progressing well.  BLE ther ex completed without issue - assist needed at times to go through full range.  Pt transferred supine-->sit with ModAx1, stood x 2 reps with ModAx1, and took ~3-4 steps over to recliner with ModAx2.  Continue to recommend inpt rehab at d/c.     Time Calculation:    PT Charges     Row Name 22 1617             Time Calculation    Start Time 1514  -LM      PT Received On 22  -LM      PT Goal Re-Cert Due Date 22  -LM              Timed Charges    82148 - PT Therapeutic Exercise Minutes 13  -LM      81465 - PT Therapeutic Activity Minutes 35  -LM              Total Minutes    Timed Charges Total Minutes 48  -LM       Total Minutes 48  -LM            User Key  (r) = Recorded By, (t) = Taken By, (c) = Cosigned By    Initials Name Provider Type    LM Jada Rice, PT Physical Therapist              Therapy Charges for Today     Code Description Service Date Service Provider Modifiers Qty    63849468176 HC PT THERAPEUTIC ACT EA 15 MIN 2022 Jada Rice, PT GP 2    96965113951 HC PT THER PROC EA 15 MIN 2022 Jada Rice, PT GP 1          PT G-Codes  Outcome Measure Options: AM-PAC 6 Clicks Basic Mobility (PT)  AM-PAC 6 Clicks Score (PT): 11  AM-PAC 6 Clicks Score (OT): 11    Jada Rice PT  2022      Electronically signed by Jada Rice PT at 22 1619          Occupational Therapy Notes (most recent note)      Tiffany Rodriguez, OT at 22 0910          Patient Name: Mahi Coronel  : 1940    MRN: [id number]                              Today's Date: 2022       Admit Date: 2022    Visit Dx:     ICD-10-CM ICD-9-CM   1. Sepsis without acute organ dysfunction, due to unspecified organism (HCC)  A41.9 038.9     995.91   2. Splenomegaly  R16.1 789.2   3. Oropharyngeal dysphagia  R13.12 787.22     Patient Active Problem List   Diagnosis   • Lymphoma, low grade (HCC)   • Neuropathy   • Splenomegaly   • Anemia   • Thrombocytopenia (HCC)   •  Sepsis (HCC)   • Lactic acidosis   • Hyponatremia   • CKD (chronic kidney disease) stage 3, GFR 30-59 ml/min (HCC)   • Elevated procalcitonin   • Elevated C-reactive protein (CRP)   • Coagulopathy (HCC)   • Pneumothorax, unspecified   • Sepsis without acute organ dysfunction, due to unspecified organism (HCC)     Past Medical History:   Diagnosis Date   • Anemia 09/18/2015   • Cataract     h/o   • Disease of thyroid gland    • Diverticulitis    • DVT (deep venous thrombosis) (HCC)     left lower leg   • GERD (gastroesophageal reflux disease)    • Gout    • History of basal cell carcinoma    • History of transfusion 2022    1 unit at [facility], pt denies reactions   • Hypertension         • IBS (irritable bowel syndrome)    • Non Hodgkin's lymphoma (HCC)    • Splenomegaly    • Urinary incontinence      Past Surgical History:   Procedure Laterality Date   • APPENDECTOMY     • BACK SURGERY      lumbar   • CHOLECYSTECTOMY     • COLON RESECTION  2021   • EXPLORATORY LAPAROTOMY N/A 7/21/2022    Procedure: ABDOMINAL WASHOUT WITH CLOSURE;  Surgeon: Jayme Kemp MD;  Location: [facility];  Service: General;  Laterality: N/A;   • HYSTERECTOMY      jaja   • KIDNEY SURGERY      kidney stones   • ORIF ANKLE FRACTURE Right     h/o   • SPLENECTOMY N/A 7/20/2022    Procedure: SPLENECTOMY OPEN, DISTAL PANCREATECTOMY;  Surgeon: Jayme Kemp MD;  [facility];  Service: General;  Laterality: N/A;   • TOTAL HIP ARTHROPLASTY Bilateral     h/o      General Information     Row Name 08/01/22 6856          OT Time and Intention    Document Type therapy note (daily note)  -AC     Mode of Treatment occupational therapy  -AC     Row Name 08/01/22 7779          General Information    Patient Profile Reviewed yes  -AC     Existing Precautions/Restrictions fall;other (see comments)  abdominal incision, SHERRILL drain, OOB with brief, BLE and LUE edema  -AC     Row Name 08/01/22 5628          Cognition    Orientation Status (Cognition)  oriented x 3  -     Row Name 08/01/22 0950          Safety Issues, Functional Mobility    Safety Issues Affecting Function (Mobility) safety precaution awareness;safety precautions follow-through/compliance;sequencing abilities  -     Impairments Affecting Function (Mobility) balance;endurance/activity tolerance;strength;grasp;pain  -     Cognitive Impairments, Mobility Safety/Performance safety precaution awareness;safety precaution follow-through;sequencing abilities;insight into deficits/self-awareness  -           User Key  (r) = Recorded By, (t) = Taken By, (c) = Cosigned By    Initials Name Provider Type     Tiffany Rodriguez, OT Occupational Therapist                 Mobility/ADL's     Row Name 08/01/22 0952          Bed Mobility    Bed Mobility rolling left;rolling right;supine-sit  -     Rolling Left [place] (Bed Mobility) verbal cues;minimum assist (75% patient effort)  -     Rolling Right [place] (Bed Mobility) verbal cues;minimum assist (75% patient effort)  -     Supine-Sit [place] (Bed Mobility) verbal cues;moderate assist (50% patient effort);2 person assist  -     Bed Mobility, Safety Issues decreased use of arms for pushing/pulling;decreased use of legs for bridging/pushing;impaired trunk control for bed mobility  -     Assistive Device (Bed Mobility) bed rails;head of bed elevated  -     Row Name 08/01/22 0952          Transfers    Transfers sit-stand transfer  -     Sit-Stand [place] (Transfers) verbal cues;moderate assist (50% patient effort);2 person assist  -     Row Name 08/01/22 0952          Sit-Stand Transfer    Assistive Device (Sit-Stand Transfers) walker, front-wheeled  -     Row Name 08/01/22 0952          Functional Mobility    Functional Mobility- Ind. Level minimum assist (75% patient effort);2 person assist required  -     Functional Mobility- Device walker, front-wheeled  -     Functional Mobility-Distance (Feet) 6  -      Functional Mobility- Safety Issues step length decreased  -     Functional Mobility- Comment VCs for upright posture  -     Row Name 08/01/22 0952          Activities of Daily Living    BADL Assessment/Intervention upper body dressing;lower body dressing;grooming;toileting  -     Row Name 08/01/22 0952          Lower Body Dressing Assessment/Training    [place] Level (Lower Body Dressing) doff;socks;dependent (less than 25% patient effort);verbal cues  -     Position (Lower Body Dressing) supine  -     Row Name 08/01/22 0952          Grooming Assessment/Training    [place] Level (Grooming) hair care, combing/brushing;minimum assist (75% patient effort);set up;wash face, hands  -     Position (Grooming) edge of bed sitting  -     Row Name 08/01/22 0952          Toileting Assessment/Training    [place] Level (Toileting) adjust/manage clothing;change pad/brief;perform perineal hygiene;dependent (less than 25% patient effort);verbal cues  -     Position (Toileting) supine  -     Row Name 08/01/22 0952          Upper Body Dressing Assessment/Training    [place] Level (Upper Body Dressing) doff;don;moderate assist (50% patient effort)  hospital gown  -     Position (Upper Body Dressing) supine  -           User Key  (r) = Recorded By, (t) = Taken By, (c) = Cosigned By    Initials Name Provider Type    Tiffany Platt, OT Occupational Therapist               Obj/Interventions     [facility] Name 08/01/22 0957          Elbow/Forearm (Therapeutic Exercise)    Elbow/Forearm (Therapeutic Exercise) AROM (active range of motion)  -     Elbow/Forearm AROM (Therapeutic Exercise) bilateral;flexion;extension;10 repetitions  -[facility] Name 08/01/22 0957          Hand (Therapeutic Exercise)    Hand (Therapeutic Exercise) AROM (active range of motion)  -     Hand AROM/AAROM (Therapeutic Exercise) bilateral;finger flexion;finger extension;10 repetitions  -     Row Name 08/01/22 0957           Motor Skills    Therapeutic Exercise elbow/forearm;hand  -     Row Name 08/01/22 0957          Balance    Balance Assessment sitting static balance;standing static balance  -     Dynamic Sitting Balance supervision  -     Static Standing Balance minimal assist  -AC     Dynamic Standing Balance minimal assist  -AC     Position/Device Used, Standing Balance walker, rolling  -AC           User Key  (r) = Recorded By, (t) = Taken By, (c) = Cosigned By    Initials Name Provider Type     Tiffany Rodriguez, OT Occupational Therapist               Goals/Plan    No documentation.                Clinical Impression     Row Name 08/01/22 0910          Pain Assessment    Pretreatment Pain Rating 1/10  -     Posttreatment Pain Rating 1/10  -     Pain Location incisional  -     Pain Location - abdomen  -     Pain Intervention(s) Repositioned  -     Row Name 08/01/22 0910          Plan of Care Review    Plan of Care Reviewed With patient  -     Progress improving  -     Outcome Evaluation Pt min A to roll R/L, dependent perineal hygiene,  [facility]  setup to wash face, min A to brush hair, dep to don socks,  mod A x 2 supine to sit,  mod A  2 STS,  min A x2 to ambulate 6ft with RW.  Pt with good progress but limited by weakness and decreased activity tolerance. Recommend IP rehab upon d/c.  -     Row Name 08/01/22 0910          Therapy Plan Review/Discharge Plan (OT)    Anticipated Discharge Disposition (OT) inpatient rehabilitation facility  -     Row Name 08/01/22 0910          Vital Signs    Pre Systolic BP Rehab 114  -AC     Pre Treatment Diastolic BP 46  -AC     Post Systolic BP Rehab 119  -AC     Post Treatment Diastolic BP 64  -AC     Pretreatment Heart Rate (beats/min) 72  -AC     Posttreatment Heart Rate (beats/min) 72  -AC     Pre SpO2 (%) 94  -AC     O2 Delivery Pre Treatment room air  -AC     O2 Delivery Intra Treatment room air  -AC     Post SpO2 (%) 95  -AC     O2  Delivery Post Treatment room air  -AC     Pre Patient Position Supine  -AC     Intra Patient Position Standing  -AC     Post Patient Position Sitting  -AC     Row Name 08/01/22 0910          Positioning and Restraints    Pre-Treatment Position in bed  -AC     Post Treatment Position chair  -AC     In Chair notified nsg;reclined;call light within reach;encouraged to call for assist;RUE elevated;LUE elevated;heels elevated;waffle boot/both;with family/caregiver  -           User Key  (r) = Recorded By, (t) = Taken By, (c) = Cosigned By    Initials Name Provider Type    Tiffany Platt, NIKUNJ Occupational Therapist               Outcome Measures     Row Name 08/01/22 1027          How much help from another is currently needed...    Putting on and taking off regular lower body clothing? 1  -AC     Bathing (including washing, rinsing, and drying) 2  -AC     Toileting (which includes using toilet bed pan or urinal) 1  -AC     Putting on and taking off regular upper body clothing 2  -AC     Taking care of personal grooming (such as brushing teeth) 3  -AC     Eating meals 3  -AC     AM-PAC 6 Clicks Score (OT) 12  -     Row Name 08/01/22 1027          Functional Assessment    Outcome Measure Options AM-PAC 6 Clicks Daily Activity (OT)  -           User Key  (r) = Recorded By, (t) = Taken By, (c) = Cosigned By    Initials Name Provider Type    Tiffany Platt, NIKUNJ Occupational Therapist                Occupational Therapy Education                 Title: PT OT SLP Therapies (In Progress)     Topic: Occupational Therapy (In Progress)     Point: ADL training (In Progress)     Description:   Instruct learner(s) on proper safety adaptation and remediation techniques during self care or transfers.   Instruct in proper use of assistive devices.              Learning Progress Summary           Patient Acceptance, E, NR by RACHEL at 8/1/2022 1028      Show all documentation for this point (2)                 Point: Home exercise  program (Not Started)     Description:   Instruct learner(s) on appropriate technique for monitoring, assisting and/or progressing therapeutic exercises/activities.              Learner Progress:  Not documented in this visit.          Point: Precautions (Done)     Description:   Instruct learner(s) on prescribed precautions during self-care and functional transfers.              Learning Progress Summary           Patient Acceptance, E, VU by MA at 7/28/2022 1519      Show all documentation for this point (1)                 Point: Body mechanics (Done)     Description:   Instruct learner(s) on proper positioning and spine alignment during self-care, functional mobility activities and/or exercises.              Learning Progress Summary           Patient Acceptance, E, VU by MA at 7/28/2022 1519      Show all documentation for this point (1)                             User Key     Initials Effective Dates Name Provider Type Discipline     06/16/21 -  Tiffany Rodriguez, OT Occupational Therapist OT    MA 11/19/20 -  Miri Peoples OT Occupational Therapist OT              OT Recommendation and Plan     Plan of Care Review  Plan of Care Reviewed With: patient  Progress: improving  Outcome Evaluation: Pt min A to roll R/L, dependent perineal hygiene,  mod A to don/[facility] gown,  setup to wash face, min A to brush hair, dep to don socks,  mod A x 2 supine to sit,  mod A  2 STS,  min A x2 to ambulate 6ft with RW.  Pt with good progress but limited by weakness and decreased activity tolerance. Recommend IP rehab upon d/c.     Time Calculation:    Time Calculation- OT     Row Name 08/01/22 0910             Time Calculation- OT    OT Start Time 0910  -      OT Received On 08/01/22  -      OT Goal Re-Cert Due Date 08/06/22  -              Timed Charges    55779 - OT Therapeutic Exercise Minutes 8  -AC      81485 - OT Therapeutic Activity Minutes 15  -      86907 - OT Self Care/Mgmt Minutes 15  -               Total Minutes    Timed Charges Total Minutes 38  -AC       Total Minutes 38  -AC            User Key  (r) = Recorded By, (t) = Taken By, (c) = Cosigned By    Initials Name Provider Type    AC Tiffany Rodriguez OT Occupational Therapist              Therapy Charges for Today     Code Description Service Date Service Provider Modifiers Qty    19494557983 HC OT THER PROC EA 15 MIN 8/1/2022 Tiffany Rodriguez OT GO 1    30718847343 HC OT THERAPEUTIC ACT EA 15 MIN 8/1/2022 Tiffany Rodriguez OT GO 1    [id number]  OT SELF CARE/MGMT/TRAIN EA 15 MIN 8/1/2022 Tiffany Rodriguez OT GO 1    25317931200 HC OT THER SUPP EA 15 MIN 8/1/2022 Tiffany Rodriguez OT GO 2               Tiffany Rodriguez OT  8/1/2022    Electronically signed by Tiffany Rodriguez OT at 08/01/22 1031

## 2022-08-01 NOTE — CASE MANAGEMENT/SOCIAL WORK
Continued Stay Note   [place]     Patient Name: Mahi Coronel  MRN: [id number]  Today's Date: 8/1/2022    Admit Date: 7/20/2022     Discharge Plan     Row Name 08/01/22 1215       Plan    Plan Skilled Rehab Bed    Patient/Family in Agreement with Plan yes    Plan Comments Pt has NG tube out is on mechanical soft/nectar thick liquid. D/C plan is skilled rehab facility. Family and pt want skilled rehab bed close to [place]. I spoke with pt and both daughters at the bedside. Once pt has completed skilled rehab she will be going to stay with her daughter/Tierra in [place] for while. Family can transport at D/C.  I spoke with Gerri at [facility]-I faxed her updated notes. I spoke with Mary Ann/[facility]/[place]-faxed them updated notes. PT saw pt yesterday, OT/ST seeing today. CM will continue to follow.    Final Discharge Disposition Code 03 - skilled nursing facility (SNF)               Discharge Codes    No documentation.               Expected Discharge Date and Time     Expected Discharge Date Expected Discharge Time    Aug 5, 2022             Miri Padilla, YAMIL

## 2022-08-01 NOTE — PROGRESS NOTES
[facility] Medicine Services  Consult progress note    Patient Name: Mahi Coronel  : 1940  MRN: [id number]    Date of Admission: 2022  Primary Care Physician: Romulo Isidro MD    Subjective   Subjective     CC:  Htn, post-op anemia    HPI:  Feeling better overall. No dyspnea at rest. No n/v    ROS:  No f/c  No chest pain  No dyspnea  No nausea currently  abd pain stable    Objective   Objective     Vital Signs:   Temp:  [97.9 °F (36.6 °C)-99.3 °F (37.4 °C)] 98.4 °F (36.9 °C)  Heart Rate:  [61-75] 75  Resp:  [18] 18  BP: (119-138)/(46-64) 119/64     Physical Exam:  Constitutional:Alert, oriented x 3, elderly, nontoxic and comfortable appearing at rest. No distress  Psych:Normal/appropriate affect, ng tube in place and nasal canula in place  HEENT:NCAT, oropharynx clear  Neck: neck supple, full range of motion  Neuro: Face symmetric, speech clear, equal , moves all extremities  Cardiac: RRR; 2+BLE edema stable  Resp: decreased air movement bilateral bases  GI: abd soft, appropriate post op tenderness  Skin: No extremity rash  Musculoskeletal/extremities: no cyanosis of extremities; knees full range of motion          Results Reviewed:  LAB RESULTS:      Lab 22  0602 22  0409 22  0450 22  0316 22  0308 22  0412 22  0438   WBC 19.92* 24.78* 26.85* 26.78* 22.95* 22.49* 24.55*   HEMOGLOBIN 8.8* 8.8* 8.7* 8.3* 9.5* 10.6* 10.1*   HEMATOCRIT 27.3* 26.7* 27.2* 27.1* 29.6* 32.7* 32.0*   PLATELETS 643* 637* 593* 507* 410 366 269   NEUTROS ABS 12.88* 16.56* 15.92* 17.14* 12.85* 13.81* 16.84*   IMMATURE GRANS (ABS) 0.34* 0.59* 0.64*  --   --  0.33* 0.44*   LYMPHS ABS 3.25* 4.97* 5.90*  --   --  4.07* 2.73   MONOS ABS 2.84* 1.92* 3.41*  --   --  3.57* 3.84*   EOS ABS 0.46* 0.62* 0.80* 0.80* 0.69* 0.58* 0.57*   MCV 93.8 94.3 96.1 97.5* 95.5 93.7 95.5         Lab 22  0602 22  0409 22  0450 22  2025 22  1316  07/29/22  0316 07/28/22  1231 07/28/22  0308   SODIUM 133* 144 146*  --   --  147*  --  148*   POTASSIUM 5.2 4.6 4.3  --  4.4 3.6 4.4 3.4*   CHLORIDE 102 111* 113*  --   --  111*  --  112*   CO2 22.0 26.0 27.0  --   --  28.0  --  30.0*   ANION GAP 9.0 7.0 6.0  --   --  8.0  --  6.0   BUN 31* 28* 32*  --   --  40*  --  39*   CREATININE 0.96 0.87 0.91  --   --  1.16*  --  1.08*   EGFR 58.8* 66.2 62.7  --   --  46.9*  --  51.1*   GLUCOSE 103* 142* 152*  --   --  173*  --  172*   CALCIUM 8.3* 8.3* 8.3*  --   --  8.6  --  8.6   MAGNESIUM 2.2 2.0 2.1  --   --  2.5*  --  1.9   PHOSPHORUS  --   --  2.6 3.0 2.2* 2.0* 1.7* 2.1*         Lab 08/01/22  0602 07/31/22  0409 07/30/22  0450 07/29/22  0316 07/28/22  0308   TOTAL PROTEIN 4.4* 4.5* 4.8* 5.0* 4.6*   ALBUMIN 2.10* 2.50* 2.70* 2.90* 2.50*   GLOBULIN 2.3 2.0 2.1 2.1 2.1   ALT (SGPT) 20 22 28 39* 59*   AST (SGOT) 23 18 20 18 16   BILIRUBIN 0.3 0.2 0.3 0.2 0.3   ALK PHOS 66 82 99 78 84                 Lab 07/31/22 0409   IRON 20*   IRON SATURATION 11*   TIBC 188*   TRANSFERRIN 126*         Brief Urine Lab Results  (Last result in the past 365 days)      Color   Clarity   Blood   Leuk Est   Nitrite   Protein   CREAT   Urine HCG        03/22/22 2202             69.7         03/22/22 2202 Yellow   Clear   Small (1+)   Negative   Negative   Trace                 Microbiology Results Abnormal     Procedure Component Value - Date/Time    Respiratory Culture - Sputum, ET Suction [[id number]] Collected: 07/22/22 1254    Lab Status: Final result Specimen: Sputum from ET Suction Updated: 07/24/22 1502     Respiratory Culture Heavy growth (4+) Normal respiratory woo. No S. aureus or Pseudomonas aeruginosa detected. Final report.     Gram Stain Many (4+) WBCs per low power field      Rare (1+) Epithelial cells per low power field      Moderate (3+) Gram positive cocci in pairs, chains and clusters          No radiology results from the last 24 hrs        I have reviewed the  medications:  Scheduled Meds:atenolol, 50 mg, Oral, Q24H  ferric gluconate, 125 mg, Intravenous, Daily  heparin (porcine), 5,000 Units, Subcutaneous, Q12H  levothyroxine, 50 mcg, Oral, Q AM  pantoprazole, 40 mg, Oral, Q AM  terazosin, 1 mg, Oral, Nightly      Continuous Infusions:   PRN Meds:.•  acetaminophen  •  acetaminophen  •  artificial tears  •  magnesium sulfate **OR** magnesium sulfate **OR** magnesium sulfate  •  metoclopramide  •  ondansetron  •  oxyCODONE-acetaminophen  •  polyvinyl alcohol  •  potassium & sodium phosphates **OR** potassium & sodium phosphates  •  potassium chloride    Assessment & Plan   Assessment & Plan     Active Hospital Problems    Diagnosis  POA   • **Lymphoma, 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        Brief Hospital Course to date:  Mahi Coronel is a 81 y.o. 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 Dr. Castañeda referred for splenectomy and underwent procedure 7/20/22 by Dr. Kemp. Post-op course complicated by bleeding and coagulopathy and was transferred to icu on mechanical ventilation w/ an open abdomen, and a chest tube was placed uppon arrival to the icu by Dr. Kemp for a pneumothorax. Patient went back to OR 7/21/22 for washout and hemostasis. Received empiric antibiotics while in the icu, but no organism was isolated. Patient ultimately was extubated on 7/24/22 and chest tube was d/c'd on 7/27/22. Patient was initiated on nocturnal tube feeds in icu. Ultimately was transferred out of icu to telemetry floor on 7/30/22 and hospitalists asked to  assume medical co-management w/ Dr. 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 Kemp, tolerating po (ng & curtis out)    Leukocytosis, improving  -leukocytosis multifactorial including stress response recent critical illness, splenectomy; no overt infection found  -wbc improving (19,920 today, was 24,000 yesterday)  -s/p empiric antibiotics (7 days of empiric cefepime and flagyl which completed 7/29/22)    HTN  -continue atenolol 50mg daily, hytrin 1mg; stable    BLE edema, due to volume resuscitation  -BLE venous duplex 7/29/22 negative for DVT    Hypothyroidism  -iv synthroid for now until tolerating po reliably    gen weakness/debility  -working w/ therapy, likely benefit from rehab at d/c    Am labs ordered    -will continue to follow with you    DVT prophylaxis:  Medical and mechanical DVT prophylaxis orders are present. sq heparin    AM-PAC 6 Clicks Score (PT): 11 (07/31/22 1617)    CODE STATUS:   Code Status and Medical Interventions:   Ordered at: 07/20/22 2018     Level Of Support Discussed With:    Next of Kin (If No Surrogate)     Code Status (Patient has no pulse and is not breathing):    No CPR (Do Not Attempt to Resuscitate)     Medical Interventions (Patient has pulse or is breathing):    Full Support       Boyd Fragoso MD  08/01/22

## 2022-08-01 NOTE — PLAN OF CARE
Goal Outcome Evaluation:  Plan of Care Reviewed With: patient        Progress: improving  Outcome Evaluation: Pt min A to roll R/L, dependent perineal hygiene,  mod A to don/[facility] gown,  setup to wash face, min A to brush hair, dep to don socks,  mod A x 2 supine to sit,  mod A  2 STS,  min A x2 to ambulate 6ft with RW.  Pt with good progress but limited by weakness and decreased activity tolerance. Recommend IP rehab upon d/c.

## 2022-08-01 NOTE — PLAN OF CARE
Problem: Adult Inpatient Plan of Care  Goal: Plan of Care Review  Outcome: Ongoing, Progressing  Flowsheets (Taken 8/1/2022 1619)  Plan of Care Reviewed With:   patient   daughter   Goal Outcome Evaluation:  Plan of Care Reviewed With: patient, daughter            SLP treatment completed. Will continue to address dysphagia in tx. Please see note for further details and recommendations.

## 2022-08-01 NOTE — THERAPY TREATMENT NOTE
Patient Name: Mahi Coronel  : 1940    MRN: [id number]                              Today's Date: 2022       Admit Date: 2022    Visit Dx:     ICD-10-CM ICD-9-CM   1. Sepsis without acute organ dysfunction, due to unspecified organism (HCC)  A41.9 038.9     995.91   2. Splenomegaly  R16.1 789.2   3. Oropharyngeal dysphagia  R13.12 787.22     Patient Active Problem List   Diagnosis   • Lymphoma, low grade (HCC)   • Neuropathy   • Splenomegaly   • Anemia   • Thrombocytopenia (HCC)   • Sepsis (HCC)   • Lactic acidosis   • Hyponatremia   • CKD (chronic kidney disease) stage 3, GFR 30-59 ml/min (HCC)   • Elevated procalcitonin   • Elevated C-reactive protein (CRP)   • Coagulopathy (HCC)   • Pneumothorax, unspecified   • Sepsis without acute organ dysfunction, due to unspecified organism (HCC)     Past Medical History:   Diagnosis Date   • Anemia 2015   • Cataract     h/o   • Disease of thyroid gland    • Diverticulitis    • DVT (deep venous thrombosis) (HCC)     left lower leg   • GERD (gastroesophageal reflux disease)    • Gout    • History of basal cell carcinoma    • History of transfusion     1 unit at [facility], pt denies reactions   • Hypertension         • IBS (irritable bowel syndrome)    • Non Hodgkin's lymphoma (HCC)    • Splenomegaly    • Urinary incontinence      Past Surgical History:   Procedure Laterality Date   • APPENDECTOMY     • BACK SURGERY      lumbar   • CHOLECYSTECTOMY     • COLON RESECTION     • EXPLORATORY LAPAROTOMY N/A 2022    Procedure: ABDOMINAL WASHOUT WITH CLOSURE;  Surgeon: Jayme Kemp MD;  Location: [facility] OR;  Service: General;  Laterality: N/A;   • HYSTERECTOMY      jaja   • KIDNEY SURGERY      kidney stones   • ORIF ANKLE FRACTURE Right     h/o   • SPLENECTOMY N/A 2022    Procedure: SPLENECTOMY OPEN, DISTAL PANCREATECTOMY;  Surgeon: Jayme Kemp MD;  Location: [facility] OR;  Service: General;  Laterality: N/A;   • TOTAL HIP ARTHROPLASTY  Bilateral     h/o      General Information     Row Name 08/01/22 0950          OT Time and Intention    Document Type therapy note (daily note)  -     Mode of Treatment occupational therapy  -     Row Name 08/01/22 0950          General Information    Patient Profile Reviewed yes  -     Existing Precautions/Restrictions fall;other (see comments)  abdominal incision, SHERRILL drain, OOB with brief, BLE and LUE edema  -     Row Name 08/01/22 0950          Cognition    Orientation Status (Cognition) oriented x 3  -     Row Name 08/01/22 0950          Safety Issues, Functional Mobility    Safety Issues Affecting Function (Mobility) safety precaution awareness;safety precautions follow-through/compliance;sequencing abilities  -     Impairments Affecting Function (Mobility) balance;endurance/activity tolerance;strength;grasp;pain  -     Cognitive Impairments, Mobility Safety/Performance safety precaution awareness;safety precaution follow-through;sequencing abilities;insight into deficits/self-awareness  -           User Key  (r) = Recorded By, (t) = Taken By, (c) = Cosigned By    Initials Name Provider Type     Tiffany Rodriguez, OT Occupational Therapist                 Mobility/ADL's     Row Name 08/01/22 0952          Bed Mobility    Bed Mobility rolling left;rolling right;supine-sit  -     Rolling Left [place] (Bed Mobility) verbal cues;minimum assist (75% patient effort)  -     Rolling Right [place] (Bed Mobility) verbal cues;minimum assist (75% patient effort)  -     Supine-Sit [place] (Bed Mobility) verbal cues;moderate assist (50% patient effort);2 person assist  -     Bed Mobility, Safety Issues decreased use of arms for pushing/pulling;decreased use of legs for bridging/pushing;impaired trunk control for bed mobility  -     Assistive Device (Bed Mobility) bed rails;head of bed elevated  -     Row Name 08/01/22 0952          Transfers    Transfers sit-stand transfer  -      Sit-Stand [place] (Transfers) verbal cues;moderate assist (50% patient effort);2 person assist  -     Row Name 08/01/22 0952          Sit-Stand Transfer    Assistive Device (Sit-Stand Transfers) walker, front-wheeled  -AC     Row Name 08/01/22 0952          Functional Mobility    Functional Mobility- Ind. Level minimum assist (75% patient effort);2 person assist required  -     Functional Mobility- Device walker, front-wheeled  -AC     Functional Mobility-Distance (Feet) 6  -     Functional Mobility- Safety Issues step length decreased  -     Functional Mobility- Comment VCs for upright posture  -     Row Name 08/01/22 0952          Activities of Daily Living    BADL Assessment/Intervention upper body dressing;lower body dressing;grooming;toileting  -     Row Name 08/01/22 0952          Lower Body Dressing Assessment/Training    [place] Level (Lower Body Dressing) doff;socks;dependent (less than 25% patient effort);verbal cues  -AC     Position (Lower Body Dressing) supine  -AC     Row Name 08/01/22 0952          Grooming Assessment/Training    [place] Level (Grooming) hair care, combing/brushing;minimum assist (75% patient effort);set up;wash face, hands  -AC     Position (Grooming) edge of bed sitting  -AC     Row Name 08/01/22 0952          Toileting Assessment/Training    [place] Level (Toileting) adjust/manage clothing;change pad/brief;perform perineal hygiene;dependent (less than 25% patient effort);verbal cues  -AC     Position (Toileting) supine  -AC     Row Name 08/01/22 0952          Upper Body Dressing Assessment/Training    [place] Level (Upper Body Dressing) doff;don;moderate assist (50% patient effort)  hospital gown  -AC     Position (Upper Body Dressing) supine  -AC           User Key  (r) = Recorded By, (t) = Taken By, (c) = Cosigned By    Initials Name Provider Type    Tiffany Platt OT Occupational Therapist               Obj/Interventions     Row Name  08/01/22 0957          Elbow/Forearm (Therapeutic Exercise)    Elbow/Forearm (Therapeutic Exercise) AROM (active range of motion)  -     Elbow/Forearm AROM (Therapeutic Exercise) bilateral;flexion;extension;10 repetitions  -     Row Name 08/01/22 0957          Hand (Therapeutic Exercise)    Hand (Therapeutic Exercise) AROM (active range of motion)  -     Hand AROM/AAROM (Therapeutic Exercise) bilateral;finger flexion;finger extension;10 repetitions  -     Row Name 08/01/22 0957          Motor Skills    Therapeutic Exercise elbow/forearm;hand  -     Row Name 08/01/22 0957          Balance    Balance Assessment sitting static balance;standing static balance  -     Dynamic Sitting Balance supervision  -     Static Standing Balance minimal assist  -     Dynamic Standing Balance minimal assist  -     Position/Device Used, Standing Balance walker, rolling  -           User Key  (r) = Recorded By, (t) = Taken By, (c) = Cosigned By    Initials Name Provider Type    AC Tiffany Rodriguez, OT Occupational Therapist               Goals/Plan    No documentation.                Clinical Impression     Row Name 08/01/22 0910          Pain Assessment    Pretreatment Pain Rating 1/10  -     Posttreatment Pain Rating 1/10  -     Pain Location incisional  -     Pain Location - abdomen  -     Pain Intervention(s) Repositioned  -     Row Name 08/01/22 0910          Plan of Care Review    Plan of Care Reviewed With patient  -     Progress improving  -     Outcome Evaluation Pt min A to roll R/L, dependent perineal hygiene,  mod A to don/doff hospital gown,  setup to wash face, min A to brush hair, dep to don socks,  mod A x 2 supine to sit,  mod A  2 STS,  min A x2 to ambulate 6ft with RW.  Pt with good progress but limited by weakness and decreased activity tolerance. Recommend IP rehab upon d/c.  -     Row Name 08/01/22 0910          Therapy Plan Review/Discharge Plan (OT)    Anticipated Discharge  Disposition (OT) inpatient rehabilitation facility  -     Row Name 08/01/22 0910          Vital Signs    Pre Systolic BP Rehab 114  -AC     Pre Treatment Diastolic BP 46  -AC     Post Systolic BP Rehab 119  -AC     Post Treatment Diastolic BP 64  -AC     Pretreatment Heart Rate (beats/min) 72  -AC     Posttreatment Heart Rate (beats/min) 72  -AC     Pre SpO2 (%) 94  -AC     O2 Delivery Pre Treatment room air  -AC     O2 Delivery Intra Treatment room air  -AC     Post SpO2 (%) 95  -AC     O2 Delivery Post Treatment room air  -AC     Pre Patient Position Supine  -AC     Intra Patient Position Standing  -AC     Post Patient Position Sitting  -AC     Row Name 08/01/22 0910          Positioning and Restraints    Pre-Treatment Position in bed  -AC     Post Treatment Position chair  -AC     In Chair notified nsg;reclined;call light within reach;encouraged to call for assist;RUE elevated;LUE elevated;heels elevated;waffle boot/both;with family/caregiver  -           User Key  (r) = Recorded By, (t) = Taken By, (c) = Cosigned By    Initials Name Provider Type    Tiffany Platt, OT Occupational Therapist               Outcome Measures     Row Name 08/01/22 1027          How much help from another is currently needed...    Putting on and taking off regular lower body clothing? 1  -AC     Bathing (including washing, rinsing, and drying) 2  -AC     Toileting (which includes using toilet bed pan or urinal) 1  -AC     Putting on and taking off regular upper body clothing 2  -AC     Taking care of personal grooming (such as brushing teeth) 3  -AC     Eating meals 3  -AC     AM-PAC 6 Clicks Score (OT) 12  -     Row Name 08/01/22 1027          Functional Assessment    Outcome Measure Options AM-PAC 6 Clicks Daily Activity (OT)  -           User Key  (r) = Recorded By, (t) = Taken By, (c) = Cosigned By    Initials Name Provider Type    Tiffany Platt, OT Occupational Therapist                Occupational Therapy  Education                 Title: PT OT SLP Therapies (In Progress)     Topic: Occupational Therapy (In Progress)     Point: ADL training (In Progress)     Description:   Instruct learner(s) on proper safety adaptation and remediation techniques during self care or transfers.   Instruct in proper use of assistive devices.              Learning Progress Summary           Patient Acceptance, E, NR by RACHEL at 8/1/2022 1028      Show all documentation for this point (2)                 Point: Home exercise program (Not Started)     Description:   Instruct learner(s) on appropriate technique for monitoring, assisting and/or progressing therapeutic exercises/activities.              Learner Progress:  Not documented in this visit.          Point: Precautions (Done)     Description:   Instruct learner(s) on prescribed precautions during self-care and functional transfers.              Learning Progress Summary           Patient Acceptance, E, VU by MA at 7/28/2022 1519      Show all documentation for this point (1)                 Point: Body mechanics (Done)     Description:   Instruct learner(s) on proper positioning and spine alignment during self-care, functional mobility activities and/or exercises.              Learning Progress Summary           Patient Acceptance, E, VU by MA at 7/28/2022 1519      Show all documentation for this point (1)                             User Key     Initials Effective Dates Name Provider Type Discipline     06/16/21 -  Tiffany Rodriguez, OT Occupational Therapist OT    MA 11/19/20 -  Miri Peoples OT Occupational Therapist OT              OT Recommendation and Plan     Plan of Care Review  Plan of Care Reviewed With: patient  Progress: improving  Outcome Evaluation: Pt min A to roll R/L, dependent perineal hygiene,  mod A to don/[facility] hospital gown,  setup to wash face, min A to brush hair, dep to don socks,  mod A x 2 supine to sit,  mod A  2 STS,  min A x2 to ambulate 6ft with RW.  Pt  with good progress but limited by weakness and decreased activity tolerance. Recommend IP rehab upon d/c.     Time Calculation:    Time Calculation- OT     Row Name 08/01/22 0910             Time Calculation- OT    OT Start Time 0910  -AC      OT Received On 08/01/22  -AC      OT Goal Re-Cert Due Date 08/06/22  -AC              Timed Charges    04803 - OT Therapeutic Exercise Minutes 8  -AC      59959 - OT Therapeutic Activity Minutes 15  -AC      74847 - OT Self Care/Mgmt Minutes 15  -AC              Total Minutes    Timed Charges Total Minutes 38  -AC       Total Minutes 38  -AC            User Key  (r) = Recorded By, (t) = Taken By, (c) = Cosigned By    Initials Name Provider Type    AC Tiffany Rodriguez, OT Occupational Therapist              Therapy Charges for Today     Code Description Service Date Service Provider Modifiers Qty    32064667128 HC OT THER PROC EA 15 MIN 8/1/2022 Tiffany Rodriguez, OT GO 1    32455824019 HC OT THERAPEUTIC ACT EA 15 MIN 8/1/2022 Tiffany Rodriguez, OT GO 1    46788237642 HC OT SELF CARE/MGMT/TRAIN EA 15 MIN 8/1/2022 Tiffany Rodriguez, OT GO 1    68894567862 HC OT THER SUPP EA 15 MIN 8/1/2022 Tiffany Rodriguez, OT GO 2               Tiffany Rodriguez OT  8/1/2022

## 2022-08-01 NOTE — PLAN OF CARE
Goal Outcome Evaluation:  Plan of Care Reviewed With: patient        Progress: improving  Outcome Evaluation: VSS. Turn Q2. Pt had no complaints of any SOB or discomfort during the night. Midline abd. incision WNL, staples intact. SHERRILL drain to LLQ in place, 400ml serous drainage out this shift. Garcia patent/draining. Will continue with plan of care.

## 2022-08-01 NOTE — PROGRESS NOTES
"Patient Name:  Mahi Coronel  YOB: 1940  [id number]    Surgery Progress Note    Date of visit: 8/1/2022    Subjective   Tolerating diet without nausea or vomiting. No fevers/chills. No abdominal pain. +BM.          Objective       /51 (BP Location: Right arm, Patient Position: Lying)   Pulse 67   Temp 99.3 °F (37.4 °C) (Oral)   Resp 18   Ht 167.6 cm (65.98\")   Wt 89.4 kg (197 lb 1.5 oz)   SpO2 94%   BMI 31.83 kg/m²     Intake/Output Summary (Last 24 hours) at 8/1/2022 0808  Last data filed at 8/1/2022 0325  Gross per 24 hour   Intake 2028 ml   Output 2500 ml   Net -472 ml   SHERRILL 1625 cc    CV:  Rhythm regular and rate regular  L:  Clear to auscultation bilaterally  Abd:  Bowel sounds positive, soft, incision c/d/i, SHERRILL serous  Ext:  No cyanosis, clubbing, edema    Recent labs and imaging that are back at this time have been reviewed.   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

## 2022-08-01 NOTE — NURSING NOTE
DAILY HIEU:           Daily Low Hieu <=14           Hieu score:  14         At this time the patient's RN reports no new skin issues or needs.  Per the bedside RN, this patient does not require a specialty mattress/bed and is on a waffle topper at this time.     Recommendations:    Continue to provide good general skin care. Apply preventative or barrier cream BID and/or PRN. Keep patient dry and turn Q2H.  Elevate and offload heels, or apply pressure relieving boots.      If alteration to skin integrity or change in wound bed presentation, please contact WOC team.

## 2022-08-02 ENCOUNTER — APPOINTMENT (OUTPATIENT)
Dept: GENERAL RADIOLOGY | Facility: HOSPITAL | Age: 82
End: 2022-08-02

## 2022-08-02 LAB
ALBUMIN SERPL-MCNC: 2.6 G/DL (ref 3.5–5.2)
ALBUMIN/GLOB SERPL: 1.4 G/DL
ALP SERPL-CCNC: 83 U/L (ref 39–117)
ALT SERPL W P-5'-P-CCNC: 19 U/L (ref 1–33)
AMYLASE FLD-CCNC: 54 U/L
ANION GAP SERPL CALCULATED.3IONS-SCNC: 10 MMOL/L (ref 5–15)
AST SERPL-CCNC: 29 U/L (ref 1–32)
BASOPHILS # BLD AUTO: 0.11 10*3/MM3 (ref 0–0.2)
BASOPHILS NFR BLD AUTO: 0.6 % (ref 0–1.5)
BILIRUB SERPL-MCNC: 0.3 MG/DL (ref 0–1.2)
BUN SERPL-MCNC: 28 MG/DL (ref 8–23)
BUN/CREAT SERPL: 26.2 (ref 7–25)
CALCIUM SPEC-SCNC: 8.2 MG/DL (ref 8.6–10.5)
CHLORIDE SERPL-SCNC: 98 MMOL/L (ref 98–107)
CO2 SERPL-SCNC: 21 MMOL/L (ref 22–29)
CREAT SERPL-MCNC: 1.07 MG/DL (ref 0.57–1)
DEPRECATED RDW RBC AUTO: 51.1 FL (ref 37–54)
EGFRCR SERPLBLD CKD-EPI 2021: 51.6 ML/MIN/1.73
EOSINOPHIL # BLD AUTO: 0.44 10*3/MM3 (ref 0–0.4)
EOSINOPHIL NFR BLD AUTO: 2.6 % (ref 0.3–6.2)
ERYTHROCYTE [DISTWIDTH] IN BLOOD BY AUTOMATED COUNT: 15.6 % (ref 12.3–15.4)
GLOBULIN UR ELPH-MCNC: 1.9 GM/DL
GLUCOSE SERPL-MCNC: 149 MG/DL (ref 65–99)
HCT VFR BLD AUTO: 26.9 % (ref 34–46.6)
HGB BLD-MCNC: 8.9 G/DL (ref 12–15.9)
IMM GRANULOCYTES # BLD AUTO: 0.24 10*3/MM3 (ref 0–0.05)
IMM GRANULOCYTES NFR BLD AUTO: 1.4 % (ref 0–0.5)
LYMPHOCYTES # BLD AUTO: 3.6 10*3/MM3 (ref 0.7–3.1)
LYMPHOCYTES NFR BLD AUTO: 21.3 % (ref 19.6–45.3)
MCH RBC QN AUTO: 30.6 PG (ref 26.6–33)
MCHC RBC AUTO-ENTMCNC: 33.1 G/DL (ref 31.5–35.7)
MCV RBC AUTO: 92.4 FL (ref 79–97)
MONOCYTES # BLD AUTO: 2.04 10*3/MM3 (ref 0.1–0.9)
MONOCYTES NFR BLD AUTO: 12 % (ref 5–12)
NEUTROPHILS NFR BLD AUTO: 10.5 10*3/MM3 (ref 1.7–7)
NEUTROPHILS NFR BLD AUTO: 62.1 % (ref 42.7–76)
NRBC BLD AUTO-RTO: 0.2 /100 WBC (ref 0–0.2)
PLATELET # BLD AUTO: 665 10*3/MM3 (ref 140–450)
PMV BLD AUTO: 9.9 FL (ref 6–12)
POTASSIUM SERPL-SCNC: 4.6 MMOL/L (ref 3.5–5.2)
PROT SERPL-MCNC: 4.5 G/DL (ref 6–8.5)
RBC # BLD AUTO: 2.91 10*6/MM3 (ref 3.77–5.28)
SODIUM SERPL-SCNC: 129 MMOL/L (ref 136–145)
TROPONIN T SERPL-MCNC: <0.01 NG/ML (ref 0–0.03)
WBC NRBC COR # BLD: 16.93 10*3/MM3 (ref 3.4–10.8)

## 2022-08-02 PROCEDURE — 80053 COMPREHEN METABOLIC PANEL: CPT | Performed by: INTERNAL MEDICINE

## 2022-08-02 PROCEDURE — 25010000002 NA FERRIC GLUC CPLX PER 12.5 MG: Performed by: INTERNAL MEDICINE

## 2022-08-02 PROCEDURE — 93010 ELECTROCARDIOGRAM REPORT: CPT | Performed by: INTERNAL MEDICINE

## 2022-08-02 PROCEDURE — 74230 X-RAY XM SWLNG FUNCJ C+: CPT

## 2022-08-02 PROCEDURE — 99232 SBSQ HOSP IP/OBS MODERATE 35: CPT | Performed by: HOSPITALIST

## 2022-08-02 PROCEDURE — 84484 ASSAY OF TROPONIN QUANT: CPT | Performed by: HOSPITALIST

## 2022-08-02 PROCEDURE — 85025 COMPLETE CBC W/AUTO DIFF WBC: CPT | Performed by: SURGERY

## 2022-08-02 PROCEDURE — 92611 MOTION FLUOROSCOPY/SWALLOW: CPT | Performed by: SPEECH-LANGUAGE PATHOLOGIST

## 2022-08-02 PROCEDURE — 71045 X-RAY EXAM CHEST 1 VIEW: CPT

## 2022-08-02 PROCEDURE — 25010000002 HEPARIN (PORCINE) PER 1000 UNITS: Performed by: INTERNAL MEDICINE

## 2022-08-02 PROCEDURE — 93005 ELECTROCARDIOGRAM TRACING: CPT | Performed by: HOSPITALIST

## 2022-08-02 RX ADMIN — ATENOLOL 50 MG: 50 TABLET ORAL at 08:56

## 2022-08-02 RX ADMIN — NYSTATIN: 100000 POWDER TOPICAL at 20:37

## 2022-08-02 RX ADMIN — HEPARIN SODIUM 5000 UNITS: 5000 INJECTION, SOLUTION INTRAVENOUS; SUBCUTANEOUS at 20:37

## 2022-08-02 RX ADMIN — SODIUM CHLORIDE 125 MG: 9 INJECTION, SOLUTION INTRAVENOUS at 08:56

## 2022-08-02 RX ADMIN — LEVOTHYROXINE SODIUM 50 MCG: 50 TABLET ORAL at 05:35

## 2022-08-02 RX ADMIN — HEPARIN SODIUM 5000 UNITS: 5000 INJECTION, SOLUTION INTRAVENOUS; SUBCUTANEOUS at 08:56

## 2022-08-02 RX ADMIN — NYSTATIN 1 APPLICATION: 100000 POWDER TOPICAL at 05:34

## 2022-08-02 RX ADMIN — TERAZOSIN HYDROCHLORIDE 1 MG: 1 CAPSULE ORAL at 20:37

## 2022-08-02 RX ADMIN — NYSTATIN: 100000 POWDER TOPICAL at 14:29

## 2022-08-02 RX ADMIN — PANTOPRAZOLE SODIUM 40 MG: 40 TABLET, DELAYED RELEASE ORAL at 05:35

## 2022-08-02 NOTE — PROGRESS NOTES
"Patient Name:  Mahi Coronel  YOB: 1940  [id number]    Surgery Progress Note    Date of visit: 8/2/2022    Subjective   Pain minimal. No nausea/vomiting. No fevers/chills. Garcia removed and required one straight cath.         Objective       /53 (BP Location: Left arm, Patient Position: Lying)   Pulse 74   Temp 98.9 °F (37.2 °C) (Axillary)   Resp 16   Ht 167.6 cm (65.98\")   Wt 89.4 kg (197 lb 1.5 oz)   SpO2 95%   BMI 31.83 kg/m²     Intake/Output Summary (Last 24 hours) at 8/2/2022 0723  Last data filed at 8/2/2022 0700  Gross per 24 hour   Intake 960 ml   Output 2035 ml   Net -1075 ml   SHERRILL 685 cc    CV:  Rhythm regular and rate regular  L:  Clear to auscultation bilaterally  Abd:  Bowel sounds positive, soft, nontender, incision c/d/i, SHERRILL serous  Ext:  No cyanosis, clubbing, edema    Recent labs and imaging that are back at this time have been reviewed.   WBC 19.9 -> 16.9  Hgb 8.9         Assessment & Plan     Pt POD#13 splenectomy and distal pancreatectomy  Pt POD#12 abdominal washout and closure  Dysphagia diet -> appreciate speech path input  Re-check drain fluid amylase level given increased output -> pending  OOB as able, PT/OT  DVT prlx  Pain control  Straight cath PRN  DC planning -> will need inpatient rehab per PT -> will need input from case management      Jayme Kemp MD  8/2/2022  07:23 EDT      "

## 2022-08-02 NOTE — PROGRESS NOTES
[facility] Medicine Services  PROGRESS NOTE    Patient Name: Mahi Coronel  : 1940  MRN: [id number]    Date of Admission: 2022  Primary Care Physician: Romulo Isidro MD    Subjective   Subjective     CC:  F/U s/p splenectomy    HPI:  Patient seen this morning. Overall doing alright, she has some pain but it is well controlled. Thinks she had a small BM this morning. Eating a little better. Some mild dyspnea present.    ROS:  Gen-no fevers, no chills  CV-no chest pain, no palpitations  Resp-no cough, +mild dyspnea  GI-no N/V/D, +abd pain    All other systems reviewed and negative except any additional pertinent positives and negatives as discussed in HPI.      Objective   Objective     Vital Signs:   Temp:  [98.3 °F (36.8 °C)-98.9 °F (37.2 °C)] 98.3 °F (36.8 °C)  Heart Rate:  [74] 74  Resp:  [16] 16  BP: (120-121)/(47-53) 121/47     Physical Exam:  Gen-no acute distress  HENT-NCAT, mucous membranes moist  CV-RRR, S1 S2 normal, no m/r/g  Resp-CTAB, no wheezes or rales  Abd-soft, appropriate post-op tenderness, ND, +BS  Ext-1-2+ BLE edema  Neuro-A&Ox3, no focal deficits  Skin-no rashes  Psych-appropriate mood      Results Reviewed:  LAB RESULTS:      Lab 22  0547 22  0602 22  0409 22  0450 22  0316 22  0308 22  0412   WBC 16.93* 19.92* 24.78* 26.85* 26.78*   < > 22.49*   HEMOGLOBIN 8.9* 8.8* 8.8* 8.7* 8.3*   < > 10.6*   HEMATOCRIT 26.9* 27.3* 26.7* 27.2* 27.1*   < > 32.7*   PLATELETS 665* 643* 637* 593* 507*   < > 366   NEUTROS ABS 10.50* 12.88* 16.56* 15.92* 17.14*   < > 13.81*   IMMATURE GRANS (ABS) 0.24* 0.34* 0.59* 0.64*  --   --  0.33*   LYMPHS ABS 3.60* 3.25* 4.97* 5.90*  --   --  4.07*   MONOS ABS 2.04* 2.84* 1.92* 3.41*  --   --  3.57*   EOS ABS 0.44* 0.46* 0.62* 0.80* 0.80*   < > 0.58*   MCV 92.4 93.8 94.3 96.1 97.5*   < > 93.7    < > = values in this interval not displayed.         Lab 22  1220 22  0602  07/31/22  0409 07/30/22  0450 07/29/22  2025 07/29/22  1316 07/29/22  0316 07/28/22  1231 07/28/22  0308   SODIUM 129* 133* 144 146*  --   --  147*  --  148*   POTASSIUM 4.6 5.2 4.6 4.3  --  4.4 3.6 4.4 3.4*   CHLORIDE 98 102 111* 113*  --   --  111*  --  112*   CO2 21.0* 22.0 26.0 27.0  --   --  28.0  --  30.0*   ANION GAP 10.0 9.0 7.0 6.0  --   --  8.0  --  6.0   BUN 28* 31* 28* 32*  --   --  40*  --  39*   CREATININE 1.07* 0.96 0.87 0.91  --   --  1.16*  --  1.08*   EGFR 51.6* 58.8* 66.2 62.7  --   --  46.9*  --  51.1*   GLUCOSE 149* 103* 142* 152*  --   --  173*  --  172*   CALCIUM 8.2* 8.3* 8.3* 8.3*  --   --  8.6  --  8.6   MAGNESIUM  --  2.2 2.0 2.1  --   --  2.5*  --  1.9   PHOSPHORUS  --   --   --  2.6 3.0 2.2* 2.0* 1.7* 2.1*         Lab 08/02/22  1220 08/01/22  0602 07/31/22 0409 07/30/22  0450 07/29/22  0316   TOTAL PROTEIN 4.5* 4.4* 4.5* 4.8* 5.0*   ALBUMIN 2.60* 2.10* 2.50* 2.70* 2.90*   GLOBULIN 1.9 2.3 2.0 2.1 2.1   ALT (SGPT) 19 20 22 28 39*   AST (SGOT) 29 23 18 20 18   BILIRUBIN 0.3 0.3 0.2 0.3 0.2   ALK PHOS 83 66 82 99 78                 Lab 07/31/22  0409   IRON 20*   IRON SATURATION 11*   TIBC 188*   TRANSFERRIN 126*         Brief Urine Lab Results  (Last result in the past 365 days)      Color   Clarity   Blood   Leuk Est   Nitrite   Protein   CREAT   Urine HCG        03/22/22 2202             69.7         03/22/22 2202 Yellow   Clear   Small (1+)   Negative   Negative   Trace                 Microbiology Results Abnormal     Procedure Component Value - Date/Time    Respiratory Culture - Sputum, ET Suction [[id number]] Collected: 07/22/22 1254    Lab Status: Final result Specimen: Sputum from ET Suction Updated: 07/24/22 1502     Respiratory Culture Heavy growth (4+) Normal respiratory woo. No S. aureus or Pseudomonas aeruginosa detected. Final report.     Gram Stain Many (4+) WBCs per low power field      Rare (1+) Epithelial cells per low power field      Moderate (3+) Gram positive cocci  in pairs, chains and clusters          FL Video Swallow With Speech Single Contrast    Result Date: 8/2/2022  EXAMINATION: FL VIDEO SWALLOW W SPEECH SINGLE-CONTRAST-  INDICATION: dysphagia; A41.9-Sepsis, unspecified organism; R16.1-Splenomegaly, not elsewhere classified; R13.12-Dysphagia, oropharyngeal phase  TECHNIQUE: 48 seconds of fluoroscopic time was used for this exam. 6 associated fluoroscopic loops were saved. The patient was evaluated in the seated lateral wall taking a variety of consistencies of barium by mouth under the direction of speech pathology.  COMPARISON: NONE  FINDINGS: 48 seconds of fluoroscopy provided for a modified barium swallow. Please see speech therapy report for full details and recommendations.       Impression: Fluoroscopy provided for a modified barium swallow. Please see speech therapy report for full details and recommendations.              I have reviewed the medications:  Scheduled Meds:atenolol, 50 mg, Oral, Q24H  barium sulfate, 20 mL, Oral, Once in imaging  barium sulfate, 100 mL, Oral, Once in imaging  heparin (porcine), 5,000 Units, Subcutaneous, Q12H  levothyroxine, 50 mcg, Oral, Q AM  nystatin, , Topical, Q8H  pantoprazole, 40 mg, Oral, Q AM  terazosin, 1 mg, Oral, Nightly      Continuous Infusions:   PRN Meds:.•  acetaminophen  •  acetaminophen  •  artificial tears  •  magnesium sulfate **OR** magnesium sulfate **OR** magnesium sulfate  •  metoclopramide  •  ondansetron  •  oxyCODONE-acetaminophen  •  polyvinyl alcohol  •  potassium & sodium phosphates **OR** potassium & sodium phosphates  •  potassium chloride    Assessment & Plan   Assessment & Plan     Active Hospital Problems    Diagnosis  POA   • **Lymphoma, low grade (HCC) [C85.90]  Yes   • Sepsis without acute organ dysfunction, due to unspecified organism (HCC) [A41.9]  Yes   • Coagulopathy (HCC) [D68.9]  No   • Pneumothorax, unspecified [J93.9]  No   • CKD (chronic kidney disease) stage 3, GFR 30-59 ml/min ([facility])  [N18.30]  Yes   • Anemia [D64.9]  Yes   • Splenomegaly [R16.1]  Yes      Resolved Hospital Problems   No resolved problems to display.        Brief Hospital Course to date:  Mahi Coronel is a 81 y.o. 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 Dr. Castañeda referred patient for splenectomy and underwent procedure 7/20/22 by Dr. Kemp. Post-op course complicated by bleeding and coagulopathy and was transferred to ICU on mechanical ventilation with an open abdomen, and a chest tube was placed uppon arrival by Dr. Kemp for a pneumothorax. Patient went back to OR 7/21/22 for washout and hemostasis. Received empiric antibiotics while in the ICU, but no organism was isolated. Patient ultimately was extubated on 7/24/22 and chest tube was removed on 7/27/22. Patient was initiated on nocturnal tube feeds in ICU. Ultimately was transferred out of ICU to telemetry floor on 7/30/22 and hospitalists consulted for medical co-management with Dr. Kemp.     This patient's problems and plans were partially entered by my partner and updated as appropriate by me 08/02/22.       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 Kemp, tolerating PO (NG & Garcia out)  -Add incentive spirometer at bedside     Leukocytosis, improving  -Leukocytosis multifactorial including stress response, recent critical illness, splenectomy; no overt infection found  -s/p empiric antibiotics (7 days  of empiric Cefepime and Flagyl which completed 7/29/22)  -WBC improving     Hyponatremia  Elevated Cr  -Na trending down, Cr trending up  -Concern for volume depletion  -Encourage PO hydration    HTN  -Continue Atenolol 50 mg daily, Hytrin 1 mg nightly  -Stable     BLE edema, due to volume resuscitation  -BLE venous duplex 7/29/22 negative for DVT     Hypothyroidism  -Continue Synthroid     Generalized weakness/debility  -PT/OT, likely will benefit from rehab at discharge    Expected Discharge Location and Transportation: rehab  Expected Discharge Date: per Dr. Kemp    DVT prophylaxis:  Medical and mechanical DVT prophylaxis orders are present.     AM-PAC 6 Clicks Score (PT): 14 (08/01/22 2000)    CODE STATUS:   Code Status and Medical Interventions:   Ordered at: 07/20/22 2018     Level Of Support Discussed With:    Next of Kin (If No Surrogate)     Code Status (Patient has no pulse and is not breathing):    No CPR (Do Not Attempt to Resuscitate)     Medical Interventions (Patient has pulse or is breathing):    Full Support       Vesta Patricio MD  08/02/22

## 2022-08-02 NOTE — MBS/VFSS/FEES
Acute Care - Speech Language Pathology   Swallow Re-Evaluation [facility]   Modified Barium Swallow Study (MBS)       Patient Name: Mahi Coronel  : 1940  MRN: [id number]  Today's Date: 2022               Admit Date: 2022    Visit Dx:     ICD-10-CM ICD-9-CM   1. Sepsis without acute organ dysfunction, due to unspecified organism (HCC)  A41.9 038.9     995.91   2. Splenomegaly  R16.1 789.2   3. Oropharyngeal dysphagia  R13.12 787.22     Patient Active Problem List   Diagnosis   • Lymphoma, low grade (HCC)   • Neuropathy   • Splenomegaly   • Anemia   • Thrombocytopenia (HCC)   • Sepsis (HCC)   • Lactic acidosis   • Hyponatremia   • CKD (chronic kidney disease) stage 3, GFR 30-59 ml/min (HCC)   • Elevated procalcitonin   • Elevated C-reactive protein (CRP)   • Coagulopathy (HCC)   • Pneumothorax, unspecified   • Sepsis without acute organ dysfunction, due to unspecified organism (HCC)     Past Medical History:   Diagnosis Date   • Anemia 2015   • Cataract     h/o   • Disease of thyroid gland    • Diverticulitis    • DVT (deep venous thrombosis) (HCC)     left lower leg   • GERD (gastroesophageal reflux disease)    • Gout    • History of basal cell carcinoma    • History of transfusion     1 unit at [facility], pt denies reactions   • Hypertension         • IBS (irritable bowel syndrome)    • Non Hodgkin's lymphoma (HCC)    • Splenomegaly    • Urinary incontinence      Past Surgical History:   Procedure Laterality Date   • APPENDECTOMY     • BACK SURGERY      lumbar   • CHOLECYSTECTOMY     • COLON RESECTION     • EXPLORATORY LAPAROTOMY N/A 2022    Procedure: ABDOMINAL WASHOUT WITH CLOSURE;  Surgeon: Jayme Kemp MD;  Location: [facility];  Service: General;  Laterality: N/A;   • HYSTERECTOMY      jaja   • KIDNEY SURGERY      kidney stones   • ORIF ANKLE FRACTURE Right     h/o   • SPLENECTOMY N/A 2022    Procedure: SPLENECTOMY OPEN, DISTAL PANCREATECTOMY;  Surgeon: Viridiana  Jayme RICHMOND MD;  Location: [facility];  Service: General;  Laterality: N/A;   • TOTAL HIP ARTHROPLASTY Bilateral     h/o       SLP Recommendation and Plan  SLP Swallowing Diagnosis: mild, oral dysphagia, pharyngeal dysphagia (08/02/22 1230)  SLP Diet Recommendation: mechanical soft with no mixed consistencies, thin liquids, other (see comments) (no straws) (08/02/22 1230)  Recommended Precautions and Strategies: upright posture during/after eating, small bites of food and sips of liquid, no straw, general aspiration precautions (08/02/22 1230)  SLP Rec. for Method of Medication Administration: meds whole, with pudding or applesauce, as tolerated (08/02/22 1230)     Monitor for Signs of Aspiration: yes, notify SLP if any concerns (08/02/22 1230)     Swallow Criteria for Skilled Therapeutic Interventions Met: demonstrates skilled criteria (08/02/22 1230)  Anticipated Discharge Disposition (SLP): unknown, anticipate therapy at next level of care (08/02/22 1230)  Rehab Potential/Prognosis, Swallowing: good, to achieve stated therapy goals (08/02/22 1230)  Therapy Frequency (Swallow): 5 days per week (08/02/22 1230)  Predicted Duration Therapy Intervention (Days): until discharge (08/02/22 1230)                                  Plan of Care Reviewed With: patient  Progress: improving      SWALLOW EVALUATION (last 72 hours)     SLP Adult Swallow Evaluation     Row Name 08/02/22 1230                Rehab Evaluation    Document Type re-evaluation  -CJ       Subjective Information no complaints  -CJ       Patient Observations alert;cooperative  -CJ       Patient/Family/Caregiver Comments/Observations no family present  -CJ       Patient Effort good  -CJ       Symptoms Noted During/After Treatment none  -CJ               General Information    Patient Profile Reviewed yes  -CJ       Pertinent History Of Current Problem see prev eval; referred for repeat MBS to determine upgrade  -CJ       Current Method of Nutrition mechanical  soft, no mixed consistencies;nectar/syrup-thick liquids  -CJ       Precautions/Limitations, Vision WFL;for purposes of eval  -       Precautions/Limitations, Hearing hearing impairment, bilaterally  -       Prior Level of Function-Communication unknown  -       Prior Level of Function-Swallowing no diet consistency restrictions  -       Plans/Goals Discussed with agreed upon;patient  -       Barriers to Rehab none identified  -       Patient's Goals for Discharge return to regular diet  -               Pain    Additional Documentation Pain Scale: FACES Pre/Post-Treatment (Group)  -CJ               Pain Scale: Numbers Pre/Post-Treatment    Pretreatment Pain Rating --       Posttreatment Pain Rating --               Pain Scale: FACES Pre/Post-Treatment    Pain: FACES Scale, Pretreatment 0-->no hurt  -CJ       Posttreatment Pain Rating 0-->no hurt  -CJ               MBS/VFSS    Utensils Used spoon;cup;straw  -       Consistencies Trialed regular textures;thin liquids;pudding thick  -               MBS/VFSS Interpretation    Oral Prep Phase impaired oral phase of swallowing  -       Oral Transit Phase WFL  -       Oral Residue WFL  -       VFSS Summary Mild oropharyngeal dysphagia. Prolonged mastication time w/ solids. Deep laryngeal penetration w/ thin liquids via straw to the level of the true VF's, appeared to clear upon completion of swallow however difficult to fully visualize 2/2 artifact 2/2 lines. Transient penetration only intermittently w/ thin liquids via single cup/spoon presentations that cleared upon completion of swallow.Mild residue primarily w/ thins, pt sensed and cleared w/ spontaneous 2nd swallow. No other episodes of laryngeal penetration or aspiration evidenced across this evaluation. Recommend dysphagia level IV w/ thin liquids, no straws, no mixed. SLP will continue to f/u for dysphagia tx  -CJ               Oral Preparatory Phase    Oral Preparatory Phase prolonged  manipulation  -CJ       Prolonged Manipulation regular textures;secondary to reduced lingual strength  -CJ               Initiation of Pharyngeal Swallow    Initiation of Pharyngeal Swallow bolus in valleculae  -CJ       Pharyngeal Phase impaired pharyngeal phase of swallowing  -CJ       Penetration During the Swallow thin liquids;secondary to reduced laryngeal elevation;secondary to reduced vestibular closure;other (see comments)  worse via straw  -CJ       Response to Penetration deep;no response  -CJ       Rosenbek's Scale thin:;4--->level 4  via straw  -CJ       Pharyngeal Residue thin liquids;valleculae;pyriform sinuses;secondary to reduced laryngeal elevation;secondary to reduced hyolaryngeal excursion  -CJ       Response to Residue cleared residue with spontaneous subsequent swallow  -CJ       Attempted Compensatory Maneuvers bolus size;bolus presentation style;throat clear after swallow;additional subsequent swallow  -CJ       Response to Attempted Compensatory Maneuvers prevented penetration  -CJ       Pharyngeal Phase, Comment Noted concern for osteophytes C4-C7  -CJ               SLP Evaluation Clinical Impression    SLP Swallowing Diagnosis mild;oral dysphagia;pharyngeal dysphagia  -CJ       Functional Impact risk of aspiration/pneumonia  -       Rehab Potential/Prognosis, Swallowing good, to achieve stated therapy goals  -       Swallow Criteria for Skilled Therapeutic Interventions Met demonstrates skilled criteria  -               SLP Treatment Clinical Impressions    Treatment Assessment (SLP) --       Daily Summary of Progress (SLP) --       Plan for Continued Treatment (SLP) --       Care Plan Review --               Recommendations    Therapy Frequency (Swallow) 5 days per week  -       Predicted Duration Therapy Intervention (Days) until discharge  -       SLP Diet Recommendation mechanical soft with no mixed consistencies;thin liquids;other (see comments)  no straws  -CJ        Recommended Precautions and Strategies upright posture during/after eating;small bites of food and sips of liquid;no straw;general aspiration precautions  -CJ       Oral Care Recommendations Oral Care BID/PRN  -CJ       SLP Rec. for Method of Medication Administration meds whole;with pudding or applesauce;as tolerated  -CJ       Monitor for Signs of Aspiration yes;notify SLP if any concerns  -CJ       Anticipated Discharge Disposition (SLP) unknown;anticipate therapy at next level of care  -CJ             User Key  (r) = Recorded By, (t) = Taken By, (c) = Cosigned By    Initials Name Effective Dates    CJ Sally Delarosa, MS CCC-SLP 06/16/21 -     DV Danika Gutierres MS CCC-SLP 06/16/21 -                 EDUCATION  The patient has been educated in the following areas:   Dysphagia (Swallowing Impairment) Oral Care/Hydration Modified Diet Instruction.        SLP GOALS     Row Name 08/02/22 1230          (LTG) Patient will demonstrate functional swallow for    Diet Texture (Demonstrate functional swallow) regular textures  -CJ     Liquid viscosity (Demonstrate functional swallow) thin liquids  -CJ     Dare (Demonstrate functional swallow) independently (over 90% accuracy)  -CJ     Time Frame (Demonstrate functional swallow) by discharge  -CJ     Progress/Outcomes (Demonstrate functional swallow) goal ongoing  -CJ     Comment (Demonstrate functional swallow) --            (STG) Patient will tolerate trials of    Consistencies Trialed (Tolerate trials) soft chopped textures;thin liquids  no straws  -CJ     Desired Outcome (Tolerate trials) without signs/symptoms of aspiration;with adequate oral prep/transit/clearance  -CJ     Dare (Tolerate trials) independently (over 90% accuracy)  -CJ     Progress/Outcomes (Tolerate trials) new goal  -CJ            (STG) Lingual Strengthening Goal 1 (SLP)    Activity (Lingual Strengthening Goal 1, SLP) increase lingual tone/sensation/control/coordination/movement;increase  tongue back strength  -CJ     Increase Lingual Tone/Sensation/Control/Coordination/Movement swallow trials;lingual resistance exercises  -CJ     Increase Tongue Back Strength lingual resistance exercises  -CJ     Norton/Accuracy (Lingual Strengthening Goal 1, SLP) with minimal cues (75-90% accuracy)  -CJ     Time Frame (Lingual Strengthening Goal 1, SLP) short term goal (STG)  -CJ     Progress/Outcomes (Lingual Strengthening Goal 1, SLP) goal ongoing  -CJ     Comment (Lingual Strengthening Goal 1, SLP) --            (STG) Pharyngeal Strengthening Exercise Goal 1 (SLP)    Activity (Pharyngeal Strengthening Goal 1, SLP) increase superior movement of the hyolaryngeal complex;increase anterior movement of the hyolaryngeal complex;increase closure at entrance to airway/closure of airway at glottis  -CJ     Increase Timing --     Increase Superior Movement of the Hyolaryngeal Complex effortful pitch glide (falsetto + pharyngeal squeeze)  -CJ     Increase Anterior Movement of the Hyolaryngeal Complex chin tuck against resistance (CTAR)  -CJ     Increase Closure at Entrance to Airway/Closure of Airway at Glottis supraglottic swallow  -CJ     Norton/Accuracy (Pharyngeal Strengthening Goal 1, SLP) with minimal cues (75-90% accuracy)  -CJ     Time Frame (Pharyngeal Strengthening Goal 1, SLP) short term goal (STG)  -CJ     Progress/Outcomes (Pharyngeal Strengthening Goal 1, SLP) goal revised this date  -CJ     Comment (Pharyngeal Strengthening Goal 1, SLP) --            (STG) Swallow Compensatory Strategies Goal 1 (SLP)    Activity (Swallow Compensatory Strategies/Techniques Goal 1, SLP) compensatory strategies;during meal intake;during p.o. trials;small bites;small cup sips;other (see comments)  no straws  -CJ     Norton/Accuracy (Swallow Compensatory Strategies/Techniques Goal 1, SLP) independently (over 90% accuracy)  -CJ     Time Frame (Swallow Compensatory Strategies/Techniques Goal 1, SLP) short term  goal (STG)  -     Progress/Outcomes (Swallow Compensatory Strategies/Techniques Goal 1, SLP) new goal  -           User Key  (r) = Recorded By, (t) = Taken By, (c) = Cosigned By    Initials Name Provider Type    Sally Vega MS CCC-SLP Speech and Language Pathologist    Danika Conroy MS CCC-SLP Speech and Language Pathologist                   Time Calculation:    Time Calculation- SLP     Row Name 08/02/22 1453             Time Calculation- SLP    SLP Start Time 1230  -      SLP Received On 08/02/22  -              Untimed Charges    14764-FS Motion Fluoro Eval Swallow Minutes 54  -              Total Minutes    Untimed Charges Total Minutes 54  -CJ       Total Minutes 54  -CJ            User Key  (r) = Recorded By, (t) = Taken By, (c) = Cosigned By    Initials Name Provider Type    Sally Vega MS CCC-SLP Speech and Language Pathologist                Therapy Charges for Today     Code Description Service Date Service Provider Modifiers Qty    [id number]  ST MOTION FLUORO EVAL SWALLOW 4 8/2/2022 Sally Delarosa MS CCC-SLP GN 1               Sally Delarosa MS CCC-CHANA  8/2/2022

## 2022-08-02 NOTE — PLAN OF CARE
Goal Outcome Evaluation:  Plan of Care Reviewed With: patient        Progress: improving   SLP re-evaluation completed. Will continue to address dysphagia. MBS completed, upgrade to dysphagia level IV w/ thin liquids, no mixed consistencies, no straws.. Please see note for further details and recommendations.

## 2022-08-02 NOTE — DISCHARGE INSTR - DIET
8/2/2022  MBS/VFSS Reason for Referral  Patient was referred for a MBS to assess the efficiency of his/her swallow function, rule out aspiration and make recommendations regarding safe dietary consistencies, effective compensatory strategies, and safe eating environment.             Recommendations/Treatment  SLP Swallowing Diagnosis: mild, oral dysphagia, pharyngeal dysphagia (08/02/22 1230)  Functional Impact: risk of aspiration/pneumonia (08/02/22 1230)  Rehab Potential/Prognosis, Swallowing: good, to achieve stated therapy goals (08/02/22 1230)  Swallow Criteria for Skilled Therapeutic Interventions Met: demonstrates skilled criteria (08/02/22 1230)  Therapy Frequency (Swallow): 5 days per week (08/02/22 1230)  Predicted Duration Therapy Intervention (Days): until discharge (08/02/22 1230)  SLP Diet Recommendation: mechanical soft with no mixed consistencies, thin liquids, other (see comments) (no straws) (08/02/22 1230)  Recommended Precautions and Strategies: upright posture during/after eating, small bites of food and sips of liquid, no straw, general aspiration precautions (08/02/22 1230)  SLP Rec. for Method of Medication Administration: meds whole, with pudding or applesauce, as tolerated (08/02/22 1230)  Monitor for Signs of Aspiration: yes, notify SLP if any concerns (08/02/22 1230)  Anticipated Discharge Disposition (SLP): unknown, anticipate therapy at next level of care (08/02/22 1230)    Instrumental Set-up  Utensils Used: spoon, cup, straw (08/02/22 1230)  Consistencies Trialed: regular textures, thin liquids, pudding thick (08/02/22 1230)    Oral Preparation/ Oral Phase  Oral Prep Phase: impaired oral phase of swallowing (08/02/22 1230)  Oral Transit Phase: WFL (08/02/22 1230)  Oral Residue: WFL (08/02/22 1230)  Oral Preparatory Phase: prolonged manipulation (08/02/22 1230)  Prolonged Manipulation: regular textures, secondary to reduced lingual strength (08/02/22 1230)          Pharyngeal  Phase  Initiation of Pharyngeal Swallow: bolus in valleculae (08/02/22 1230)  Pharyngeal Phase: impaired pharyngeal phase of swallowing (08/02/22 1230)  Penetration During the Swallow: thin liquids, secondary to reduced laryngeal elevation, secondary to reduced vestibular closure, other (see comments) (worse via straw) (08/02/22 1230)  Response to Penetration: deep, no response (08/02/22 1230)  Pharyngeal Residue: thin liquids, valleculae, pyriform sinuses, secondary to reduced laryngeal elevation, secondary to reduced hyolaryngeal excursion (08/02/22 1230)  Response to Residue: cleared residue with spontaneous subsequent swallow (08/02/22 1230)  Attempted Compensatory Maneuvers: bolus size, bolus presentation style, throat clear after swallow, additional subsequent swallow (08/02/22 1230)  Response to Attempted Compensatory Maneuvers: prevented penetration (08/02/22 1230)  Pharyngeal Phase, Comment: Noted concern for osteophytes C4-C7 (08/02/22 1230)  VFSS Summary: Mild oropharyngeal dysphagia. Prolonged mastication time w/ solids. Deep laryngeal penetration w/ thin liquids via straw to the level of the true VF's, appeared to clear upon completion of swallow however difficult to fully visualize 2/2 artifact 2/2 lines. Transient penetration only intermittently w/ thin liquids via single cup/spoon presentations that cleared upon completion of swallow.Mild residue primarily w/ thins, pt sensed and cleared w/ spontaneous 2nd swallow. No other episodes of laryngeal penetration or aspiration evidenced across this evaluation. Recommend dysphagia level IV w/ thin liquids, no straws, no mixed. SLP will continue to f/u for dysphagia tx (08/02/22 1230)    Cervical Esophageal Phase

## 2022-08-03 LAB
ALBUMIN SERPL-MCNC: 2.7 G/DL (ref 3.5–5.2)
ALBUMIN/GLOB SERPL: 1.6 G/DL
ALP SERPL-CCNC: 86 U/L (ref 39–117)
ALT SERPL W P-5'-P-CCNC: 19 U/L (ref 1–33)
ANION GAP SERPL CALCULATED.3IONS-SCNC: 7 MMOL/L (ref 5–15)
AST SERPL-CCNC: 27 U/L (ref 1–32)
BASOPHILS # BLD AUTO: 0.1 10*3/MM3 (ref 0–0.2)
BASOPHILS NFR BLD AUTO: 0.7 % (ref 0–1.5)
BILIRUB SERPL-MCNC: 0.3 MG/DL (ref 0–1.2)
BUN SERPL-MCNC: 23 MG/DL (ref 8–23)
BUN/CREAT SERPL: 23 (ref 7–25)
CALCIUM SPEC-SCNC: 8.3 MG/DL (ref 8.6–10.5)
CHLORIDE SERPL-SCNC: 100 MMOL/L (ref 98–107)
CO2 SERPL-SCNC: 24 MMOL/L (ref 22–29)
CREAT SERPL-MCNC: 1 MG/DL (ref 0.57–1)
DEPRECATED RDW RBC AUTO: 53.1 FL (ref 37–54)
EGFRCR SERPLBLD CKD-EPI 2021: 56 ML/MIN/1.73
EOSINOPHIL # BLD AUTO: 0.28 10*3/MM3 (ref 0–0.4)
EOSINOPHIL NFR BLD AUTO: 2 % (ref 0.3–6.2)
ERYTHROCYTE [DISTWIDTH] IN BLOOD BY AUTOMATED COUNT: 15.9 % (ref 12.3–15.4)
GLOBULIN UR ELPH-MCNC: 1.7 GM/DL
GLUCOSE SERPL-MCNC: 126 MG/DL (ref 65–99)
HCT VFR BLD AUTO: 29.9 % (ref 34–46.6)
HGB BLD-MCNC: 9.8 G/DL (ref 12–15.9)
IMM GRANULOCYTES # BLD AUTO: 0.15 10*3/MM3 (ref 0–0.05)
IMM GRANULOCYTES NFR BLD AUTO: 1.1 % (ref 0–0.5)
LYMPHOCYTES # BLD AUTO: 2.47 10*3/MM3 (ref 0.7–3.1)
LYMPHOCYTES NFR BLD AUTO: 17.9 % (ref 19.6–45.3)
MCH RBC QN AUTO: 30.7 PG (ref 26.6–33)
MCHC RBC AUTO-ENTMCNC: 32.8 G/DL (ref 31.5–35.7)
MCV RBC AUTO: 93.7 FL (ref 79–97)
MONOCYTES # BLD AUTO: 1.72 10*3/MM3 (ref 0.1–0.9)
MONOCYTES NFR BLD AUTO: 12.5 % (ref 5–12)
NEUTROPHILS NFR BLD AUTO: 65.8 % (ref 42.7–76)
NEUTROPHILS NFR BLD AUTO: 9.06 10*3/MM3 (ref 1.7–7)
NRBC BLD AUTO-RTO: 0.3 /100 WBC (ref 0–0.2)
PLATELET # BLD AUTO: 754 10*3/MM3 (ref 140–450)
PMV BLD AUTO: 9.8 FL (ref 6–12)
POTASSIUM SERPL-SCNC: 4.8 MMOL/L (ref 3.5–5.2)
PROT SERPL-MCNC: 4.4 G/DL (ref 6–8.5)
QT INTERVAL: 482 MS
QTC INTERVAL: 509 MS
RBC # BLD AUTO: 3.19 10*6/MM3 (ref 3.77–5.28)
SODIUM SERPL-SCNC: 131 MMOL/L (ref 136–145)
WBC NRBC COR # BLD: 13.78 10*3/MM3 (ref 3.4–10.8)

## 2022-08-03 PROCEDURE — 99232 SBSQ HOSP IP/OBS MODERATE 35: CPT | Performed by: HOSPITALIST

## 2022-08-03 PROCEDURE — 25010000002 HEPARIN (PORCINE) PER 1000 UNITS: Performed by: INTERNAL MEDICINE

## 2022-08-03 PROCEDURE — 80053 COMPREHEN METABOLIC PANEL: CPT | Performed by: INTERNAL MEDICINE

## 2022-08-03 PROCEDURE — 97530 THERAPEUTIC ACTIVITIES: CPT

## 2022-08-03 PROCEDURE — 85025 COMPLETE CBC W/AUTO DIFF WBC: CPT | Performed by: SURGERY

## 2022-08-03 RX ADMIN — HEPARIN SODIUM 5000 UNITS: 5000 INJECTION, SOLUTION INTRAVENOUS; SUBCUTANEOUS at 20:06

## 2022-08-03 RX ADMIN — ATENOLOL 50 MG: 50 TABLET ORAL at 08:40

## 2022-08-03 RX ADMIN — PANTOPRAZOLE SODIUM 40 MG: 40 TABLET, DELAYED RELEASE ORAL at 06:54

## 2022-08-03 RX ADMIN — NYSTATIN: 100000 POWDER TOPICAL at 06:54

## 2022-08-03 RX ADMIN — LEVOTHYROXINE SODIUM 50 MCG: 50 TABLET ORAL at 06:54

## 2022-08-03 RX ADMIN — NYSTATIN: 100000 POWDER TOPICAL at 14:25

## 2022-08-03 RX ADMIN — HEPARIN SODIUM 5000 UNITS: 5000 INJECTION, SOLUTION INTRAVENOUS; SUBCUTANEOUS at 08:40

## 2022-08-03 RX ADMIN — NYSTATIN: 100000 POWDER TOPICAL at 20:06

## 2022-08-03 RX ADMIN — TERAZOSIN HYDROCHLORIDE 1 MG: 1 CAPSULE ORAL at 20:06

## 2022-08-03 NOTE — PLAN OF CARE
Goal Outcome Evaluation:  Plan of Care Reviewed With: patient, family        Progress: improving  Outcome Evaluation: Pt improved mobility and independence this session. Pt required min A for supine to sit and mod A for scooting to EOB. Pt c/o dizziness during mobility, BP taken and stable throughout session with no significant decrease. Pt able to complete x2 STS, one from chair and one from EOB with CGA and RW. Pt able to take 4-5 steps to recliner with CGA and RW. Pt and family educated on proper techniques and pacing for transfers, with extensive education on pt safety. Continue to progress per pt tolerance.

## 2022-08-03 NOTE — PROGRESS NOTES
"Patient Name:  Mahi Coronel  YOB: 1940  [id number]    Surgery Progress Note    Date of visit: 8/3/2022    Subjective   No pain. No nausea/vomiting. Multiple BMs. No fevers/chills. Had an episode of chest pain yesterday evening, but resolved. Work up negative for cardiac source.         Objective       /50 (BP Location: Right arm, Patient Position: Lying)   Pulse 64   Temp 97.6 °F (36.4 °C) (Oral)   Resp 16   Ht 167.6 cm (65.98\")   Wt 89.4 kg (197 lb 1.5 oz)   SpO2 97%   BMI 31.83 kg/m²     Intake/Output Summary (Last 24 hours) at 8/3/2022 0838  Last data filed at 8/3/2022 0654  Gross per 24 hour   Intake 200 ml   Output 1580 ml   Net -1380 ml   SHERRILL 555 cc    CV:  Rhythm regular and rate regular  L:  Clear to auscultation bilaterally  Abd:  Bowel sounds positive, soft, incision c/d/i, SHERRILL serous  Ext:  No cyanosis, clubbing, edema    Recent labs and imaging that are back at this time have been reviewed.          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

## 2022-08-03 NOTE — THERAPY TREATMENT NOTE
Patient Name: Mahi Coronel  : 1940    MRN: [id number]                              Today's Date: 8/3/2022       Admit Date: 2022    Visit Dx:     ICD-10-CM ICD-9-CM   1. Sepsis without acute organ dysfunction, due to unspecified organism (HCC)  A41.9 038.9     995.91   2. Splenomegaly  R16.1 789.2   3. Oropharyngeal dysphagia  R13.12 787.22     Patient Active Problem List   Diagnosis   • Lymphoma, low grade (HCC)   • Neuropathy   • Splenomegaly   • Anemia   • Thrombocytopenia (HCC)   • Sepsis (HCC)   • Lactic acidosis   • Hyponatremia   • CKD (chronic kidney disease) stage 3, GFR 30-59 ml/min (HCC)   • Elevated procalcitonin   • Elevated C-reactive protein (CRP)   • Coagulopathy (HCC)   • Pneumothorax, unspecified     Past Medical History:   Diagnosis Date   • Anemia 2015   • Cataract     h/o   • Disease of thyroid gland    • Diverticulitis    • DVT (deep venous thrombosis) (HCC)     left lower leg   • GERD (gastroesophageal reflux disease)    • Gout    • History of basal cell carcinoma    • History of transfusion     1 unit at [facility], pt denies reactions   • Hypertension         • IBS (irritable bowel syndrome)    • Non Hodgkin's lymphoma (HCC)    • Splenomegaly    • Urinary incontinence      Past Surgical History:   Procedure Laterality Date   • APPENDECTOMY     • BACK SURGERY      lumbar   • CHOLECYSTECTOMY     • COLON RESECTION     • EXPLORATORY LAPAROTOMY N/A 2022    Procedure: ABDOMINAL WASHOUT WITH CLOSURE;  Surgeon: Jayme Kemp MD;  Location: [facility] OR;  Service: General;  Laterality: N/A;   • HYSTERECTOMY      jaja   • KIDNEY SURGERY      kidney stones   • ORIF ANKLE FRACTURE Right     h/o   • SPLENECTOMY N/A 2022    Procedure: SPLENECTOMY OPEN, DISTAL PANCREATECTOMY;  Surgeon: Jayme Kemp MD;  Location: [facility] OR;  Service: General;  Laterality: N/A;   • TOTAL HIP ARTHROPLASTY Bilateral     h/o      General Information     Row Name 22 1607           Physical Therapy Time and Intention    Document Type therapy note (daily note)  -AE     Mode of Treatment physical therapy  -AE     Row Name 08/03/22 1607          General Information    Existing Precautions/Restrictions fall;other (see comments)  Abd incision; SHERRILL drain; LUE edema, BLE edema  -AE     Barriers to Rehab medically complex;previous functional deficit;hearing deficit  -AE     Row Name 08/03/22 1607          Cognition    Orientation Status (Cognition) oriented x 3  -AE     Row Name 08/03/22 1607          Safety Issues, Functional Mobility    Safety Issues Affecting Function (Mobility) awareness of need for assistance;insight into deficits/self-awareness;safety precaution awareness;sequencing abilities  -AE     Impairments Affecting Function (Mobility) balance;endurance/activity tolerance;pain;postural/trunk control;shortness of breath;strength  -AE           User Key  (r) = Recorded By, (t) = Taken By, (c) = Cosigned By    Initials Name Provider Type    AE Ashok Duong, PT Physical Therapist               Mobility     Row Name 08/03/22 1609          Bed Mobility    Bed Mobility scooting/bridging;supine-sit  -AE     Scooting/Bridging [place] (Bed Mobility) 1 person assist;verbal cues;moderate assist (50% patient effort)  -AE     Supine-Sit [place] (Bed Mobility) minimum assist (75% patient effort);1 person assist;verbal cues  -AE     Assistive Device (Bed Mobility) draw sheet;head of bed elevated  -AE     Comment, (Bed Mobility) VCs for hand placement and sequencing. Pt required extra time to complete mobility and c/o dizziness with transition to sitting EOB. BP taken and stable throughout.  -AE     Row Name 08/03/22 1609          Transfers    Comment, (Transfers) VCs for hand placement and sequencing. Pt demo increased independence this session.  -AE     Row Name 08/03/22 1609          Bed-Chair Transfer    Bed-Chair [place] (Transfers) contact guard;1 person assist;verbal cues  -AE      Assistive Device (Bed-Chair Transfers) walker, front-wheeled  -AE     Comment, (Bed-Chair Transfer) Pt took 4-5 steps to chair and demo good balance throughout, VCs for sequencing.  -AE     Row Name 08/03/22 1609          Sit-Stand Transfer    Sit-Stand [place] (Transfers) contact guard;1 person assist;verbal cues  -AE     Assistive Device (Sit-Stand Transfers) walker, front-wheeled  -AE     Row Name 08/03/22 1609          Gait/Stairs (Locomotion)    Comment, (Gait/Stairs) Deferred d/t pt decreased endurance.  -AE           User Key  (r) = Recorded By, (t) = Taken By, (c) = Cosigned By    Initials Name Provider Type    AE Ashok Duong PT Physical Therapist               Obj/Interventions     Row Name 08/03/22 1613          Balance    Balance Assessment sitting static balance;sitting dynamic balance;sit to stand dynamic balance;standing static balance;standing dynamic balance  -AE     Static Sitting Balance standby assist  -AE     Dynamic Sitting Balance standby assist  -AE     Position, Sitting Balance unsupported;sitting edge of bed  -AE     Sit to Stand Dynamic Balance contact guard;1-person assist;verbal cues  -AE     Static Standing Balance contact guard  -AE     Dynamic Standing Balance contact guard  -AE     Position/Device Used, Standing Balance supported;walker, rolling  -AE           User Key  (r) = Recorded By, (t) = Taken By, (c) = Cosigned By    Initials Name Provider Type    AE Ashok Duong PT Physical Therapist               Goals/Plan    No documentation.                Clinical Impression     Row Name 08/03/22 1614          Pain Scale: FACES Pre/Post-Treatment    Pain: FACES Scale, Pretreatment 2-->hurts little bit  -AE     Posttreatment Pain Rating 2-->hurts little bit  -AE     Pain Location incisional  -AE     Pain Location - abdomen  -AE     Row Name 08/03/22 1614          Plan of Care Review    Plan of Care Reviewed With patient;family  -AE     Progress improving  -AE     Outcome  Evaluation Pt improved mobility and independence this session. Pt required min A for supine to sit and mod A for scooting to EOB. Pt c/o dizziness during mobility, BP taken and stable throughout session with no significant decrease. Pt able to complete x2 STS, one from chair and one from EOB with CGA and RW. Pt able to take 4-5 steps to recliner with CGA and RW. Pt and family educated on proper techniques and pacing for transfers, with extensive education on pt safety. Continue to progress per pt tolerance.  -AE     Row Name 08/03/22 1614          Vital Signs    Pre Systolic BP Rehab 134  -AE     Pre Treatment Diastolic BP 48  -AE     Intra Systolic BP Rehab 135  -AE     Intra Treatment Diastolic BP 56  -AE     Pretreatment Heart Rate (beats/min) 60  -AE     Posttreatment Heart Rate (beats/min) 61  -AE     Pre SpO2 (%) --  VSS  -AE     O2 Delivery Pre Treatment room air  -AE     O2 Delivery Intra Treatment room air  -AE     O2 Delivery Post Treatment room air  -AE     Pre Patient Position Supine  -AE     Intra Patient Position Standing  -AE     Post Patient Position Sitting  -AE     Row Name 08/03/22 1614          Positioning and Restraints    Pre-Treatment Position in bed  -AE     Post Treatment Position chair  -AE     In Chair notified nsg;reclined;call light within reach;encouraged to call for assist;exit alarm on;with family/caregiver;RUE elevated;LUE elevated;legs elevated;waffle cushion  -AE           User Key  (r) = Recorded By, (t) = Taken By, (c) = Cosigned By    Initials Name Provider Type    AE Ashok Duong, PT Physical Therapist               Outcome Measures     Row Name 08/03/22 1618          How much help from another person do you currently need...    Turning from your back to your side while in flat bed without using bedrails? 3  -AE     Moving from lying on back to sitting on the side of a flat bed without bedrails? 3  -AE     Moving to and from a bed to a chair (including a wheelchair)? 3  -AE      Standing up from a chair using your arms (e.g., wheelchair, bedside chair)? 3  -AE     Climbing 3-5 steps with a railing? 2  -AE     To walk in hospital room? 3  -AE     AM-PAC 6 Clicks Score (PT) 17  -AE     Highest level of mobility 5 --> Static standing  -AE     Row Name 08/03/22 1618          Functional Assessment    Outcome Measure Options AM-PAC 6 Clicks Basic Mobility (PT)  -AE           User Key  (r) = Recorded By, (t) = Taken By, (c) = Cosigned By    Initials Name Provider Type    AE Ashok Duong, PT Physical Therapist                             Physical Therapy Education                 Title: PT OT SLP Therapies (In Progress)     Topic: Physical Therapy (Done)     Point: Mobility training (Done)     Learning Progress Summary           Patient Acceptance, E, VU by AE at 8/3/2022 1500   Family Acceptance, E, VU by AE at 8/3/2022 1500      Show all documentation for this point (3)                 Point: Home exercise program (Done)     Learning Progress Summary           Patient Acceptance, E, VU by AE at 8/3/2022 1500   Family Acceptance, E, VU by AE at 8/3/2022 1500      Show all documentation for this point (1)                 Point: Body mechanics (Done)     Learning Progress Summary           Patient Acceptance, E, VU by AE at 8/3/2022 1500   Family Acceptance, E, VU by AE at 8/3/2022 1500      Show all documentation for this point (3)                 Point: Precautions (Done)     Learning Progress Summary           Patient Acceptance, E, VU by AE at 8/3/2022 1500   Family Acceptance, E, VU by AE at 8/3/2022 1500      Show all documentation for this point (3)                             User Key     Initials Effective Dates Name Provider Type Discipline    AE 09/21/21 -  Ashok Duong, PT Physical Therapist PT              PT Recommendation and Plan     Plan of Care Reviewed With: patient, family  Progress: improving  Outcome Evaluation: Pt improved mobility and independence this session. Pt  required min A for supine to sit and mod A for scooting to EOB. Pt c/o dizziness during mobility, BP taken and stable throughout session with no significant decrease. Pt able to complete x2 STS, one from chair and one from EOB with CGA and RW. Pt able to take 4-5 steps to recliner with CGA and RW. Pt and family educated on proper techniques and pacing for transfers, with extensive education on pt safety. Continue to progress per pt tolerance.     Time Calculation:    PT Charges     Row Name 08/03/22 1619             Time Calculation    Start Time 1500  -AE      PT Received On 08/03/22  -AE      PT Goal Re-Cert Due Date 08/06/22  -AE              Time Calculation- PT    Total Timed Code Minutes- PT 30 minute(s)  -AE              Timed Charges    45051 - PT Therapeutic Activity Minutes 30  -AE              Total Minutes    Timed Charges Total Minutes 30  -AE       Total Minutes 30  -AE            User Key  (r) = Recorded By, (t) = Taken By, (c) = Cosigned By    Initials Name Provider Type    AE Ashok Duong, PT Physical Therapist              Therapy Charges for Today     Code Description Service Date Service Provider Modifiers Qty    36494960947 HC PT THERAPEUTIC ACT EA 15 MIN 8/3/2022 Ashok Duong, PT GP 2    16211053620 HC PT THER SUPP EA 15 MIN 8/3/2022 Ashok Duong, PT GP 2          PT G-Codes  Outcome Measure Options: AM-PAC 6 Clicks Basic Mobility (PT)  AM-PAC 6 Clicks Score (PT): 17  AM-PAC 6 Clicks Score (OT): 12    Ashok Duong PT  8/3/2022

## 2022-08-03 NOTE — CASE MANAGEMENT/SOCIAL WORK
Continued Stay Note  [facility]     Patient Name: Mahi Coronel  MRN: [id number]  Today's Date: 8/3/2022    Admit Date: 7/20/2022     Discharge Plan     Row Name 08/03/22 1425       Plan    Plan [facility] [place]    Patient/Family in Agreement with Plan yes    Plan Comments Per Marian with [facility] [place] they expect to have a bed available tomorrow.  Ms. Coronel is agreeable to accept this bed offer.  Her daughters have requested to provide transportation.  I have asked PT/OT to see her today to ensure she will be able to transfer safely in/out of care with family assistance.  Ms. Coronel' denies having any other discharge needs.    Final Discharge Disposition Code 03 - skilled nursing facility (SNF)               Discharge Codes    No documentation.               Expected Discharge Date and Time     Expected Discharge Date Expected Discharge Time    Aug 5, 2022             Amaya Mayo RN

## 2022-08-03 NOTE — PROGRESS NOTES
[facility] Medicine Services  PROGRESS NOTE    Patient Name: Mahi Coronel  : 1940  MRN: [id number]    Date of Admission: 2022  Primary Care Physician: Romulo Isidro MD    Subjective   Subjective     CC:  F/U s/p splenectomy    HPI:  Patient seen this morning. On the commode having a bowel movement. Had episode of chest pain last evening, she thinks it was probably gas. Improved and no further pain since.     ROS:  Gen-no fevers, no chills  CV-no chest pain, no palpitations  Resp-no cough, +mild dyspnea  GI-no N/V/D, +abd pain    All other systems reviewed and negative except any additional pertinent positives and negatives as discussed in HPI.      Objective   Objective     Vital Signs:   Temp:  [96 °F (35.6 °C)-97.6 °F (36.4 °C)] 96 °F (35.6 °C)  Heart Rate:  [59-64] 62  Resp:  [16-17] 17  BP: (133-134)/(48-52) 134/48  Flow (L/min):  [2] 2     Physical Exam:  Gen-no acute distress  HENT-NCAT, mucous membranes moist  CV-RRR, S1 S2 normal, no m/r/g  Resp-CTAB, no wheezes or rales  Abd-soft, appropriate post-op tenderness, ND, +BS  Ext-1-2+ BLE edema  Neuro-A&Ox3, no focal deficits  Skin-no rashes  Psych-appropriate mood  No change from yesterday      Results Reviewed:  LAB RESULTS:      Lab 22  1032 22  0547 22  0602 22  0409 22  0450   WBC 13.78* 16.93* 19.92* 24.78* 26.85*   HEMOGLOBIN 9.8* 8.9* 8.8* 8.8* 8.7*   HEMATOCRIT 29.9* 26.9* 27.3* 26.7* 27.2*   PLATELETS 754* 665* 643* 637* 593*   NEUTROS ABS 9.06* 10.50* 12.88* 16.56* 15.92*   IMMATURE GRANS (ABS) 0.15* 0.24* 0.34* 0.59* 0.64*   LYMPHS ABS 2.47 3.60* 3.25* 4.97* 5.90*   MONOS ABS 1.72* 2.04* 2.84* 1.92* 3.41*   EOS ABS 0.28 0.44* 0.46* 0.62* 0.80*   MCV 93.7 92.4 93.8 94.3 96.1         Lab 22  1032 22  1220 22  0602 22  0409 22  0450 22  2025 22  1316 22  0316 22  1231 22  0308   SODIUM 131* 129* 133* 144 146*  --    --  147*  --  148*   POTASSIUM 4.8 4.6 5.2 4.6 4.3  --  4.4 3.6 4.4 3.4*   CHLORIDE 100 98 102 111* 113*  --   --  111*  --  112*   CO2 24.0 21.0* 22.0 26.0 27.0  --   --  28.0  --  30.0*   ANION GAP 7.0 10.0 9.0 7.0 6.0  --   --  8.0  --  6.0   BUN 23 28* 31* 28* 32*  --   --  40*  --  39*   CREATININE 1.00 1.07* 0.96 0.87 0.91  --   --  1.16*  --  1.08*   EGFR 56.0* 51.6* 58.8* 66.2 62.7  --   --  46.9*  --  51.1*   GLUCOSE 126* 149* 103* 142* 152*  --   --  173*  --  172*   CALCIUM 8.3* 8.2* 8.3* 8.3* 8.3*  --   --  8.6  --  8.6   MAGNESIUM  --   --  2.2 2.0 2.1  --   --  2.5*  --  1.9   PHOSPHORUS  --   --   --   --  2.6 3.0 2.2* 2.0* 1.7* 2.1*         Lab 08/03/22  1032 08/02/22  1220 08/01/22  0602 07/31/22  0409 07/30/22  0450   TOTAL PROTEIN 4.4* 4.5* 4.4* 4.5* 4.8*   ALBUMIN 2.70* 2.60* 2.10* 2.50* 2.70*   GLOBULIN 1.7 1.9 2.3 2.0 2.1   ALT (SGPT) 19 19 20 22 28   AST (SGOT) 27 29 23 18 20   BILIRUBIN 0.3 0.3 0.3 0.2 0.3   ALK PHOS 86 83 66 82 99         Lab 08/02/22  1836   TROPONIN T <0.010             Lab 07/31/22  0409   IRON 20*   IRON SATURATION 11*   TIBC 188*   TRANSFERRIN 126*         Brief Urine Lab Results  (Last result in the past 365 days)      Color   Clarity   Blood   Leuk Est   Nitrite   Protein   CREAT   Urine HCG        03/22/22 2202             69.7         03/22/22 2202 Yellow   Clear   Small (1+)   Negative   Negative   Trace                 Microbiology Results Abnormal     Procedure Component Value - Date/Time    Respiratory Culture - Sputum, ET Suction [[id number]] Collected: 07/22/22 1254    Lab Status: Final result Specimen: Sputum from ET Suction Updated: 07/24/22 1502     Respiratory Culture Heavy growth (4+) Normal respiratory woo. No S. aureus or Pseudomonas aeruginosa detected. Final report.     Gram Stain Many (4+) WBCs per low power field      Rare (1+) Epithelial cells per low power field      Moderate (3+) Gram positive cocci in pairs, chains and clusters          FL  Video Swallow With Speech Single Contrast    Result Date: 8/2/2022  EXAMINATION: FL VIDEO SWALLOW W SPEECH SINGLE-CONTRAST-  INDICATION: dysphagia; A41.9-Sepsis, unspecified organism; R16.1-Splenomegaly, not elsewhere classified; R13.12-Dysphagia, oropharyngeal phase  TECHNIQUE: 48 seconds of fluoroscopic time was used for this exam. 6 associated fluoroscopic loops were saved. The patient was evaluated in the seated lateral wall taking a variety of consistencies of barium by mouth under the direction of speech pathology.  COMPARISON: NONE  FINDINGS: 48 seconds of fluoroscopy provided for a modified barium swallow. Please see speech therapy report for full details and recommendations.       Impression: Fluoroscopy provided for a modified barium swallow. Please see speech therapy report for full details and recommendations.    This report was finalized on 8/2/2022 4:39 PM by Josué Lopes MD.      XR Chest 1 View    Result Date: 8/2/2022   DATE OF EXAM: 8/2/2022 5:05 PM  PROCEDURE: XR CHEST 1 VW-  INDICATIONS: chest pain; A41.9-Sepsis, unspecified organism; R16.1-Splenomegaly, not elsewhere classified; R13.12-Dysphagia, oropharyngeal phase  COMPARISON: 07/27/2022  TECHNIQUE: Portable chest radiograph.  FINDINGS:  There is a left IJ central venous catheter with the tip at the proximal right atrium. Esophagogastric tube has been removed. There is a small left pleural effusion which is increased from the prior study with left basilar airspace disease which may relate to atelectasis and/or pneumonia. Mild bibasilar atelectasis. Oral contrast noted overlying the upper abdomen with suspected surgical drainage catheter.      Impression: 1. Esophagogastric tube removed in the interval. 2. Stable left IJ central venous catheter. 3. Increased small left effusion with left basilar airspace disease which may relate to atelectasis and/or pneumonia.  This report was finalized on 8/2/2022 5:30 PM by Isaías Durand MD.            I  have reviewed the medications:  Scheduled Meds:atenolol, 50 mg, Oral, Q24H  barium sulfate, 20 mL, Oral, Once in imaging  barium sulfate, 100 mL, Oral, Once in imaging  heparin (porcine), 5,000 Units, Subcutaneous, Q12H  levothyroxine, 50 mcg, Oral, Q AM  nystatin, , Topical, Q8H  pantoprazole, 40 mg, Oral, Q AM  terazosin, 1 mg, Oral, Nightly      Continuous Infusions:   PRN Meds:.•  acetaminophen  •  acetaminophen  •  artificial tears  •  magnesium sulfate **OR** magnesium sulfate **OR** magnesium sulfate  •  metoclopramide  •  ondansetron  •  oxyCODONE-acetaminophen  •  polyvinyl alcohol  •  potassium & sodium phosphates **OR** potassium & sodium phosphates  •  potassium chloride    Assessment & Plan   Assessment & Plan     Active Hospital Problems    Diagnosis  POA   • **Lymphoma, 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        Brief Hospital Course to date:  Mahi Coronel is a 81 y.o. 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 Dr. Castañeda referred patient for splenectomy and underwent procedure 7/20/22 by Dr. Kemp. Post-op course complicated by bleeding and coagulopathy and was transferred to ICU on mechanical ventilation with an open abdomen, and a chest tube was placed uppon arrival by Dr. Kemp for a pneumothorax. Patient went back to OR 7/21/22 for washout and hemostasis. Received empiric antibiotics while in the ICU, but no organism was isolated. Patient ultimately was extubated on 7/24/22 and chest tube was removed on 7/27/22. Patient was initiated on nocturnal tube feeds in ICU. Ultimately was transferred out of ICU to telemetry floor on 7/30/22 and hospitalists  consulted for medical co-management with Dr. Kemp.     This patient's problems and plans were partially entered by my partner and updated as appropriate by me 08/03/22.    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 Kemp, tolerating PO (NG & Garcia out)  -Encourage incentive spirometry     Leukocytosis, improving  -Leukocytosis multifactorial including stress response, recent critical illness, splenectomy; no overt infection found  -s/p empiric antibiotics (7 days of empiric Cefepime and Flagyl which completed 7/29/22)  -WBC improving     Hyponatremia  Elevated Cr  -Improved today  -Encourage PO hydration    HTN  -Continue Atenolol 50 mg daily, Hytrin 1 mg nightly  -Stable     BLE edema, due to volume resuscitation  -BLE venous duplex 7/29/22 negative for DVT     Hypothyroidism  -Continue Synthroid     Generalized weakness/debility  -PT/OT, likely will benefit from rehab at discharge    Expected Discharge Location and Transportation: rehab  Expected Discharge Date: per Dr. Kemp, appears medically stable for transfer anytime    DVT prophylaxis:  Medical and mechanical DVT prophylaxis orders are present.     AM-PAC 6 Clicks Score (PT): 14 (08/01/22 2000)    CODE STATUS:   Code Status and Medical Interventions:   Ordered at: 07/20/22 2018     Level Of Support Discussed With:    Next of Kin (If No Surrogate)     Code Status (Patient has no pulse and is not breathing):    No CPR (Do Not Attempt to Resuscitate)     Medical  Interventions (Patient has pulse or is breathing):    Full Support       Vesta Patricio MD  08/03/22

## 2022-08-04 ENCOUNTER — READMISSION MANAGEMENT (OUTPATIENT)
Dept: CALL CENTER | Facility: HOSPITAL | Age: 82
End: 2022-08-04

## 2022-08-04 VITALS
DIASTOLIC BLOOD PRESSURE: 57 MMHG | RESPIRATION RATE: 16 BRPM | WEIGHT: 197.09 LBS | HEART RATE: 64 BPM | BODY MASS INDEX: 31.68 KG/M2 | HEIGHT: 66 IN | TEMPERATURE: 97.6 F | OXYGEN SATURATION: 98 % | SYSTOLIC BLOOD PRESSURE: 140 MMHG

## 2022-08-04 LAB
ALBUMIN SERPL-MCNC: 2.7 G/DL (ref 3.5–5.2)
ALBUMIN/GLOB SERPL: 1.4 G/DL
ALP SERPL-CCNC: 88 U/L (ref 39–117)
ALT SERPL W P-5'-P-CCNC: 20 U/L (ref 1–33)
ANION GAP SERPL CALCULATED.3IONS-SCNC: 8 MMOL/L (ref 5–15)
AST SERPL-CCNC: 32 U/L (ref 1–32)
BASOPHILS # BLD AUTO: 0.11 10*3/MM3 (ref 0–0.2)
BASOPHILS NFR BLD AUTO: 0.9 % (ref 0–1.5)
BILIRUB SERPL-MCNC: 0.4 MG/DL (ref 0–1.2)
BUN SERPL-MCNC: 17 MG/DL (ref 8–23)
BUN/CREAT SERPL: 20.5 (ref 7–25)
CALCIUM SPEC-SCNC: 8.3 MG/DL (ref 8.6–10.5)
CHLORIDE SERPL-SCNC: 102 MMOL/L (ref 98–107)
CO2 SERPL-SCNC: 21 MMOL/L (ref 22–29)
CREAT SERPL-MCNC: 0.83 MG/DL (ref 0.57–1)
DEPRECATED RDW RBC AUTO: 57 FL (ref 37–54)
EGFRCR SERPLBLD CKD-EPI 2021: 70 ML/MIN/1.73
EOSINOPHIL # BLD AUTO: 0.35 10*3/MM3 (ref 0–0.4)
EOSINOPHIL NFR BLD AUTO: 2.8 % (ref 0.3–6.2)
ERYTHROCYTE [DISTWIDTH] IN BLOOD BY AUTOMATED COUNT: 16 % (ref 12.3–15.4)
GLOBULIN UR ELPH-MCNC: 2 GM/DL
GLUCOSE SERPL-MCNC: 94 MG/DL (ref 65–99)
HCT VFR BLD AUTO: 32.5 % (ref 34–46.6)
HGB BLD-MCNC: 10.2 G/DL (ref 12–15.9)
IMM GRANULOCYTES # BLD AUTO: 0.13 10*3/MM3 (ref 0–0.05)
IMM GRANULOCYTES NFR BLD AUTO: 1 % (ref 0–0.5)
LYMPHOCYTES # BLD AUTO: 3.22 10*3/MM3 (ref 0.7–3.1)
LYMPHOCYTES NFR BLD AUTO: 25.3 % (ref 19.6–45.3)
MCH RBC QN AUTO: 31.4 PG (ref 26.6–33)
MCHC RBC AUTO-ENTMCNC: 31.4 G/DL (ref 31.5–35.7)
MCV RBC AUTO: 100 FL (ref 79–97)
MONOCYTES # BLD AUTO: 1.91 10*3/MM3 (ref 0.1–0.9)
MONOCYTES NFR BLD AUTO: 15 % (ref 5–12)
NEUTROPHILS NFR BLD AUTO: 55 % (ref 42.7–76)
NEUTROPHILS NFR BLD AUTO: 7 10*3/MM3 (ref 1.7–7)
NRBC BLD AUTO-RTO: 0.3 /100 WBC (ref 0–0.2)
PLATELET # BLD AUTO: 734 10*3/MM3 (ref 140–450)
PMV BLD AUTO: 9.7 FL (ref 6–12)
POTASSIUM SERPL-SCNC: 5 MMOL/L (ref 3.5–5.2)
PROT SERPL-MCNC: 4.7 G/DL (ref 6–8.5)
RBC # BLD AUTO: 3.25 10*6/MM3 (ref 3.77–5.28)
SODIUM SERPL-SCNC: 131 MMOL/L (ref 136–145)
WBC NRBC COR # BLD: 12.72 10*3/MM3 (ref 3.4–10.8)

## 2022-08-04 PROCEDURE — 80053 COMPREHEN METABOLIC PANEL: CPT | Performed by: INTERNAL MEDICINE

## 2022-08-04 PROCEDURE — 92526 ORAL FUNCTION THERAPY: CPT

## 2022-08-04 PROCEDURE — 99232 SBSQ HOSP IP/OBS MODERATE 35: CPT | Performed by: HOSPITALIST

## 2022-08-04 PROCEDURE — 85025 COMPLETE CBC W/AUTO DIFF WBC: CPT | Performed by: SURGERY

## 2022-08-04 PROCEDURE — 25010000002 HEPARIN (PORCINE) PER 1000 UNITS: Performed by: INTERNAL MEDICINE

## 2022-08-04 RX ORDER — ACETAMINOPHEN 325 MG/1
650 TABLET ORAL EVERY 6 HOURS PRN
Start: 2022-08-04

## 2022-08-04 RX ORDER — OXYCODONE AND ACETAMINOPHEN 7.5; 325 MG/1; MG/1
1 TABLET ORAL EVERY 6 HOURS PRN
Qty: 10 TABLET | Refills: 0 | Status: SHIPPED | OUTPATIENT
Start: 2022-08-04

## 2022-08-04 RX ADMIN — LEVOTHYROXINE SODIUM 50 MCG: 50 TABLET ORAL at 05:52

## 2022-08-04 RX ADMIN — ACETAMINOPHEN 650 MG: 325 TABLET ORAL at 01:36

## 2022-08-04 RX ADMIN — PANTOPRAZOLE SODIUM 40 MG: 40 TABLET, DELAYED RELEASE ORAL at 05:52

## 2022-08-04 RX ADMIN — NYSTATIN: 100000 POWDER TOPICAL at 05:53

## 2022-08-04 RX ADMIN — HEPARIN SODIUM 5000 UNITS: 5000 INJECTION, SOLUTION INTRAVENOUS; SUBCUTANEOUS at 10:00

## 2022-08-04 RX ADMIN — ATENOLOL 50 MG: 50 TABLET ORAL at 10:00

## 2022-08-04 NOTE — PLAN OF CARE
Goal Outcome Evaluation:    SLP treatment completed. Will continue to address dysphagia in tx. Please see note for further details and recommendations.

## 2022-08-04 NOTE — OUTREACH NOTE
Prep Survey    Flowsheet Row Responses   Holiness facility patient discharged from? [facility]   Is LACE score < 7 ? No   Emergency Room discharge w/ pulse ox? No   Eligibility Not Eligible   What are the reasons patient is not eligible? [facility]   Does the patient have one of the following disease processes/diagnoses(primary or secondary)? Other   Prep survey completed? Yes          SEDRICK ROTHMAN - Registered Nurse

## 2022-08-04 NOTE — PROGRESS NOTES
"Patient Name:  Mahi Coronel  YOB: 1940  [id number]    Surgery Progress Note    Date of visit: 8/4/2022    Subjective   No abdominal pain. No nausea/vomiting. No fevers/chills. Multiple BMs.         Objective       /47   Pulse 60   Temp 97.7 °F (36.5 °C) (Oral)   Resp 16   Ht 167.6 cm (65.98\")   Wt 89.4 kg (197 lb 1.5 oz)   SpO2 98%   BMI 31.83 kg/m²     Intake/Output Summary (Last 24 hours) at 8/4/2022 1008  Last data filed at 8/4/2022 0532  Gross per 24 hour   Intake --   Output 950 ml   Net -950 ml   SHERRILL 600 cc    CV:  Rhythm regular and rate regular  L:  Clear to auscultation bilaterally  Abd:  Bowel sounds positive, soft, nontender, nondistended, incision c/d/i, SHERRILL serous  Ext:  No cyanosis, clubbing, edema           Assessment & Plan     Pt POD#15 splenectomy and distal pancreatectomy  Pt POD#14 abdominal washout and closure  Dysphagia diet -> appreciate speech path input  OOB as able, PT/OT  OK for DC from hospital from surgical perspective; drain should stay in and I would like to see her back in clinic in 1 week        Jayme Kemp MD  8/4/2022  10:08 EDT      "

## 2022-08-04 NOTE — THERAPY TREATMENT NOTE
Acute Care - Speech Language Pathology   Swallow Treatment Note  David     Patient Name: Mahi Coronel  : 1940  MRN: [id number]  Today's Date: 2022               Admit Date: 2022    Visit Dx:     ICD-10-CM ICD-9-CM   1. Sepsis without acute organ dysfunction, due to unspecified organism (HCC)  A41.9 038.9     995.91   2. Splenomegaly  R16.1 789.2   3. Oropharyngeal dysphagia  R13.12 787.22     Patient Active Problem List   Diagnosis   • Lymphoma, low grade (HCC)   • Neuropathy   • Splenomegaly   • Anemia   • Thrombocytopenia (HCC)   • Sepsis (HCC)   • Lactic acidosis   • Hyponatremia   • CKD (chronic kidney disease) stage 3, GFR 30-59 ml/min (HCC)   • Elevated procalcitonin   • Elevated C-reactive protein (CRP)   • Coagulopathy (HCC)   • Pneumothorax, unspecified     Past Medical History:   Diagnosis Date   • Anemia 2015   • Cataract     h/o   • Disease of thyroid gland    • Diverticulitis    • DVT (deep venous thrombosis) (HCC)     left lower leg   • GERD (gastroesophageal reflux disease)    • Gout    • History of basal cell carcinoma    • History of transfusion     1 unit at [facility], pt denies reactions   • Hypertension         • IBS (irritable bowel syndrome)    • Non Hodgkin's lymphoma (HCC)    • Splenomegaly    • Urinary incontinence      Past Surgical History:   Procedure Laterality Date   • APPENDECTOMY     • BACK SURGERY      lumbar   • CHOLECYSTECTOMY     • COLON RESECTION     • EXPLORATORY LAPAROTOMY N/A 2022    Procedure: ABDOMINAL WASHOUT WITH CLOSURE;  Surgeon: Jayme Kemp MD;  Location: [facility];  Service: General;  Laterality: N/A;   • HYSTERECTOMY      jaja   • KIDNEY SURGERY      kidney stones   • ORIF ANKLE FRACTURE Right     h/o   • SPLENECTOMY N/A 2022    Procedure: SPLENECTOMY OPEN, DISTAL PANCREATECTOMY;  Surgeon: Jayme Kemp MD;  Location: [facility] OR;  Service: General;  Laterality: N/A;   • TOTAL HIP ARTHROPLASTY Bilateral     h/o        SLP Recommendation and Plan  SLP Swallowing Diagnosis: mild, oral dysphagia, pharyngeal dysphagia (08/04/22 1205)  SLP Diet Recommendation: mechanical soft with no mixed consistencies, thin liquids, other (see comments) (no straw) (08/04/22 1205)  Recommended Precautions and Strategies: upright posture during/after eating, small bites of food and sips of liquid, no straw, general aspiration precautions (08/04/22 1205)  SLP Rec. for Method of Medication Administration: meds whole, with pudding or applesauce, as tolerated (08/04/22 1205)     Monitor for Signs of Aspiration: yes, notify SLP if any concerns (08/04/22 1205)     Swallow Criteria for Skilled Therapeutic Interventions Met: demonstrates skilled criteria (08/04/22 1205)  Anticipated Discharge Disposition (SLP): unknown, anticipate therapy at next level of care (08/04/22 1205)  Rehab Potential/Prognosis, Swallowing: good, to achieve stated therapy goals (08/04/22 1205)  Therapy Frequency (Swallow): 5 days per week (08/04/22 1205)  Predicted Duration Therapy Intervention (Days): until discharge (08/04/22 1205)     Daily Summary of Progress (SLP): progress toward functional goals is good (08/04/22 1205)               Treatment Assessment (SLP): No overt s/s of aspiration with lvl 4, thin liquids via small bites/sips. Education provided re continuation of swallow precautions and dysphagia exercises and rationale for each. SLP to continue to follow to address dysphagia in tx. (08/04/22 1205)  Plan for Continued Treatment (SLP): continue treatment per plan of care (08/04/22 1205)                SWALLOW EVALUATION (last 72 hours)     SLP Adult Swallow Evaluation     Row Name 08/04/22 1205 08/02/22 1230 08/01/22 1600             Rehab Evaluation    Document Type therapy note (daily note)  -CH re-evaluation  -CJ therapy note (daily note)  -DV      Subjective Information no complaints  -CH no complaints  -CJ no complaints  -DV      Patient Observations  alert;cooperative;agree to therapy  - alert;cooperative  - alert;cooperative  -DV      Patient/Family/Caregiver Comments/Observations family present  - no family present  -CJ dtr present  -DV      Patient Effort good  -CH good  -CJ good  -DV      Symptoms Noted During/After Treatment none  -CH none  -CJ none  -DV              General Information    Patient Profile Reviewed yes  -CH yes  -CJ --      Pertinent History Of Current Problem -- see prev eval; referred for repeat MBS to determine upgrade  -CJ --      Current Method of Nutrition -- mechanical soft, no mixed consistencies;nectar/syrup-thick liquids  -CJ --      Precautions/Limitations, Vision -- WFL;for purposes of eval  -CJ --      Precautions/Limitations, Hearing -- hearing impairment, bilaterally  -CJ --      Prior Level of Function-Communication -- unknown  -CJ --      Prior Level of Function-Swallowing -- no diet consistency restrictions  - --      Plans/Goals Discussed with -- agreed upon;patient  -CJ --      Barriers to Rehab -- none identified  -CJ --      Patient's Goals for Discharge -- return to regular diet  - --              Pain    Additional Documentation Pain Scale: FACES Pre/Post-Treatment (Group)  - Pain Scale: FACES Pre/Post-Treatment (Group)  - Pain Scale: Numbers Pre/Post-Treatment (Group)  -              Pain Scale: Numbers Pre/Post-Treatment    Pretreatment Pain Rating -- -- 0/10 - no pain  -DV      Posttreatment Pain Rating -- -- 0/10 - no pain  -DV              Pain Scale: FACES Pre/Post-Treatment    Pain: FACES Scale, Pretreatment 0-->no hurt  - 0-->no hurt  - --      Posttreatment Pain Rating 0-->no hurt  - 0-->no hurt  - --              MBS/VFSS    Utensils Used -- spoon;cup;straw  - --      Consistencies Trialed -- regular textures;thin liquids;pudding thick  - --              MBS/VFSS Interpretation    Oral Prep Phase -- impaired oral phase of swallowing  -CJ --      Oral Transit Phase -- WFL  -CJ --       Oral Residue -- WFL  -CJ --      VFSS Summary -- Mild oropharyngeal dysphagia. Prolonged mastication time w/ solids. Deep laryngeal penetration w/ thin liquids via straw to the level of the true VF's, appeared to clear upon completion of swallow however difficult to fully visualize 2/2 artifact 2/2 lines. Transient penetration only intermittently w/ thin liquids via single cup/spoon presentations that cleared upon completion of swallow.Mild residue primarily w/ thins, pt sensed and cleared w/ spontaneous 2nd swallow. No other episodes of laryngeal penetration or aspiration evidenced across this evaluation. Recommend dysphagia level IV w/ thin liquids, no straws, no mixed. SLP will continue to f/u for dysphagia tx  -CJ --              Oral Preparatory Phase    Oral Preparatory Phase -- prolonged manipulation  -CJ --      Prolonged Manipulation -- regular textures;secondary to reduced lingual strength  -CJ --              Initiation of Pharyngeal Swallow    Initiation of Pharyngeal Swallow -- bolus in valleculae  -CJ --      Pharyngeal Phase -- impaired pharyngeal phase of swallowing  -CJ --      Penetration During the Swallow -- thin liquids;secondary to reduced laryngeal elevation;secondary to reduced vestibular closure;other (see comments)  worse via straw  -CJ --      Response to Penetration -- deep;no response  -CJ --      Rosenbek's Scale -- thin:;4--->level 4  via straw  -CJ --      Pharyngeal Residue -- thin liquids;valleculae;pyriform sinuses;secondary to reduced laryngeal elevation;secondary to reduced hyolaryngeal excursion  -CJ --      Response to Residue -- cleared residue with spontaneous subsequent swallow  -CJ --      Attempted Compensatory Maneuvers -- bolus size;bolus presentation style;throat clear after swallow;additional subsequent swallow  -CJ --      Response to Attempted Compensatory Maneuvers -- prevented penetration  -CJ --      Pharyngeal Phase, Comment -- Noted concern for osteophytes  C4-C7  -CJ --              SLP Evaluation Clinical Impression    SLP Swallowing Diagnosis mild;oral dysphagia;pharyngeal dysphagia  -CH mild;oral dysphagia;pharyngeal dysphagia  -CJ --      Functional Impact risk of aspiration/pneumonia  -CH risk of aspiration/pneumonia  -CJ --      Rehab Potential/Prognosis, Swallowing good, to achieve stated therapy goals  -CH good, to achieve stated therapy goals  -CJ --      Swallow Criteria for Skilled Therapeutic Interventions Met demonstrates skilled criteria  -CH demonstrates skilled criteria  -CJ --              SLP Treatment Clinical Impressions    Treatment Assessment (SLP) No overt s/s of aspiration with lvl 4, thin liquids via small bites/sips. Education provided re continuation of swallow precautions and dysphagia exercises and rationale for each. SLP to continue to follow to address dysphagia in tx.  -CH -- Pt looks much stronger overall. No overt s/sx aspiration with multiple trials of thin liquid. Recommend MBS tomorrow to assess readiness for diet upgrade.  -DV      Daily Summary of Progress (SLP) progress toward functional goals is good  -CH -- progress toward functional goals as expected  -DV      Plan for Continued Treatment (SLP) continue treatment per plan of care  -CH -- continue treatment per plan of care  -DV      Care Plan Review evaluation/treatment results reviewed;care plan/treatment goals reviewed  - -- evaluation/treatment results reviewed;care plan/treatment goals reviewed;risks/benefits reviewed;patient/other agree to care plan  -DV      Care Plan Review, Other Participant(s) family  - -- --              Recommendations    Therapy Frequency (Swallow) 5 days per week  - 5 days per week  -CJ --      Predicted Duration Therapy Intervention (Days) until discharge  -CH until discharge  -CJ --      SLP Diet Recommendation mechanical soft with no mixed consistencies;thin liquids;other (see comments)  no straw  -CH mechanical soft with no mixed  consistencies;thin liquids;other (see comments)  no straws  - --      Recommended Precautions and Strategies upright posture during/after eating;small bites of food and sips of liquid;no straw;general aspiration precautions  - upright posture during/after eating;small bites of food and sips of liquid;no straw;general aspiration precautions  - --      Oral Care Recommendations Oral Care BID/PRN  - Oral Care BID/PRN  - --      SLP Rec. for Method of Medication Administration meds whole;with pudding or applesauce;as tolerated  - meds whole;with pudding or applesauce;as tolerated  - --      Monitor for Signs of Aspiration yes;notify SLP if any concerns  - yes;notify SLP if any concerns  - --      Anticipated Discharge Disposition (SLP) unknown;anticipate therapy at next level of care  - unknown;anticipate therapy at next level of care  - --            User Key  (r) = Recorded By, (t) = Taken By, (c) = Cosigned By    Initials Name Effective Dates     Sally Delarosa, MS CCC-SLP 06/16/21 -     CH Gilma Norris, MS CCC-SLP 06/16/21 -     DV Danika Gutierres, MS CCC-SLP 06/16/21 -                 EDUCATION  The patient has been educated in the following areas:   Home Exercise Program (HEP) Dysphagia (Swallowing Impairment) Oral Care/Hydration Modified Diet Instruction.        SLP GOALS     Row Name 08/04/22 1205 08/02/22 1230 08/01/22 1600       (LTG) Patient will demonstrate functional swallow for    Diet Texture (Demonstrate functional swallow) regular textures  - regular textures  - regular textures  -    Liquid viscosity (Demonstrate functional swallow) thin liquids  - thin liquids  - thin liquids  -DV    [place] (Demonstrate functional swallow) independently (over 90% accuracy)  - independently (over 90% accuracy)  - independently (over 90% accuracy)  -DV    Time Frame (Demonstrate functional swallow) by discharge  -CH by discharge  -CJ by discharge  -DV    Progress/Outcomes  (Demonstrate functional swallow) continuing progress toward goal  -CH goal ongoing  -CJ continuing progress toward goal  -DV    Comment (Demonstrate functional swallow) No s/s of aspiration with lvl 4, thin liquid trials  - -- no s/s multiple trials thin liquid  -DV       (STG) Patient will tolerate trials of    Consistencies Trialed (Tolerate trials) soft chopped textures;thin liquids  no straw  - soft chopped textures;thin liquids  no straws  -CJ --    Desired Outcome (Tolerate trials) without signs/symptoms of aspiration;with adequate oral prep/transit/clearance  - without signs/symptoms of aspiration;with adequate oral prep/transit/clearance  -CJ --    [place] (Tolerate trials) independently (over 90% accuracy)  - independently (over 90% accuracy)  -CJ --    Time Frame (Tolerate trials) by discharge  - -- --    Progress/Outcomes (Tolerate trials) continuing progress toward goal  -CH new goal  -CJ --    Comment (Tolerate trials) tolerated level 4, thin liquids w/o s/s of aspiration  - -- --       (STG) Lingual Strengthening Goal 1 (SLP)    Activity (Lingual Strengthening Goal 1, SLP) increase lingual tone/sensation/control/coordination/movement;increase tongue back strength  - increase lingual tone/sensation/control/coordination/movement;increase tongue back strength  -CJ increase lingual tone/sensation/control/coordination/movement;increase tongue back strength  -DV    Increase Lingual Tone/Sensation/Control/Coordination/Movement swallow trials;lingual resistance exercises  - swallow trials;lingual resistance exercises  - swallow trials;lingual resistance exercises  -DV    Increase Tongue Back Strength lingual resistance exercises  - lingual resistance exercises  - lingual resistance exercises  -DV    [place]/Accuracy (Lingual Strengthening Goal 1, SLP) with minimal cues (75-90% accuracy)  -CH with minimal cues (75-90% accuracy)  -CJ with minimal cues (75-90% accuracy)  -DV     Time Frame (Lingual Strengthening Goal 1, SLP) short term goal (STG)  -CH short term goal (STG)  -CJ short term goal (STG)  -DV    Progress/Outcomes (Lingual Strengthening Goal 1, SLP) continuing progress toward goal  -CH goal ongoing  -CJ continuing progress toward goal  -DV    Comment (Lingual Strengthening Goal 1, SLP) completed  -CH -- completed  -DV       (STG) Pharyngeal Strengthening Exercise Goal 1 (SLP)    Activity (Pharyngeal Strengthening Goal 1, SLP) increase superior movement of the hyolaryngeal complex;increase anterior movement of the hyolaryngeal complex;increase closure at entrance to airway/closure of airway at glottis  -CH increase superior movement of the hyolaryngeal complex;increase anterior movement of the hyolaryngeal complex;increase closure at entrance to airway/closure of airway at glottis  -CJ increase timing  -DV    Increase Timing prepping - 3 second prep or suck swallow or 3-step swallow;super-supraglottic swallow  -CH -- prepping - 3 second prep or suck swallow or 3-step swallow;super-supraglottic swallow  -DV    Increase Superior Movement of the Hyolaryngeal Complex effortful pitch glide (falsetto + pharyngeal squeeze)  -CH effortful pitch glide (falsetto + pharyngeal squeeze)  -CJ --    Increase Anterior Movement of the Hyolaryngeal Complex chin tuck against resistance (CTAR)  -CH chin tuck against resistance (CTAR)  -CJ --    Increase Closure at Entrance to Airway/Closure of Airway at Glottis supraglottic swallow  -CH supraglottic swallow  -CJ --    [place]/Accuracy (Pharyngeal Strengthening Goal 1, SLP) with minimal cues (75-90% accuracy)  -CH with minimal cues (75-90% accuracy)  -CJ with minimal cues (75-90% accuracy)  -DV    Time Frame (Pharyngeal Strengthening Goal 1, SLP) short term goal (STG)  -CH short term goal (STG)  -CJ short term goal (STG)  -DV    Progress/Outcomes (Pharyngeal Strengthening Goal 1, SLP) continuing progress toward goal  -CH goal revised this date   -CJ continuing progress toward goal  -DV    Comment (Pharyngeal Strengthening Goal 1, SLP) completed  -CH -- completed  -DV       (STG) Swallow Compensatory Strategies Goal 1 (SLP)    Activity (Swallow Compensatory Strategies/Techniques Goal 1, SLP) compensatory strategies;during meal intake;during p.o. trials;small bites;small cup sips;other (see comments)  no straw  -CH compensatory strategies;during meal intake;during p.o. trials;small bites;small cup sips;other (see comments)  no straws  -CJ --    [place]/Accuracy (Swallow Compensatory Strategies/Techniques Goal 1, SLP) independently (over 90% accuracy)  -CH independently (over 90% accuracy)  -CJ --    Time Frame (Swallow Compensatory Strategies/Techniques Goal 1, SLP) short term goal (STG)  -CH short term goal (STG)  -CJ --    Progress/Outcomes (Swallow Compensatory Strategies/Techniques Goal 1, SLP) continuing progress toward goal  -CH new goal  -CJ --    Comment (Swallow Compensatory Strategies/Techniques Goal 1, SLP) reviewed precautions and provided ratinale for each.  -CH -- --          User Key  (r) = Recorded By, (t) = Taken By, (c) = Cosigned By    Initials Name Provider Type    Sally Vega, MS CCC-SLP Speech and Language Pathologist    Gilma Jimenez, MS CCC-SLP Speech and Language Pathologist    Danika Conroy MS CCC-SLP Speech and Language Pathologist                   Time Calculation:    Time Calculation- SLP     Row Name 08/04/22 1329             Time Calculation- SLP    SLP Start Time 1205  -CH      SLP Received On 08/04/22  -CH              Untimed Charges    43051-PR Treatment Swallow Minutes 38  -CH              Total Minutes    Untimed Charges Total Minutes 38  -CH       Total Minutes 38  -CH            User Key  (r) = Recorded By, (t) = Taken By, (c) = Cosigned By    Initials Name Provider Type    Gilma Jimenez, MS CCC-SLP Speech and Language Pathologist                Therapy Charges for Today     Code Description  Service Date Service Provider Modifiers Qty    [id number]  ST TREATMENT SWALLOW 3 8/4/2022 Gilma Norris MS CCC-SLP GN 1               Gilma Norris MS CCC-SLP  8/4/2022     Patient was not wearing a face mask and did not exhibit coughing during this therapy encounter.  Procedure performed was aerosolizing, involved close contact (within 6 feet for at least 15 minutes or longer), and did not involve contact with infectious secretions or specimens.  Therapist used appropriate personal protective equipment including gloves, standard procedure mask and eye protection.  Appropriate PPE was worn during the entire therapy session.  Hand hygiene was completed before and after therapy session.

## 2022-08-04 NOTE — CASE MANAGEMENT/SOCIAL WORK
Case Management Discharge Note      Final Note: I have met with Ms. Coronel at the bedside to discuss today's discharge.  She remains agreeable to inpatient rehab at [facility].  [facility] [place] [facility] they have a bed available today and are comfortable managing her Dysphagia diet and her SHERRILL drain.  They do request future management and f/u of SHERRILL drain to be addressed by provider in discharge summary.  Ms. Coronel states that her daughter will be providing transportation when she is ready to be discharged. Per today's PT notes, Pt able to complete x2 STS, one from chair and one from EOB with CGA and RW. Pt able to take 4-5 steps to recliner with CGA and RW. Pt and family educated on proper techniques and pacing for transfers, with extensive education on pt safety.   Please include any prescriptions in the discharge summary/discharge packet.  CM will fax the discharge summary when available to [phone].  Nurse to please call report to [facility] [place] [phone].  Ms. Coronel denies having any other discharge needs at this time.         Selected Continued Care - Admitted Since 7/20/2022     Destination Coordination complete.    Service Provider Selected Services Address Phone Fax Patient Preferred    [facility]  [facility] [address] [place] [id number] [phone] --          Durable Medical Equipment    No services have been selected for the patient.              Dialysis/Infusion    No services have been selected for the patient.              Home Medical Care    No services have been selected for the patient.              Therapy    No services have been selected for the patient.              Community Resources    No services have been selected for the patient.              Community & DME    No services have been selected for the patient.                  Transportation Services  Private: Car    Final Discharge Disposition Code: 03 - skilled nursing  facility (SNF)

## 2022-08-04 NOTE — DISCHARGE SUMMARY
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        Hospital Course  Patient is a 81 y.o. female presented with splenomegaly. She underwent an open splenectomy with significant blood loss, left pneumothorax requiring chest tube placement. Chest tube was later removed. Had prolonged intubation. After extubation, had dysphagia which improved. Now tolerating a diet, pain minimal, voiding spontaneously, continuing to work with physical therapy.      Procedures Performed    Procedure(s):  SPLENECTOMY OPEN, DISTAL PANCREATECTOMY  -------------------    Procedure(s):  ABDOMINAL WASHOUT WITH CLOSURE  -------------------       Consults:   Consults     No orders found from 6/21/2022 to 7/21/2022.          Pertinent Test Results: N/A    Condition on Discharge:  Pain controlled with oral pain medication, tolerating diet, ambulating appropriately, voiding spontaneously.      Vital Signs  Temp:  [97.7 °F (36.5 °C)] 97.7 °F (36.5 °C)  Heart Rate:  [60-61] 60  Resp:  [16] 16  BP: (118-119)/() 119/47    Physical Exam:     General Appearance:    Alert, cooperative, in no acute distress   Head:    Normocephalic, without obvious abnormality, atraumatic   Eyes:            Lids and lashes normal, conjunctivae and sclerae normal, no   icterus, no pallor, corneas clear, PERRLA   Ears:    Ears appear intact with no abnormalities noted   Throat:   No oral lesions, no thrush, oral mucosa moist   Neck:   No adenopathy, supple, trachea  midline, no thyromegaly, no     carotid bruit, no JVD   Back:     No kyphosis present, no scoliosis present, no skin lesions,       erythema or scars, no tenderness to percussion or                   palpation,   range of motion normal   Lungs:     Clear to auscultation,respirations regular, even and                   unlabored    Heart:    Regular rhythm and normal rate, normal S1 and S2, no            murmur, no gallop, no rub, no click   Breast Exam:    Deferred   Abdomen:     Normal bowel sounds, no masses, no organomegaly, soft        non-tender, non-distended, no guarding, no rebound                 Tenderness, incision c/d/i   Genitalia:    Deferred   Extremities:   Moves all extremities well, no edema, no cyanosis, no              redness   Pulses:   Pulses palpable and equal bilaterally   Skin:   No bleeding, bruising or rash   Lymph nodes:   No palpable adenopathy   Neurologic:   Cranial nerves 2 - 12 grossly intact, sensation intact, DTR        present and equal bilaterally       Discharge Disposition  Home or Self Care    Discharge Medications     Discharge Medications      New Medications      Instructions Start Date   oxyCODONE-acetaminophen 7.5-325 MG per tablet  Commonly known as: PERCOCET   1 tablet, Oral, Every 6 Hours PRN         Changes to Medications      Instructions Start Date   acetaminophen 325 MG tablet  Commonly known as: TYLENOL  What changed:   · medication strength  · how much to take  · when to take this  · reasons to take this   650 mg, Oral, Every 6 Hours PRN         Continue These Medications      Instructions Start Date   allopurinol 300 MG tablet  Commonly known as: ZYLOPRIM   300 mg, Oral, Daily      APPLE CIDER VINEGAR PO   1 dose, Oral, Daily      aspirin 81 MG chewable tablet   81 mg, Oral, Daily      atenolol 50 MG tablet  Commonly known as: TENORMIN   50 mg, Oral, Every Evening      BENEFIBER DRINK MIX PO   Oral, Daily      ferrous gluconate 324 (37.5 Fe) MG tablet tablet    324 mg, Oral, Daily      fluocinonide 0.05 % external solution  Commonly known as: LIDEX   Topical, 2 Times Weekly      fluticasone 50 MCG/ACT nasal spray  Commonly known as: FLONASE   2 sprays, Nasal, Daily      gabapentin 600 MG tablet  Commonly known as: NEURONTIN   600 mg, Oral, Every Night at Bedtime      levothyroxine 50 MCG tablet  Commonly known as: SYNTHROID, LEVOTHROID   50 mcg, Oral, Daily      lisinopril 2.5 MG tablet  Commonly known as: PRINIVIL,ZESTRIL   2.5 mg, Oral, Daily      mesalamine 400 MG capsule delayed-release delayed release capsule  Commonly known as: DELZICOL   1 tablet, Oral, Daily      nystatin 241273 UNIT/GM ointment  Commonly known as: MYCOSTATIN   1 application, Topical, 2 Times Daily, Filled 07/12/22      omeprazole 20 MG capsule  Commonly known as: priLOSEC   20 mg, Oral, 2 Times Daily         Stop These Medications    furosemide 40 MG tablet  Commonly known as: LASIX     potassium chloride 10 MEQ CR capsule  Commonly known as: MICRO-K            Discharge Diet:   Diet Instructions    8/2/2022  MBS/VFSS Reason for Referral  Patient was referred for a MBS to assess the efficiency of his/her swallow function, rule out aspiration and make recommendations regarding safe dietary consistencies, effective compensatory strategies, and safe eating environment.             Recommendations/Treatment  SLP Swallowing Diagnosis: mild, oral dysphagia, pharyngeal dysphagia (08/02/22 1230)  Functional Impact: risk of aspiration/pneumonia (08/02/22 1230)  Rehab Potential/Prognosis, Swallowing: good, to achieve stated therapy goals (08/02/22 1230)  Swallow Criteria for Skilled Therapeutic Interventions Met: demonstrates skilled criteria (08/02/22 1230)  Therapy Frequency (Swallow): 5 days per week (08/02/22 1230)  Predicted Duration Therapy Intervention (Days): until discharge (08/02/22 1230)  SLP Diet Recommendation: mechanical soft with no mixed consistencies, thin liquids, other (see comments) (no  straws) (08/02/22 1230)  Recommended Precautions and Strategies: upright posture during/after eating, small bites of food and sips of liquid, no straw, general aspiration precautions (08/02/22 1230)  SLP Rec. for Method of Medication Administration: meds whole, with pudding or applesauce, as tolerated (08/02/22 1230)  Monitor for Signs of Aspiration: yes, notify SLP if any concerns (08/02/22 1230)  Anticipated Discharge Disposition (SLP): unknown, anticipate therapy at next level of care (08/02/22 1230)    Instrumental Set-up  Utensils Used: spoon, cup, straw (08/02/22 1230)  Consistencies Trialed: regular textures, thin liquids, pudding thick (08/02/22 1230)    Oral Preparation/ Oral Phase  Oral Prep Phase: impaired oral phase of swallowing (08/02/22 1230)  Oral Transit Phase: WFL (08/02/22 1230)  Oral Residue: WFL (08/02/22 1230)  Oral Preparatory Phase: prolonged manipulation (08/02/22 1230)  Prolonged Manipulation: regular textures, secondary to reduced lingual strength (08/02/22 1230)          Pharyngeal Phase  Initiation of Pharyngeal Swallow: bolus in valleculae (08/02/22 1230)  Pharyngeal Phase: impaired pharyngeal phase of swallowing (08/02/22 1230)  Penetration During the Swallow: thin liquids, secondary to reduced laryngeal elevation, secondary to reduced vestibular closure, other (see comments) (worse via straw) (08/02/22 1230)  Response to Penetration: deep, no response (08/02/22 1230)  Pharyngeal Residue: thin liquids, valleculae, pyriform sinuses, secondary to reduced laryngeal elevation, secondary to reduced hyolaryngeal excursion (08/02/22 1230)  Response to Residue: cleared residue with spontaneous subsequent swallow (08/02/22 1230)  Attempted Compensatory Maneuvers: bolus size, bolus presentation style, throat clear after swallow, additional subsequent swallow (08/02/22 1230)  Response to Attempted Compensatory Maneuvers: prevented penetration (08/02/22 1230)  Pharyngeal Phase, Comment: Noted  concern for osteophytes C4-C7 (08/02/22 1230)  VFSS Summary: Mild oropharyngeal dysphagia. Prolonged mastication time w/ solids. Deep laryngeal penetration w/ thin liquids via straw to the level of the true VF's, appeared to clear upon completion of swallow however difficult to fully visualize 2/2 artifact 2/2 lines. Transient penetration only intermittently w/ thin liquids via single cup/spoon presentations that cleared upon completion of swallow.Mild residue primarily w/ thins, pt sensed and cleared w/ spontaneous 2nd swallow. No other episodes of laryngeal penetration or aspiration evidenced across this evaluation. Recommend dysphagia level IV w/ thin liquids, no straws, no mixed. SLP will continue to f/u for dysphagia tx (08/02/22 1230)    Cervical Esophageal Phase                       Activity at Discharge:    SHERRILL to bulb suction; measure daily output and bring records to Dr. Kemp's visit in 1 week    Follow-up Appointments  Future Appointments   Date Time Provider Department Center   8/10/2022 10:30 AM Chantell Kyle APRN [facility]     Additional Instructions for the Follow-ups that You Need to Schedule     Discharge Follow-up with PCP   As directed       Currently Documented PCP:    Romulo Isidro MD    PCP Phone Number:    [phone]     Follow Up Details: 1 week               Test Results Pending at Discharge       Jayme Kemp MD   08/04/22  15:12 EDT    Time: Discharge 15 min

## 2022-08-04 NOTE — PROGRESS NOTES
[facility] Medicine Services  PROGRESS NOTE    Patient Name: Mahi Coronel  : 1940  MRN: [id number]    Date of Admission: 2022  Primary Care Physician: Romulo Isidro MD    Subjective   Subjective     CC:  F/U s/p splenectomy    HPI:  Patient seen this morning. No major complaints. She is eager to go to rehab today.    ROS:  Gen-no fevers, no chills  CV-no chest pain, no palpitations  Resp-no cough, no dyspnea  GI-no N/V/D, improving abd pain    All other systems reviewed and negative except any additional pertinent positives and negatives as discussed in HPI.      Objective   Objective     Vital Signs:   Temp:  [97.7 °F (36.5 °C)] 97.7 °F (36.5 °C)  Heart Rate:  [60-61] 60  Resp:  [16] 16  BP: (118-119)/() 119/47     Physical Exam:  Gen-no acute distress  HENT-NCAT, mucous membranes moist  CV-RRR, S1 S2 normal, no m/r/g  Resp-CTAB, no wheezes or rales  Abd-soft, appropriate post-op tenderness, ND, +BS  Ext-1-2+ BLE edema  Neuro-A&Ox3, no focal deficits  Skin-no rashes  Psych-appropriate mood  No change from yesterday      Results Reviewed:  LAB RESULTS:      Lab 22  0921 22  1032 22  0547 22  0602 22  0409   WBC 12.72* 13.78* 16.93* 19.92* 24.78*   HEMOGLOBIN 10.2* 9.8* 8.9* 8.8* 8.8*   HEMATOCRIT 32.5* 29.9* 26.9* 27.3* 26.7*   PLATELETS 734* 754* 665* 643* 637*   NEUTROS ABS 7.00 9.06* 10.50* 12.88* 16.56*   IMMATURE GRANS (ABS) 0.13* 0.15* 0.24* 0.34* 0.59*   LYMPHS ABS 3.22* 2.47 3.60* 3.25* 4.97*   MONOS ABS 1.91* 1.72* 2.04* 2.84* 1.92*   EOS ABS 0.35 0.28 0.44* 0.46* 0.62*   .0* 93.7 92.4 93.8 94.3         Lab 22  0921 22  1032 22  1220 22  0602 22  0409 22  0450 22  2025 22  1316 22  0316 22  1231 22  1231   SODIUM 131* 131* 129* 133* 144 146*  --   --  147*   < >  --    POTASSIUM 5.0 4.8 4.6 5.2 4.6 4.3  --  4.4 3.6  --  4.4   CHLORIDE 102 100 98 102  111* 113*  --   --  111*   < >  --    CO2 21.0* 24.0 21.0* 22.0 26.0 27.0  --   --  28.0   < >  --    ANION GAP 8.0 7.0 10.0 9.0 7.0 6.0  --   --  8.0   < >  --    BUN 17 23 28* 31* 28* 32*  --   --  40*   < >  --    CREATININE 0.83 1.00 1.07* 0.96 0.87 0.91  --   --  1.16*   < >  --    EGFR 70.0 56.0* 51.6* 58.8* 66.2 62.7  --   --  46.9*   < >  --    GLUCOSE 94 126* 149* 103* 142* 152*  --   --  173*   < >  --    CALCIUM 8.3* 8.3* 8.2* 8.3* 8.3* 8.3*  --   --  8.6   < >  --    MAGNESIUM  --   --   --  2.2 2.0 2.1  --   --  2.5*  --   --    PHOSPHORUS  --   --   --   --   --  2.6 3.0 2.2* 2.0*  --  1.7*    < > = values in this interval not displayed.         Lab 08/04/22  0921 08/03/22  1032 08/02/22  1220 08/01/22  0602 07/31/22  0409   TOTAL PROTEIN 4.7* 4.4* 4.5* 4.4* 4.5*   ALBUMIN 2.70* 2.70* 2.60* 2.10* 2.50*   GLOBULIN 2.0 1.7 1.9 2.3 2.0   ALT (SGPT) 20 19 19 20 22   AST (SGOT) 32 27 29 23 18   BILIRUBIN 0.4 0.3 0.3 0.3 0.2   ALK PHOS 88 86 83 66 82         Lab 08/02/22  1836   TROPONIN T <0.010             Lab 07/31/22  0409   IRON 20*   IRON SATURATION 11*   TIBC 188*   TRANSFERRIN 126*         Brief Urine Lab Results  (Last result in the past 365 days)      Color   Clarity   Blood   Leuk Est   Nitrite   Protein   CREAT   Urine HCG        03/22/22 2202             69.7         03/22/22 2202 Yellow   Clear   Small (1+)   Negative   Negative   Trace                 Microbiology Results Abnormal     Procedure Component Value - Date/Time    Respiratory Culture - Sputum, ET Suction [[id number]] Collected: 07/22/22 1254    Lab Status: Final result Specimen: Sputum from ET Suction Updated: 07/24/22 1502     Respiratory Culture Heavy growth (4+) Normal respiratory woo. No S. aureus or Pseudomonas aeruginosa detected. Final report.     Gram Stain Many (4+) WBCs per low power field      Rare (1+) Epithelial cells per low power field      Moderate (3+) Gram positive cocci in pairs, chains and clusters          FL  Video Swallow With Speech Single Contrast    Result Date: 8/2/2022  EXAMINATION: FL VIDEO SWALLOW W SPEECH SINGLE-CONTRAST-  INDICATION: dysphagia; A41.9-Sepsis, unspecified organism; R16.1-Splenomegaly, not elsewhere classified; R13.12-Dysphagia, oropharyngeal phase  TECHNIQUE: 48 seconds of fluoroscopic time was used for this exam. 6 associated fluoroscopic loops were saved. The patient was evaluated in the seated lateral wall taking a variety of consistencies of barium by mouth under the direction of speech pathology.  COMPARISON: NONE  FINDINGS: 48 seconds of fluoroscopy provided for a modified barium swallow. Please see speech therapy report for full details and recommendations.       Impression: Fluoroscopy provided for a modified barium swallow. Please see speech therapy report for full details and recommendations.    This report was finalized on 8/2/2022 4:39 PM by Josué Lopes MD.      XR Chest 1 View    Result Date: 8/2/2022   DATE OF EXAM: 8/2/2022 5:05 PM  PROCEDURE: XR CHEST 1 VW-  INDICATIONS: chest pain; A41.9-Sepsis, unspecified organism; R16.1-Splenomegaly, not elsewhere classified; R13.12-Dysphagia, oropharyngeal phase  COMPARISON: 07/27/2022  TECHNIQUE: Portable chest radiograph.  FINDINGS:  There is a left IJ central venous catheter with the tip at the proximal right atrium. Esophagogastric tube has been removed. There is a small left pleural effusion which is increased from the prior study with left basilar airspace disease which may relate to atelectasis and/or pneumonia. Mild bibasilar atelectasis. Oral contrast noted overlying the upper abdomen with suspected surgical drainage catheter.      Impression: 1. Esophagogastric tube removed in the interval. 2. Stable left IJ central venous catheter. 3. Increased small left effusion with left basilar airspace disease which may relate to atelectasis and/or pneumonia.  This report was finalized on 8/2/2022 5:30 PM by Isaías Durand MD.            I  have reviewed the medications:  Scheduled Meds:atenolol, 50 mg, Oral, Q24H  barium sulfate, 20 mL, Oral, Once in imaging  barium sulfate, 100 mL, Oral, Once in imaging  heparin (porcine), 5,000 Units, Subcutaneous, Q12H  levothyroxine, 50 mcg, Oral, Q AM  nystatin, , Topical, Q8H  pantoprazole, 40 mg, Oral, Q AM  terazosin, 1 mg, Oral, Nightly      Continuous Infusions:   PRN Meds:.•  acetaminophen  •  acetaminophen  •  artificial tears  •  magnesium sulfate **OR** magnesium sulfate **OR** magnesium sulfate  •  metoclopramide  •  ondansetron  •  polyvinyl alcohol  •  potassium & sodium phosphates **OR** potassium & sodium phosphates  •  potassium chloride    Assessment & Plan   Assessment & Plan     Active Hospital Problems    Diagnosis  POA   • **Lymphoma, 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        Brief Hospital Course to date:  Mahi Coronel is a 81 y.o. 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 Dr. Castañeda referred patient for splenectomy and underwent procedure 7/20/22 by Dr. Kemp. Post-op course complicated by bleeding and coagulopathy and was transferred to ICU on mechanical ventilation with an open abdomen, and a chest tube was placed uppon arrival by Dr. Kemp for a pneumothorax. Patient went back to OR 7/21/22 for washout and hemostasis. Received empiric antibiotics while in the ICU, but no organism was isolated. Patient ultimately was extubated on 7/24/22 and chest tube was removed on 7/27/22. Patient was initiated on nocturnal tube feeds in ICU. Ultimately was transferred out of ICU to telemetry floor on 7/30/22 and hospitalists consulted for medical  co-management with Dr. Kemp.     This patient's problems and plans were partially entered by my partner and updated as appropriate by me 08/04/22.    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 Kemp, tolerating PO (NG & Garcia out), SHERRILL drain remains in place  -Encourage incentive spirometry  -F/U with Dr. Kemp after discharge as instructed     Leukocytosis, improving  -Leukocytosis multifactorial including stress response, recent critical illness, splenectomy; no overt infection found  -s/p empiric antibiotics (7 days of empiric Cefepime and Flagyl which completed 7/29/22)  -WBC improving     Hyponatremia  Elevated Cr  -Improved/stable  -Encourage PO hydration    HTN  -Continue Atenolol 50 mg daily, resume home Lisinopril at discharge  -Stable     BLE edema, due to volume resuscitation  -BLE venous duplex 7/29/22 negative for DVT     Hypothyroidism  -Continue Synthroid     Generalized weakness/debility  -PT/OT, plans for rehab at discharge    Expected Discharge Location and Transportation: rehab  Expected Discharge Date: 08/04/22 per Dr. Kemp    I have completed the hospitalist portion of the discharge med rec. Please call with any other questions.     DVT prophylaxis:  Medical and mechanical DVT prophylaxis orders are present.     AM-PAC 6 Clicks Score (PT): 17 (08/03/22 3509)    CODE STATUS:   Code Status and Medical Interventions:   Ordered at: 07/20/22 2018     Level Of Support Discussed With:    Next of Kin (If No  Surrogate)     Code Status (Patient has no pulse and is not breathing):    No CPR (Do Not Attempt to Resuscitate)     Medical Interventions (Patient has pulse or is breathing):    Full Support       Vesta Patricio MD  08/04/22

## 2022-08-04 NOTE — DISCHARGE PLACEMENT REQUEST
"Case management  [phone]    Mahi Coronel (81 y.o. Female)             Date of Birth   1940    Social Security Number       Address   [address] [place], [place] [address]    Home Phone   [phone]    MRN   [id number]       [facility]    Marital Status                               Admission Date   7/20/22    Admission Type   Elective    Admitting Provider   Vesta Patricio MD    Attending Provider   Jayme Kemp MD    Department, Room/Bed   [address], S560/1       Discharge Date       Discharge Disposition   Home or Self Care    Discharge Destination                               Attending Provider: Jayme Kemp MD    Allergies: Penicillins, Rituxan [Rituximab], Hydrocodone, Sulfa Antibiotics, Bactrim [Sulfamethoxazole-trimethoprim], Chlorhexidine Gluconate, Keflex [Cephalexin], Latex, Lortab [Hydrocodone-acetaminophen], Magnesium Citrate, Neosporin Original [Bacitracin-neomycin-polymyxin]    Isolation: None   Infection: None   Code Status: No CPR   Advance Care Planning Activity    Ht: 167.6 cm (65.98\")   Wt: 89.4 kg (197 lb 1.5 oz)    Admission Cmt: None   Principal Problem: Lymphoma, 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                 Active Insurance as of 7/20/2022     Primary Coverage     Payor Plan Insurance Group Employer/Plan Group    MEDICARE MEDICARE A & B      Payor Plan Address Payor Plan Phone Number Payor Plan Fax Number Effective Dates    [address] [phone]  9/1/2003 - None Entered    [facility] [id number]       Subscriber Name Subscriber Birth Date Member ID       MAHI CORONEL 1940 [id number]           Secondary Coverage     Payor Plan Insurance Group Employer/Plan Group    [employer] BLUE [employer] AN[employer] [facility] SUPP [id number]     Payor Plan Address Payor Plan Phone Number Payor Plan Fax Number Effective Dates    [address]   12/1/2016 - None Entered    [facility] [id number]       Subscriber Name Subscriber Birth Date Member " ID       MAHI CORONEL 1940 [id number]                 Emergency Contacts      (Rel.) Home Phone Work Phone Mobile Phone    TOBIAS BRENNAN (Daughter) [phone] -- [phone]    Luna Coronel (Sister) -- -- [phone]    James Coronel -- -- [phone]               Discharge Summary      Jayme Kemp MD at 08/04/22 1512          Patient Name:  Mahi Coronel  YOB: 1940  [id number]    Date of Discharge:  8/4/2022    Discharge Diagnosis: splenomegaly    Presenting Problem/History of Present Illness  Active Hospital Problems    Diagnosis  POA   • **Lymphoma, 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        Hospital Course  Patient is a 81 y.o. female presented with splenomegaly. She underwent an open splenectomy with significant blood loss, left pneumothorax requiring chest tube placement. Chest tube was later removed. Had prolonged intubation. After extubation, had dysphagia which improved. Now tolerating a diet, pain minimal, voiding spontaneously, continuing to work with physical therapy.      Procedures Performed    Procedure(s):  SPLENECTOMY OPEN, DISTAL PANCREATECTOMY  -------------------    Procedure(s):  ABDOMINAL WASHOUT WITH CLOSURE  -------------------       Consults:   Consults     No orders found from 6/21/2022 to 7/21/2022.          Pertinent Test Results: N/A    Condition on Discharge:  Pain controlled with oral pain medication, tolerating diet, ambulating appropriately, voiding spontaneously.      Vital Signs  Temp:  [97.7 °F (36.5 °C)] 97.7 °F (36.5 °C)  Heart Rate:  [60-61] 60  Resp:  [16] 16  BP: (118-119)/() 119/47    Physical Exam:     General Appearance:    Alert, cooperative, in no  acute distress   Head:    Normocephalic, without obvious abnormality, atraumatic   Eyes:            Lids and lashes normal, conjunctivae and sclerae normal, no   icterus, no pallor, corneas clear, PERRLA   Ears:    Ears appear intact with no abnormalities noted   Throat:   No oral lesions, no thrush, oral mucosa moist   Neck:   No adenopathy, supple, trachea midline, no thyromegaly, no     carotid bruit, no JVD   Back:     No kyphosis present, no scoliosis present, no skin lesions,       erythema or scars, no tenderness to percussion or                   palpation,   range of motion normal   Lungs:     Clear to auscultation,respirations regular, even and                   unlabored    Heart:    Regular rhythm and normal rate, normal S1 and S2, no            murmur, no gallop, no rub, no click   Breast Exam:    Deferred   Abdomen:     Normal bowel sounds, no masses, no organomegaly, soft        non-tender, non-distended, no guarding, no rebound                 Tenderness, incision c/d/i   Genitalia:    Deferred   Extremities:   Moves all extremities well, no edema, no cyanosis, no              redness   Pulses:   Pulses palpable and equal bilaterally   Skin:   No bleeding, bruising or rash   Lymph nodes:   No palpable adenopathy   Neurologic:   Cranial nerves 2 - 12 grossly intact, sensation intact, DTR        present and equal bilaterally       Discharge Disposition  Home or Self Care    Discharge Medications     Discharge Medications      New Medications      Instructions Start Date   oxyCODONE-acetaminophen 7.5-325 MG per tablet  Commonly known as: PERCOCET   1 tablet, Oral, Every 6 Hours PRN         Changes to Medications      Instructions Start Date   acetaminophen 325 MG tablet  Commonly known as: TYLENOL  What changed:   · medication strength  · how much to take  · when to take this  · reasons to take this   650 mg, Oral, Every 6 Hours PRN         Continue These Medications      Instructions Start Date    allopurinol 300 MG tablet  Commonly known as: ZYLOPRIM   300 mg, Oral, Daily      APPLE CIDER VINEGAR PO   1 dose, Oral, Daily      aspirin 81 MG chewable tablet   81 mg, Oral, Daily      atenolol 50 MG tablet  Commonly known as: TENORMIN   50 mg, Oral, Every Evening      BENEFIBER DRINK MIX PO   Oral, Daily      ferrous gluconate 324 (37.5 Fe) MG tablet tablet   324 mg, Oral, Daily      fluocinonide 0.05 % external solution  Commonly known as: LIDEX   Topical, 2 Times Weekly      fluticasone 50 MCG/ACT nasal spray  Commonly known as: FLONASE   2 sprays, Nasal, Daily      gabapentin 600 MG tablet  Commonly known as: NEURONTIN   600 mg, Oral, Every Night at Bedtime      levothyroxine 50 MCG tablet  Commonly known as: SYNTHROID, LEVOTHROID   50 mcg, Oral, Daily      lisinopril 2.5 MG tablet  Commonly known as: PRINIVIL,ZESTRIL   2.5 mg, Oral, Daily      mesalamine 400 MG capsule delayed-release delayed release capsule  Commonly known as: DELZICOL   1 tablet, Oral, Daily      nystatin 198896 UNIT/GM ointment  Commonly known as: MYCOSTATIN   1 application, Topical, 2 Times Daily, Filled 07/12/22      omeprazole 20 MG capsule  Commonly known as: priLOSEC   20 mg, Oral, 2 Times Daily         Stop These Medications    furosemide 40 MG tablet  Commonly known as: LASIX     potassium chloride 10 MEQ CR capsule  Commonly known as: MICRO-K            Discharge Diet:   Diet Instructions    8/2/2022  MBS/VFSS Reason for Referral  Patient was referred for a MBS to assess the efficiency of his/her swallow function, rule out aspiration and make recommendations regarding safe dietary consistencies, effective compensatory strategies, and safe eating environment.             Recommendations/Treatment  SLP Swallowing Diagnosis: mild, oral dysphagia, pharyngeal dysphagia (08/02/22 1230)  Functional Impact: risk of aspiration/pneumonia (08/02/22 1230)  Rehab Potential/Prognosis, Swallowing: good, to achieve stated therapy goals  (08/02/22 1230)  Swallow Criteria for Skilled Therapeutic Interventions Met: demonstrates skilled criteria (08/02/22 1230)  Therapy Frequency (Swallow): 5 days per week (08/02/22 1230)  Predicted Duration Therapy Intervention (Days): until discharge (08/02/22 1230)  SLP Diet Recommendation: mechanical soft with no mixed consistencies, thin liquids, other (see comments) (no straws) (08/02/22 1230)  Recommended Precautions and Strategies: upright posture during/after eating, small bites of food and sips of liquid, no straw, general aspiration precautions (08/02/22 1230)  SLP Rec. for Method of Medication Administration: meds whole, with pudding or applesauce, as tolerated (08/02/22 1230)  Monitor for Signs of Aspiration: yes, notify SLP if any concerns (08/02/22 1230)  Anticipated Discharge Disposition (SLP): unknown, anticipate therapy at next level of care (08/02/22 1230)    Instrumental Set-up  Utensils Used: spoon, cup, straw (08/02/22 1230)  Consistencies Trialed: regular textures, thin liquids, pudding thick (08/02/22 1230)    Oral Preparation/ Oral Phase  Oral Prep Phase: impaired oral phase of swallowing (08/02/22 1230)  Oral Transit Phase: WFL (08/02/22 1230)  Oral Residue: WFL (08/02/22 1230)  Oral Preparatory Phase: prolonged manipulation (08/02/22 1230)  Prolonged Manipulation: regular textures, secondary to reduced lingual strength (08/02/22 1230)          Pharyngeal Phase  Initiation of Pharyngeal Swallow: bolus in valleculae (08/02/22 1230)  Pharyngeal Phase: impaired pharyngeal phase of swallowing (08/02/22 1230)  Penetration During the Swallow: thin liquids, secondary to reduced laryngeal elevation, secondary to reduced vestibular closure, other (see comments) (worse via straw) (08/02/22 1230)  Response to Penetration: deep, no response (08/02/22 1230)  Pharyngeal Residue: thin liquids, valleculae, pyriform sinuses, secondary to reduced laryngeal elevation, secondary to reduced hyolaryngeal excursion  (08/02/22 1230)  Response to Residue: cleared residue with spontaneous subsequent swallow (08/02/22 1230)  Attempted Compensatory Maneuvers: bolus size, bolus presentation style, throat clear after swallow, additional subsequent swallow (08/02/22 1230)  Response to Attempted Compensatory Maneuvers: prevented penetration (08/02/22 1230)  Pharyngeal Phase, Comment: Noted concern for osteophytes C4-C7 (08/02/22 1230)  VFSS Summary: Mild oropharyngeal dysphagia. Prolonged mastication time w/ solids. Deep laryngeal penetration w/ thin liquids via straw to the level of the true VF's, appeared to clear upon completion of swallow however difficult to fully visualize 2/2 artifact 2/2 lines. Transient penetration only intermittently w/ thin liquids via single cup/spoon presentations that cleared upon completion of swallow.Mild residue primarily w/ thins, pt sensed and cleared w/ spontaneous 2nd swallow. No other episodes of laryngeal penetration or aspiration evidenced across this evaluation. Recommend dysphagia level IV w/ thin liquids, no straws, no mixed. SLP will continue to f/u for dysphagia tx (08/02/22 1230)    Cervical Esophageal Phase                       Activity at Discharge:    SHERRILL to bulb suction; measure daily output and bring records to Dr. Kemp's visit in 1 week    Follow-up Appointments  Future Appointments   Date Time Provider Department Center   8/10/2022 10:30 AM Chantell Kyle APRN [facility]     Additional Instructions for the Follow-ups that You Need to Schedule     Discharge Follow-up with PCP   As directed       Currently Documented PCP:    Romulo Isidro MD    PCP Phone Number:    [phone]     Follow Up Details: 1 week               Test Results Pending at Discharge       Jayme Kemp MD   08/04/22  15:12 EDT    Time: Discharge 15 min          Electronically signed by Jayme Kemp MD at 08/04/22 7476

## 2022-08-08 ENCOUNTER — TELEPHONE (OUTPATIENT)
Dept: ONCOLOGY | Facility: CLINIC | Age: 82
End: 2022-08-08

## 2022-08-08 NOTE — TELEPHONE ENCOUNTER
Caller: Luna Coronel    Relationship: DAUGHTER    Best call back number: [phone]    What is the best time to reach you: NO NEED, JUST TO LET DR. MUJICA KNOW.    Who are you requesting to speak with (clinical staff, provider,  specific staff member):DR. MUJICA    Do you know the name of the person who called: LUNA    What was the call regarding: LUNA & PATIENT WANTS DR. MUJICA TO KNOW THAT THERE WERE COMPLICATION WITH PATIENTS SURGERY TO HAVE SPLEEN TAKEN OUT, THEY GOT IN THERE & VEINS & ARTERIES HAD CALCIFIED, SO THEY COULD NOT STOP BLEEDING.  THE SPLEEN WAS BIGGER THAN A FOOTBALL.  THEY HAD TO GO IN A SECOND TIME & PATIENT WAS IN ICU FOR 10 DAYS IN CRITICAL CONDITION, SHE IS DOING MUCH BETTER & REHABBING AT HOME PLACE IN [place].    Do you require a callback: NO

## 2022-08-08 NOTE — TELEPHONE ENCOUNTER
I shared this message with Dr. Castañeda and he said to have Luna reach out to us when her mom has recovered more to schedule a follow up appt.  Luna said she would do that.

## 2022-08-17 ENCOUNTER — LAB REQUISITION (OUTPATIENT)
Dept: LAB | Facility: HOSPITAL | Age: 82
End: 2022-08-17

## 2022-08-17 DIAGNOSIS — C85.90 NON-HODGKIN LYMPHOMA, UNSPECIFIED, UNSPECIFIED SITE: ICD-10-CM

## 2022-08-17 DIAGNOSIS — R79.89 OTHER SPECIFIED ABNORMAL FINDINGS OF BLOOD CHEMISTRY: ICD-10-CM

## 2022-08-17 LAB
BASOPHILS # BLD AUTO: 0.15 10*3/MM3 (ref 0–0.2)
BASOPHILS NFR BLD AUTO: 1.3 % (ref 0–1.5)
DEPRECATED RDW RBC AUTO: 63.1 FL (ref 37–54)
EOSINOPHIL # BLD AUTO: 0.32 10*3/MM3 (ref 0–0.4)
EOSINOPHIL NFR BLD AUTO: 2.8 % (ref 0.3–6.2)
ERYTHROCYTE [DISTWIDTH] IN BLOOD BY AUTOMATED COUNT: 15.8 % (ref 12.3–15.4)
HCT VFR BLD AUTO: 42.6 % (ref 34–46.6)
HGB BLD-MCNC: 12.3 G/DL (ref 12–15.9)
IMM GRANULOCYTES # BLD AUTO: 0.07 10*3/MM3 (ref 0–0.05)
IMM GRANULOCYTES NFR BLD AUTO: 0.6 % (ref 0–0.5)
LYMPHOCYTES # BLD AUTO: 2.4 10*3/MM3 (ref 0.7–3.1)
LYMPHOCYTES NFR BLD AUTO: 20.7 % (ref 19.6–45.3)
MCH RBC QN AUTO: 31 PG (ref 26.6–33)
MCHC RBC AUTO-ENTMCNC: 28.9 G/DL (ref 31.5–35.7)
MCV RBC AUTO: 107.3 FL (ref 79–97)
MONOCYTES # BLD AUTO: 1.14 10*3/MM3 (ref 0.1–0.9)
MONOCYTES NFR BLD AUTO: 9.8 % (ref 5–12)
NEUTROPHILS NFR BLD AUTO: 64.8 % (ref 42.7–76)
NEUTROPHILS NFR BLD AUTO: 7.5 10*3/MM3 (ref 1.7–7)
NRBC BLD AUTO-RTO: 0.2 /100 WBC (ref 0–0.2)
PLATELET # BLD AUTO: 512 10*3/MM3 (ref 140–450)
PMV BLD AUTO: 8.9 FL (ref 6–12)
RBC # BLD AUTO: 3.97 10*6/MM3 (ref 3.77–5.28)
WBC NRBC COR # BLD: 11.58 10*3/MM3 (ref 3.4–10.8)

## 2022-08-17 PROCEDURE — 85025 COMPLETE CBC W/AUTO DIFF WBC: CPT

## 2022-08-18 ENCOUNTER — TELEPHONE (OUTPATIENT)
Dept: ONCOLOGY | Facility: CLINIC | Age: 82
End: 2022-08-18

## 2022-08-18 NOTE — TELEPHONE ENCOUNTER
Caller: Tierra Obrien    Relationship: Emergency Contact    Best call back number: [phone]    What is the best time to reach you: ANYTIME    Who are you requesting to speak with (clinical staff, provider,  specific staff member):     What was the call regarding: TIERRA CALLING TO SCHED PT A F/U APPT AT THE [place] OFFICE - CANNOT DO 8/24, 8/29, OR 8/31.     Do you require a callback: YES

## 2022-09-01 ENCOUNTER — OFFICE VISIT (OUTPATIENT)
Dept: ONCOLOGY | Facility: CLINIC | Age: 82
End: 2022-09-01

## 2022-09-01 VITALS
BODY MASS INDEX: 27 KG/M2 | OXYGEN SATURATION: 98 % | WEIGHT: 168 LBS | HEIGHT: 66 IN | HEART RATE: 63 BPM | TEMPERATURE: 97.6 F | RESPIRATION RATE: 20 BRPM | SYSTOLIC BLOOD PRESSURE: 141 MMHG | DIASTOLIC BLOOD PRESSURE: 66 MMHG

## 2022-09-01 DIAGNOSIS — C83.07 MARGINAL ZONE LYMPHOMA OF SPLEEN: Primary | ICD-10-CM

## 2022-09-01 DIAGNOSIS — Z90.81 H/O SPLENECTOMY: ICD-10-CM

## 2022-09-01 PROBLEM — R16.1 SPLENOMEGALY: Status: RESOLVED | Noted: 2019-03-27 | Resolved: 2022-09-01

## 2022-09-01 PROCEDURE — 99214 OFFICE O/P EST MOD 30 MIN: CPT | Performed by: NURSE PRACTITIONER

## 2022-09-01 NOTE — PROGRESS NOTES
CHIEF COMPLAINT:  1. Splenic marginal zone lymphoma s/p splenectomy     Problem List:  Oncology/Hematology History Overview Note   1. 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  -Open splenectomy 7/20/2022.  2. History of iron deficiency anemia with last colonoscopy 11/2014, accordingto the patient, with a history of ulcerative colitis treated by Dr. Edge.  3. History of basal cell carcinomas.   4. Cataracts.   5. Remote history of deep vein thrombosis with vena cava filter in place.   6. Diverticulitis by history.   7. History of gout.   8. Hypertension.   9. History of 3 different kidney surgeries.   10. Cholecystectomy.   11. Chronic neuropathy.   12. History of ARMAAN.   13. History of back surgeries.   14. History of total hip replacement.   15. History of ankle fracture.   16. History of appendectomy.   17. 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 Edge.  18.  Epistaxis    Oncology history timeline:  a) Splenomegaly on hospitalization 02/2016. She had a week of nausea, vomiting and diarrhea with dehydration. The nausea, vomiting, and dehydration have resolved but on CT they found retroperitoneal adenopathy with some splenomegaly that was not bulky. She denies any ongoing fevers or chills or bed drenching night sweats or unexplained weight loss and no change in the color, caliber or consistency of her stools. She had a slightly elevated beta-2 microglobulin of 3.5 but no monoclonal spike and a white count 5300, hemoglobin 11.5, platelets 158,000, sedimentation rate 45, C-reactive protein 13.6 and a normal LDH of 318 with normal liver enzymes. Slight hyponatremia with sodium of 134 and hypokalemia with potassium of 3.3 on  02/18/2016. She had slight decrease in immunoglobulin G to 580 and immunoglobulin A to 35 on testing while in the hospital.   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 There were no features to suggest hairy cell lymphoma or a mantle cell lymphoma and no large cell population. This was CD5 negative, CD10 negative, kappa clonal negative and bright for CD20 with dim surface light chain expression and bright cytoplasm, CD45 bright. The CD38 negative for plasmacytic differentiation with a hemoglobin of 11.4, white count 7200 with normal differential, and platelets of 163,000.   c) Liver, spleen ultrasound showed splenomegaly of 19.6 cm maximum dimension with 1.6 cm slightly dilated portal vein.  D)-3/20/2019 Dr. 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  Colonoscopy showed hemorrhoids, diverticuli, 11 mm proximal ascending colon flat polyp.  Dr. 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   -3/27/2019 PET shows mild hypermetabolism within the lymph nodes to the left of the proximal third of the esophagus as well as near the tracheal bifurcation and no other area is of abnormal hypermetabolism.  Splenomegaly stable.  She has significant carotid stenosis especially on the right.  -1/12/2021 white count 4200 hemoglobin 10 platelets 114,000 unremarkable differential.  CMP unremarkable  -4/28/2021 Dr. Augustine performed attempted laparoscopic partial colectomy with subsequent open right hemicolectomy with ileocolonic stenosis but no residual polyp found in the proximal ascending colon after  resection of the right colon and a small polyp in the distal part of the ascending colon.  Pathology showed residual adenomatous polyp tubular adenoma without high-grade dysplasia or invasive carcinoma and 13 benign lymph nodes.  Additional colonic segment benign with 4 benign lymph nodes.  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  Get her blood counts today and just prior to a minute evaluation in 3 months.  No plans for scans in the absence of symptoms.  I will keep in mind the potential of palliative splenic radiation if she develops early satiety or other abdominal complaints from splenomegaly.    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    -2/1/2021 PET negative.  Spleen is unremarkable with homogeneous uptake.    -2/5/2021 EGD and colonoscopy with Dr. Edge showed Negrete's esophagus, hemorrhoids, diverticuli, proximal ascending colon polyp.  No mention of any colitis.    -4/28/2021 attempted laparoscopic partial colectomy with subsequent open right hemicolectomy with ileocolonic stenosis but no residual polyp found in the proximal ascending colon after resection of the right colon and a small polyp in the distal part of the ascending colon.  Pathology showed residual adenomatous polyp tubular adenoma without high-grade dysplasia or invasive carcinoma and 13 benign lymph nodes.  Additional colonic segment benign with 4 benign lymph nodes.    -3/3/2022 data:  Hemoglobin hemoglobin 9.8 with MCV of 91.9 and platelets slightly low 128,000 with normal white count 7100.  Unremarkable differential.    Creatinine 1.5 glucose 160 sodium 129 with potassium 4.7 and chloride 94 otherwise  unremarkable CMP.  C-reactive protein normal 1.7.  Sedimentation rate 31, upper limit 30.  INR normal 0.99 with PTT 26.1.  100 mg/dL M spike on SPEP.  Normal quantitative immunoglobulins except for decreased IgE.  Serum immunofixation electrophoresis was unclear as to monoclonality.  Serum kappa 33.8 lambda 27.4 both minimally elevated with normal ratio 1.23.  Serum viscosity normal 1.6.    -3/4/2022 CT chest abdomen pelvis without contrast: 2.5 mm noncalcified nodule right upper lobe and right lower lobe for which follow-up in 6 months for stability or resolution is suggested.  No suspicious lung nodules to suggest metastasis.  Massive enlargement of the spleen which is stable compared to June 2021.  1.4 cm and 1.5 cm juan luis hepatic adenopathy.  Stable retroperitoneal adenopathy largest 1.5 cm.  1.8 cm stable IVC node.    -3/15/2022 [facility] medical oncology follow-up visit: She is not hyperviscous and I doubt her epistaxis is related to the lymphoma or hyperviscosity and I do not think she has Waldenstrom's.  However, I do think she has massive splenomegaly and while that has been present for quite some time, I nonetheless think that a lot of her abdominal discomfort is due to the massive splenomegaly pushing her bowel to the right and she could have some bowel involvement of lymphoma that we could put her through endoscopy again but she had this back a little over a year ago with no obvious bowel involvement and no obvious colitis.  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  She would not consent to anything stronger than that regardless and it took a little convincing to get her to go along with this plan but she is willing to try provided that she has no major allergic reactions etc.  We will  give first dose in [place] after chemo preparation visit we will use peripheral veins and I will have her follow-up in a few weeks with my nurse practitioner in [place] to make sure she is tolerating this well and to set up follow-up visits in the weeks and months ahead.  Repeat CTs I would ask my nurse practitioner to get ordered for approximately 4 months out from the end of Rituxan.  In the meantime, from the epistaxis standpoint I asked her to follow-up with ENT.   We discussed the protracted immune suppression that would be associated with the Rituxan and the fact that it would likely make vaccinations less helpful but that does not mean she should not get them.  We also discussed the possibility of allergic reactions.  Once all of this is done, she will need to follow-up with whoever is going to take over from Dr. 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    -3/22/2022 through 3/27/2022 inpatient hospitalization for fever after Rituxan initial dose.  No growth on cultures.  CT chest abdomen pelvis other than showing marked splenomegaly and stable retroperitoneal nodes showed nothing else.  She was pancytopenic on admission.  Given 1 unit platelet packed red cell transfusion.  On 3/22/2022 her platelet count was 142,000 and by the evening had dropped to 36,001-day with lactic acidosis.  Hemoglobin dropped to 7.2 x 3 2322 and white count down to 2450 with absolute neutrophil count baldomero of 240 on 3/22/2022 with a neutrophil count that very same day earlier in the day of 2300 before the Rituxan was given.  By 3/27/2022 her white count was 5150, hemoglobin 9.3, platelets 72,000 and neutrophil count was 2840.  During her hospitalization I saw her and we discussed that I would not risk further Rituxan as it does appear that this was an unusually brisk cytokine reaction from Rituxan.  While in the hospital I had Dr. Justin Kemp see  her and he has an appointment to see her on April 12 to check her for fitness for surgery.  Avatrombopag to stimulate marrow production of platelets in preparation for surgery is not generally indicated in the face of nonportal hypertensive thrombocytopenia where her pancytopenia is both due to marrow involvement and also cancer-induced splenomegaly.    -4/12/2022 [facility] medical oncology follow-up: Patient states she saw Dr. Kemp yesterday and he apparently is coordinating the pneumococcal vaccinations and the Haemophilus influenza B and meningococcal vaccinations.  She thinks she got the Pneumovax with Dr. Isidro recently already and that Dr. Kemp is coordinating this with Dr. Isidro but I am not sure of that and I have reached out to Dr. Kemp who is currently scrubbed to make sure this is getting orchestrated.  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  I would never test her with another anti-CD20 antibody like Rituxan or its cousins.  She needs thorough vaccination presplenectomy.  I will defer to Dr. Kemp to orchestrate as I am not sure whether he is having Dr. Isidro do that or whether he himself is ordering this but I will have the patient close that loop with Dr. Kemp to be certain they understand how to proceed.  Her blood counts last week were entirely normal with Dr. Isidro.  The primary reason for splenectomy at this point is not because of her counts but because of the massive splenomegaly causing abdominal discomforts and pushing of her viscera.  Dr. Kemp is aware of the risks of adhesions but will, according to the patient, attempt laparoscopic approach but if that does not work he will convert to an open procedure.  I will see her back in  about a month with blood counts prior to return to see how she is doing postsplenectomy.    -7/20/2022 underwent open splenectomy, pathology revealed splenic and perisplenic lymph nodes involved by low-grade B-cell lymphoma most consistent with marginal zone lymphoma.  The histologic appearance and immunohistochemical staining pattern is felt to be most consistent with a marginal zone lymphoma, no evidence of large cell transformation identified.    -9/1/2022 [facility] Oncology clinic follow-up: Mahi had open splenectomy on 7/20/2022 with a fairly tumultuous recovery with blood loss, prolonged intubation, pneumothorax, she was discharged on 8/4/2022 to rehab and is currently in rehab but getting stronger every day and hopes to go home this week with her daughter.  She looks great.  I have reviewed her CBC from 8/17/2022 that shows WBC 11.58, hemoglobin 12.3, hematocrit 42.6%, platelet count 512,000.  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  We will continue to monitor with CBC again in 3 months and I have ordered that today.  She had all of her presplenectomy vaccinations, she is waiting for the new COVID booster and will get that as soon as it is available.     Marginal zone lymphoma of spleen (HCC)   4/25/2016 Initial Diagnosis    Lymphoma, low grade     5/30/2017 Imaging    CT chest/abdomen/pelvis no obvious pulmonary nodules identified, no pleural effusions.  Stable appearance of adenopathy within the abdomen compared to earlier study as well as splenomegaly.     12/6/2017 Imaging    CT chest/abdomen/pelvis stable, essentially unchanged prominent mediastinal lymph nodes, unchanged right lower lobe medial basal segmental focal fibrotic findings.  Scattered noncalcified small lung nodules appear unchanged.  Stable spine or megaly.  Scattered prominent retroperitoneal lymph nodes now  appear smaller.  Unchanged spinal findings consistent with diffuse idiopathic skeletal hyperostosis and prior operative intervention at L4-5.       6/8/2018 Imaging    CT chest abdomen and pelvis without contrast: Stable, no significant change in the appearance compared to earlier study from December 6, 2017.     3/1/2019 Imaging    CT chest, abdomen and pelvis impression: No significant interval change from last year.  Stable appearance of the significantly enlarged spleen which measures up to nearly 20 x 12 cm.  Moderate, scattered upper abdominal adenopathy with innumerable scattered gastrohepatic, retroperitoneal, and mesenteric nodes measuring roughly between 1-2 cm are stable as well.  No significant progression.  Mild mediastinal adenopathy appears stable as well.     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  Colonoscopy showed hemorrhoids, diverticuli, 11 mm proximal ascending colon flat polyp.  Dr. 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     3/27/2019 Imaging    PET shows mild hypermetabolism within the lymph nodes to the left of the proximal third of the esophagus as well as near the tracheal bifurcation and no other area is of abnormal hypermetabolism.  Splenomegaly stable.  She has significant carotid stenosis especially on the right.     2/1/2021 Imaging    PET IMPRESSION:  Negative PET/CT scan. No evidence of recurrent or active  lymphoma. Enlargement seen of the spleen.     3/4/2022 Imaging    -3/3/2022 data:  Hemoglobin hemoglobin 9.8 with MCV of 91.9 and platelets slightly low 128,000 with normal white count 7100.  Unremarkable differential.    Creatinine 1.5 glucose 160 sodium 129 with potassium 4.7 and chloride 94 otherwise unremarkable  CMP.  C-reactive protein normal 1.7.  Sedimentation rate 31, upper limit 30.  INR normal 0.99 with PTT 26.1.  100 mg/dL M spike on SPEP.  Normal quantitative immunoglobulins except for decreased IgE.  Serum immunofixation electrophoresis was unclear as to monoclonality.  Serum kappa 33.8 lambda 27.4 both minimally elevated with normal ratio 1.23.  Serum viscosity normal 1.6.    -3/4/2022 CT chest abdomen pelvis without contrast: 2.5 mm noncalcified nodule right upper lobe and right lower lobe for which follow-up in 6 months for stability or resolution is suggested.  No suspicious lung nodules to suggest metastasis.  Massive enlargement of the spleen which is stable compared to June 2021.  1.4 cm and 1.5 cm juan luis hepatic adenopathy.  Stable retroperitoneal adenopathy largest 1.5 cm.  1.8 cm stable IVC node.     3/22/2022 - 3/22/2022 Chemotherapy    OP LYMPHOMA (CLL) RiTUXimab (Weekly X 4)     3/22/2022 Adverse Reaction    -3/22/2022 through 3/27/2022 inpatient hospitalization for fever after Rituxan initial dose.  No growth on cultures.  CT chest abdomen pelvis other than showing marked splenomegaly and stable retroperitoneal nodes showed nothing else.  She was pancytopenic on admission.  Given 1 unit platelet packed red cell transfusion.  On 3/22/2022 her platelet count was 142,000 and by the evening had dropped to 36,001-day with lactic acidosis.  Hemoglobin dropped to 7.2 x 3 2322 and white count down to 2450 with absolute neutrophil count baldomero of 240 on 3/22/2022 with a neutrophil count that very same day earlier in the day of 2300 before the Rituxan was given.  By 3/27/2022 her white count was 5150, hemoglobin 9.3, platelets 72,000 and neutrophil count was 2840.  During her hospitalization I saw her and we discussed that I would not risk further Rituxan as it does appear that this was an unusually brisk cytokine reaction from Rituxan.  While in the hospital I had Dr. Justni Kemp see her and he has an  appointment to see her on April 12 to check her for fitness for surgery.  Avatrombopag to stimulate marrow production of platelets in preparation for surgery is not generally indicated in the face of nonportal hypertensive thrombocytopenia where her pancytopenia is both due to marrow involvement and also cancer-induced splenomegaly.     7/20/2022 Surgery    Surgery       Procedure:  Open splenectomy, distal pancreatectomy              HISTORY OF PRESENT ILLNESS:  The patient is a 81 y.o. female, here for follow up on management of splenic marginal zone lymphoma with marrow involvement.  Since we saw Mahi last she underwent open splenectomy on 7/20/2022, she did have significant blood loss, pneumothorax that required a chest tube and prolonged intubation with some dysphagia but everything improved by time of discharge at which time she went to rehab.  Mahi is currently in rehab at [facility] and reports that she is getting stronger every day.  She is hoping to be discharged this week and will stay with her daughter as long as needed before going back home.  She is eating well.  She no longer has abdominal fullness or discomfort.  She reports her bowel movements are regular.  She denies any pain.  No fevers or chills or current concerns.    Past Medical History:   Diagnosis Date   • Anemia 09/18/2015   • Cataract     h/o   • Disease of thyroid gland    • Diverticulitis    • DVT (deep venous thrombosis) (HCC)     left lower leg   • GERD (gastroesophageal reflux disease)    • Gout    • History of basal cell carcinoma    • History of transfusion 2022    1 unit at [facility], pt denies reactions   • Hypertension         • IBS (irritable bowel syndrome)    • Non Hodgkin's lymphoma (HCC)    • Splenomegaly    • Urinary incontinence      Past Surgical History:   Procedure Laterality Date   • APPENDECTOMY     • BACK SURGERY      lumbar   • CHOLECYSTECTOMY     • COLON RESECTION  2021   • EXPLORATORY LAPAROTOMY N/A 7/21/2022  "   Procedure: ABDOMINAL WASHOUT WITH CLOSURE;  Surgeon: Jayme Kemp MD;  Location:  [facility];  Service: General;  Laterality: N/A;   • HYSTERECTOMY      jaja   • KIDNEY SURGERY      kidney stones   • ORIF ANKLE FRACTURE Right     h/o   • SPLENECTOMY N/A 7/20/2022    Procedure: SPLENECTOMY OPEN, DISTAL PANCREATECTOMY;  Surgeon: Jayme Kemp MD;  Location:  [facility];  Service: General;  Laterality: N/A;   • TOTAL HIP ARTHROPLASTY Bilateral     h/o       Allergies   Allergen Reactions   • Penicillins Swelling and Rash     Passed out   • Rituxan [Rituximab] Anaphylaxis   • Hydrocodone Itching and Rash     Severe itching   • Sulfa Antibiotics Swelling     Lips swell   • Bactrim [Sulfamethoxazole-Trimethoprim] Itching     Fever and itching   • Chlorhexidine Gluconate Rash   • Keflex [Cephalexin] Rash   • Latex Rash   • Lortab [Hydrocodone-Acetaminophen] Rash   • Magnesium Citrate Rash   • Neosporin Original [Bacitracin-Neomycin-Polymyxin] Rash       Family History and Social History reviewed and changed as necessary    REVIEW OF SYSTEM:   Gaining strength after splenectomy in July but no new somatic concerns    PHYSICAL EXAM:  Mahi looks good today, she is alert and oriented, no distress, appears well-nourished.  She is here with her daughter today.  No palpable lymphadenopathy.  Abdomen is nontender, nondistended, she has a well-healed midline incision.    Vitals:    09/01/22 1003   BP: 141/66   Pulse: 63   Resp: 20   Temp: 97.6 °F (36.4 °C)   SpO2: 98%   Weight: 76.2 kg (168 lb)   Height: 167.6 cm (66\")     Vitals:    09/01/22 1003   PainSc: 0-No pain          ECOG score: 2           Vitals reviewed.  Records reviewed including operative report, laboratory data and discharge summary from recent hospitalization 7/20/2022 through 8/4/2022 for splenectomy.    ECOG: (2) Ambulatory & Capable of Self Care, Unable to Carry Out Work Activity, Up & About Greater Than 50% of Waking Hours    Lab Results "   Component Value Date    HGB 12.3 08/17/2022    HCT 42.6 08/17/2022    .3 (H) 08/17/2022     (H) 08/17/2022    WBC 11.58 (H) 08/17/2022    NEUTROABS 7.50 (H) 08/17/2022    LYMPHSABS 2.40 08/17/2022    MONOSABS 1.14 (H) 08/17/2022    EOSABS 0.32 08/17/2022    BASOSABS 0.15 08/17/2022       Lab Results   Component Value Date    GLUCOSE 94 08/04/2022    BUN 17 08/04/2022    CREATININE 0.83 08/04/2022     (L) 08/04/2022    K 5.0 08/04/2022     08/04/2022    CO2 21.0 (L) 08/04/2022    CALCIUM 8.3 (L) 08/04/2022    PROTEINTOT 4.7 (L) 08/04/2022    ALBUMIN 2.70 (L) 08/04/2022    BILITOT 0.4 08/04/2022    ALKPHOS 88 08/04/2022    AST 32 08/04/2022    ALT 20 08/04/2022             ASSESSMENT & PLAN:  1. Low-grade lymphoma with severe cytokine reaction with febrile hypotensive acidosis with first portion of first dose of first day of planned weekly Rituxan x4 with rapid onset pancytopenia.  Now status post open splenectomy 7/20/2022.  2. History of iron deficiency anemia with last colonoscopy 11/2014, accordingto the patient, with a history of ulcerative colitis treated by Dr. Edge.  3. History of basal cell carcinomas.   4. Cataracts.   5. Remote history of deep vein thrombosis with vena cava filter in place.   6. Diverticulitis by history.   7. History of gout.   8. Hypertension.   9. History of 3 different kidney surgeries.   10. Cholecystectomy.   11. Chronic neuropathy.   12. History of ARMAAN.   13. History of back surgeries.   14. History of total hip replacement.   15. History of ankle fracture.   16. History of appendectomy.   17. 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 Edge.  18.  Epistaxis    Oncology history timeline:  a) Splenomegaly on hospitalization 02/2016. She had a week of nausea, vomiting and diarrhea with dehydration. The nausea, vomiting, and dehydration have resolved but on CT they found  retroperitoneal adenopathy with some splenomegaly that was not bulky. She denies any ongoing fevers or chills or bed drenching night sweats or unexplained weight loss and no change in the color, caliber or consistency of her stools. She had a slightly elevated beta-2 microglobulin of 3.5 but no monoclonal spike and a white count 5300, hemoglobin 11.5, platelets 158,000, sedimentation rate 45, C-reactive protein 13.6 and a normal LDH of 318 with normal liver enzymes. Slight hyponatremia with sodium of 134 and hypokalemia with potassium of 3.3 on 02/18/2016. She had slight decrease in immunoglobulin G to 580 and immunoglobulin A to 35 on testing while in the hospital.   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 There were no features to suggest hairy cell lymphoma or a mantle cell lymphoma and no large cell population. This was CD5 negative, CD10 negative, kappa clonal negative and bright for CD20 with dim surface light chain expression and bright cytoplasm, CD45 bright. The CD38 negative for plasmacytic differentiation with a hemoglobin of 11.4, white count 7200 with normal differential, and platelets of 163,000.   c) Liver, spleen ultrasound showed splenomegaly of 19.6 cm maximum dimension with 1.6 cm slightly dilated portal vein.  D)-3/20/2019 Dr. 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  Colonoscopy showed hemorrhoids, diverticuli, 11 mm proximal ascending colon flat polyp.  Dr. 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   -3/27/2019 PET shows mild hypermetabolism within the  lymph nodes to the left of the proximal third of the esophagus as well as near the tracheal bifurcation and no other area is of abnormal hypermetabolism.  Splenomegaly stable.  She has significant carotid stenosis especially on the right.  -1/12/2021 white count 4200 hemoglobin 10 platelets 114,000 unremarkable differential.  CMP unremarkable  -4/28/2021 Dr. Augustine performed attempted laparoscopic partial colectomy with subsequent open right hemicolectomy with ileocolonic stenosis but no residual polyp found in the proximal ascending colon after resection of the right colon and a small polyp in the distal part of the ascending colon.  Pathology showed residual adenomatous polyp tubular adenoma without high-grade dysplasia or invasive carcinoma and 13 benign lymph nodes.  Additional colonic segment benign with 4 benign lymph nodes.  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  Get her blood counts today and just prior to a minute evaluation in 3 months.  No plans for scans in the absence of symptoms.  I will keep in mind the potential of palliative splenic radiation if she develops early satiety or other abdominal complaints from splenomegaly.    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    -2/1/2021 PET negative.  Spleen is unremarkable with homogeneous uptake.    -2/5/2021 EGD and colonoscopy with Dr. Edge showed Negrete's esophagus, hemorrhoids, diverticuli, proximal ascending colon polyp.  No mention of any colitis.    -4/28/2021 attempted laparoscopic partial colectomy with subsequent open right hemicolectomy with ileocolonic stenosis but no residual polyp found in the proximal ascending  colon after resection of the right colon and a small polyp in the distal part of the ascending colon.  Pathology showed residual adenomatous polyp tubular adenoma without high-grade dysplasia or invasive carcinoma and 13 benign lymph nodes.  Additional colonic segment benign with 4 benign lymph nodes.    -3/3/2022 data:  Hemoglobin hemoglobin 9.8 with MCV of 91.9 and platelets slightly low 128,000 with normal white count 7100.  Unremarkable differential.    Creatinine 1.5 glucose 160 sodium 129 with potassium 4.7 and chloride 94 otherwise unremarkable CMP.  C-reactive protein normal 1.7.  Sedimentation rate 31, upper limit 30.  INR normal 0.99 with PTT 26.1.  100 mg/dL M spike on SPEP.  Normal quantitative immunoglobulins except for decreased IgE.  Serum immunofixation electrophoresis was unclear as to monoclonality.  Serum kappa 33.8 lambda 27.4 both minimally elevated with normal ratio 1.23.  Serum viscosity normal 1.6.    -3/4/2022 CT chest abdomen pelvis without contrast: 2.5 mm noncalcified nodule right upper lobe and right lower lobe for which follow-up in 6 months for stability or resolution is suggested.  No suspicious lung nodules to suggest metastasis.  Massive enlargement of the spleen which is stable compared to June 2021.  1.4 cm and 1.5 cm juan luis hepatic adenopathy.  Stable retroperitoneal adenopathy largest 1.5 cm.  1.8 cm stable IVC node.    -3/15/2022 [facility] medical oncology follow-up visit: She is not hyperviscous and I doubt her epistaxis is related to the lymphoma or hyperviscosity and I do not think she has Waldenstrom's.  However, I do think she has massive splenomegaly and while that has been present for quite some time, I nonetheless think that a lot of her abdominal discomfort is due to the massive splenomegaly pushing her bowel to the right and she could have some bowel involvement of lymphoma that we could put her through endoscopy again but she had this back a little over a year ago with  no obvious bowel involvement and no obvious colitis.  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  She would not consent to anything stronger than that regardless and it took a little convincing to get her to go along with this plan but she is willing to try provided that she has no major allergic reactions etc.  We will give first dose in [place] after chemo preparation visit we will use peripheral veins and I will have her follow-up in a few weeks with my nurse practitioner in [place] to make sure she is tolerating this well and to set up follow-up visits in the weeks and months ahead.  Repeat CTs I would ask my nurse practitioner to get ordered for approximately 4 months out from the end of Rituxan.  In the meantime, from the epistaxis standpoint I asked her to follow-up with ENT.   We discussed the protracted immune suppression that would be associated with the Rituxan and the fact that it would likely make vaccinations less helpful but that does not mean she should not get them.  We also discussed the possibility of allergic reactions.  Once all of this is done, she will need to follow-up with whoever is going to take over from Dr. 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    -3/22/2022 through 3/27/2022 inpatient hospitalization for fever after Rituxan initial dose.  No growth on cultures.  CT chest abdomen pelvis other than showing marked splenomegaly and stable retroperitoneal nodes showed nothing else.  She was pancytopenic on admission.  Given 1 unit platelet packed red cell transfusion.  On 3/22/2022 her platelet count was 142,000 and by the evening had dropped to 36,001-day with lactic acidosis.   Hemoglobin dropped to 7.2 x 3 2322 and white count down to 2450 with absolute neutrophil count baldomero of 240 on 3/22/2022 with a neutrophil count that very same day earlier in the day of 2300 before the Rituxan was given.  By 3/27/2022 her white count was 5150, hemoglobin 9.3, platelets 72,000 and neutrophil count was 2840.  During her hospitalization I saw her and we discussed that I would not risk further Rituxan as it does appear that this was an unusually brisk cytokine reaction from Rituxan.  While in the hospital I had Dr. Justin Kemp see her and he has an appointment to see her on April 12 to check her for fitness for surgery.  Avatrombopag to stimulate marrow production of platelets in preparation for surgery is not generally indicated in the face of nonportal hypertensive thrombocytopenia where her pancytopenia is both due to marrow involvement and also cancer-induced splenomegaly.    -4/6/2022 labs with Dr. Isidro… white count 6500 with hemoglobin 11.5 and platelets 235,000 with absolute neutrophil count 3705.  Creatinine 1.33 with normal liver enzymes.    -4/12/2022 [facility] medical oncology follow-up: Patient states she saw Dr. Kemp yesterday and he apparently is coordinating the pneumococcal vaccinations and the Haemophilus influenza B and meningococcal vaccinations.  She thinks she got the Pneumovax with Dr. Isidro recently already and that Dr. Kemp is coordinating this with Dr. Isidro but I am not sure of that and I have reached out to Dr. Kemp who is currently scrubbed to make sure this is getting orchestrated.  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  I would never test her with another anti-CD20 antibody like Rituxan or its cousins.  She needs  thorough vaccination presplenectomy.  I will defer to Dr. Kemp to orchestrate as I am not sure whether he is having Dr. Isidro do that or whether he himself is ordering this but I will have the patient close that loop with Dr. Kemp to be certain they understand how to proceed.  Her blood counts last week were entirely normal with Dr. Isidro.  The primary reason for splenectomy at this point is not because of her counts but because of the massive splenomegaly causing abdominal discomforts and pushing of her viscera.  Dr. Kemp is aware of the risks of adhesions but will, according to the patient, attempt laparoscopic approach but if that does not work he will convert to an open procedure.  I will see her back in about a month with blood counts prior to return to see how she is doing postsplenectomy.    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  The histologic appearance and immunohistochemical staining pattern is felt to be most consistent with a marginal zone lymphoma, no evidence of large cell transformation identified.    -9/1/2022 [facility] Oncology clinic follow-up: Mahi had open splenectomy on 7/20/2022 with a fairly tumultuous recovery with blood loss, prolonged intubation, pneumothorax, she was discharged on 8/4/2022 to rehab and is currently in rehab but getting stronger every day and hopes to go home this week with her daughter.  She looks great.  I have reviewed her CBC from 8/17/2022 that shows WBC 11.58, hemoglobin 12.3, hematocrit 42.6%, platelet count 512,000.  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  We will continue to monitor with CBC again in 3 months and I have ordered that today.  She had all of her  presplenectomy vaccinations, she is waiting for the new COVID booster and will get that as soon as it is available.    Return to clinic in 3 months for follow-up    I spent 30 minutes caring for Mahi on this date of service. This time includes time spent by me in the following activities: preparing for the visit, reviewing tests, obtaining and/or reviewing a separately obtained history, performing a medically appropriate examination and/or evaluation, ordering medications, tests, or procedures, referring and communicating with other health care professionals, documenting information in the medical record and plan of care discussed with Dr. Castañeda at time of visit also.     Chantell Kyle, APRN    09/01/2022

## 2022-12-13 ENCOUNTER — OFFICE VISIT (OUTPATIENT)
Dept: ONCOLOGY | Facility: CLINIC | Age: 82
End: 2022-12-13

## 2022-12-13 VITALS
TEMPERATURE: 97.5 F | HEIGHT: 66 IN | WEIGHT: 170 LBS | HEART RATE: 64 BPM | BODY MASS INDEX: 27.32 KG/M2 | DIASTOLIC BLOOD PRESSURE: 53 MMHG | RESPIRATION RATE: 20 BRPM | SYSTOLIC BLOOD PRESSURE: 117 MMHG

## 2022-12-13 DIAGNOSIS — C83.07 MARGINAL ZONE LYMPHOMA OF SPLEEN: Primary | ICD-10-CM

## 2022-12-13 PROCEDURE — 99214 OFFICE O/P EST MOD 30 MIN: CPT | Performed by: INTERNAL MEDICINE

## 2022-12-13 NOTE — PROGRESS NOTES
CHIEF COMPLAINT: No new somatic complaints    Problem List:  Oncology/Hematology History Overview Note   1. 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  2. History of iron deficiency anemia with last colonoscopy 11/2014, accordingto the patient, with a history of ulcerative colitis treated by Dr. Edge.  3. History of basal cell carcinomas.   4. Cataracts.   5. Remote history of deep vein thrombosis with vena cava filter in place.   6. Diverticulitis by history.   7. History of gout.   8. Hypertension.   9. History of 3 different kidney surgeries.   10. Cholecystectomy.   11. Chronic neuropathy.   12. History of ARMAAN.   13. History of back surgeries.   14. History of total hip replacement.   15. History of ankle fracture.   16. History of appendectomy.   17. 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 Edge.  18.  Epistaxis  19.  Stage III chronic kidney disease with hyponatremia on 48 ounce per day fluid restriction per Dr. Wooten 10/11/2022    Oncology history timeline:  a) Splenomegaly on hospitalization 02/2016. She had a week of nausea, vomiting and diarrhea with dehydration. The nausea, vomiting, and dehydration have resolved but on CT they found retroperitoneal adenopathy with some splenomegaly that was not bulky. She denies any ongoing fevers or chills or bed drenching night sweats or unexplained weight loss and no change in the color, caliber or consistency of her stools. She had a slightly elevated beta-2 microglobulin of 3.5 but no monoclonal spike and a white count 5300, hemoglobin 11.5, platelets 158,000, sedimentation rate 45,  C-reactive protein 13.6 and a normal LDH of 318 with normal liver enzymes. Slight hyponatremia with sodium of 134 and hypokalemia with potassium of 3.3 on 02/18/2016. She had slight decrease in immunoglobulin G to 580 and immunoglobulin A to 35 on testing while in the hospital.   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 There were no features to suggest hairy cell lymphoma or a mantle cell lymphoma and no large cell population. This was CD5 negative, CD10 negative, kappa clonal negative and bright for CD20 with dim surface light chain expression and bright cytoplasm, CD45 bright. The CD38 negative for plasmacytic differentiation with a hemoglobin of 11.4, white count 7200 with normal differential, and platelets of 163,000.   c) Liver, spleen ultrasound showed splenomegaly of 19.6 cm maximum dimension with 1.6 cm slightly dilated portal vein.  D)-3/20/2019 Dr. 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  Colonoscopy showed hemorrhoids, diverticuli, 11 mm proximal ascending colon flat polyp.  Dr. Edge relayed to the patient that this was a high-grade dysplasia in the Negrete's for which he is sending to [facility] for ablation as well as needing removal of a tubulovillous adenoma unable to be removed endoscopically.   -3/27/2019 PET shows mild hypermetabolism within the lymph nodes to the left of the proximal third of the esophagus as well as near the tracheal bifurcation and no other area is of abnormal hypermetabolism.  Splenomegaly stable.  She has significant carotid stenosis especially on the right.  -1/12/2021 white count 4200 hemoglobin 10 platelets 114,000 unremarkable differential.  CMP unremarkable  -4/28/2021 Dr. Augustine performed attempted  laparoscopic partial colectomy with subsequent open right hemicolectomy with ileocolonic stenosis but no residual polyp found in the proximal ascending colon after resection of the right colon and a small polyp in the distal part of the ascending colon.  Pathology showed residual adenomatous polyp tubular adenoma without high-grade dysplasia or invasive carcinoma and 13 benign lymph nodes.  Additional colonic segment benign with 4 benign lymph nodes.  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  Get her blood counts today and just prior to a minute evaluation in 3 months.  No plans for scans in the absence of symptoms.  I will keep in mind the potential of palliative splenic radiation if she develops early satiety or other abdominal complaints from splenomegaly.    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    -2/1/2021 PET negative.  Spleen is unremarkable with homogeneous uptake.    -2/5/2021 EGD and colonoscopy with Dr. Edge showed Negrete's esophagus, hemorrhoids, diverticuli, proximal ascending colon polyp.  No mention of any colitis.    -4/28/2021 attempted laparoscopic partial colectomy with subsequent open right hemicolectomy with ileocolonic stenosis but no residual polyp found in the proximal ascending colon after resection of the right colon and a small polyp in the distal part of the ascending colon.  Pathology showed residual adenomatous polyp tubular adenoma without high-grade dysplasia or invasive carcinoma and 13 benign lymph nodes.  Additional colonic segment benign with 4 benign lymph nodes.    -3/3/2022 data:  Hemoglobin hemoglobin 9.8 with MCV of 91.9 and platelets slightly  low 128,000 with normal white count 7100.  Unremarkable differential.    Creatinine 1.5 glucose 160 sodium 129 with potassium 4.7 and chloride 94 otherwise unremarkable CMP.  C-reactive protein normal 1.7.  Sedimentation rate 31, upper limit 30.  INR normal 0.99 with PTT 26.1.  100 mg/dL M spike on SPEP.  Normal quantitative immunoglobulins except for decreased IgE.  Serum immunofixation electrophoresis was unclear as to monoclonality.  Serum kappa 33.8 lambda 27.4 both minimally elevated with normal ratio 1.23.  Serum viscosity normal 1.6.    -3/4/2022 CT chest abdomen pelvis without contrast: 2.5 mm noncalcified nodule right upper lobe and right lower lobe for which follow-up in 6 months for stability or resolution is suggested.  No suspicious lung nodules to suggest metastasis.  Massive enlargement of the spleen which is stable compared to June 2021.  1.4 cm and 1.5 cm juan luis hepatic adenopathy.  Stable retroperitoneal adenopathy largest 1.5 cm.  1.8 cm stable IVC node.    -3/15/2022 [facility] medical oncology follow-up visit: She is not hyperviscous and I doubt her epistaxis is related to the lymphoma or hyperviscosity and I do not think she has Waldenstrom's.  However, I do think she has massive splenomegaly and while that has been present for quite some time, I nonetheless think that a lot of her abdominal discomfort is due to the massive splenomegaly pushing her bowel to the right and she could have some bowel involvement of lymphoma that we could put her through endoscopy again but she had this back a little over a year ago with no obvious bowel involvement and no obvious colitis.  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  She would not consent to anything stronger than that regardless and  it took a little convincing to get her to go along with this plan but she is willing to try provided that she has no major allergic reactions etc.  We will give first dose in [place] after chemo preparation visit we will use peripheral veins and I will have her follow-up in a few weeks with my nurse practitioner in [place] to make sure she is tolerating this well and to set up follow-up visits in the weeks and months ahead.  Repeat CTs I would ask my nurse practitioner to get ordered for approximately 4 months out from the end of Rituxan.  In the meantime, from the epistaxis standpoint I asked her to follow-up with ENT.   We discussed the protracted immune suppression that would be associated with the Rituxan and the fact that it would likely make vaccinations less helpful but that does not mean she should not get them.  We also discussed the possibility of allergic reactions.  Once all of this is done, she will need to follow-up with whoever is going to take over from Dr. 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    -3/22/2022 through 3/27/2022 inpatient hospitalization for fever after Rituxan initial dose.  No growth on cultures.  CT chest abdomen pelvis other than showing marked splenomegaly and stable retroperitoneal nodes showed nothing else.  She was pancytopenic on admission.  Given 1 unit platelet packed red cell transfusion.  On 3/22/2022 her platelet count was 142,000 and by the evening had dropped to 36,001-day with lactic acidosis.  Hemoglobin dropped to 7.2 x 3 2322 and white count down to 2450 with absolute neutrophil count baldomero of 240 on 3/22/2022 with a neutrophil count that very same day earlier in the day of 2300 before the Rituxan was given.  By 3/27/2022 her white count was 5150, hemoglobin 9.3, platelets 72,000 and neutrophil count was 2840.  During her hospitalization I saw her and we discussed that I would not  risk further Rituxan as it does appear that this was an unusually brisk cytokine reaction from Rituxan.  While in the hospital I had Dr. Justin Kemp see her and he has an appointment to see her on April 12 to check her for fitness for surgery.  Avatrombopag to stimulate marrow production of platelets in preparation for surgery is not generally indicated in the face of nonportal hypertensive thrombocytopenia where her pancytopenia is both due to marrow involvement and also cancer-induced splenomegaly.    -4/12/2022 [facility] oncology follow-up: Patient states she saw Dr. Kemp yesterday and he apparently is coordinating the pneumococcal vaccinations and the Haemophilus influenza B and meningococcal vaccinations.  She thinks she got the Pneumovax with Dr. Isidro recently already and that Dr. Kemp is coordinating this with Dr. Isidro but I am not sure of that and I have reached out to Dr. Kemp who is currently scrubbed to make sure this is getting orchestrated.  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  I would never test her with another anti-CD20 antibody like Rituxan or its cousins.  She needs thorough vaccination presplenectomy.  I will defer to Dr. Kemp to orchestrate as I am not sure whether he is having Dr. Isidro do that or whether he himself is ordering this but I will have the patient close that loop with Dr. Kemp to be certain they understand how to proceed.  Her blood counts last week were entirely normal with Dr. Isidro.  The primary reason for splenectomy at this point is not because of her counts but because of the massive splenomegaly causing abdominal discomforts and pushing of her viscera.  Dr. Kemp is aware of the risks of adhesions  but will, according to the patient, attempt laparoscopic approach but if that does not work he will convert to an open procedure.  I will see her back in about a month with blood counts prior to return to see how she is doing postsplenectomy.    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  The histologic appearance and immunohistochemical staining pattern is felt to be most consistent with a marginal zone lymphoma, no evidence of large cell transformation identified.    -9/1/2022 [facility] Oncology clinic follow-up: Mahi had open splenectomy on 7/20/2022 with a fairly tumultuous recovery with blood loss, prolonged intubation, pneumothorax, she was discharged on 8/4/2022 to rehab and is currently in rehab but getting stronger every day and hopes to go home this week with her daughter.  She looks great.  I have reviewed her CBC from 8/17/2022 that shows WBC 11.58, hemoglobin 12.3, hematocrit 42.6%, platelet count 512,000.  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  We will continue to monitor with CBC again in 3 months and I have ordered that today.  She had all of her presplenectomy vaccinations, she is waiting for the new COVID booster and will get that as soon as it is available.    -12/1/2022 white count 11,000 hemoglobin 11.6 platelets 446,000 with absolute lymphocyte count 4080 with absolute neutrophil count 4870.    -12/13/2022 [facility] hematology oncology follow-up: Counts are doing well.  No palpable cervical axillary or inguinal adenopathy.  No plan for any further imaging in the absence of symptoms.  Counts doing well postsplenectomy.  We will repeat CBC prior to return to my nurse practitioner in 3 months and we will see her  once a quarter.     Marginal zone lymphoma of spleen (HCC)   4/25/2016 Initial Diagnosis    Lymphoma, low grade     5/30/2017 Imaging    CT chest/abdomen/pelvis no obvious pulmonary nodules identified, no pleural effusions.  Stable appearance of adenopathy within the abdomen compared to earlier study as well as splenomegaly.     12/6/2017 Imaging    CT chest/abdomen/pelvis stable, essentially unchanged prominent mediastinal lymph nodes, unchanged right lower lobe medial basal segmental focal fibrotic findings.  Scattered noncalcified small lung nodules appear unchanged.  Stable spine or megaly.  Scattered prominent retroperitoneal lymph nodes now appear smaller.  Unchanged spinal findings consistent with diffuse idiopathic skeletal hyperostosis and prior operative intervention at L4-5.       6/8/2018 Imaging    CT chest abdomen and pelvis without contrast: Stable, no significant change in the appearance compared to earlier study from December 6, 2017.     3/1/2019 Imaging    CT chest, abdomen and pelvis impression: No significant interval change from last year.  Stable appearance of the significantly enlarged spleen which measures up to nearly 20 x 12 cm.  Moderate, scattered upper abdominal adenopathy with innumerable scattered gastrohepatic, retroperitoneal, and mesenteric nodes measuring roughly between 1-2 cm are stable as well.  No significant progression.  Mild mediastinal adenopathy appears stable as well.     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  Colonoscopy showed hemorrhoids, diverticuli, 11 mm proximal ascending colon flat polyp.  Dr. 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     3/27/2019 Imaging    PET shows mild hypermetabolism within the lymph nodes to  the left of the proximal third of the esophagus as well as near the tracheal bifurcation and no other area is of abnormal hypermetabolism.  Splenomegaly stable.  She has significant carotid stenosis especially on the right.     2/1/2021 Imaging    PET IMPRESSION:  Negative PET/CT scan. No evidence of recurrent or active  lymphoma. Enlargement seen of the spleen.     3/4/2022 Imaging    -3/3/2022 data:  Hemoglobin hemoglobin 9.8 with MCV of 91.9 and platelets slightly low 128,000 with normal white count 7100.  Unremarkable differential.    Creatinine 1.5 glucose 160 sodium 129 with potassium 4.7 and chloride 94 otherwise unremarkable CMP.  C-reactive protein normal 1.7.  Sedimentation rate 31, upper limit 30.  INR normal 0.99 with PTT 26.1.  100 mg/dL M spike on SPEP.  Normal quantitative immunoglobulins except for decreased IgE.  Serum immunofixation electrophoresis was unclear as to monoclonality.  Serum kappa 33.8 lambda 27.4 both minimally elevated with normal ratio 1.23.  Serum viscosity normal 1.6.    -3/4/2022 CT chest abdomen pelvis without contrast: 2.5 mm noncalcified nodule right upper lobe and right lower lobe for which follow-up in 6 months for stability or resolution is suggested.  No suspicious lung nodules to suggest metastasis.  Massive enlargement of the spleen which is stable compared to June 2021.  1.4 cm and 1.5 cm juan luis hepatic adenopathy.  Stable retroperitoneal adenopathy largest 1.5 cm.  1.8 cm stable IVC node.     3/22/2022 - 3/22/2022 Chemotherapy    OP LYMPHOMA (CLL) RiTUXimab (Weekly X 4)     3/22/2022 Adverse Reaction    -3/22/2022 through 3/27/2022 inpatient hospitalization for fever after Rituxan initial dose.  No growth on cultures.  CT chest abdomen pelvis other than showing marked splenomegaly and stable retroperitoneal nodes showed nothing else.  She was pancytopenic on admission.  Given 1 unit platelet packed red cell transfusion.  On 3/22/2022 her platelet count was 142,000 and  by the evening had dropped to 36,001-day with lactic acidosis.  Hemoglobin dropped to 7.2 x 3 2322 and white count down to 2450 with absolute neutrophil count baldomero of 240 on 3/22/2022 with a neutrophil count that very same day earlier in the day of 2300 before the Rituxan was given.  By 3/27/2022 her white count was 5150, hemoglobin 9.3, platelets 72,000 and neutrophil count was 2840.  During her hospitalization I saw her and we discussed that I would not risk further Rituxan as it does appear that this was an unusually brisk cytokine reaction from Rituxan.  While in the hospital I had Dr. Justin Kemp see her and he has an appointment to see her on April 12 to check her for fitness for surgery.  Avatrombopag to stimulate marrow production of platelets in preparation for surgery is not generally indicated in the face of nonportal hypertensive thrombocytopenia where her pancytopenia is both due to marrow involvement and also cancer-induced splenomegaly.     7/20/2022 Surgery    Surgery       Procedure:  Open splenectomy, distal pancreatectomy              HISTORY OF PRESENT ILLNESS:  The patient is a 82 y.o. female, here for follow up on management of marginal zone lymphoma with no new somatic complaints.  General fatigue not new.    Past Medical History:   Diagnosis Date   • Anemia 09/18/2015   • Cataract     h/o   • Disease of thyroid gland    • Diverticulitis    • DVT (deep venous thrombosis) (HCC)     left lower leg   • GERD (gastroesophageal reflux disease)    • Gout    • History of basal cell carcinoma    • History of transfusion 2022    1 unit at [facility], pt denies reactions   • Hypertension         • IBS (irritable bowel syndrome)    • Non Hodgkin's lymphoma (HCC)    • Splenomegaly    • Urinary incontinence      Past Surgical History:   Procedure Laterality Date   • APPENDECTOMY     • BACK SURGERY      lumbar   • CHOLECYSTECTOMY     • COLON RESECTION  2021   • EXPLORATORY LAPAROTOMY N/A 7/21/2022    Procedure:  "ABDOMINAL WASHOUT WITH CLOSURE;  Surgeon: Jayme Kemp MD;  Location:  [facility];  Service: General;  Laterality: N/A;   • HYSTERECTOMY      jaja   • KIDNEY SURGERY      kidney stones   • ORIF ANKLE FRACTURE Right     h/o   • SPLENECTOMY N/A 7/20/2022    Procedure: SPLENECTOMY OPEN, DISTAL PANCREATECTOMY;  Surgeon: Jayme Kemp MD;  Location:  [facility];  Service: General;  Laterality: N/A;   • TOTAL HIP ARTHROPLASTY Bilateral     h/o       Allergies   Allergen Reactions   • Penicillins Swelling and Rash     Passed out   • Rituxan [Rituximab] Anaphylaxis   • Hydrocodone Itching and Rash     Severe itching   • Sulfa Antibiotics Swelling     Lips swell   • Bactrim [Sulfamethoxazole-Trimethoprim] Itching     Fever and itching   • Chlorhexidine Gluconate Rash   • Keflex [Cephalexin] Rash   • Latex Rash   • Lortab [Hydrocodone-Acetaminophen] Rash   • Magnesium Citrate Rash   • Neosporin Original [Bacitracin-Neomycin-Polymyxin] Rash       Family History and Social History reviewed and changed as necessary    REVIEW OF SYSTEM:   No new somatic complaints    PHYSICAL EXAM:  No palpable cervical axillary or inguinal adenopathy    Vitals:    12/13/22 0958   BP: 117/53   Pulse: 64   Resp: 20   Temp: 97.5 °F (36.4 °C)   Weight: 77.1 kg (170 lb)   Height: 167.6 cm (66\")     Vitals:    12/13/22 0958   PainSc: 0-No pain          ECOG score: 2           Vitals reviewed.    ECOG: (2) Ambulatory & Capable of Self Care, Unable to Carry Out Work Activity, Up & About Greater Than 50% of Waking Hours    Lab Results   Component Value Date    HGB 12.3 08/17/2022    HCT 42.6 08/17/2022    .3 (H) 08/17/2022     (H) 08/17/2022    WBC 11.58 (H) 08/17/2022    NEUTROABS 7.50 (H) 08/17/2022    LYMPHSABS 2.40 08/17/2022    MONOSABS 1.14 (H) 08/17/2022    EOSABS 0.32 08/17/2022    BASOSABS 0.15 08/17/2022       Lab Results   Component Value Date    GLUCOSE 94 08/04/2022    BUN 17 08/04/2022    CREATININE 0.83 08/04/2022 "     (L) 08/04/2022    K 5.0 08/04/2022     08/04/2022    CO2 21.0 (L) 08/04/2022    CALCIUM 8.3 (L) 08/04/2022    PROTEINTOT 4.7 (L) 08/04/2022    ALBUMIN 2.70 (L) 08/04/2022    BILITOT 0.4 08/04/2022    ALKPHOS 88 08/04/2022    AST 32 08/04/2022    ALT 20 08/04/2022             ASSESSMENT & PLAN:  1. 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  2. History of iron deficiency anemia with last colonoscopy 11/2014, accordingto the patient, with a history of ulcerative colitis treated by Dr. Edge.  3. History of basal cell carcinomas.   4. Cataracts.   5. Remote history of deep vein thrombosis with vena cava filter in place.   6. Diverticulitis by history.   7. History of gout.   8. Hypertension.   9. History of 3 different kidney surgeries.   10. Cholecystectomy.   11. Chronic neuropathy.   12. History of ARMAAN.   13. History of back surgeries.   14. History of total hip replacement.   15. History of ankle fracture.   16. History of appendectomy.   17. 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 Edge.  18.  Epistaxis  19.  Stage III chronic kidney disease with hyponatremia on 48 ounce per day fluid restriction per Dr. Wooten 10/11/2022    Oncology history timeline:  a) Splenomegaly on hospitalization 02/2016. She had a week of nausea, vomiting and diarrhea with dehydration. The nausea, vomiting, and dehydration have resolved but on CT they found retroperitoneal adenopathy with some splenomegaly that was not bulky. She denies any ongoing fevers or chills or bed drenching night sweats or unexplained weight loss and no change in the  color, caliber or consistency of her stools. She had a slightly elevated beta-2 microglobulin of 3.5 but no monoclonal spike and a white count 5300, hemoglobin 11.5, platelets 158,000, sedimentation rate 45, C-reactive protein 13.6 and a normal LDH of 318 with normal liver enzymes. Slight hyponatremia with sodium of 134 and hypokalemia with potassium of 3.3 on 02/18/2016. She had slight decrease in immunoglobulin G to 580 and immunoglobulin A to 35 on testing while in the hospital.   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 There were no features to suggest hairy cell lymphoma or a mantle cell lymphoma and no large cell population. This was CD5 negative, CD10 negative, kappa clonal negative and bright for CD20 with dim surface light chain expression and bright cytoplasm, CD45 bright. The CD38 negative for plasmacytic differentiation with a hemoglobin of 11.4, white count 7200 with normal differential, and platelets of 163,000.   c) Liver, spleen ultrasound showed splenomegaly of 19.6 cm maximum dimension with 1.6 cm slightly dilated portal vein.  D)-3/20/2019 Dr. 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  Colonoscopy showed hemorrhoids, diverticuli, 11 mm proximal ascending colon flat polyp.  Dr. 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   -3/27/2019 PET shows mild hypermetabolism within the lymph nodes to the left of the proximal third of the esophagus as well as near the tracheal bifurcation and no other area is of abnormal hypermetabolism.  Splenomegaly stable.  She has  significant carotid stenosis especially on the right.  -1/12/2021 white count 4200 hemoglobin 10 platelets 114,000 unremarkable differential.  CMP unremarkable  -4/28/2021 Dr. Augustine performed attempted laparoscopic partial colectomy with subsequent open right hemicolectomy with ileocolonic stenosis but no residual polyp found in the proximal ascending colon after resection of the right colon and a small polyp in the distal part of the ascending colon.  Pathology showed residual adenomatous polyp tubular adenoma without high-grade dysplasia or invasive carcinoma and 13 benign lymph nodes.  Additional colonic segment benign with 4 benign lymph nodes.  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  Get her blood counts today and just prior to a minute evaluation in 3 months.  No plans for scans in the absence of symptoms.  I will keep in mind the potential of palliative splenic radiation if she develops early satiety or other abdominal complaints from splenomegaly.    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    -2/1/2021 PET negative.  Spleen is unremarkable with homogeneous uptake.    -2/5/2021 EGD and colonoscopy with Dr. Edge showed Negrete's esophagus, hemorrhoids, diverticuli, proximal ascending colon polyp.  No mention of any colitis.    -4/28/2021 attempted laparoscopic partial colectomy with subsequent open right hemicolectomy with ileocolonic stenosis but no residual polyp found in the proximal ascending colon after resection of the right colon and a small polyp in the distal part of the ascending colon.  Pathology showed residual adenomatous polyp tubular adenoma without high-grade  dysplasia or invasive carcinoma and 13 benign lymph nodes.  Additional colonic segment benign with 4 benign lymph nodes.    -3/3/2022 data:  Hemoglobin hemoglobin 9.8 with MCV of 91.9 and platelets slightly low 128,000 with normal white count 7100.  Unremarkable differential.    Creatinine 1.5 glucose 160 sodium 129 with potassium 4.7 and chloride 94 otherwise unremarkable CMP.  C-reactive protein normal 1.7.  Sedimentation rate 31, upper limit 30.  INR normal 0.99 with PTT 26.1.  100 mg/dL M spike on SPEP.  Normal quantitative immunoglobulins except for decreased IgE.  Serum immunofixation electrophoresis was unclear as to monoclonality.  Serum kappa 33.8 lambda 27.4 both minimally elevated with normal ratio 1.23.  Serum viscosity normal 1.6.    -3/4/2022 CT chest abdomen pelvis without contrast: 2.5 mm noncalcified nodule right upper lobe and right lower lobe for which follow-up in 6 months for stability or resolution is suggested.  No suspicious lung nodules to suggest metastasis.  Massive enlargement of the spleen which is stable compared to June 2021.  1.4 cm and 1.5 cm juan luis hepatic adenopathy.  Stable retroperitoneal adenopathy largest 1.5 cm.  1.8 cm stable IVC node.    -3/15/2022 [facility] medical oncology follow-up visit: She is not hyperviscous and I doubt her epistaxis is related to the lymphoma or hyperviscosity and I do not think she has Waldenstrom's.  However, I do think she has massive splenomegaly and while that has been present for quite some time, I nonetheless think that a lot of her abdominal discomfort is due to the massive splenomegaly pushing her bowel to the right and she could have some bowel involvement of lymphoma that we could put her through endoscopy again but she had this back a little over a year ago with no obvious bowel involvement and no obvious colitis.  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  She would not consent to anything stronger than that regardless and it took a little convincing to get her to go along with this plan but she is willing to try provided that she has no major allergic reactions etc.  We will give first dose in [place] after chemo preparation visit we will use peripheral veins and I will have her follow-up in a few weeks with my nurse practitioner in [place] to make sure she is tolerating this well and to set up follow-up visits in the weeks and months ahead.  Repeat CTs I would ask my nurse practitioner to get ordered for approximately 4 months out from the end of Rituxan.  In the meantime, from the epistaxis standpoint I asked her to follow-up with ENT.   We discussed the protracted immune suppression that would be associated with the Rituxan and the fact that it would likely make vaccinations less helpful but that does not mean she should not get them.  We also discussed the possibility of allergic reactions.  Once all of this is done, she will need to follow-up with whoever is going to take over from Dr. 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    -3/22/2022 through 3/27/2022 inpatient hospitalization for fever after Rituxan initial dose.  No growth on cultures.  CT chest abdomen pelvis other than showing marked splenomegaly and stable retroperitoneal nodes showed nothing else.  She was pancytopenic on admission.  Given 1 unit platelet packed red cell transfusion.  On 3/22/2022 her platelet count was 142,000 and by the evening had dropped to 36,001-day with lactic acidosis.  Hemoglobin dropped to 7.2 x 3 2322 and white count down to 2450 with absolute neutrophil count baldomero of 240 on 3/22/2022 with a neutrophil count that very same day earlier in the day of 2300  before the Rituxan was given.  By 3/27/2022 her white count was 5150, hemoglobin 9.3, platelets 72,000 and neutrophil count was 2840.  During her hospitalization I saw her and we discussed that I would not risk further Rituxan as it does appear that this was an unusually brisk cytokine reaction from Rituxan.  While in the hospital I had Dr. Justin Kemp see her and he has an appointment to see her on April 12 to check her for fitness for surgery.  Avatrombopag to stimulate marrow production of platelets in preparation for surgery is not generally indicated in the face of nonportal hypertensive thrombocytopenia where her pancytopenia is both due to marrow involvement and also cancer-induced splenomegaly.    -4/12/2022 [facility] medical oncology follow-up: Patient states she saw Dr. Kemp yesterday and he apparently is coordinating the pneumococcal vaccinations and the Haemophilus influenza B and meningococcal vaccinations.  She thinks she got the Pneumovax with Dr. Isidro recently already and that Dr. Kemp is coordinating this with Dr. Isidro but I am not sure of that and I have reached out to Dr. Kemp who is currently scrubbed to make sure this is getting orchestrated.  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  I would never test her with another anti-CD20 antibody like Rituxan or its cousins.  She needs thorough vaccination presplenectomy.  I will defer to Dr. Kemp to orchestrate as I am not sure whether he is having Dr. Isidro do that or whether he himself is ordering this but I will have the patient close that loop with Dr. Kemp to be certain they understand how to proceed.  Her blood counts last week were entirely normal with Dr. Isidro.  The primary reason  for splenectomy at this point is not because of her counts but because of the massive splenomegaly causing abdominal discomforts and pushing of her viscera.  Dr. Kemp is aware of the risks of adhesions but will, according to the patient, attempt laparoscopic approach but if that does not work he will convert to an open procedure.  I will see her back in about a month with blood counts prior to return to see how she is doing postsplenectomy.    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  The histologic appearance and immunohistochemical staining pattern is felt to be most consistent with a marginal zone lymphoma, no evidence of large cell transformation identified.    -9/1/2022 [facility] follow-up: Mahi had open splenectomy on 7/20/2022 with a fairly tumultuous recovery with blood loss, prolonged intubation, pneumothorax, she was discharged on 8/4/2022 to rehab and is currently in rehab but getting stronger every day and hopes to go home this week with her daughter.  She looks great.  I have reviewed her CBC from 8/17/2022 that shows WBC 11.58, hemoglobin 12.3, hematocrit 42.6%, platelet count 512,000.  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  We will continue to monitor with CBC again in 3 months and I have ordered that today.  She had all of her presplenectomy vaccinations, she is waiting for the new COVID booster and will get that as soon as it is available.    -12/1/2022 white count 11,000 hemoglobin 11.6 platelets 446,000 with absolute lymphocyte count 4080 with absolute neutrophil count 4870.    -12/13/2022 [facility]ology follow-up: Counts are doing well.  No palpable cervical axillary or inguinal  adenopathy.  No plan for any further imaging in the absence of symptoms.  Counts doing well postsplenectomy.  We will repeat CBC prior to return to my nurse practitioner in 3 months and we will see her once a quarter.    Total time of care today inclusive of time spent today prior to patient's arrival reviewing interval data and during visit translating that to her and after visit setting up follow-up took 30 minutes of patient care time throughout the day today.  Nelson Castañeda MD    12/13/2022

## 2023-03-23 ENCOUNTER — OFFICE VISIT (OUTPATIENT)
Dept: ONCOLOGY | Facility: CLINIC | Age: 83
End: 2023-03-23
Payer: MEDICARE

## 2023-03-23 VITALS
TEMPERATURE: 98.2 F | WEIGHT: 177 LBS | HEART RATE: 65 BPM | BODY MASS INDEX: 28.45 KG/M2 | RESPIRATION RATE: 20 BRPM | HEIGHT: 66 IN | OXYGEN SATURATION: 99 % | DIASTOLIC BLOOD PRESSURE: 66 MMHG | SYSTOLIC BLOOD PRESSURE: 146 MMHG

## 2023-03-23 DIAGNOSIS — C83.07 MARGINAL ZONE LYMPHOMA OF SPLEEN: Primary | ICD-10-CM

## 2023-03-23 PROCEDURE — 1159F MED LIST DOCD IN RCRD: CPT | Performed by: NURSE PRACTITIONER

## 2023-03-23 PROCEDURE — 1160F RVW MEDS BY RX/DR IN RCRD: CPT | Performed by: NURSE PRACTITIONER

## 2023-03-23 PROCEDURE — 99213 OFFICE O/P EST LOW 20 MIN: CPT | Performed by: NURSE PRACTITIONER

## 2023-03-23 PROCEDURE — 1126F AMNT PAIN NOTED NONE PRSNT: CPT | Performed by: NURSE PRACTITIONER

## 2023-03-23 NOTE — PROGRESS NOTES
CHIEF COMPLAINT:  Hair thinning and thin nails    Problem List:  Oncology/Hematology History Overview Note   1. 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  2. History of iron deficiency anemia with last colonoscopy 11/2014, accordingto the patient, with a history of ulcerative colitis treated by Dr. Edge.  3. History of basal cell carcinomas.   4. Cataracts.   5. Remote history of deep vein thrombosis with vena cava filter in place.   6. Diverticulitis by history.   7. History of gout.   8. Hypertension.   9. History of 3 different kidney surgeries.   10. Cholecystectomy.   11. Chronic neuropathy.   12. History of ARMAAN.   13. History of back surgeries.   14. History of total hip replacement.   15. History of ankle fracture.   16. History of appendectomy.   17. 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 Edge.  18.  Epistaxis  19.  Stage III chronic kidney disease with hyponatremia on 48 ounce per day fluid restriction per Dr. Wooten 10/11/2022    Oncology history timeline:  a) Splenomegaly on hospitalization 02/2016. She had a week of nausea, vomiting and diarrhea with dehydration. The nausea, vomiting, and dehydration have resolved but on CT they found retroperitoneal adenopathy with some splenomegaly that was not bulky. She denies any ongoing fevers or chills or bed drenching night sweats or unexplained weight loss and no change in the color, caliber or consistency of her stools. She had a slightly elevated beta-2 microglobulin of 3.5 but no monoclonal spike and a white count 5300, hemoglobin 11.5, platelets 158,000, sedimentation rate  45, C-reactive protein 13.6 and a normal LDH of 318 with normal liver enzymes. Slight hyponatremia with sodium of 134 and hypokalemia with potassium of 3.3 on 02/18/2016. She had slight decrease in immunoglobulin G to 580 and immunoglobulin A to 35 on testing while in the hospital.   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 There were no features to suggest hairy cell lymphoma or a mantle cell lymphoma and no large cell population. This was CD5 negative, CD10 negative, kappa clonal negative and bright for CD20 with dim surface light chain expression and bright cytoplasm, CD45 bright. The CD38 negative for plasmacytic differentiation with a hemoglobin of 11.4, white count 7200 with normal differential, and platelets of 163,000.   c) Liver, spleen ultrasound showed splenomegaly of 19.6 cm maximum dimension with 1.6 cm slightly dilated portal vein.  D)-3/20/2019 Dr. 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  Colonoscopy showed hemorrhoids, diverticuli, 11 mm proximal ascending colon flat polyp.  Dr. 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   -3/27/2019 PET shows mild hypermetabolism within the lymph nodes to the left of the proximal third of the esophagus as well as near the tracheal bifurcation and no other area is of abnormal hypermetabolism.  Splenomegaly stable.  She has significant carotid stenosis especially on the right.  -1/12/2021 white count 4200 hemoglobin 10 platelets 114,000 unremarkable differential.  CMP unremarkable  -4/28/2021 Dr. Augustine performed attempted  laparoscopic partial colectomy with subsequent open right hemicolectomy with ileocolonic stenosis but no residual polyp found in the proximal ascending colon after resection of the right colon and a small polyp in the distal part of the ascending colon.  Pathology showed residual adenomatous polyp tubular adenoma without high-grade dysplasia or invasive carcinoma and 13 benign lymph nodes.  Additional colonic segment benign with 4 benign lymph nodes.  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  Get her blood counts today and just prior to a minute evaluation in 3 months.  No plans for scans in the absence of symptoms.  I will keep in mind the potential of palliative splenic radiation if she develops early satiety or other abdominal complaints from splenomegaly.    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    -2/1/2021 PET negative.  Spleen is unremarkable with homogeneous uptake.    -2/5/2021 EGD and colonoscopy with Dr. Edge showed Negrete's esophagus, hemorrhoids, diverticuli, proximal ascending colon polyp.  No mention of any colitis.    -4/28/2021 attempted laparoscopic partial colectomy with subsequent open right hemicolectomy with ileocolonic stenosis but no residual polyp found in the proximal ascending colon after resection of the right colon and a small polyp in the distal part of the ascending colon.  Pathology showed residual adenomatous polyp tubular adenoma without high-grade dysplasia or invasive carcinoma and 13 benign lymph nodes.  Additional colonic segment benign with 4 benign lymph nodes.    -3/3/2022 data:  Hemoglobin hemoglobin 9.8 with MCV of 91.9 and platelets slightly  low 128,000 with normal white count 7100.  Unremarkable differential.    Creatinine 1.5 glucose 160 sodium 129 with potassium 4.7 and chloride 94 otherwise unremarkable CMP.  C-reactive protein normal 1.7.  Sedimentation rate 31, upper limit 30.  INR normal 0.99 with PTT 26.1.  100 mg/dL M spike on SPEP.  Normal quantitative immunoglobulins except for decreased IgE.  Serum immunofixation electrophoresis was unclear as to monoclonality.  Serum kappa 33.8 lambda 27.4 both minimally elevated with normal ratio 1.23.  Serum viscosity normal 1.6.    -3/4/2022 CT chest abdomen pelvis without contrast: 2.5 mm noncalcified nodule right upper lobe and right lower lobe for which follow-up in 6 months for stability or resolution is suggested.  No suspicious lung nodules to suggest metastasis.  Massive enlargement of the spleen which is stable compared to June 2021.  1.4 cm and 1.5 cm juan luis hepatic adenopathy.  Stable retroperitoneal adenopathy largest 1.5 cm.  1.8 cm stable IVC node.    -3/15/2022 [facility] [place] medical oncology follow-up visit: She is not hyperviscous and I doubt her epistaxis is related to the lymphoma or hyperviscosity and I do not think she has Waldenstrom's.  However, I do think she has massive splenomegaly and while that has been present for quite some time, I nonetheless think that a lot of her abdominal discomfort is due to the massive splenomegaly pushing her bowel to the right and she could have some bowel involvement of lymphoma that we could put her through endoscopy again but she had this back a little over a year ago with no obvious bowel involvement and no obvious colitis.  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  She would not consent to anything stronger than that regardless and  it took a little convincing to get her to go along with this plan but she is willing to try provided that she has no major allergic reactions etc.  We will give first dose in [place] after chemo preparation visit we will use peripheral veins and I will have her follow-up in a few weeks with my nurse practitioner in [place] to make sure she is tolerating this well and to set up follow-up visits in the weeks and months ahead.  Repeat CTs I would ask my nurse practitioner to get ordered for approximately 4 months out from the end of Rituxan.  In the meantime, from the epistaxis standpoint I asked her to follow-up with ENT.   We discussed the protracted immune suppression that would be associated with the Rituxan and the fact that it would likely make vaccinations less helpful but that does not mean she should not get them.  We also discussed the possibility of allergic reactions.  Once all of this is done, she will need to follow-up with whoever is going to take over from Dr. 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    -3/22/2022 through 3/27/2022 inpatient hospitalization for fever after Rituxan initial dose.  No growth on cultures.  CT chest abdomen pelvis other than showing marked splenomegaly and stable retroperitoneal nodes showed nothing else.  She was pancytopenic on admission.  Given 1 unit platelet packed red cell transfusion.  On 3/22/2022 her platelet count was 142,000 and by the evening had dropped to 36,001-day with lactic acidosis.  Hemoglobin dropped to 7.2 x 3 2322 and white count down to 2450 with absolute neutrophil count baldomero of 240 on 3/22/2022 with a neutrophil count that very same day earlier in the day of 2300 before the Rituxan was given.  By 3/27/2022 her white count was 5150, hemoglobin 9.3, platelets 72,000 and neutrophil count was 2840.  During her hospitalization I saw her and we discussed that I would not  risk further Rituxan as it does appear that this was an unusually brisk cytokine reaction from Rituxan.  While in the hospital I had Dr. Justin Kemp see her and he has an appointment to see her on April 12 to check her for fitness for surgery.  Avatrombopag to stimulate marrow production of platelets in preparation for surgery is not generally indicated in the face of nonportal hypertensive thrombocytopenia where her pancytopenia is both due to marrow involvement and also cancer-induced splenomegaly.    -4/12/2022 [facility] oncology follow-up: Patient states she saw Dr. Kemp yesterday and he apparently is coordinating the pneumococcal vaccinations and the Haemophilus influenza B and meningococcal vaccinations.  She thinks she got the Pneumovax with Dr. Isidro recently already and that Dr. Kemp is coordinating this with Dr. Isidro but I am not sure of that and I have reached out to Dr. Kemp who is currently scrubbed to make sure this is getting orchestrated.  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  I would never test her with another anti-CD20 antibody like Rituxan or its cousins.  She needs thorough vaccination presplenectomy.  I will defer to Dr. Kemp to orchestrate as I am not sure whether he is having Dr. Isidro do that or whether he himself is ordering this but I will have the patient close that loop with Dr. Kemp to be certain they understand how to proceed.  Her blood counts last week were entirely normal with Dr. Isidro.  The primary reason for splenectomy at this point is not because of her counts but because of the massive splenomegaly causing abdominal discomforts and pushing of her viscera.  Dr. Kemp is aware of the risks of adhesions  but will, according to the patient, attempt laparoscopic approach but if that does not work he will convert to an open procedure.  I will see her back in about a month with blood counts prior to return to see how she is doing postsplenectomy.    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  The histologic appearance and immunohistochemical staining pattern is felt to be most consistent with a marginal zone lymphoma, no evidence of large cell transformation identified.    -9/1/2022 [facility] Oncology clinic follow-up: Mahi had open splenectomy on 7/20/2022 with a fairly tumultuous recovery with blood loss, prolonged intubation, pneumothorax, she was discharged on 8/4/2022 to rehab and is currently in rehab but getting stronger every day and hopes to go home this week with her daughter.  She looks great.  I have reviewed her CBC from 8/17/2022 that shows WBC 11.58, hemoglobin 12.3, hematocrit 42.6%, platelet count 512,000.  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  We will continue to monitor with CBC again in 3 months and I have ordered that today.  She had all of her presplenectomy vaccinations, she is waiting for the new COVID booster and will get that as soon as it is available.    -12/1/2022 white count 11,000 hemoglobin 11.6 platelets 446,000 with absolute lymphocyte count 4080 with absolute neutrophil count 4870.    -12/13/2022 [facility] hematology oncology follow-up: Counts are doing well.  No palpable cervical axillary or inguinal adenopathy.  No plan for any further imaging in the absence of symptoms.  Counts doing well postsplenectomy.  We will repeat CBC prior to return to my nurse practitioner in 3 months and we will see her  once a quarter.     Marginal zone lymphoma of spleen (HCC)   4/25/2016 Initial Diagnosis    Lymphoma, low grade     5/30/2017 Imaging    CT chest/abdomen/pelvis no obvious pulmonary nodules identified, no pleural effusions.  Stable appearance of adenopathy within the abdomen compared to earlier study as well as splenomegaly.     12/6/2017 Imaging    CT chest/abdomen/pelvis stable, essentially unchanged prominent mediastinal lymph nodes, unchanged right lower lobe medial basal segmental focal fibrotic findings.  Scattered noncalcified small lung nodules appear unchanged.  Stable spine or megaly.  Scattered prominent retroperitoneal lymph nodes now appear smaller.  Unchanged spinal findings consistent with diffuse idiopathic skeletal hyperostosis and prior operative intervention at L4-5.       6/8/2018 Imaging    CT chest abdomen and pelvis without contrast: Stable, no significant change in the appearance compared to earlier study from December 6, 2017.     3/1/2019 Imaging    CT chest, abdomen and pelvis impression: No significant interval change from last year.  Stable appearance of the significantly enlarged spleen which measures up to nearly 20 x 12 cm.  Moderate, scattered upper abdominal adenopathy with innumerable scattered gastrohepatic, retroperitoneal, and mesenteric nodes measuring roughly between 1-2 cm are stable as well.  No significant progression.  Mild mediastinal adenopathy appears stable as well.     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  Colonoscopy showed hemorrhoids, diverticuli, 11 mm proximal ascending colon flat polyp.  Dr. 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     3/27/2019 Imaging    PET shows mild hypermetabolism within the lymph nodes to  the left of the proximal third of the esophagus as well as near the tracheal bifurcation and no other area is of abnormal hypermetabolism.  Splenomegaly stable.  She has significant carotid stenosis especially on the right.     2/1/2021 Imaging    PET IMPRESSION:  Negative PET/CT scan. No evidence of recurrent or active  lymphoma. Enlargement seen of the spleen.     3/4/2022 Imaging    -3/3/2022 data:  Hemoglobin hemoglobin 9.8 with MCV of 91.9 and platelets slightly low 128,000 with normal white count 7100.  Unremarkable differential.    Creatinine 1.5 glucose 160 sodium 129 with potassium 4.7 and chloride 94 otherwise unremarkable CMP.  C-reactive protein normal 1.7.  Sedimentation rate 31, upper limit 30.  INR normal 0.99 with PTT 26.1.  100 mg/dL M spike on SPEP.  Normal quantitative immunoglobulins except for decreased IgE.  Serum immunofixation electrophoresis was unclear as to monoclonality.  Serum kappa 33.8 lambda 27.4 both minimally elevated with normal ratio 1.23.  Serum viscosity normal 1.6.    -3/4/2022 CT chest abdomen pelvis without contrast: 2.5 mm noncalcified nodule right upper lobe and right lower lobe for which follow-up in 6 months for stability or resolution is suggested.  No suspicious lung nodules to suggest metastasis.  Massive enlargement of the spleen which is stable compared to June 2021.  1.4 cm and 1.5 cm juan luis hepatic adenopathy.  Stable retroperitoneal adenopathy largest 1.5 cm.  1.8 cm stable IVC node.     3/22/2022 - 3/22/2022 Chemotherapy    OP LYMPHOMA (CLL) RiTUXimab (Weekly X 4)     3/22/2022 Adverse Reaction    -3/22/2022 through 3/27/2022 inpatient hospitalization for fever after Rituxan initial dose.  No growth on cultures.  CT chest abdomen pelvis other than showing marked splenomegaly and stable retroperitoneal nodes showed nothing else.  She was pancytopenic on admission.  Given 1 unit platelet packed red cell transfusion.  On 3/22/2022 her platelet count was 142,000 and  by the evening had dropped to 36,001-day with lactic acidosis.  Hemoglobin dropped to 7.2 x 3 2322 and white count down to 2450 with absolute neutrophil count baldomero of 240 on 3/22/2022 with a neutrophil count that very same day earlier in the day of 2300 before the Rituxan was given.  By 3/27/2022 her white count was 5150, hemoglobin 9.3, platelets 72,000 and neutrophil count was 2840.  During her hospitalization I saw her and we discussed that I would not risk further Rituxan as it does appear that this was an unusually brisk cytokine reaction from Rituxan.  While in the hospital I had Dr. Justin Kemp see her and he has an appointment to see her on April 12 to check her for fitness for surgery.  Avatrombopag to stimulate marrow production of platelets in preparation for surgery is not generally indicated in the face of nonportal hypertensive thrombocytopenia where her pancytopenia is both due to marrow involvement and also cancer-induced splenomegaly.     7/20/2022 Surgery    Surgery       Procedure:  Open splenectomy, distal pancreatectomy              HISTORY OF PRESENT ILLNESS:  The patient is a 82 y.o. female, here for follow up on management of marginal zone lymphoma, she had splenectomy 7/2022.  Mahi overall is doing well.  She reports she feels her hair is thinning and her nails break off easily.  She has some seasonal allergies with clear rhinorrhea.  She has not had any fevers or chills, no illnesses or hospitalizations since we saw her last.    Past Medical History:   Diagnosis Date   • Anemia 09/18/2015   • Cataract     h/o   • Disease of thyroid gland    • Diverticulitis    • DVT (deep venous thrombosis) (HCC)     left lower leg   • GERD (gastroesophageal reflux disease)    • Gout    • History of basal cell carcinoma    • History of transfusion 2022    1 unit at [facility], pt denies reactions   • Hypertension         • IBS (irritable bowel syndrome)    • Non Hodgkin's lymphoma (HCC)    • Splenomegaly    •  "Urinary incontinence      Past Surgical History:   Procedure Laterality Date   • APPENDECTOMY     • BACK SURGERY      lumbar   • CHOLECYSTECTOMY     • COLON RESECTION  2021   • EXPLORATORY LAPAROTOMY N/A 7/21/2022    Procedure: ABDOMINAL WASHOUT WITH CLOSURE;  Surgeon: Jayme Kemp MD;  Location: [facility] OR;  Service: General;  Laterality: N/A;   • HYSTERECTOMY      jaja   • KIDNEY SURGERY      kidney stones   • ORIF ANKLE FRACTURE Right     h/o   • SPLENECTOMY N/A 7/20/2022    Procedure: SPLENECTOMY OPEN, DISTAL PANCREATECTOMY;  Surgeon: Jayme Kemp MD;  Location:  [facility] OR;  Service: General;  Laterality: N/A;   • TOTAL HIP ARTHROPLASTY Bilateral     h/o       Allergies   Allergen Reactions   • Penicillins Swelling and Rash     Passed out   • Rituxan [Rituximab] Anaphylaxis   • Hydrocodone Itching and Rash     Severe itching   • Sulfa Antibiotics Swelling     Lips swell   • Bactrim [Sulfamethoxazole-Trimethoprim] Itching     Fever and itching   • Chlorhexidine Gluconate Rash   • Keflex [Cephalexin] Rash   • Latex Rash   • Lortab [Hydrocodone-Acetaminophen] Rash   • Magnesium Citrate Rash   • Neosporin Original [Bacitracin-Neomycin-Polymyxin] Rash       Family History and Social History reviewed and changed as necessary    REVIEW OF SYSTEM:   Hair thinning and thin nails that break easily otherwise no new somatic complaints    PHYSICAL EXAM:  Well-developed, well-nourished appearing female in no distress  Hair is thinning but no hien areas of total alopecia.  Nails appear healthy, they are trimmed short but no ridging or pitting or splitting of her nails.   Heart regular rate and rhythm  Respirations regular and unlabored, lungs clear  No cervical, supraclavicular or axillary nodes palpable on exam  Abdomen soft, nontender, nondistended    Vitals:    03/23/23 1055   BP: 146/66   Pulse: 65   Resp: 20   Temp: 98.2 °F (36.8 °C)   SpO2: 99%   Weight: 80.3 kg (177 lb)   Height: 167.6 cm (66\")     Vitals: "    03/23/23 1055   PainSc: 0-No pain          ECOG score: 2           Vitals reviewed.  Labs reviewed from 3/17/2023 with CBC, WBC 11,900, hemoglobin 12.5, hematocrit 38%, platelet count 437,000, ANC 5.35.    ECOG: (2) Ambulatory & Capable of Self Care, Unable to Carry Out Work Activity, Up & About Greater Than 50% of Waking Hours    Lab Results   Component Value Date    HGB 12.3 08/17/2022    HCT 42.6 08/17/2022    .3 (H) 08/17/2022     (H) 08/17/2022    WBC 11.58 (H) 08/17/2022    NEUTROABS 7.50 (H) 08/17/2022    LYMPHSABS 2.40 08/17/2022    MONOSABS 1.14 (H) 08/17/2022    EOSABS 0.32 08/17/2022    BASOSABS 0.15 08/17/2022       Lab Results   Component Value Date    GLUCOSE 94 08/04/2022    BUN 17 08/04/2022    CREATININE 0.83 08/04/2022     (L) 08/04/2022    K 5.0 08/04/2022     08/04/2022    CO2 21.0 (L) 08/04/2022    CALCIUM 8.3 (L) 08/04/2022    PROTEINTOT 4.7 (L) 08/04/2022    ALBUMIN 2.70 (L) 08/04/2022    BILITOT 0.4 08/04/2022    ALKPHOS 88 08/04/2022    AST 32 08/04/2022    ALT 20 08/04/2022             ASSESSMENT & PLAN:  1. 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  2. History of iron deficiency anemia with last colonoscopy 11/2014, accordingto the patient, with a history of ulcerative colitis treated by Dr. Edge.  3. History of basal cell carcinomas.   4. Cataracts.   5. Remote history of deep vein thrombosis with vena cava filter in place.   6. Diverticulitis by history.   7. History of gout.   8. Hypertension.   9. History of 3 different kidney surgeries.   10. Cholecystectomy.   11. Chronic neuropathy.   12. History of ARMAAN.   13. History of back surgeries.   14.  History of total hip replacement.   15. History of ankle fracture.   16. History of appendectomy.   17. 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 Edge.  18.  Epistaxis  19.  Stage III chronic kidney disease with hyponatremia on 48 ounce per day fluid restriction per Dr. Wooten 10/11/2022    Oncology history timeline:  a) Splenomegaly on hospitalization 02/2016. She had a week of nausea, vomiting and diarrhea with dehydration. The nausea, vomiting, and dehydration have resolved but on CT they found retroperitoneal adenopathy with some splenomegaly that was not bulky. She denies any ongoing fevers or chills or bed drenching night sweats or unexplained weight loss and no change in the color, caliber or consistency of her stools. She had a slightly elevated beta-2 microglobulin of 3.5 but no monoclonal spike and a white count 5300, hemoglobin 11.5, platelets 158,000, sedimentation rate 45, C-reactive protein 13.6 and a normal LDH of 318 with normal liver enzymes. Slight hyponatremia with sodium of 134 and hypokalemia with potassium of 3.3 on 02/18/2016. She had slight decrease in immunoglobulin G to 580 and immunoglobulin A to 35 on testing while in the hospital.   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 There were no features to suggest hairy cell lymphoma or a mantle cell lymphoma and no large cell population. This was CD5 negative, CD10 negative, kappa clonal negative and bright for CD20 with dim surface light chain expression and bright cytoplasm, CD45 bright. The CD38 negative for plasmacytic differentiation with a hemoglobin of 11.4, white count 7200 with normal differential, and platelets of 163,000.   c) Liver, spleen  ultrasound showed splenomegaly of 19.6 cm maximum dimension with 1.6 cm slightly dilated portal vein.  D)-3/20/2019 Dr. 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  Colonoscopy showed hemorrhoids, diverticuli, 11 mm proximal ascending colon flat polyp.  Dr. 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   -3/27/2019 PET shows mild hypermetabolism within the lymph nodes to the left of the proximal third of the esophagus as well as near the tracheal bifurcation and no other area is of abnormal hypermetabolism.  Splenomegaly stable.  She has significant carotid stenosis especially on the right.  -1/12/2021 white count 4200 hemoglobin 10 platelets 114,000 unremarkable differential.  CMP unremarkable  -4/28/2021 Dr. Augustine performed attempted laparoscopic partial colectomy with subsequent open right hemicolectomy with ileocolonic stenosis but no residual polyp found in the proximal ascending colon after resection of the right colon and a small polyp in the distal part of the ascending colon.  Pathology showed residual adenomatous polyp tubular adenoma without high-grade dysplasia or invasive carcinoma and 13 benign lymph nodes.  Additional colonic segment benign with 4 benign lymph nodes.  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  Get her blood counts today and just prior to a minute evaluation in 3 months.  No plans for scans in the absence of symptoms.  I will keep in mind the potential of palliative splenic radiation if she develops  early satiety or other abdominal complaints from splenomegaly.    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    -2/1/2021 PET negative.  Spleen is unremarkable with homogeneous uptake.    -2/5/2021 EGD and colonoscopy with Dr. Edge showed Negrete's esophagus, hemorrhoids, diverticuli, proximal ascending colon polyp.  No mention of any colitis.    -4/28/2021 attempted laparoscopic partial colectomy with subsequent open right hemicolectomy with ileocolonic stenosis but no residual polyp found in the proximal ascending colon after resection of the right colon and a small polyp in the distal part of the ascending colon.  Pathology showed residual adenomatous polyp tubular adenoma without high-grade dysplasia or invasive carcinoma and 13 benign lymph nodes.  Additional colonic segment benign with 4 benign lymph nodes.    -3/3/2022 data:  Hemoglobin hemoglobin 9.8 with MCV of 91.9 and platelets slightly low 128,000 with normal white count 7100.  Unremarkable differential.    Creatinine 1.5 glucose 160 sodium 129 with potassium 4.7 and chloride 94 otherwise unremarkable CMP.  C-reactive protein normal 1.7.  Sedimentation rate 31, upper limit 30.  INR normal 0.99 with PTT 26.1.  100 mg/dL M spike on SPEP.  Normal quantitative immunoglobulins except for decreased IgE.  Serum immunofixation electrophoresis was unclear as to monoclonality.  Serum kappa 33.8 lambda 27.4 both minimally elevated with normal ratio 1.23.  Serum viscosity normal 1.6.    -3/4/2022 CT chest abdomen pelvis without contrast: 2.5 mm noncalcified nodule right upper lobe and right lower lobe for which follow-up in 6 months for stability or resolution is suggested.  No suspicious lung nodules to suggest metastasis.  Massive enlargement of the spleen which is stable compared to June 2021.  1.4 cm and 1.5 cm juan luis hepatic adenopathy.  Stable retroperitoneal adenopathy largest 1.5 cm.  1.8 cm  stable IVC node.    -3/15/2022 [facility] medical oncology follow-up visit: She is not hyperviscous and I doubt her epistaxis is related to the lymphoma or hyperviscosity and I do not think she has Waldenstrom's.  However, I do think she has massive splenomegaly and while that has been present for quite some time, I nonetheless think that a lot of her abdominal discomfort is due to the massive splenomegaly pushing her bowel to the right and she could have some bowel involvement of lymphoma that we could put her through endoscopy again but she had this back a little over a year ago with no obvious bowel involvement and no obvious colitis.  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  She would not consent to anything stronger than that regardless and it took a little convincing to get her to go along with this plan but she is willing to try provided that she has no major allergic reactions etc.  We will give first dose in [place] after chemo preparation visit we will use peripheral veins and I will have her follow-up in a few weeks with my nurse practitioner in [place] to make sure she is tolerating this well and to set up follow-up visits in the weeks and months ahead.  Repeat CTs I would ask my nurse practitioner to get ordered for approximately 4 months out from the end of Rituxan.  In the meantime, from the epistaxis standpoint I asked her to follow-up with ENT.   We discussed the protracted immune suppression that would be associated with the Rituxan and the fact that it would likely make vaccinations less helpful but that does not mean she should not get them.  We also discussed the possibility of allergic reactions.  Once all of this is done, she will need to follow-up with whoever is going  to take over from Dr. 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    -3/22/2022 through 3/27/2022 inpatient hospitalization for fever after Rituxan initial dose.  No growth on cultures.  CT chest abdomen pelvis other than showing marked splenomegaly and stable retroperitoneal nodes showed nothing else.  She was pancytopenic on admission.  Given 1 unit platelet packed red cell transfusion.  On 3/22/2022 her platelet count was 142,000 and by the evening had dropped to 36,001-day with lactic acidosis.  Hemoglobin dropped to 7.2 x 3 2322 and white count down to 2450 with absolute neutrophil count baldomero of 240 on 3/22/2022 with a neutrophil count that very same day earlier in the day of 2300 before the Rituxan was given.  By 3/27/2022 her white count was 5150, hemoglobin 9.3, platelets 72,000 and neutrophil count was 2840.  During her hospitalization I saw her and we discussed that I would not risk further Rituxan as it does appear that this was an unusually brisk cytokine reaction from Rituxan.  While in the hospital I had Dr. Justin Kemp see her and he has an appointment to see her on April 12 to check her for fitness for surgery.  Avatrombopag to stimulate marrow production of platelets in preparation for surgery is not generally indicated in the face of nonportal hypertensive thrombocytopenia where her pancytopenia is both due to marrow involvement and also cancer-induced splenomegaly.    -4/12/2022 [facility] medical oncology follow-up: Patient states she saw Dr. Kemp yesterday and he apparently is coordinating the pneumococcal vaccinations and the Haemophilus influenza B and meningococcal vaccinations.  She thinks she got the Pneumovax with Dr. Isidro recently already and that Dr. Kemp is coordinating this with Dr. Isidro but I am not sure of that and I have reached out to Dr. Kemp who is currently scrubbed to make sure this is  getting orchestrated.  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  I would never test her with another anti-CD20 antibody like Rituxan or its cousins.  She needs thorough vaccination presplenectomy.  I will defer to Dr. Kemp to orchestrate as I am not sure whether he is having Dr. Isidro do that or whether he himself is ordering this but I will have the patient close that loop with Dr. Kemp to be certain they understand how to proceed.  Her blood counts last week were entirely normal with Dr. Isidro.  The primary reason for splenectomy at this point is not because of her counts but because of the massive splenomegaly causing abdominal discomforts and pushing of her viscera.  Dr. Kemp is aware of the risks of adhesions but will, according to the patient, attempt laparoscopic approach but if that does not work he will convert to an open procedure.  I will see her back in about a month with blood counts prior to return to see how she is doing postsplenectomy.    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  The histologic appearance and immunohistochemical staining pattern is felt to be most consistent with a marginal zone lymphoma, no evidence of large cell transformation identified.    -9/1/2022 [facility] Oncology clinic follow-up: Mahi had open splenectomy on 7/20/2022 with a fairly tumultuous recovery with blood loss, prolonged intubation, pneumothorax, she was discharged on 8/4/2022 to rehab and is currently in rehab but getting stronger every day and hopes to go home this week with her daughter.  She looks great.   I have reviewed her CBC from 8/17/2022 that shows WBC 11.58, hemoglobin 12.3, hematocrit 42.6%, platelet count 512,000.  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  We will continue to monitor with CBC again in 3 months and I have ordered that today.  She had all of her presplenectomy vaccinations, she is waiting for the new COVID booster and will get that as soon as it is available.    -12/1/2022 white count 11,000 hemoglobin 11.6 platelets 446,000 with absolute lymphocyte count 4080 with absolute neutrophil count 4870.    -12/13/2022 [facility] hematology oncology follow-up: Counts are doing well.  No palpable cervical axillary or inguinal adenopathy.  No plan for any further imaging in the absence of symptoms.  Counts doing well postsplenectomy.  We will repeat CBC prior to return to my nurse practitioner in 3 months and we will see her once a quarter.    -3/23/2023 [facility] Hematology/Oncology clinic follow-up: Mahi continues to do well, no evidence of progression of lymphoma.  She has no palpable lymphadenopathy.  CBC is unremarkable with WBC 11,900, hemoglobin 12.5, hematocrit 38%, platelet count 437,000, ANC 5.35.  She has had no illnesses since we saw her last.  She does have some hair thinning but no hien alopecia.  I have asked her to discuss with her dermatologist any recommendations.  Her nails are short but appear very healthy. She is on levothyroxine for hypothyroidism and this is adjusted and monitored by Dr. Isidro.  We will continue to monitor with CBC in 3 months prior to return and I have ordered that today.    Return to clinic in 3 months for follow-up    I spent 20 minutes caring for Mahi on this date of service. This time includes time spent by me in the following activities: preparing for the visit, reviewing tests, performing a medically appropriate examination  and/or evaluation, ordering medications, tests, or procedures and documenting information in the medical record.     Chantell Kyle, APRN    03/23/2023

## 2023-09-21 ENCOUNTER — LAB (OUTPATIENT)
Dept: ONCOLOGY | Facility: HOSPITAL | Age: 83
End: 2023-09-21
Payer: MEDICARE

## 2023-09-21 VITALS
TEMPERATURE: 97.9 F | HEART RATE: 65 BPM | RESPIRATION RATE: 17 BRPM | BODY MASS INDEX: 28.92 KG/M2 | DIASTOLIC BLOOD PRESSURE: 60 MMHG | WEIGHT: 179.2 LBS | SYSTOLIC BLOOD PRESSURE: 155 MMHG

## 2023-09-21 DIAGNOSIS — C83.07 MARGINAL ZONE LYMPHOMA OF SPLEEN: ICD-10-CM

## 2023-09-21 DIAGNOSIS — C83.07 MARGINAL ZONE LYMPHOMA OF SPLEEN: Primary | ICD-10-CM

## 2023-09-21 PROCEDURE — G0463 HOSPITAL OUTPT CLINIC VISIT: HCPCS

## 2023-09-21 PROCEDURE — 36415 COLL VENOUS BLD VENIPUNCTURE: CPT

## 2023-09-22 LAB
BASOPHILS # BLD AUTO: 0.12 10*3/MM3 (ref 0–0.2)
BASOPHILS NFR BLD AUTO: 1.2 % (ref 0–1.5)
EOSINOPHIL # BLD AUTO: 0.48 10*3/MM3 (ref 0–0.4)
EOSINOPHIL NFR BLD AUTO: 4.9 % (ref 0.3–6.2)
ERYTHROCYTE [DISTWIDTH] IN BLOOD BY AUTOMATED COUNT: 13.5 % (ref 12.3–15.4)
HCT VFR BLD AUTO: 36.5 % (ref 34–46.6)
HGB BLD-MCNC: 12.6 G/DL (ref 12–15.9)
IMM GRANULOCYTES # BLD AUTO: 0.04 10*3/MM3 (ref 0–0.05)
IMM GRANULOCYTES NFR BLD AUTO: 0.4 % (ref 0–0.5)
LYMPHOCYTES # BLD AUTO: 3.38 10*3/MM3 (ref 0.7–3.1)
LYMPHOCYTES NFR BLD AUTO: 34.4 % (ref 19.6–45.3)
MCH RBC QN AUTO: 32.1 PG (ref 26.6–33)
MCHC RBC AUTO-ENTMCNC: 34.5 G/DL (ref 31.5–35.7)
MCV RBC AUTO: 93.1 FL (ref 79–97)
MONOCYTES # BLD AUTO: 1.23 10*3/MM3 (ref 0.1–0.9)
MONOCYTES NFR BLD AUTO: 12.5 % (ref 5–12)
NEUTROPHILS # BLD AUTO: 4.58 10*3/MM3 (ref 1.7–7)
NEUTROPHILS NFR BLD AUTO: 46.6 % (ref 42.7–76)
NRBC BLD AUTO-RTO: 0.6 /100 WBC (ref 0–0.2)
PLATELET # BLD AUTO: 443 10*3/MM3 (ref 140–450)
RBC # BLD AUTO: 3.92 10*6/MM3 (ref 3.77–5.28)
WBC # BLD AUTO: 9.83 10*3/MM3 (ref 3.4–10.8)

## 2023-09-28 ENCOUNTER — OFFICE VISIT (OUTPATIENT)
Dept: ONCOLOGY | Facility: CLINIC | Age: 83
End: 2023-09-28
Payer: MEDICARE

## 2023-09-28 VITALS
BODY MASS INDEX: 28.45 KG/M2 | OXYGEN SATURATION: 96 % | HEART RATE: 65 BPM | HEIGHT: 66 IN | SYSTOLIC BLOOD PRESSURE: 158 MMHG | WEIGHT: 177 LBS | DIASTOLIC BLOOD PRESSURE: 70 MMHG | TEMPERATURE: 97.8 F | RESPIRATION RATE: 18 BRPM

## 2023-09-28 DIAGNOSIS — C83.07 MARGINAL ZONE LYMPHOMA OF SPLEEN: Primary | ICD-10-CM

## 2023-09-28 PROCEDURE — 1160F RVW MEDS BY RX/DR IN RCRD: CPT | Performed by: NURSE PRACTITIONER

## 2023-09-28 PROCEDURE — 1126F AMNT PAIN NOTED NONE PRSNT: CPT | Performed by: NURSE PRACTITIONER

## 2023-09-28 PROCEDURE — 99214 OFFICE O/P EST MOD 30 MIN: CPT | Performed by: NURSE PRACTITIONER

## 2023-09-28 PROCEDURE — 1159F MED LIST DOCD IN RCRD: CPT | Performed by: NURSE PRACTITIONER

## 2023-09-28 NOTE — PROGRESS NOTES
CHIEF COMPLAINT: Intermittent abdominal pain and diarrhea    Problem List:  Oncology/Hematology History Overview Note   1. 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  2. History of iron deficiency anemia with last colonoscopy 11/2014, accordingto the patient, with a history of ulcerative colitis treated by Dr. Edge.  3. History of basal cell carcinomas.   4. Cataracts.   5. Remote history of deep vein thrombosis with vena cava filter in place.   6. Diverticulitis by history.   7. History of gout.   8. Hypertension.   9. History of 3 different kidney surgeries.   10. Cholecystectomy.   11. Chronic neuropathy.   12. History of ARMAAN.   13. History of back surgeries.   14. History of total hip replacement.   15. History of ankle fracture.   16. History of appendectomy.   17. 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 Edge.  18.  Epistaxis  19.  Stage III chronic kidney disease with hyponatremia on 48 ounce per day fluid restriction per Dr. Wooten 10/11/2022    Oncology history timeline:  a) Splenomegaly on hospitalization 02/2016. She had a week of nausea, vomiting and diarrhea with dehydration. The nausea, vomiting, and dehydration have resolved but on CT they found retroperitoneal adenopathy with some splenomegaly that was not bulky. She denies any ongoing fevers or chills or bed drenching night sweats or unexplained weight loss and no change in the color, caliber or consistency of her stools. She had a slightly elevated beta-2 microglobulin of 3.5 but no monoclonal spike and a white count 5300, hemoglobin 11.5, platelets 158,000,  sedimentation rate 45, C-reactive protein 13.6 and a normal LDH of 318 with normal liver enzymes. Slight hyponatremia with sodium of 134 and hypokalemia with potassium of 3.3 on 02/18/2016. She had slight decrease in immunoglobulin G to 580 and immunoglobulin A to 35 on testing while in the hospital.   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 There were no features to suggest hairy cell lymphoma or a mantle cell lymphoma and no large cell population. This was CD5 negative, CD10 negative, kappa clonal negative and bright for CD20 with dim surface light chain expression and bright cytoplasm, CD45 bright. The CD38 negative for plasmacytic differentiation with a hemoglobin of 11.4, white count 7200 with normal differential, and platelets of 163,000.   c) Liver, spleen ultrasound showed splenomegaly of 19.6 cm maximum dimension with 1.6 cm slightly dilated portal vein.  D)-3/20/2019 Dr. 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  Colonoscopy showed hemorrhoids, diverticuli, 11 mm proximal ascending colon flat polyp.  Dr. 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   -3/27/2019 PET shows mild hypermetabolism within the lymph nodes to the left of the proximal third of the esophagus as well as near the tracheal bifurcation and no other area is of abnormal hypermetabolism.  Splenomegaly stable.  She has significant carotid stenosis especially on the right.  -1/12/2021 white count 4200 hemoglobin 10 platelets 114,000 unremarkable differential.  CMP unremarkable  -4/28/2021 Dr. Augustine  performed attempted laparoscopic partial colectomy with subsequent open right hemicolectomy with ileocolonic stenosis but no residual polyp found in the proximal ascending colon after resection of the right colon and a small polyp in the distal part of the ascending colon.  Pathology showed residual adenomatous polyp tubular adenoma without high-grade dysplasia or invasive carcinoma and 13 benign lymph nodes.  Additional colonic segment benign with 4 benign lymph nodes.  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  Get her blood counts today and just prior to a minute evaluation in 3 months.  No plans for scans in the absence of symptoms.  I will keep in mind the potential of palliative splenic radiation if she develops early satiety or other abdominal complaints from splenomegaly.    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    -2/1/2021 PET negative.  Spleen is unremarkable with homogeneous uptake.    -2/5/2021 EGD and colonoscopy with Dr. Edge showed Negrete's esophagus, hemorrhoids, diverticuli, proximal ascending colon polyp.  No mention of any colitis.    -4/28/2021 attempted laparoscopic partial colectomy with subsequent open right hemicolectomy with ileocolonic stenosis but no residual polyp found in the proximal ascending colon after resection of the right colon and a small polyp in the distal part of the ascending colon.  Pathology showed residual adenomatous polyp tubular adenoma without high-grade dysplasia or invasive carcinoma and 13 benign lymph nodes.  Additional colonic segment benign with 4 benign lymph nodes.    -3/3/2022 data:  Hemoglobin hemoglobin 9.8 with MCV of 91.9 and  platelets slightly low 128,000 with normal white count 7100.  Unremarkable differential.    Creatinine 1.5 glucose 160 sodium 129 with potassium 4.7 and chloride 94 otherwise unremarkable CMP.  C-reactive protein normal 1.7.  Sedimentation rate 31, upper limit 30.  INR normal 0.99 with PTT 26.1.  100 mg/dL M spike on SPEP.  Normal quantitative immunoglobulins except for decreased IgE.  Serum immunofixation electrophoresis was unclear as to monoclonality.  Serum kappa 33.8 lambda 27.4 both minimally elevated with normal ratio 1.23.  Serum viscosity normal 1.6.    -3/4/2022 CT chest abdomen pelvis without contrast: 2.5 mm noncalcified nodule right upper lobe and right lower lobe for which follow-up in 6 months for stability or resolution is suggested.  No suspicious lung nodules to suggest metastasis.  Massive enlargement of the spleen which is stable compared to June 2021.  1.4 cm and 1.5 cm juan luis hepatic adenopathy.  Stable retroperitoneal adenopathy largest 1.5 cm.  1.8 cm stable IVC node.    -3/15/2022 [facility] medical oncology follow-up visit: She is not hyperviscous and I doubt her epistaxis is related to the lymphoma or hyperviscosity and I do not think she has Waldenstrom's.  However, I do think she has massive splenomegaly and while that has been present for quite some time, I nonetheless think that a lot of her abdominal discomfort is due to the massive splenomegaly pushing her bowel to the right and she could have some bowel involvement of lymphoma that we could put her through endoscopy again but she had this back a little over a year ago with no obvious bowel involvement and no obvious colitis.  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  She would not consent to anything stronger than  that regardless and it took a little convincing to get her to go along with this plan but she is willing to try provided that she has no major allergic reactions etc.  We will give first dose in [place] after chemo preparation visit we will use peripheral veins and I will have her follow-up in a few weeks with my nurse practitioner in [place] to make sure she is tolerating this well and to set up follow-up visits in the weeks and months ahead.  Repeat CTs I would ask my nurse practitioner to get ordered for approximately 4 months out from the end of Rituxan.  In the meantime, from the epistaxis standpoint I asked her to follow-up with ENT.   We discussed the protracted immune suppression that would be associated with the Rituxan and the fact that it would likely make vaccinations less helpful but that does not mean she should not get them.  We also discussed the possibility of allergic reactions.  Once all of this is done, she will need to follow-up with whoever is going to take over from Dr. 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    -3/22/2022 through 3/27/2022 inpatient hospitalization for fever after Rituxan initial dose.  No growth on cultures.  CT chest abdomen pelvis other than showing marked splenomegaly and stable retroperitoneal nodes showed nothing else.  She was pancytopenic on admission.  Given 1 unit platelet packed red cell transfusion.  On 3/22/2022 her platelet count was 142,000 and by the evening had dropped to 36,001-day with lactic acidosis.  Hemoglobin dropped to 7.2 x 3 2322 and white count down to 2450 with absolute neutrophil count baldomero of 240 on 3/22/2022 with a neutrophil count that very same day earlier in the day of 2300 before the Rituxan was given.  By 3/27/2022 her white count was 5150, hemoglobin 9.3, platelets 72,000 and neutrophil count was 2840.  During her hospitalization I saw her and we  discussed that I would not risk further Rituxan as it does appear that this was an unusually brisk cytokine reaction from Rituxan.  While in the hospital I had Dr. Justin Kemp see her and he has an appointment to see her on April 12 to check her for fitness for surgery.  Avatrombopag to stimulate marrow production of platelets in preparation for surgery is not generally indicated in the face of nonportal hypertensive thrombocytopenia where her pancytopenia is both due to marrow involvement and also cancer-induced splenomegaly.    -4/12/2022 [facility] medical oncology follow-up: Patient states she saw Dr. Kemp yesterday and he apparently is coordinating the pneumococcal vaccinations and the Haemophilus influenza B and meningococcal vaccinations.  She thinks she got the Pneumovax with Dr. Isidro recently already and that Dr. Kemp is coordinating this with Dr. Isidro but I am not sure of that and I have reached out to Dr. Kemp who is currently scrubbed to make sure this is getting orchestrated.  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  I would never test her with another anti-CD20 antibody like Rituxan or its cousins.  She needs thorough vaccination presplenectomy.  I will defer to Dr. Kemp to orchestrate as I am not sure whether he is having Dr. Isidro do that or whether he himself is ordering this but I will have the patient close that loop with Dr. Kemp to be certain they understand how to proceed.  Her blood counts last week were entirely normal with Dr. Isidro.  The primary reason for splenectomy at this point is not because of her counts but because of the massive splenomegaly causing abdominal discomforts and pushing of her viscera.  Dr. Kemp is aware  of the risks of adhesions but will, according to the patient, attempt laparoscopic approach but if that does not work he will convert to an open procedure.  I will see her back in about a month with blood counts prior to return to see how she is doing postsplenectomy.    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  The histologic appearance and immunohistochemical staining pattern is felt to be most consistent with a marginal zone lymphoma, no evidence of large cell transformation identified.    -9/1/2022 [facility] Oncology clinic follow-up: Mahi had open splenectomy on 7/20/2022 with a fairly tumultuous recovery with blood loss, prolonged intubation, pneumothorax, she was discharged on 8/4/2022 to rehab and is currently in rehab but getting stronger every day and hopes to go home this week with her daughter.  She looks great.  I have reviewed her CBC from 8/17/2022 that shows WBC 11.58, hemoglobin 12.3, hematocrit 42.6%, platelet count 512,000.  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  We will continue to monitor with CBC again in 3 months and I have ordered that today.  She had all of her presplenectomy vaccinations, she is waiting for the new COVID booster and will get that as soon as it is available.    -12/1/2022 white count 11,000 hemoglobin 11.6 platelets 446,000 with absolute lymphocyte count 4080 with absolute neutrophil count 4870.    -12/13/2022 [facility] hematology oncology follow-up: Counts are doing well.  No palpable cervical axillary or inguinal adenopathy.  No plan for any further imaging in the absence of symptoms.  Counts doing well postsplenectomy.  We will repeat CBC prior to return to my nurse practitioner in 3  months and we will see her once a quarter.    -3/23/2023 [facility]Oncology clinic follow-up: Mahi continues to do well, no evidence of progression of lymphoma.  She has no palpable lymphadenopathy.  CBC is unremarkable with WBC 11,900, hemoglobin 12.5, hematocrit 38%, platelet count 437,000, ANC 5.35.  She has had no illnesses since we saw her last.  She does have some hair thinning but no hien alopecia.  I have asked her to discuss with her dermatologist any recommendations.  Her nails are short but appear very healthy. She is on levothyroxine for hypothyroidism and this is adjusted and monitored by Dr. Isidro.  We will continue to monitor with CBC in 3 months prior to return and I have ordered that today.    -6/29/2023 [facility]ematology/Oncology clinic follow-up: Mahi overall seems to be doing well, her current CBC is unremarkable with WBC 10.30, hemoglobin 11.6, hematocrit 32.9%, platelet count 559,000, ANC 4.78.  I am concerned with the bullous skin lesions she is describing and currently has on her left calf.  I have put a picture in her chart under media.  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  The skin lesions have apparently been occurring over what sounds like about 11 years, she has been hospitalized on a few occasions and treated for cellulitis.  She follows with Dr. Vidhya Araya and I have reached out to their office to see if we can get her seen now to biopsy her current skin lesion while it is active.  Her follow-up with Dr. Araya is not until October, I discussed with Mahi that she needs to be seen sooner.  Otherwise we will plan on seeing her back in 3 months for follow-up with repeat CBC and I have ordered that today.  We will see her sooner if she has any concerns and she knows to reach out to us.     Marginal zone lymphoma of spleen   4/25/2016 Initial  Diagnosis    Lymphoma, low grade     5/30/2017 Imaging    CT chest/abdomen/pelvis no obvious pulmonary nodules identified, no pleural effusions.  Stable appearance of adenopathy within the abdomen compared to earlier study as well as splenomegaly.     12/6/2017 Imaging    CT chest/abdomen/pelvis stable, essentially unchanged prominent mediastinal lymph nodes, unchanged right lower lobe medial basal segmental focal fibrotic findings.  Scattered noncalcified small lung nodules appear unchanged.  Stable spine or megaly.  Scattered prominent retroperitoneal lymph nodes now appear smaller.  Unchanged spinal findings consistent with diffuse idiopathic skeletal hyperostosis and prior operative intervention at L4-5.       6/8/2018 Imaging    CT chest abdomen and pelvis without contrast: Stable, no significant change in the appearance compared to earlier study from December 6, 2017.     3/1/2019 Imaging    CT chest, abdomen and pelvis impression: No significant interval change from last year.  Stable appearance of the significantly enlarged spleen which measures up to nearly 20 x 12 cm.  Moderate, scattered upper abdominal adenopathy with innumerable scattered gastrohepatic, retroperitoneal, and mesenteric nodes measuring roughly between 1-2 cm are stable as well.  No significant progression.  Mild mediastinal adenopathy appears stable as well.     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  Colonoscopy showed hemorrhoids, diverticuli, 11 mm proximal ascending colon flat polyp.  Dr. 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     3/27/2019 Imaging    PET shows mild hypermetabolism within the lymph nodes to the left of the proximal third of the esophagus as well as near the tracheal  bifurcation and no other area is of abnormal hypermetabolism.  Splenomegaly stable.  She has significant carotid stenosis especially on the right.     2/1/2021 Imaging    PET IMPRESSION:  Negative PET/CT scan. No evidence of recurrent or active  lymphoma. Enlargement seen of the spleen.     3/4/2022 Imaging    -3/3/2022 data:  Hemoglobin hemoglobin 9.8 with MCV of 91.9 and platelets slightly low 128,000 with normal white count 7100.  Unremarkable differential.    Creatinine 1.5 glucose 160 sodium 129 with potassium 4.7 and chloride 94 otherwise unremarkable CMP.  C-reactive protein normal 1.7.  Sedimentation rate 31, upper limit 30.  INR normal 0.99 with PTT 26.1.  100 mg/dL M spike on SPEP.  Normal quantitative immunoglobulins except for decreased IgE.  Serum immunofixation electrophoresis was unclear as to monoclonality.  Serum kappa 33.8 lambda 27.4 both minimally elevated with normal ratio 1.23.  Serum viscosity normal 1.6.    -3/4/2022 CT chest abdomen pelvis without contrast: 2.5 mm noncalcified nodule right upper lobe and right lower lobe for which follow-up in 6 months for stability or resolution is suggested.  No suspicious lung nodules to suggest metastasis.  Massive enlargement of the spleen which is stable compared to June 2021.  1.4 cm and 1.5 cm juan luis hepatic adenopathy.  Stable retroperitoneal adenopathy largest 1.5 cm.  1.8 cm stable IVC node.     3/22/2022 - 3/22/2022 Chemotherapy    OP LYMPHOMA (CLL) RiTUXimab (Weekly X 4)       3/22/2022 Adverse Reaction    -3/22/2022 through 3/27/2022 inpatient hospitalization for fever after Rituxan initial dose.  No growth on cultures.  CT chest abdomen pelvis other than showing marked splenomegaly and stable retroperitoneal nodes showed nothing else.  She was pancytopenic on admission.  Given 1 unit platelet packed red cell transfusion.  On 3/22/2022 her platelet count was 142,000 and by the evening had dropped to 36,001-day with lactic acidosis.  Hemoglobin  "dropped to 7.2 x 3 2322 and white count down to 2450 with absolute neutrophil count baldomero of 240 on 3/22/2022 with a neutrophil count that very same day earlier in the day of 2300 before the Rituxan was given.  By 3/27/2022 her white count was 5150, hemoglobin 9.3, platelets 72,000 and neutrophil count was 2840.  During her hospitalization I saw her and we discussed that I would not risk further Rituxan as it does appear that this was an unusually brisk cytokine reaction from Rituxan.  While in the hospital I had Dr. Justin Kemp see her and he has an appointment to see her on April 12 to check her for fitness for surgery.  Avatrombopag to stimulate marrow production of platelets in preparation for surgery is not generally indicated in the face of nonportal hypertensive thrombocytopenia where her pancytopenia is both due to marrow involvement and also cancer-induced splenomegaly.     7/20/2022 Surgery    Surgery       Procedure:  Open splenectomy, distal pancreatectomy              HISTORY OF PRESENT ILLNESS:  The patient is a 83 y.o. female, here for follow up on management of marginal zone lymphoma, she had splenectomy 7/2022.  Mahi reports that she thinks she may have a \"stomach bug\" as she has had some abdominal cramping and diarrhea for the past week and her daughter is also experiencing the same symptoms.  No fevers or chills.  Her appetite has been fair, no abnormal weight loss.  She has been following closely with dermatology, she had some biopsies of the skin lesions on her legs that she showed me at our last visit, she states that she was told \"these could be related to her lymphoma\".  She has been using topical ointment when they occur.  Currently has no lesions on her legs.      Past Medical History:   Diagnosis Date    Anemia 09/18/2015    Cataract     h/o    Disease of thyroid gland     Diverticulitis     DVT (deep venous thrombosis)     left lower leg    GERD (gastroesophageal reflux disease)     " "Gout     History of basal cell carcinoma     History of transfusion 2022    1 unit at [facility], pt denies reactions    Hypertension          IBS (irritable bowel syndrome)     Non Hodgkin's lymphoma     Splenomegaly     Urinary incontinence      Past Surgical History:   Procedure Laterality Date    APPENDECTOMY      BACK SURGERY      lumbar    CHOLECYSTECTOMY      COLON RESECTION  2021    EXPLORATORY LAPAROTOMY N/A 7/21/2022    Procedure: ABDOMINAL WASHOUT WITH CLOSURE;  Surgeon: Jayme Kemp MD;  Location:  [facility];  Service: General;  Laterality: N/A;    HYSTERECTOMY      jaja    KIDNEY SURGERY      kidney stones    ORIF ANKLE FRACTURE Right     h/o    SPLENECTOMY N/A 7/20/2022    Procedure: SPLENECTOMY OPEN, DISTAL PANCREATECTOMY;  Surgeon: Jayme Kemp MD;  Location:  [facility];  Service: General;  Laterality: N/A;    TOTAL HIP ARTHROPLASTY Bilateral     h/o       Allergies   Allergen Reactions    Penicillins Swelling and Rash     Passed out    Rituxan [Rituximab] Anaphylaxis    Hydrocodone Itching and Rash     Severe itching    Sulfa Antibiotics Swelling     Lips swell    Bactrim [Sulfamethoxazole-Trimethoprim] Itching     Fever and itching    Chlorhexidine Gluconate Rash    Keflex [Cephalexin] Rash    Latex Rash    Lortab [Hydrocodone-Acetaminophen] Rash    Magnesium Citrate Rash    Neosporin Original [Bacitracin-Neomycin-Polymyxin] Rash       Family History and Social History reviewed and changed as necessary    REVIEW OF SYSTEM:   Abdominal cramping and diarrhea    PHYSICAL EXAM:  Well-developed, well-nourished appearing female in no distress  No palpable cervical, supraclavicular or axillary adenopathy on exam  Respirations regular nonlabored, lungs clear    Vitals:    09/28/23 1123   BP: 158/70   Pulse: 65   Resp: 18   Temp: 97.8 °F (36.6 °C)   SpO2: 96%   Weight: 80.3 kg (177 lb)   Height: 167.6 cm (66\")     Vitals:    09/28/23 1123   PainSc: 0-No pain          ECOG score: 2           Vitals " reviewed.  Labs reviewed    ECOG: (2) Ambulatory & Capable of Self Care, Unable to Carry Out Work Activity, Up & About Greater Than 50% of Waking Hours    Lab Results   Component Value Date    HGB 12.6 09/21/2023    HCT 36.5 09/21/2023    MCV 93.1 09/21/2023     09/21/2023    WBC 9.83 09/21/2023    NEUTROABS 4.58 09/21/2023    LYMPHSABS 3.38 (H) 09/21/2023    MONOSABS 1.23 (H) 09/21/2023    EOSABS 0.48 (H) 09/21/2023    BASOSABS 0.12 09/21/2023       Lab Results   Component Value Date    GLUCOSE 94 08/04/2022    BUN 17 08/04/2022    CREATININE 0.83 08/04/2022     (L) 08/04/2022    K 5.0 08/04/2022     08/04/2022    CO2 21.0 (L) 08/04/2022    CALCIUM 8.3 (L) 08/04/2022    PROTEINTOT 4.7 (L) 08/04/2022    ALBUMIN 2.70 (L) 08/04/2022    BILITOT 0.4 08/04/2022    ALKPHOS 88 08/04/2022    AST 32 08/04/2022    ALT 20 08/04/2022             ASSESSMENT & PLAN:  1. 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  2. History of iron deficiency anemia with last colonoscopy 11/2014, accordingto the patient, with a history of ulcerative colitis treated by Dr. Edge.  3. History of basal cell carcinomas.   4. Cataracts.   5. Remote history of deep vein thrombosis with vena cava filter in place.   6. Diverticulitis by history.   7. History of gout.   8. Hypertension.   9. History of 3 different kidney surgeries.   10. Cholecystectomy.   11. Chronic neuropathy.   12. History of ARMAAN.   13. History of back surgeries.   14. History of total hip replacement.   15. History of ankle fracture.   16. History of appendectomy.   17. 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 Edge.  18.  Epistaxis  19.  Stage III chronic kidney disease with hyponatremia on 48 ounce per day fluid restriction per Dr. Wooten 10/11/2022  20.  History of intermittent bullous skin lesions    Oncology history timeline:  a) Splenomegaly on hospitalization 02/2016. She had a week of nausea, vomiting and diarrhea with dehydration. The nausea, vomiting, and dehydration have resolved but on CT they found retroperitoneal adenopathy with some splenomegaly that was not bulky. She denies any ongoing fevers or chills or bed drenching night sweats or unexplained weight loss and no change in the color, caliber or consistency of her stools. She had a slightly elevated beta-2 microglobulin of 3.5 but no monoclonal spike and a white count 5300, hemoglobin 11.5, platelets 158,000, sedimentation rate 45, C-reactive protein 13.6 and a normal LDH of 318 with normal liver enzymes. Slight hyponatremia with sodium of 134 and hypokalemia with potassium of 3.3 on 02/18/2016. She had slight decrease in immunoglobulin G to 580 and immunoglobulin A to 35 on testing while in the hospital.   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 There were no features to suggest hairy cell lymphoma or a mantle cell lymphoma and no large cell population. This was CD5 negative, CD10 negative, kappa clonal negative and bright for CD20 with dim surface light chain expression and bright cytoplasm, CD45 bright. The CD38 negative for plasmacytic differentiation with a hemoglobin of 11.4, white count 7200 with normal differential, and platelets of 163,000.   c) Liver, spleen ultrasound showed splenomegaly of 19.6 cm maximum dimension with 1.6 cm slightly dilated portal vein.  D)-3/20/2019 Dr. Edge EGD showed  Negrete's esophagus in the lower third of the esophagus 6 cm in length.  Colonoscopy showed hemorrhoids, diverticuli, 11 mm proximal ascending colon flat polyp.  Dr. 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   -3/27/2019 PET shows mild hypermetabolism within the lymph nodes to the left of the proximal third of the esophagus as well as near the tracheal bifurcation and no other area is of abnormal hypermetabolism.  Splenomegaly stable.  She has significant carotid stenosis especially on the right.  -1/12/2021 white count 4200 hemoglobin 10 platelets 114,000 unremarkable differential.  CMP unremarkable  -4/28/2021 Dr. Augustine performed attempted laparoscopic partial colectomy with subsequent open right hemicolectomy with ileocolonic stenosis but no residual polyp found in the proximal ascending colon after resection of the right colon and a small polyp in the distal part of the ascending colon.  Pathology showed residual adenomatous polyp tubular adenoma without high-grade dysplasia or invasive carcinoma and 13 benign lymph nodes.  Additional colonic segment benign with 4 benign lymph nodes.  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  Get her blood counts today and just prior to a minute evaluation in 3 months.  No plans for scans in the absence of symptoms.  I will keep in mind the potential of palliative splenic radiation if she develops early satiety or other abdominal complaints from splenomegaly.    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    -2/1/2021 PET negative.  Spleen is unremarkable with homogeneous uptake.    -2/5/2021 EGD and colonoscopy with Dr. Edge showed Negrete's esophagus, hemorrhoids, diverticuli, proximal ascending colon polyp.  No mention of any colitis.    -4/28/2021 attempted laparoscopic partial colectomy with subsequent open right hemicolectomy with ileocolonic stenosis but no residual polyp found in the proximal ascending colon after resection of the right colon and a small polyp in the distal part of the ascending colon.  Pathology showed residual adenomatous polyp tubular adenoma without high-grade dysplasia or invasive carcinoma and 13 benign lymph nodes.  Additional colonic segment benign with 4 benign lymph nodes.    -3/3/2022 data:  Hemoglobin hemoglobin 9.8 with MCV of 91.9 and platelets slightly low 128,000 with normal white count 7100.  Unremarkable differential.    Creatinine 1.5 glucose 160 sodium 129 with potassium 4.7 and chloride 94 otherwise unremarkable CMP.  C-reactive protein normal 1.7.  Sedimentation rate 31, upper limit 30.  INR normal 0.99 with PTT 26.1.  100 mg/dL M spike on SPEP.  Normal quantitative immunoglobulins except for decreased IgE.  Serum immunofixation electrophoresis was unclear as to monoclonality.  Serum kappa 33.8 lambda 27.4 both minimally elevated with normal ratio 1.23.  Serum viscosity normal 1.6.    -3/4/2022 CT chest abdomen pelvis without contrast: 2.5 mm noncalcified nodule right upper lobe and right lower lobe for which follow-up in 6 months for stability or resolution is suggested.  No suspicious lung nodules to suggest metastasis.  Massive enlargement of the spleen which is stable compared to June 2021.  1.4 cm and 1.5 cm juan luis hepatic adenopathy.  Stable retroperitoneal adenopathy largest 1.5 cm.  1.8 cm stable IVC node.    -3/15/2022 [facility] oncology follow-up visit: She is not hyperviscous and I doubt her epistaxis is related to the  lymphoma or hyperviscosity and I do not think she has Waldenstrom's.  However, I do think she has massive splenomegaly and while that has been present for quite some time, I nonetheless think that a lot of her abdominal discomfort is due to the massive splenomegaly pushing her bowel to the right and she could have some bowel involvement of lymphoma that we could put her through endoscopy again but she had this back a little over a year ago with no obvious bowel involvement and no obvious colitis.  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  She would not consent to anything stronger than that regardless and it took a little convincing to get her to go along with this plan but she is willing to try provided that she has no major allergic reactions etc.  We will give first dose in [place] after chemo preparation visit we will use peripheral veins and I will have her follow-up in a few weeks with my nurse practitioner in [place] to make sure she is tolerating this well and to set up follow-up visits in the weeks and months ahead.  Repeat CTs I would ask my nurse practitioner to get ordered for approximately 4 months out from the end of Rituxan.  In the meantime, from the epistaxis standpoint I asked her to follow-up with ENT.   We discussed the protracted immune suppression that would be associated with the Rituxan and the fact that it would likely make vaccinations less helpful but that does not mean she should not get them.  We also discussed the possibility of allergic reactions.  Once all of this is done, she will need to follow-up with whoever is going to take over from Dr. Edge to make sure that she has no progression of her Negrete's esophagus and she is due for scope during this  year.    -3/22/2022 through 3/27/2022 inpatient hospitalization for fever after Rituxan initial dose.  No growth on cultures.  CT chest abdomen pelvis other than showing marked splenomegaly and stable retroperitoneal nodes showed nothing else.  She was pancytopenic on admission.  Given 1 unit platelet packed red cell transfusion.  On 3/22/2022 her platelet count was 142,000 and by the evening had dropped to 36,001-day with lactic acidosis.  Hemoglobin dropped to 7.2 x 3 2322 and white count down to 2450 with absolute neutrophil count baldomero of 240 on 3/22/2022 with a neutrophil count that very same day earlier in the day of 2300 before the Rituxan was given.  By 3/27/2022 her white count was 5150, hemoglobin 9.3, platelets 72,000 and neutrophil count was 2840.  During her hospitalization I saw her and we discussed that I would not risk further Rituxan as it does appear that this was an unusually brisk cytokine reaction from Rituxan.  While in the hospital I had Dr. Justin Kemp see her and he has an appointment to see her on April 12 to check her for fitness for surgery.  Avatrombopag to stimulate marrow production of platelets in preparation for surgery is not generally indicated in the face of nonportal hypertensive thrombocytopenia where her pancytopenia is both due to marrow involvement and also cancer-induced splenomegaly.    -4/12/2022 [facility] medical oncology follow-up: Patient states she saw Dr. Kemp yesterday and he apparently is coordinating the pneumococcal vaccinations and the Haemophilus influenza B and meningococcal vaccinations.  She thinks she got the Pneumovax with Dr. Isidro recently already and that Dr. Kemp is coordinating this with Dr. Isidro but I am not sure of that and I have reached out to Dr. Kemp who is currently scrubbed to make sure this is getting orchestrated.  As stated in my inpatient evaluation of her after her Rituxan reaction, I would not give further Rituxan but with this  presumed splenic marginal zone lymphoma, splenectomy is oftentimes helpful in managing the anemia and hypermetabolic consequences even when there is marrow involvement.  I would never test her with another anti-CD20 antibody like Rituxan or its cousins.  She needs thorough vaccination presplenectomy.  I will defer to Dr. Kemp to orchestrate as I am not sure whether he is having Dr. Isidro do that or whether he himself is ordering this but I will have the patient close that loop with Dr. Kemp to be certain they understand how to proceed.  Her blood counts last week were entirely normal with Dr. Isidro.  The primary reason for splenectomy at this point is not because of her counts but because of the massive splenomegaly causing abdominal discomforts and pushing of her viscera.  Dr. Kemp is aware of the risks of adhesions but will, according to the patient, attempt laparoscopic approach but if that does not work he will convert to an open procedure.  I will see her back in about a month with blood counts prior to return to see how she is doing postsplenectomy.    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  The histologic appearance and immunohistochemical staining pattern is felt to be most consistent with a marginal zone lymphoma, no evidence of large cell transformation identified.    -9/1/2022 [facility] follow-up: Mahi had open splenectomy on 7/20/2022 with a fairly tumultuous recovery with blood loss, prolonged intubation, pneumothorax, she was discharged on 8/4/2022 to rehab and is currently in rehab but getting stronger every day and hopes to go home this week with her daughter.  She looks great.  I have reviewed her CBC from 8/17/2022 that shows WBC 11.58, hemoglobin 12.3, hematocrit 42.6%, platelet count 512,000.  I also reviewed her  pathology from splenectomy that showed low-grade B-cell lymphoma most consistent with marginal zone lymphoma, no evidence of large cell transformation.  We will continue to monitor with CBC again in 3 months and I have ordered that today.  She had all of her presplenectomy vaccinations, she is waiting for the new COVID booster and will get that as soon as it is available.    -12/1/2022 white count 11,000 hemoglobin 11.6 platelets 446,000 with absolute lymphocyte count 4080 with absolute neutrophil count 4870.    -12/13/2022 [facility] hematology oncology follow-up: Counts are doing well.  No palpable cervical axillary or inguinal adenopathy.  No plan for any further imaging in the absence of symptoms.  Counts doing well postsplenectomy.  We will repeat CBC prior to return to my nurse practitioner in 3 months and we will see her once a quarter.    -3/23/2023 [facility] Hematology/Oncology clinic follow-up: Mahi continues to do well, no evidence of progression of lymphoma.  She has no palpable lymphadenopathy.  CBC is unremarkable with WBC 11,900, hemoglobin 12.5, hematocrit 38%, platelet count 437,000, ANC 5.35.  She has had no illnesses since we saw her last.  She does have some hair thinning but no hien alopecia.  I have asked her to discuss with her dermatologist any recommendations.  Her nails are short but appear very healthy. She is on levothyroxine for hypothyroidism and this is adjusted and monitored by Dr. Isidro.  We will continue to monitor with CBC in 3 months prior to return and I have ordered that today.    -6/29/2023 [facility] Hematology/Oncology clinic follow-up: Mahi overall seems to be doing well, her current CBC is unremarkable with WBC 10.30, hemoglobin 11.6, hematocrit 32.9%, platelet count 559,000, ANC 4.78.  I am concerned with the bullous skin lesions she is describing and currently has on her left calf.  I have put a picture in her chart under media.  She possibly has bullous pemphigoid, I would  also be concerned with her history of low-grade splenic marginal zone lymphoma of possible cutaneous lymphoma involvement.  The skin lesions have apparently been occurring over what sounds like about 11 years, she has been hospitalized on a few occasions and treated for cellulitis.  She follows with Dr. Vidhya Araya and I have reached out to their office to see if we can get her seen now to biopsy her current skin lesion while it is active.  Her follow-up with Dr. Araya is not until October, I discussed with Mahi that she needs to be seen sooner.  Otherwise we will plan on seeing her back in 3 months for follow-up with repeat CBC and I have ordered that today.  We will see her sooner if she has any concerns and she knows to reach out to us.    -9/28/2023 [facility] Oncology clinic follow-up: Mahi overall is doing well, CBC as shown above is unremarkable with WBC 9.83, hemoglobin 12.6, hematocrit 36.5%, platelet count 443,000, ANC 4.58.  She currently thinks that she may have a stomach bug as she has some intermittent diarrhea with abdominal cramping and her daughter is experiencing the same symptoms.  She is afebrile.  She will monitor and let us know if she has no improvement.  She did follow-up with GI earlier this year to discuss whether or not she would undergo another colonoscopy, she at that time decided that she did not want to undergo further colonoscopy unless it is absolutely necessary.  I discussed with her that if her abdominal symptoms did not resolve she may want to consider but currently she has no anemia or other worrisome symptoms.  At the age of 83 I would not press.  She has been following with dermatology regarding skin lesions, I did review note from office visit 7/5/2023 and biopsies were done but I do not have those pathology reports, we will reach out to try and get those.  We will continue to see her quarterly with CBC.    I spent 30 minutes caring for Mahi on this date of service. This  time includes time spent by me in the following activities: preparing for the visit, reviewing tests, obtaining and/or reviewing a separately obtained history, performing a medically appropriate examination and/or evaluation, ordering medications, tests, or procedures, and documenting information in the medical record.     Chantell Kyle, APRN    09/28/2023

## 2023-12-21 ENCOUNTER — LAB (OUTPATIENT)
Dept: ONCOLOGY | Facility: HOSPITAL | Age: 83
End: 2023-12-21
Payer: MEDICARE

## 2023-12-21 VITALS
TEMPERATURE: 97.9 F | WEIGHT: 176.4 LBS | DIASTOLIC BLOOD PRESSURE: 55 MMHG | HEART RATE: 61 BPM | RESPIRATION RATE: 18 BRPM | SYSTOLIC BLOOD PRESSURE: 169 MMHG | BODY MASS INDEX: 28.47 KG/M2

## 2023-12-21 DIAGNOSIS — C83.07 MARGINAL ZONE LYMPHOMA OF SPLEEN: ICD-10-CM

## 2023-12-21 PROCEDURE — 36415 COLL VENOUS BLD VENIPUNCTURE: CPT

## 2023-12-22 LAB
BASOPHILS # BLD AUTO: NORMAL 10*3/UL
BASOPHILS # BLD MANUAL: 0.1 10*3/MM3 (ref 0–0.2)
BASOPHILS NFR BLD MANUAL: 1 % (ref 0–1.5)
DIFFERENTIAL COMMENT: ABNORMAL
EOSINOPHIL # BLD AUTO: NORMAL 10*3/UL
EOSINOPHIL # BLD MANUAL: 0.53 10*3/MM3 (ref 0–0.4)
EOSINOPHIL NFR BLD AUTO: NORMAL %
EOSINOPHIL NFR BLD MANUAL: 5.1 % (ref 0.3–6.2)
ERYTHROCYTE [DISTWIDTH] IN BLOOD BY AUTOMATED COUNT: 13.5 % (ref 12.3–15.4)
HCT VFR BLD AUTO: 38.1 % (ref 34–46.6)
HGB BLD-MCNC: 13.2 G/DL (ref 12–15.9)
LYMPHOCYTES # BLD AUTO: NORMAL 10*3/UL
LYMPHOCYTES # BLD MANUAL: 2.96 10*3/MM3 (ref 0.7–3.1)
LYMPHOCYTES NFR BLD AUTO: NORMAL %
LYMPHOCYTES NFR BLD MANUAL: 15.3 % (ref 19.6–45.3)
MCH RBC QN AUTO: 32.6 PG (ref 26.6–33)
MCHC RBC AUTO-ENTMCNC: 34.6 G/DL (ref 31.5–35.7)
MCV RBC AUTO: 94.1 FL (ref 79–97)
MONOCYTES # BLD MANUAL: 1.16 10*3/MM3 (ref 0.1–0.9)
MONOCYTES NFR BLD AUTO: NORMAL %
MONOCYTES NFR BLD MANUAL: 11.2 % (ref 5–12)
NEUTROPHILS # BLD MANUAL: 5.6 10*3/MM3 (ref 1.7–7)
NEUTROPHILS NFR BLD AUTO: NORMAL %
NEUTROPHILS NFR BLD MANUAL: 54.1 % (ref 42.7–76)
PLATELET # BLD AUTO: 443 10*3/MM3 (ref 140–450)
PLATELET BLD QL SMEAR: ABNORMAL
RBC # BLD AUTO: 4.05 10*6/MM3 (ref 3.77–5.28)
RBC MORPH BLD: ABNORMAL
WBC # BLD AUTO: 10.35 10*3/MM3 (ref 3.4–10.8)

## 2024-01-02 ENCOUNTER — OFFICE VISIT (OUTPATIENT)
Dept: ONCOLOGY | Facility: CLINIC | Age: 84
End: 2024-01-02
Payer: MEDICARE

## 2024-01-02 VITALS
RESPIRATION RATE: 18 BRPM | BODY MASS INDEX: 28.61 KG/M2 | WEIGHT: 178 LBS | HEART RATE: 74 BPM | DIASTOLIC BLOOD PRESSURE: 92 MMHG | TEMPERATURE: 97.8 F | SYSTOLIC BLOOD PRESSURE: 144 MMHG | HEIGHT: 66 IN

## 2024-01-02 DIAGNOSIS — C83.07 MARGINAL ZONE LYMPHOMA OF SPLEEN: Primary | ICD-10-CM

## 2024-01-02 PROCEDURE — 1159F MED LIST DOCD IN RCRD: CPT | Performed by: INTERNAL MEDICINE

## 2024-01-02 PROCEDURE — 1160F RVW MEDS BY RX/DR IN RCRD: CPT | Performed by: INTERNAL MEDICINE

## 2024-01-02 PROCEDURE — 1125F AMNT PAIN NOTED PAIN PRSNT: CPT | Performed by: INTERNAL MEDICINE

## 2024-01-02 PROCEDURE — 99214 OFFICE O/P EST MOD 30 MIN: CPT | Performed by: INTERNAL MEDICINE

## 2024-01-02 NOTE — LETTER
January 2, 2024       No Recipients    Patient: Mahi Coronel   YOB: 1940   Date of Visit: 1/2/2024     Dear Romulo Isidro MD:       Thank you for referring Mahi Coronel to me for evaluation. Below are the relevant portions of my assessment and plan of care.    If you have questions, please do not hesitate to call me. I look forward to following Mahi along with you.         Sincerely,        Nelson Castañeda MD        CC:   No Recipients    Nelson Castañeda MD  01/02/24 1404  Sign when Signing Visit  CHIEF COMPLAINT: No new somatic complaints    Problem List:  Oncology/Hematology History Overview Note   1. 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  2. History of iron deficiency anemia with last colonoscopy 11/2014, accordingto the patient, with a history of ulcerative colitis treated by Dr. Edge.  3. History of basal cell carcinomas.   4. Cataracts.   5. Remote history of deep vein thrombosis with vena cava filter in place.   6. Diverticulitis by history.   7. History of gout.   8. Hypertension.   9. History of 3 different kidney surgeries.   10. Cholecystectomy.   11. Chronic neuropathy.   12. History of ARMAAN.   13. History of back surgeries.   14. History of total hip replacement.   15. History of ankle fracture.   16. History of appendectomy.   17. 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 Edge.  18.  Epistaxis  19.  Stage III chronic kidney disease with hyponatremia on 48 ounce per day fluid restriction per Dr. Wooten 10/11/2022    Oncology history timeline:  a) Splenomegaly on hospitalization 02/2016. She had a  week of nausea, vomiting and diarrhea with dehydration. The nausea, vomiting, and dehydration have resolved but on CT they found retroperitoneal adenopathy with some splenomegaly that was not bulky. She denies any ongoing fevers or chills or bed drenching night sweats or unexplained weight loss and no change in the color, caliber or consistency of her stools. She had a slightly elevated beta-2 microglobulin of 3.5 but no monoclonal spike and a white count 5300, hemoglobin 11.5, platelets 158,000, sedimentation rate 45, C-reactive protein 13.6 and a normal LDH of 318 with normal liver enzymes. Slight hyponatremia with sodium of 134 and hypokalemia with potassium of 3.3 on 02/18/2016. She had slight decrease in immunoglobulin G to 580 and immunoglobulin A to 35 on testing while in the hospital.   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 There were no features to suggest hairy cell lymphoma or a mantle cell lymphoma and no large cell population. This was CD5 negative, CD10 negative, kappa clonal negative and bright for CD20 with dim surface light chain expression and bright cytoplasm, CD45 bright. The CD38 negative for plasmacytic differentiation with a hemoglobin of 11.4, white count 7200 with normal differential, and platelets of 163,000.   c) Liver, spleen ultrasound showed splenomegaly of 19.6 cm maximum dimension with 1.6 cm slightly dilated portal vein.  D)-3/20/2019 Dr. 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  Colonoscopy showed hemorrhoids, diverticuli, 11 mm proximal ascending colon flat polyp.  Dr. Edge relayed to the patient that this was a high-grade dysplasia in the Negrete's for which he is sending to [facility] for ablation as well as needing  removal of a tubulovillous adenoma unable to be removed endoscopically.   -3/27/2019 PET shows mild hypermetabolism within the lymph nodes to the left of the proximal third of the esophagus as well as near the tracheal bifurcation and no other area is of abnormal hypermetabolism.  Splenomegaly stable.  She has significant carotid stenosis especially on the right.  -1/12/2021 white count 4200 hemoglobin 10 platelets 114,000 unremarkable differential.  CMP unremarkable  -4/28/2021 Dr. Augustine performed attempted laparoscopic partial colectomy with subsequent open right hemicolectomy with ileocolonic stenosis but no residual polyp found in the proximal ascending colon after resection of the right colon and a small polyp in the distal part of the ascending colon.  Pathology showed residual adenomatous polyp tubular adenoma without high-grade dysplasia or invasive carcinoma and 13 benign lymph nodes.  Additional colonic segment benign with 4 benign lymph nodes.  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  Get her blood counts today and just prior to a minute evaluation in 3 months.  No plans for scans in the absence of symptoms.  I will keep in mind the potential of palliative splenic radiation if she develops early satiety or other abdominal complaints from splenomegaly.    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    -2/1/2021 PET negative.  Spleen is unremarkable with homogeneous uptake.    -2/5/2021 EGD and colonoscopy with Dr. Edge showed Negrete's esophagus, hemorrhoids, diverticuli, proximal ascending colon polyp.  No mention of any colitis.    -4/28/2021 attempted laparoscopic partial  colectomy with subsequent open right hemicolectomy with ileocolonic stenosis but no residual polyp found in the proximal ascending colon after resection of the right colon and a small polyp in the distal part of the ascending colon.  Pathology showed residual adenomatous polyp tubular adenoma without high-grade dysplasia or invasive carcinoma and 13 benign lymph nodes.  Additional colonic segment benign with 4 benign lymph nodes.    -3/3/2022 data:  Hemoglobin hemoglobin 9.8 with MCV of 91.9 and platelets slightly low 128,000 with normal white count 7100.  Unremarkable differential.    Creatinine 1.5 glucose 160 sodium 129 with potassium 4.7 and chloride 94 otherwise unremarkable CMP.  C-reactive protein normal 1.7.  Sedimentation rate 31, upper limit 30.  INR normal 0.99 with PTT 26.1.  100 mg/dL M spike on SPEP.  Normal quantitative immunoglobulins except for decreased IgE.  Serum immunofixation electrophoresis was unclear as to monoclonality.  Serum kappa 33.8 lambda 27.4 both minimally elevated with normal ratio 1.23.  Serum viscosity normal 1.6.    -3/4/2022 CT chest abdomen pelvis without contrast: 2.5 mm noncalcified nodule right upper lobe and right lower lobe for which follow-up in 6 months for stability or resolution is suggested.  No suspicious lung nodules to suggest metastasis.  Massive enlargement of the spleen which is stable compared to June 2021.  1.4 cm and 1.5 cm juan luis hepatic adenopathy.  Stable retroperitoneal adenopathy largest 1.5 cm.  1.8 cm stable IVC node.    -3/15/2022 [facility] medical oncology follow-up visit: She is not hyperviscous and I doubt her epistaxis is related to the lymphoma or hyperviscosity and I do not think she has Waldenstrom's.  However, I do think she has massive splenomegaly and while that has been present for quite some time, I nonetheless think that a lot of her abdominal discomfort is due to the massive splenomegaly pushing her bowel to the right and she could have  some bowel involvement of lymphoma that we could put her through endoscopy again but she had this back a little over a year ago with no obvious bowel involvement and no obvious colitis.  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  She would not consent to anything stronger than that regardless and it took a little convincing to get her to go along with this plan but she is willing to try provided that she has no major allergic reactions etc.  We will give first dose in [place] after chemo preparation visit we will use peripheral veins and I will have her follow-up in a few weeks with my nurse practitioner in [place] to make sure she is tolerating this well and to set up follow-up visits in the weeks and months ahead.  Repeat CTs I would ask my nurse practitioner to get ordered for approximately 4 months out from the end of Rituxan.  In the meantime, from the epistaxis standpoint I asked her to follow-up with ENT.   We discussed the protracted immune suppression that would be associated with the Rituxan and the fact that it would likely make vaccinations less helpful but that does not mean she should not get them.  We also discussed the possibility of allergic reactions.  Once all of this is done, she will need to follow-up with whoever is going to take over from Dr. 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    -3/22/2022 through 3/27/2022 inpatient hospitalization for fever after Rituxan initial dose.  No growth on cultures.  CT chest abdomen pelvis other than showing marked splenomegaly and stable retroperitoneal nodes showed nothing else.  She was pancytopenic on admission.  Given 1 unit platelet packed red cell  transfusion.  On 3/22/2022 her platelet count was 142,000 and by the evening had dropped to 36,001-day with lactic acidosis.  Hemoglobin dropped to 7.2 x 3 2322 and white count down to 2450 with absolute neutrophil count baldomero of 240 on 3/22/2022 with a neutrophil count that very same day earlier in the day of 2300 before the Rituxan was given.  By 3/27/2022 her white count was 5150, hemoglobin 9.3, platelets 72,000 and neutrophil count was 2840.  During her hospitalization I saw her and we discussed that I would not risk further Rituxan as it does appear that this was an unusually brisk cytokine reaction from Rituxan.  While in the hospital I had Dr. Justin Kemp see her and he has an appointment to see her on April 12 to check her for fitness for surgery.  Avatrombopag to stimulate marrow production of platelets in preparation for surgery is not generally indicated in the face of nonportal hypertensive thrombocytopenia where her pancytopenia is both due to marrow involvement and also cancer-induced splenomegaly.    -4/12/2022 [facility] medical oncology follow-up: Patient states she saw Dr. Kemp yesterday and he apparently is coordinating the pneumococcal vaccinations and the Haemophilus influenza B and meningococcal vaccinations.  She thinks she got the Pneumovax with Dr. Isidro recently already and that Dr. Kemp is coordinating this with Dr. Isidro but I am not sure of that and I have reached out to Dr. Kemp who is currently scrubbed to make sure this is getting orchestrated.  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  I would never test her with another anti-CD20 antibody like Rituxan or its cousins.  She needs thorough vaccination presplenectomy.  I will defer to  Dr. Kemp to orchestrate as I am not sure whether he is having Dr. Isidro do that or whether he himself is ordering this but I will have the patient close that loop with Dr. Kemp to be certain they understand how to proceed.  Her blood counts last week were entirely normal with Dr. Isidro.  The primary reason for splenectomy at this point is not because of her counts but because of the massive splenomegaly causing abdominal discomforts and pushing of her viscera.  Dr. Kemp is aware of the risks of adhesions but will, according to the patient, attempt laparoscopic approach but if that does not work he will convert to an open procedure.  I will see her back in about a month with blood counts prior to return to see how she is doing postsplenectomy.    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  The histologic appearance and immunohistochemical staining pattern is felt to be most consistent with a marginal zone lymphoma, no evidence of large cell transformation identified.    -9/1/2022 [facility] follow-up: Mahi had open splenectomy on 7/20/2022 with a fairly tumultuous recovery with blood loss, prolonged intubation, pneumothorax, she was discharged on 8/4/2022 to rehab and is currently in rehab but getting stronger every day and hopes to go home this week with her daughter.  She looks great.  I have reviewed her CBC from 8/17/2022 that shows WBC 11.58, hemoglobin 12.3, hematocrit 42.6%, platelet count 512,000.  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  We will continue to monitor with CBC again in 3 months and I have ordered that today.  She had all of her presplenectomy vaccinations, she is waiting for the new  COVID booster and will get that as soon as it is available.    -12/1/2022 white count 11,000 hemoglobin 11.6 platelets 446,000 with absolute lymphocyte count 4080 with absolute neutrophil count 4870.    -12/13/2022 [facility] hematology oncology follow-up: Counts are doing well.  No palpable cervical axillary or inguinal adenopathy.  No plan for any further imaging in the absence of symptoms.  Counts doing well postsplenectomy.  We will repeat CBC prior to return to my nurse practitioner in 3 months and we will see her once a quarter.    -3/23/2023 [facility] Hematology/Oncology clinic follow-up: Mahi continues to do well, no evidence of progression of lymphoma.  She has no palpable lymphadenopathy.  CBC is unremarkable with WBC 11,900, hemoglobin 12.5, hematocrit 38%, platelet count 437,000, ANC 5.35.  She has had no illnesses since we saw her last.  She does have some hair thinning but no hien alopecia.  I have asked her to discuss with her dermatologist any recommendations.  Her nails are short but appear very healthy. She is on levothyroxine for hypothyroidism and this is adjusted and monitored by Dr. Isidro.  We will continue to monitor with CBC in 3 months prior to return and I have ordered that today.    -6/29/2023 [facility] Hematology/Oncology clinic follow-up: Mahi overall seems to be doing well, her current CBC is unremarkable with WBC 10.30, hemoglobin 11.6, hematocrit 32.9%, platelet count 559,000, ANC 4.78.  I am concerned with the bullous skin lesions she is describing and currently has on her left calf.  I have put a picture in her chart under media.  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  The skin lesions have apparently been occurring over what sounds like about 11 years, she has been hospitalized on a few occasions and treated for cellulitis.  She follows with Dr. Kee  Marshal and I have reached out to their office to see if we can get her seen now to biopsy her current skin lesion while it is active.  Her follow-up with Dr. Araya is not until October, I discussed with Mahi that she needs to be seen sooner.  Otherwise we will plan on seeing her back in 3 months for follow-up with repeat CBC and I have ordered that today.  We will see her sooner if she has any concerns and she knows to reach out to us.    -9/28/2023 [facility] follow-up: Mahi overall is doing well, CBC as shown above is unremarkable with WBC 9.83, hemoglobin 12.6, hematocrit 36.5%, platelet count 443,000, ANC 4.58.  She currently thinks that she may have a stomach bug as she has some intermittent diarrhea with abdominal cramping and her daughter is experiencing the same symptoms.  She is afebrile.  She will monitor and let us know if she has no improvement.  She did follow-up with GI earlier this year to discuss whether or not she would undergo another colonoscopy, she at that time decided that she did not want to undergo further colonoscopy unless it is absolutely necessary.  I discussed with her that if her abdominal symptoms did not resolve she may want to consider but currently she has no anemia or other worrisome symptoms.  At the age of 83 I would not press.  She has been following with dermatology regarding skin lesions, I did review note from office visit 7/5/2023 and biopsies were done but I do not have those pathology reports, we will reach out to try and get those.  We will continue to see her quarterly with CBC.    -1/2/2024 [facility] clinic follow-up: 12/21/2023 CBC normal with unremarkable differential.  White count 10,350 platelets 143,000 hemoglobin 13.2.  No somatic complaints to suggest progressive lymphoma.  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  No rash presently.  Recently exposed to COVID but no symptoms. No plans for imaging in the absence of  symptoms and will check CBC quarterly.       Marginal zone lymphoma of spleen   4/25/2016 Initial Diagnosis    Lymphoma, low grade     5/30/2017 Imaging    CT chest/abdomen/pelvis no obvious pulmonary nodules identified, no pleural effusions.  Stable appearance of adenopathy within the abdomen compared to earlier study as well as splenomegaly.     12/6/2017 Imaging    CT chest/abdomen/pelvis stable, essentially unchanged prominent mediastinal lymph nodes, unchanged right lower lobe medial basal segmental focal fibrotic findings.  Scattered noncalcified small lung nodules appear unchanged.  Stable spine or megaly.  Scattered prominent retroperitoneal lymph nodes now appear smaller.  Unchanged spinal findings consistent with diffuse idiopathic skeletal hyperostosis and prior operative intervention at L4-5.       6/8/2018 Imaging    CT chest abdomen and pelvis without contrast: Stable, no significant change in the appearance compared to earlier study from December 6, 2017.     3/1/2019 Imaging    CT chest, abdomen and pelvis impression: No significant interval change from last year.  Stable appearance of the significantly enlarged spleen which measures up to nearly 20 x 12 cm.  Moderate, scattered upper abdominal adenopathy with innumerable scattered gastrohepatic, retroperitoneal, and mesenteric nodes measuring roughly between 1-2 cm are stable as well.  No significant progression.  Mild mediastinal adenopathy appears stable as well.     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  Colonoscopy showed hemorrhoids, diverticuli, 11 mm proximal ascending colon flat polyp.  Dr. Edge relayed to the patient that this was a high-grade dysplasia in the Negrete's for which he is sending to [facility] for ablation as well as needing removal of a tubulovillous adenoma unable to be removed endoscopically.     3/27/2019 Imaging    PET shows mild hypermetabolism within  the lymph nodes to the left of the proximal third of the esophagus as well as near the tracheal bifurcation and no other area is of abnormal hypermetabolism.  Splenomegaly stable.  She has significant carotid stenosis especially on the right.     2/1/2021 Imaging    PET IMPRESSION:  Negative PET/CT scan. No evidence of recurrent or active  lymphoma. Enlargement seen of the spleen.     3/4/2022 Imaging    -3/3/2022 data:  Hemoglobin hemoglobin 9.8 with MCV of 91.9 and platelets slightly low 128,000 with normal white count 7100.  Unremarkable differential.    Creatinine 1.5 glucose 160 sodium 129 with potassium 4.7 and chloride 94 otherwise unremarkable CMP.  C-reactive protein normal 1.7.  Sedimentation rate 31, upper limit 30.  INR normal 0.99 with PTT 26.1.  100 mg/dL M spike on SPEP.  Normal quantitative immunoglobulins except for decreased IgE.  Serum immunofixation electrophoresis was unclear as to monoclonality.  Serum kappa 33.8 lambda 27.4 both minimally elevated with normal ratio 1.23.  Serum viscosity normal 1.6.    -3/4/2022 CT chest abdomen pelvis without contrast: 2.5 mm noncalcified nodule right upper lobe and right lower lobe for which follow-up in 6 months for stability or resolution is suggested.  No suspicious lung nodules to suggest metastasis.  Massive enlargement of the spleen which is stable compared to June 2021.  1.4 cm and 1.5 cm juan luis hepatic adenopathy.  Stable retroperitoneal adenopathy largest 1.5 cm.  1.8 cm stable IVC node.     3/22/2022 - 3/22/2022 Chemotherapy    OP LYMPHOMA (CLL) RiTUXimab (Weekly X 4)     3/22/2022 Adverse Reaction    -3/22/2022 through 3/27/2022 inpatient hospitalization for fever after Rituxan initial dose.  No growth on cultures.  CT chest abdomen pelvis other than showing marked splenomegaly and stable retroperitoneal nodes showed nothing else.  She was pancytopenic on admission.  Given 1 unit platelet packed red cell transfusion.  On 3/22/2022 her platelet  count was 142,000 and by the evening had dropped to 36,001-day with lactic acidosis.  Hemoglobin dropped to 7.2 x 3 2322 and white count down to 2450 with absolute neutrophil count baldomero of 240 on 3/22/2022 with a neutrophil count that very same day earlier in the day of 2300 before the Rituxan was given.  By 3/27/2022 her white count was 5150, hemoglobin 9.3, platelets 72,000 and neutrophil count was 2840.  During her hospitalization I saw her and we discussed that I would not risk further Rituxan as it does appear that this was an unusually brisk cytokine reaction from Rituxan.  While in the hospital I had Dr. Justin Kemp see her and he has an appointment to see her on April 12 to check her for fitness for surgery.  Avatrombopag to stimulate marrow production of platelets in preparation for surgery is not generally indicated in the face of nonportal hypertensive thrombocytopenia where her pancytopenia is both due to marrow involvement and also cancer-induced splenomegaly.     7/20/2022 Surgery    Surgery       Procedure:  Open splenectomy, distal pancreatectomy              HISTORY OF PRESENT ILLNESS:  The patient is a 83 y.o. female, here for follow up on management of splenic marginal zone lymphoma status post splenectomy    Past Medical History:   Diagnosis Date   • Anemia 09/18/2015   • Cataract     h/o   • Disease of thyroid gland    • Diverticulitis    • DVT (deep venous thrombosis)     left lower leg   • GERD (gastroesophageal reflux disease)    • Gout    • History of basal cell carcinoma    • History of transfusion 2022    1 unit at [facility], pt denies reactions   • Hypertension         • IBS (irritable bowel syndrome)    • Non Hodgkin's lymphoma    • Splenomegaly    • Urinary incontinence      Past Surgical History:   Procedure Laterality Date   • APPENDECTOMY     • BACK SURGERY      lumbar   • CHOLECYSTECTOMY     • COLON RESECTION  2021   • EXPLORATORY LAPAROTOMY N/A 7/21/2022    Procedure: ABDOMINAL WASHOUT  "WITH CLOSURE;  Surgeon: Jayme Kemp MD;  Location:  [facility];  Service: General;  Laterality: N/A;   • HYSTERECTOMY      jaja   • KIDNEY SURGERY      kidney stones   • ORIF ANKLE FRACTURE Right     h/o   • SPLENECTOMY N/A 7/20/2022    Procedure: SPLENECTOMY OPEN, DISTAL PANCREATECTOMY;  Surgeon: Jayme Kemp MD;  Location:  [facility];  Service: General;  Laterality: N/A;   • TOTAL HIP ARTHROPLASTY Bilateral     h/o       Allergies   Allergen Reactions   • Penicillins Swelling and Rash     Passed out   • Rituxan [Rituximab] Anaphylaxis   • Hydrocodone Itching and Rash     Severe itching   • Sulfa Antibiotics Swelling     Lips swell   • Bactrim [Sulfamethoxazole-Trimethoprim] Itching     Fever and itching   • Chlorhexidine Gluconate Rash   • Keflex [Cephalexin] Rash   • Latex Rash   • Lortab [Hydrocodone-Acetaminophen] Rash   • Magnesium Citrate Rash   • Neosporin Original [Bacitracin-Neomycin-Polymyxin] Rash       Family History and Social History reviewed and changed as necessary    REVIEW OF SYSTEM:   No new somatic complaints    PHYSICAL EXAM:  No jaundice icterus or pallor.  No respiratory distress.  No palpable cervical axillary or inguinal adenopathy.    Vitals:    01/02/24 1354   BP: 144/92   Pulse: 74   Resp: 18   Temp: 97.8 °F (36.6 °C)   Weight: 80.7 kg (178 lb)   Height: 167.6 cm (66\")     Vitals:    01/02/24 1354   PainSc:   6   PainLoc: Hip  Comment: left hip          ECOG score: 2           Vitals reviewed.    ECOG: (2) Ambulatory & Capable of Self Care, Unable to Carry Out Work Activity, Up & About Greater Than 50% of Waking Hours    Lab Results   Component Value Date    HGB 13.2 12/21/2023    HCT 38.1 12/21/2023    MCV 94.1 12/21/2023     12/21/2023    WBC 10.35 12/21/2023    NEUTROABS 5.60 12/21/2023    LYMPHSABS CANCELED 12/21/2023    LYMPHSABS 2.96 12/21/2023    MONOSABS 1.16 (H) 12/21/2023    EOSABS CANCELED 12/21/2023    EOSABS 5.1 12/21/2023    EOSABS 0.53 (H) 12/21/2023    " BASOSABS CANCELED 12/21/2023    BASOSABS 0.10 12/21/2023       Lab Results   Component Value Date    GLUCOSE 94 08/04/2022    BUN 17 08/04/2022    CREATININE 0.83 08/04/2022     (L) 08/04/2022    K 5.0 08/04/2022     08/04/2022    CO2 21.0 (L) 08/04/2022    CALCIUM 8.3 (L) 08/04/2022    PROTEINTOT 4.7 (L) 08/04/2022    ALBUMIN 2.70 (L) 08/04/2022    BILITOT 0.4 08/04/2022    ALKPHOS 88 08/04/2022    AST 32 08/04/2022    ALT 20 08/04/2022             ASSESSMENT & PLAN:  1. 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  2. History of iron deficiency anemia with last colonoscopy 11/2014, accordingto the patient, with a history of ulcerative colitis treated by Dr. Edge.  3. History of basal cell carcinomas.   4. Cataracts.   5. Remote history of deep vein thrombosis with vena cava filter in place.   6. Diverticulitis by history.   7. History of gout.   8. Hypertension.   9. History of 3 different kidney surgeries.   10. Cholecystectomy.   11. Chronic neuropathy.   12. History of ARMAAN.   13. History of back surgeries.   14. History of total hip replacement.   15. History of ankle fracture.   16. History of appendectomy.   17. 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 Edge.  18.  Epistaxis  19.  Stage III chronic kidney disease with hyponatremia on 48 ounce per day fluid restriction per Dr. Wooten 10/11/2022    Oncology history timeline:  a) Splenomegaly on hospitalization 02/2016. She had a week of nausea, vomiting and diarrhea with dehydration. The nausea, vomiting, and dehydration have resolved but on CT they found  retroperitoneal adenopathy with some splenomegaly that was not bulky. She denies any ongoing fevers or chills or bed drenching night sweats or unexplained weight loss and no change in the color, caliber or consistency of her stools. She had a slightly elevated beta-2 microglobulin of 3.5 but no monoclonal spike and a white count 5300, hemoglobin 11.5, platelets 158,000, sedimentation rate 45, C-reactive protein 13.6 and a normal LDH of 318 with normal liver enzymes. Slight hyponatremia with sodium of 134 and hypokalemia with potassium of 3.3 on 02/18/2016. She had slight decrease in immunoglobulin G to 580 and immunoglobulin A to 35 on testing while in the hospital.   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 There were no features to suggest hairy cell lymphoma or a mantle cell lymphoma and no large cell population. This was CD5 negative, CD10 negative, kappa clonal negative and bright for CD20 with dim surface light chain expression and bright cytoplasm, CD45 bright. The CD38 negative for plasmacytic differentiation with a hemoglobin of 11.4, white count 7200 with normal differential, and platelets of 163,000.   c) Liver, spleen ultrasound showed splenomegaly of 19.6 cm maximum dimension with 1.6 cm slightly dilated portal vein.  D)-3/20/2019 Dr. 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  Colonoscopy showed hemorrhoids, diverticuli, 11 mm proximal ascending colon flat polyp.  Dr. 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   -3/27/2019 PET shows mild hypermetabolism within the  lymph nodes to the left of the proximal third of the esophagus as well as near the tracheal bifurcation and no other area is of abnormal hypermetabolism.  Splenomegaly stable.  She has significant carotid stenosis especially on the right.  -1/12/2021 white count 4200 hemoglobin 10 platelets 114,000 unremarkable differential.  CMP unremarkable  -4/28/2021 Dr. Augustine performed attempted laparoscopic partial colectomy with subsequent open right hemicolectomy with ileocolonic stenosis but no residual polyp found in the proximal ascending colon after resection of the right colon and a small polyp in the distal part of the ascending colon.  Pathology showed residual adenomatous polyp tubular adenoma without high-grade dysplasia or invasive carcinoma and 13 benign lymph nodes.  Additional colonic segment benign with 4 benign lymph nodes.  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  Get her blood counts today and just prior to a minute evaluation in 3 months.  No plans for scans in the absence of symptoms.  I will keep in mind the potential of palliative splenic radiation if she develops early satiety or other abdominal complaints from splenomegaly.    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    -2/1/2021 PET negative.  Spleen is unremarkable with homogeneous uptake.    -2/5/2021 EGD and colonoscopy with Dr. Edge showed Negrete's esophagus, hemorrhoids, diverticuli, proximal ascending colon polyp.  No mention of any colitis.    -4/28/2021 attempted laparoscopic partial colectomy with subsequent open right hemicolectomy with ileocolonic stenosis but no residual polyp found in the proximal ascending  colon after resection of the right colon and a small polyp in the distal part of the ascending colon.  Pathology showed residual adenomatous polyp tubular adenoma without high-grade dysplasia or invasive carcinoma and 13 benign lymph nodes.  Additional colonic segment benign with 4 benign lymph nodes.    -3/3/2022 data:  Hemoglobin hemoglobin 9.8 with MCV of 91.9 and platelets slightly low 128,000 with normal white count 7100.  Unremarkable differential.    Creatinine 1.5 glucose 160 sodium 129 with potassium 4.7 and chloride 94 otherwise unremarkable CMP.  C-reactive protein normal 1.7.  Sedimentation rate 31, upper limit 30.  INR normal 0.99 with PTT 26.1.  100 mg/dL M spike on SPEP.  Normal quantitative immunoglobulins except for decreased IgE.  Serum immunofixation electrophoresis was unclear as to monoclonality.  Serum kappa 33.8 lambda 27.4 both minimally elevated with normal ratio 1.23.  Serum viscosity normal 1.6.    -3/4/2022 CT chest abdomen pelvis without contrast: 2.5 mm noncalcified nodule right upper lobe and right lower lobe for which follow-up in 6 months for stability or resolution is suggested.  No suspicious lung nodules to suggest metastasis.  Massive enlargement of the spleen which is stable compared to June 2021.  1.4 cm and 1.5 cm juan luis hepatic adenopathy.  Stable retroperitoneal adenopathy largest 1.5 cm.  1.8 cm stable IVC node.    -3/15/2022 [facility] medical oncology follow-up visit: She is not hyperviscous and I doubt her epistaxis is related to the lymphoma or hyperviscosity and I do not think she has Waldenstrom's.  However, I do think she has massive splenomegaly and while that has been present for quite some time, I nonetheless think that a lot of her abdominal discomfort is due to the massive splenomegaly pushing her bowel to the right and she could have some bowel involvement of lymphoma that we could put her through endoscopy again but she had this back a little over a year ago with  no obvious bowel involvement and no obvious colitis.  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  She would not consent to anything stronger than that regardless and it took a little convincing to get her to go along with this plan but she is willing to try provided that she has no major allergic reactions etc.  We will give first dose in [place] after chemo preparation visit we will use peripheral veins and I will have her follow-up in a few weeks with my nurse practitioner in [place] to make sure she is tolerating this well and to set up follow-up visits in the weeks and months ahead.  Repeat CTs I would ask my nurse practitioner to get ordered for approximately 4 months out from the end of Rituxan.  In the meantime, from the epistaxis standpoint I asked her to follow-up with ENT.   We discussed the protracted immune suppression that would be associated with the Rituxan and the fact that it would likely make vaccinations less helpful but that does not mean she should not get them.  We also discussed the possibility of allergic reactions.  Once all of this is done, she will need to follow-up with whoever is going to take over from Dr. 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    -3/22/2022 through 3/27/2022 inpatient hospitalization for fever after Rituxan initial dose.  No growth on cultures.  CT chest abdomen pelvis other than showing marked splenomegaly and stable retroperitoneal nodes showed nothing else.  She was pancytopenic on admission.  Given 1 unit platelet packed red cell transfusion.  On 3/22/2022 her platelet count was 142,000 and by the evening had dropped to 36,001-day with lactic acidosis.   Hemoglobin dropped to 7.2 x 3 2322 and white count down to 2450 with absolute neutrophil count baldomero of 240 on 3/22/2022 with a neutrophil count that very same day earlier in the day of 2300 before the Rituxan was given.  By 3/27/2022 her white count was 5150, hemoglobin 9.3, platelets 72,000 and neutrophil count was 2840.  During her hospitalization I saw her and we discussed that I would not risk further Rituxan as it does appear that this was an unusually brisk cytokine reaction from Rituxan.  While in the hospital I had Dr. Justin Kemp see her and he has an appointment to see her on April 12 to check her for fitness for surgery.  Avatrombopag to stimulate marrow production of platelets in preparation for surgery is not generally indicated in the face of nonportal hypertensive thrombocytopenia where her pancytopenia is both due to marrow involvement and also cancer-induced splenomegaly.    -4/12/2022 [facility] medical oncology follow-up: Patient states she saw Dr. Kemp yesterday and he apparently is coordinating the pneumococcal vaccinations and the Haemophilus influenza B and meningococcal vaccinations.  She thinks she got the Pneumovax with Dr. Isidro recently already and that Dr. Kemp is coordinating this with Dr. Isidro but I am not sure of that and I have reached out to Dr. Kemp who is currently scrubbed to make sure this is getting orchestrated.  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  I would never test her with another anti-CD20 antibody like Rituxan or its cousins.  She needs thorough vaccination presplenectomy.  I will defer to Dr. Kemp to orchestrate as I am not sure whether he is having Dr. Isidro do that or whether he himself is ordering this  but I will have the patient close that loop with Dr. Kemp to be certain they understand how to proceed.  Her blood counts last week were entirely normal with Dr. Isidro.  The primary reason for splenectomy at this point is not because of her counts but because of the massive splenomegaly causing abdominal discomforts and pushing of her viscera.  Dr. Kemp is aware of the risks of adhesions but will, according to the patient, attempt laparoscopic approach but if that does not work he will convert to an open procedure.  I will see her back in about a month with blood counts prior to return to see how she is doing postsplenectomy.    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  The histologic appearance and immunohistochemical staining pattern is felt to be most consistent with a marginal zone lymphoma, no evidence of large cell transformation identified.    -9/1/2022 [facility] Oncology clinic follow-up: Mahi had open splenectomy on 7/20/2022 with a fairly tumultuous recovery with blood loss, prolonged intubation, pneumothorax, she was discharged on 8/4/2022 to rehab and is currently in rehab but getting stronger every day and hopes to go home this week with her daughter.  She looks great.  I have reviewed her CBC from 8/17/2022 that shows WBC 11.58, hemoglobin 12.3, hematocrit 42.6%, platelet count 512,000.  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  We will continue to monitor with CBC again in 3 months and I have ordered that today.  She had all of her presplenectomy vaccinations, she is waiting for the new COVID booster and will get that as soon as it is available.    -12/1/2022 white count 11,000 hemoglobin 11.6 platelets 446,000  with absolute lymphocyte count 4080 with absolute neutrophil count 4870.    -12/13/2022 [facility] hematology oncology follow-up: Counts are doing well.  No palpable cervical axillary or inguinal adenopathy.  No plan for any further imaging in the absence of symptoms.  Counts doing well postsplenectomy.  We will repeat CBC prior to return to my nurse practitioner in 3 months and we will see her once a quarter.    -3/23/2023 [facility] Hematology/Oncology clinic follow-up: Mahi continues to do well, no evidence of progression of lymphoma.  She has no palpable lymphadenopathy.  CBC is unremarkable with WBC 11,900, hemoglobin 12.5, hematocrit 38%, platelet count 437,000, ANC 5.35.  She has had no illnesses since we saw her last.  She does have some hair thinning but no hien alopecia.  I have asked her to discuss with her dermatologist any recommendations.  Her nails are short but appear very healthy. She is on levothyroxine for hypothyroidism and this is adjusted and monitored by Dr. Isidro.  We will continue to monitor with CBC in 3 months prior to return and I have ordered that today.    -6/29/2023 [facility] Hematology/Oncology clinic follow-up: Mahi overall seems to be doing well, her current CBC is unremarkable with WBC 10.30, hemoglobin 11.6, hematocrit 32.9%, platelet count 559,000, ANC 4.78.  I am concerned with the bullous skin lesions she is describing and currently has on her left calf.  I have put a picture in her chart under media.  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  The skin lesions have apparently been occurring over what sounds like about 11 years, she has been hospitalized on a few occasions and treated for cellulitis.  She follows with Dr. Vidhya Araya and I have reached out to their office to see if we can get her seen now to biopsy her current skin lesion while it is  active.  Her follow-up with Dr. Araya is not until October, I discussed with Mahi that she needs to be seen sooner.  Otherwise we will plan on seeing her back in 3 months for follow-up with repeat CBC and I have ordered that today.  We will see her sooner if she has any concerns and she knows to reach out to us.    -9/28/2023 [facility] Oncology clinic follow-up: Mahi overall is doing well, CBC as shown above is unremarkable with WBC 9.83, hemoglobin 12.6, hematocrit 36.5%, platelet count 443,000, ANC 4.58.  She currently thinks that she may have a stomach bug as she has some intermittent diarrhea with abdominal cramping and her daughter is experiencing the same symptoms.  She is afebrile.  She will monitor and let us know if she has no improvement.  She did follow-up with GI earlier this year to discuss whether or not she would undergo another colonoscopy, she at that time decided that she did not want to undergo further colonoscopy unless it is absolutely necessary.  I discussed with her that if her abdominal symptoms did not resolve she may want to consider but currently she has no anemia or other worrisome symptoms.  At the age of 83 I would not press.  She has been following with dermatology regarding skin lesions, I did review note from office visit 7/5/2023 and biopsies were done but I do not have those pathology reports, we will reach out to try and get those.  We will continue to see her quarterly with CBC.    -1/2/2024 [facility] oncology clinic follow-up: 12/21/2023 CBC normal with unremarkable differential.  White count 10,350 platelets 143,000 hemoglobin 13.2.  No somatic complaints to suggest progressive lymphoma.  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  No rash presently.  Recently exposed to COVID but no symptoms.  No plans for imaging in the absence of symptoms and will check CBC quarterly.    Total time of care today inclusive of time spent today prior to patient's arrival  reviewing interval labs and during visit interviewing as to signs or symptoms of her disease and management thereof and monitoring thereof and after visit instituting this plan took 30 minutes of patient care time throughout the day today.  Nelson Castañeda MD    01/02/2024

## 2024-01-02 NOTE — PROGRESS NOTES
CHIEF COMPLAINT: No new somatic complaints    Problem List:  Oncology/Hematology History Overview Note   1. 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  2. History of iron deficiency anemia with last colonoscopy 11/2014, accordingto the patient, with a history of ulcerative colitis treated by Dr. Edge.  3. History of basal cell carcinomas.   4. Cataracts.   5. Remote history of deep vein thrombosis with vena cava filter in place.   6. Diverticulitis by history.   7. History of gout.   8. Hypertension.   9. History of 3 different kidney surgeries.   10. Cholecystectomy.   11. Chronic neuropathy.   12. History of ARMAAN.   13. History of back surgeries.   14. History of total hip replacement.   15. History of ankle fracture.   16. History of appendectomy.   17. 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 Edge.  18.  Epistaxis  19.  Stage III chronic kidney disease with hyponatremia on 48 ounce per day fluid restriction per Dr. Wooten 10/11/2022    Oncology history timeline:  a) Splenomegaly on hospitalization 02/2016. She had a week of nausea, vomiting and diarrhea with dehydration. The nausea, vomiting, and dehydration have resolved but on CT they found retroperitoneal adenopathy with some splenomegaly that was not bulky. She denies any ongoing fevers or chills or bed drenching night sweats or unexplained weight loss and no change in the color, caliber or consistency of her stools. She had a slightly elevated beta-2 microglobulin of 3.5 but no monoclonal spike and a white count 5300, hemoglobin 11.5, platelets 158,000, sedimentation rate 45,  C-reactive protein 13.6 and a normal LDH of 318 with normal liver enzymes. Slight hyponatremia with sodium of 134 and hypokalemia with potassium of 3.3 on 02/18/2016. She had slight decrease in immunoglobulin G to 580 and immunoglobulin A to 35 on testing while in the hospital.   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 There were no features to suggest hairy cell lymphoma or a mantle cell lymphoma and no large cell population. This was CD5 negative, CD10 negative, kappa clonal negative and bright for CD20 with dim surface light chain expression and bright cytoplasm, CD45 bright. The CD38 negative for plasmacytic differentiation with a hemoglobin of 11.4, white count 7200 with normal differential, and platelets of 163,000.   c) Liver, spleen ultrasound showed splenomegaly of 19.6 cm maximum dimension with 1.6 cm slightly dilated portal vein.  D)-3/20/2019 Dr. 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  Colonoscopy showed hemorrhoids, diverticuli, 11 mm proximal ascending colon flat polyp.  Dr. Edge relayed to the patient that this was a high-grade dysplasia in the Negrete's for which he is sending to [facility] for ablation as well as needing removal of a tubulovillous adenoma unable to be removed endoscopically.   -3/27/2019 PET shows mild hypermetabolism within the lymph nodes to the left of the proximal third of the esophagus as well as near the tracheal bifurcation and no other area is of abnormal hypermetabolism.  Splenomegaly stable.  She has significant carotid stenosis especially on the right.  -1/12/2021 white count 4200 hemoglobin 10 platelets 114,000 unremarkable differential.  CMP unremarkable  -4/28/2021 Dr. Augustine performed attempted  laparoscopic partial colectomy with subsequent open right hemicolectomy with ileocolonic stenosis but no residual polyp found in the proximal ascending colon after resection of the right colon and a small polyp in the distal part of the ascending colon.  Pathology showed residual adenomatous polyp tubular adenoma without high-grade dysplasia or invasive carcinoma and 13 benign lymph nodes.  Additional colonic segment benign with 4 benign lymph nodes.  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  Get her blood counts today and just prior to a minute evaluation in 3 months.  No plans for scans in the absence of symptoms.  I will keep in mind the potential of palliative splenic radiation if she develops early satiety or other abdominal complaints from splenomegaly.    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    -2/1/2021 PET negative.  Spleen is unremarkable with homogeneous uptake.    -2/5/2021 EGD and colonoscopy with Dr. Edge showed Negrete's esophagus, hemorrhoids, diverticuli, proximal ascending colon polyp.  No mention of any colitis.    -4/28/2021 attempted laparoscopic partial colectomy with subsequent open right hemicolectomy with ileocolonic stenosis but no residual polyp found in the proximal ascending colon after resection of the right colon and a small polyp in the distal part of the ascending colon.  Pathology showed residual adenomatous polyp tubular adenoma without high-grade dysplasia or invasive carcinoma and 13 benign lymph nodes.  Additional colonic segment benign with 4 benign lymph nodes.    -3/3/2022 data:  Hemoglobin hemoglobin 9.8 with MCV of 91.9 and platelets slightly  low 128,000 with normal white count 7100.  Unremarkable differential.    Creatinine 1.5 glucose 160 sodium 129 with potassium 4.7 and chloride 94 otherwise unremarkable CMP.  C-reactive protein normal 1.7.  Sedimentation rate 31, upper limit 30.  INR normal 0.99 with PTT 26.1.  100 mg/dL M spike on SPEP.  Normal quantitative immunoglobulins except for decreased IgE.  Serum immunofixation electrophoresis was unclear as to monoclonality.  Serum kappa 33.8 lambda 27.4 both minimally elevated with normal ratio 1.23.  Serum viscosity normal 1.6.    -3/4/2022 CT chest abdomen pelvis without contrast: 2.5 mm noncalcified nodule right upper lobe and right lower lobe for which follow-up in 6 months for stability or resolution is suggested.  No suspicious lung nodules to suggest metastasis.  Massive enlargement of the spleen which is stable compared to June 2021.  1.4 cm and 1.5 cm juan luis hepatic adenopathy.  Stable retroperitoneal adenopathy largest 1.5 cm.  1.8 cm stable IVC node.    -3/15/2022 [facility] medical oncology follow-up visit: She is not hyperviscous and I doubt her epistaxis is related to the lymphoma or hyperviscosity and I do not think she has Waldenstrom's.  However, I do think she has massive splenomegaly and while that has been present for quite some time, I nonetheless think that a lot of her abdominal discomfort is due to the massive splenomegaly pushing her bowel to the right and she could have some bowel involvement of lymphoma that we could put her through endoscopy again but she had this back a little over a year ago with no obvious bowel involvement and no obvious colitis.  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  She would not consent to anything stronger than that regardless and  it took a little convincing to get her to go along with this plan but she is willing to try provided that she has no major allergic reactions etc.  We will give first dose in [place] after chemo preparation visit we will use peripheral veins and I will have her follow-up in a few weeks with my nurse practitioner in [place] to make sure she is tolerating this well and to set up follow-up visits in the weeks and months ahead.  Repeat CTs I would ask my nurse practitioner to get ordered for approximately 4 months out from the end of Rituxan.  In the meantime, from the epistaxis standpoint I asked her to follow-up with ENT.   We discussed the protracted immune suppression that would be associated with the Rituxan and the fact that it would likely make vaccinations less helpful but that does not mean she should not get them.  We also discussed the possibility of allergic reactions.  Once all of this is done, she will need to follow-up with whoever is going to take over from Dr. 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    -3/22/2022 through 3/27/2022 inpatient hospitalization for fever after Rituxan initial dose.  No growth on cultures.  CT chest abdomen pelvis other than showing marked splenomegaly and stable retroperitoneal nodes showed nothing else.  She was pancytopenic on admission.  Given 1 unit platelet packed red cell transfusion.  On 3/22/2022 her platelet count was 142,000 and by the evening had dropped to 36,001-day with lactic acidosis.  Hemoglobin dropped to 7.2 x 3 2322 and white count down to 2450 with absolute neutrophil count baldomero of 240 on 3/22/2022 with a neutrophil count that very same day earlier in the day of 2300 before the Rituxan was given.  By 3/27/2022 her white count was 5150, hemoglobin 9.3, platelets 72,000 and neutrophil count was 2840.  During her hospitalization I saw her and we discussed that I would not  risk further Rituxan as it does appear that this was an unusually brisk cytokine reaction from Rituxan.  While in the hospital I had Dr. Justin Kemp see her and he has an appointment to see her on April 12 to check her for fitness for surgery.  Avatrombopag to stimulate marrow production of platelets in preparation for surgery is not generally indicated in the face of nonportal hypertensive thrombocytopenia where her pancytopenia is both due to marrow involvement and also cancer-induced splenomegaly.    -4/12/2022 [facility] oncology follow-up: Patient states she saw Dr. Kemp yesterday and he apparently is coordinating the pneumococcal vaccinations and the Haemophilus influenza B and meningococcal vaccinations.  She thinks she got the Pneumovax with Dr. Isidro recently already and that Dr. Kemp is coordinating this with Dr. Isidro but I am not sure of that and I have reached out to Dr. Kemp who is currently scrubbed to make sure this is getting orchestrated.  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  I would never test her with another anti-CD20 antibody like Rituxan or its cousins.  She needs thorough vaccination presplenectomy.  I will defer to Dr. Kemp to orchestrate as I am not sure whether he is having Dr. Isidro do that or whether he himself is ordering this but I will have the patient close that loop with Dr. Kemp to be certain they understand how to proceed.  Her blood counts last week were entirely normal with Dr. Isidro.  The primary reason for splenectomy at this point is not because of her counts but because of the massive splenomegaly causing abdominal discomforts and pushing of her viscera.  Dr. Kemp is aware of the risks of adhesions  but will, according to the patient, attempt laparoscopic approach but if that does not work he will convert to an open procedure.  I will see her back in about a month with blood counts prior to return to see how she is doing postsplenectomy.    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  The histologic appearance and immunohistochemical staining pattern is felt to be most consistent with a marginal zone lymphoma, no evidence of large cell transformation identified.    -9/1/2022 [facility] Oncology clinic follow-up: Mahi had open splenectomy on 7/20/2022 with a fairly tumultuous recovery with blood loss, prolonged intubation, pneumothorax, she was discharged on 8/4/2022 to rehab and is currently in rehab but getting stronger every day and hopes to go home this week with her daughter.  She looks great.  I have reviewed her CBC from 8/17/2022 that shows WBC 11.58, hemoglobin 12.3, hematocrit 42.6%, platelet count 512,000.  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  We will continue to monitor with CBC again in 3 months and I have ordered that today.  She had all of her presplenectomy vaccinations, she is waiting for the new COVID booster and will get that as soon as it is available.    -12/1/2022 white count 11,000 hemoglobin 11.6 platelets 446,000 with absolute lymphocyte count 4080 with absolute neutrophil count 4870.    -12/13/2022 [facility] hematology oncology follow-up: Counts are doing well.  No palpable cervical axillary or inguinal adenopathy.  No plan for any further imaging in the absence of symptoms.  Counts doing well postsplenectomy.  We will repeat CBC prior to return to my nurse practitioner in 3 months and we will see her  once a quarter.    -3/23/2023 [facility] Hematology/Oncology clinic follow-up: Mahi continues to do well, no evidence of progression of lymphoma.  She has no palpable lymphadenopathy.  CBC is unremarkable with WBC 11,900, hemoglobin 12.5, hematocrit 38%, platelet count 437,000, ANC 5.35.  She has had no illnesses since we saw her last.  She does have some hair thinning but no hien alopecia.  I have asked her to discuss with her dermatologist any recommendations.  Her nails are short but appear very healthy. She is on levothyroxine for hypothyroidism and this is adjusted and monitored by Dr. Isidro.  We will continue to monitor with CBC in 3 months prior to return and I have ordered that today.    -6/29/2023 [facility] Hematology/Oncology clinic follow-up: Mahi overall seems to be doing well, her current CBC is unremarkable with WBC 10.30, hemoglobin 11.6, hematocrit 32.9%, platelet count 559,000, ANC 4.78.  I am concerned with the bullous skin lesions she is describing and currently has on her left calf.  I have put a picture in her chart under media.  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  The skin lesions have apparently been occurring over what sounds like about 11 years, she has been hospitalized on a few occasions and treated for cellulitis.  She follows with Dr. Vidhya Araya and I have reached out to their office to see if we can get her seen now to biopsy her current skin lesion while it is active.  Her follow-up with Dr. Araya is not until October, I discussed with Mahi that she needs to be seen sooner.  Otherwise we will plan on seeing her back in 3 months for follow-up with repeat CBC and I have ordered that today.  We will see her sooner if she has any concerns and she knows to reach out to us.    -9/28/2023 [facility] Oncology clinic follow-up: Mahi engle is doing well, CBC as  shown above is unremarkable with WBC 9.83, hemoglobin 12.6, hematocrit 36.5%, platelet count 443,000, ANC 4.58.  She currently thinks that she may have a stomach bug as she has some intermittent diarrhea with abdominal cramping and her daughter is experiencing the same symptoms.  She is afebrile.  She will monitor and let us know if she has no improvement.  She did follow-up with GI earlier this year to discuss whether or not she would undergo another colonoscopy, she at that time decided that she did not want to undergo further colonoscopy unless it is absolutely necessary.  I discussed with her that if her abdominal symptoms did not resolve she may want to consider but currently she has no anemia or other worrisome symptoms.  At the age of 83 I would not press.  She has been following with dermatology regarding skin lesions, I did review note from office visit 7/5/2023 and biopsies were done but I do not have those pathology reports, we will reach out to try and get those.  We will continue to see her quarterly with CBC.    -1/2/2024 [facility] [place] oncology clinic follow-up: 12/21/2023 CBC normal with unremarkable differential.  White count 10,350 platelets 143,000 hemoglobin 13.2.  No somatic complaints to suggest progressive lymphoma.  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  No rash presently.  Recently exposed to COVID but no symptoms. No plans for imaging in the absence of symptoms and will check CBC quarterly.       Marginal zone lymphoma of spleen   4/25/2016 Initial Diagnosis    Lymphoma, low grade     5/30/2017 Imaging    CT chest/abdomen/pelvis no obvious pulmonary nodules identified, no pleural effusions.  Stable appearance of adenopathy within the abdomen compared to earlier study as well as splenomegaly.     12/6/2017 Imaging    CT chest/abdomen/pelvis stable, essentially unchanged prominent mediastinal lymph nodes, unchanged right lower lobe medial basal segmental focal fibrotic  findings.  Scattered noncalcified small lung nodules appear unchanged.  Stable spine or megaly.  Scattered prominent retroperitoneal lymph nodes now appear smaller.  Unchanged spinal findings consistent with diffuse idiopathic skeletal hyperostosis and prior operative intervention at L4-5.       6/8/2018 Imaging    CT chest abdomen and pelvis without contrast: Stable, no significant change in the appearance compared to earlier study from December 6, 2017.     3/1/2019 Imaging    CT chest, abdomen and pelvis impression: No significant interval change from last year.  Stable appearance of the significantly enlarged spleen which measures up to nearly 20 x 12 cm.  Moderate, scattered upper abdominal adenopathy with innumerable scattered gastrohepatic, retroperitoneal, and mesenteric nodes measuring roughly between 1-2 cm are stable as well.  No significant progression.  Mild mediastinal adenopathy appears stable as well.     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  Colonoscopy showed hemorrhoids, diverticuli, 11 mm proximal ascending colon flat polyp.  Dr. 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     3/27/2019 Imaging    PET shows mild hypermetabolism within the lymph nodes to the left of the proximal third of the esophagus as well as near the tracheal bifurcation and no other area is of abnormal hypermetabolism.  Splenomegaly stable.  She has significant carotid stenosis especially on the right.     2/1/2021 Imaging    PET IMPRESSION:  Negative PET/CT scan. No evidence of recurrent or active  lymphoma. Enlargement seen of the spleen.     3/4/2022 Imaging    -3/3/2022 data:  Hemoglobin hemoglobin 9.8 with MCV of 91.9 and platelets slightly low 128,000 with normal white  count 7100.  Unremarkable differential.    Creatinine 1.5 glucose 160 sodium 129 with potassium 4.7 and chloride 94 otherwise unremarkable CMP.  C-reactive protein normal 1.7.  Sedimentation rate 31, upper limit 30.  INR normal 0.99 with PTT 26.1.  100 mg/dL M spike on SPEP.  Normal quantitative immunoglobulins except for decreased IgE.  Serum immunofixation electrophoresis was unclear as to monoclonality.  Serum kappa 33.8 lambda 27.4 both minimally elevated with normal ratio 1.23.  Serum viscosity normal 1.6.    -3/4/2022 CT chest abdomen pelvis without contrast: 2.5 mm noncalcified nodule right upper lobe and right lower lobe for which follow-up in 6 months for stability or resolution is suggested.  No suspicious lung nodules to suggest metastasis.  Massive enlargement of the spleen which is stable compared to June 2021.  1.4 cm and 1.5 cm juan luis hepatic adenopathy.  Stable retroperitoneal adenopathy largest 1.5 cm.  1.8 cm stable IVC node.     3/22/2022 - 3/22/2022 Chemotherapy    OP LYMPHOMA (CLL) RiTUXimab (Weekly X 4)     3/22/2022 Adverse Reaction    -3/22/2022 through 3/27/2022 inpatient hospitalization for fever after Rituxan initial dose.  No growth on cultures.  CT chest abdomen pelvis other than showing marked splenomegaly and stable retroperitoneal nodes showed nothing else.  She was pancytopenic on admission.  Given 1 unit platelet packed red cell transfusion.  On 3/22/2022 her platelet count was 142,000 and by the evening had dropped to 36,001-day with lactic acidosis.  Hemoglobin dropped to 7.2 x 3 2322 and white count down to 2450 with absolute neutrophil count baldomero of 240 on 3/22/2022 with a neutrophil count that very same day earlier in the day of 2300 before the Rituxan was given.  By 3/27/2022 her white count was 5150, hemoglobin 9.3, platelets 72,000 and neutrophil count was 2840.  During her hospitalization I saw her and we discussed that I would not risk further Rituxan as it does appear  that this was an unusually brisk cytokine reaction from Rituxan.  While in the hospital I had Dr. Justin Kemp see her and he has an appointment to see her on April 12 to check her for fitness for surgery.  Avatrombopag to stimulate marrow production of platelets in preparation for surgery is not generally indicated in the face of nonportal hypertensive thrombocytopenia where her pancytopenia is both due to marrow involvement and also cancer-induced splenomegaly.     7/20/2022 Surgery    Surgery       Procedure:  Open splenectomy, distal pancreatectomy              HISTORY OF PRESENT ILLNESS:  The patient is a 83 y.o. female, here for follow up on management of splenic marginal zone lymphoma status post splenectomy    Past Medical History:   Diagnosis Date    Anemia 09/18/2015    Cataract     h/o    Disease of thyroid gland     Diverticulitis     DVT (deep venous thrombosis)     left lower leg    GERD (gastroesophageal reflux disease)     Gout     History of basal cell carcinoma     History of transfusion 2022    1 unit at [facility], pt denies reactions    Hypertension          IBS (irritable bowel syndrome)     Non Hodgkin's lymphoma     Splenomegaly     Urinary incontinence      Past Surgical History:   Procedure Laterality Date    APPENDECTOMY      BACK SURGERY      lumbar    CHOLECYSTECTOMY      COLON RESECTION  2021    EXPLORATORY LAPAROTOMY N/A 7/21/2022    Procedure: ABDOMINAL WASHOUT WITH CLOSURE;  Surgeon: Jayme Kemp MD;  Location:  [facility];  Service: General;  Laterality: N/A;    HYSTERECTOMY      jaja    KIDNEY SURGERY      kidney stones    ORIF ANKLE FRACTURE Right     h/o    SPLENECTOMY N/A 7/20/2022    Procedure: SPLENECTOMY OPEN, DISTAL PANCREATECTOMY;  Surgeon: Jayme Kemp MD;  Location:  [facility];  Service: General;  Laterality: N/A;    TOTAL HIP ARTHROPLASTY Bilateral     h/o       Allergies   Allergen Reactions    Penicillins Swelling and Rash     Passed out    Rituxan [Rituximab]  "Anaphylaxis    Hydrocodone Itching and Rash     Severe itching    Sulfa Antibiotics Swelling     Lips swell    Bactrim [Sulfamethoxazole-Trimethoprim] Itching     Fever and itching    Chlorhexidine Gluconate Rash    Keflex [Cephalexin] Rash    Latex Rash    Lortab [Hydrocodone-Acetaminophen] Rash    Magnesium Citrate Rash    Neosporin Original [Bacitracin-Neomycin-Polymyxin] Rash       Family History and Social History reviewed and changed as necessary    REVIEW OF SYSTEM:   No new somatic complaints    PHYSICAL EXAM:  No jaundice icterus or pallor.  No respiratory distress.  No palpable cervical axillary or inguinal adenopathy.    Vitals:    01/02/24 1354   BP: 144/92   Pulse: 74   Resp: 18   Temp: 97.8 °F (36.6 °C)   Weight: 80.7 kg (178 lb)   Height: 167.6 cm (66\")     Vitals:    01/02/24 1354   PainSc:   6   PainLoc: Hip  Comment: left hip          ECOG score: 2           Vitals reviewed.    ECOG: (2) Ambulatory & Capable of Self Care, Unable to Carry Out Work Activity, Up & About Greater Than 50% of Waking Hours    Lab Results   Component Value Date    HGB 13.2 12/21/2023    HCT 38.1 12/21/2023    MCV 94.1 12/21/2023     12/21/2023    WBC 10.35 12/21/2023    NEUTROABS 5.60 12/21/2023    LYMPHSABS CANCELED 12/21/2023    LYMPHSABS 2.96 12/21/2023    MONOSABS 1.16 (H) 12/21/2023    EOSABS CANCELED 12/21/2023    EOSABS 5.1 12/21/2023    EOSABS 0.53 (H) 12/21/2023    BASOSABS CANCELED 12/21/2023    BASOSABS 0.10 12/21/2023       Lab Results   Component Value Date    GLUCOSE 94 08/04/2022    BUN 17 08/04/2022    CREATININE 0.83 08/04/2022     (L) 08/04/2022    K 5.0 08/04/2022     08/04/2022    CO2 21.0 (L) 08/04/2022    CALCIUM 8.3 (L) 08/04/2022    PROTEINTOT 4.7 (L) 08/04/2022    ALBUMIN 2.70 (L) 08/04/2022    BILITOT 0.4 08/04/2022    ALKPHOS 88 08/04/2022    AST 32 08/04/2022    ALT 20 08/04/2022             ASSESSMENT & PLAN:  1. Low-grade splenic marginal zone lymphoma lymphoma with " symptomatic bulky splenomegaly with severe cytokine reaction with febrile hypotensive acidosis with first portion of first dose of first day of planned weekly Rituxan x4 with rapid onset pancytopenia.  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  2. History of iron deficiency anemia with last colonoscopy 11/2014, accordingto the patient, with a history of ulcerative colitis treated by Dr. Edge.  3. History of basal cell carcinomas.   4. Cataracts.   5. Remote history of deep vein thrombosis with vena cava filter in place.   6. Diverticulitis by history.   7. History of gout.   8. Hypertension.   9. History of 3 different kidney surgeries.   10. Cholecystectomy.   11. Chronic neuropathy.   12. History of ARMAAN.   13. History of back surgeries.   14. History of total hip replacement.   15. History of ankle fracture.   16. History of appendectomy.   17. 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 Edge.  18.  Epistaxis  19.  Stage III chronic kidney disease with hyponatremia on 48 ounce per day fluid restriction per Dr. Wooten 10/11/2022    Oncology history timeline:  a) Splenomegaly on hospitalization 02/2016. She had a week of nausea, vomiting and diarrhea with dehydration. The nausea, vomiting, and dehydration have resolved but on CT they found retroperitoneal adenopathy with some splenomegaly that was not bulky. She denies any ongoing fevers or chills or bed drenching night sweats or unexplained weight loss and no change in the color, caliber or consistency of her stools. She had a slightly elevated beta-2 microglobulin of 3.5 but no monoclonal spike and a white count 5300, hemoglobin 11.5, platelets 158,000, sedimentation rate 45, C-reactive protein 13.6 and a normal LDH of 318 with normal liver enzymes. Slight hyponatremia with sodium of 134 and hypokalemia with potassium of 3.3 on  02/18/2016. She had slight decrease in immunoglobulin G to 580 and immunoglobulin A to 35 on testing while in the hospital.   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 There were no features to suggest hairy cell lymphoma or a mantle cell lymphoma and no large cell population. This was CD5 negative, CD10 negative, kappa clonal negative and bright for CD20 with dim surface light chain expression and bright cytoplasm, CD45 bright. The CD38 negative for plasmacytic differentiation with a hemoglobin of 11.4, white count 7200 with normal differential, and platelets of 163,000.   c) Liver, spleen ultrasound showed splenomegaly of 19.6 cm maximum dimension with 1.6 cm slightly dilated portal vein.  D)-3/20/2019 Dr. 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  Colonoscopy showed hemorrhoids, diverticuli, 11 mm proximal ascending colon flat polyp.  Dr. 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   -3/27/2019 PET shows mild hypermetabolism within the lymph nodes to the left of the proximal third of the esophagus as well as near the tracheal bifurcation and no other area is of abnormal hypermetabolism.  Splenomegaly stable.  She has significant carotid stenosis especially on the right.  -1/12/2021 white count 4200 hemoglobin 10 platelets 114,000 unremarkable differential.  CMP unremarkable  -4/28/2021 Dr. Augustine performed attempted laparoscopic partial colectomy with subsequent open right hemicolectomy with ileocolonic stenosis but no residual polyp found in the proximal ascending colon after  resection of the right colon and a small polyp in the distal part of the ascending colon.  Pathology showed residual adenomatous polyp tubular adenoma without high-grade dysplasia or invasive carcinoma and 13 benign lymph nodes.  Additional colonic segment benign with 4 benign lymph nodes.  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  Get her blood counts today and just prior to a minute evaluation in 3 months.  No plans for scans in the absence of symptoms.  I will keep in mind the potential of palliative splenic radiation if she develops early satiety or other abdominal complaints from splenomegaly.    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    -2/1/2021 PET negative.  Spleen is unremarkable with homogeneous uptake.    -2/5/2021 EGD and colonoscopy with Dr. Edge showed Negrete's esophagus, hemorrhoids, diverticuli, proximal ascending colon polyp.  No mention of any colitis.    -4/28/2021 attempted laparoscopic partial colectomy with subsequent open right hemicolectomy with ileocolonic stenosis but no residual polyp found in the proximal ascending colon after resection of the right colon and a small polyp in the distal part of the ascending colon.  Pathology showed residual adenomatous polyp tubular adenoma without high-grade dysplasia or invasive carcinoma and 13 benign lymph nodes.  Additional colonic segment benign with 4 benign lymph nodes.    -3/3/2022 data:  Hemoglobin hemoglobin 9.8 with MCV of 91.9 and platelets slightly low 128,000 with normal white count 7100.  Unremarkable differential.    Creatinine 1.5 glucose 160 sodium 129 with potassium 4.7 and chloride 94 otherwise  unremarkable CMP.  C-reactive protein normal 1.7.  Sedimentation rate 31, upper limit 30.  INR normal 0.99 with PTT 26.1.  100 mg/dL M spike on SPEP.  Normal quantitative immunoglobulins except for decreased IgE.  Serum immunofixation electrophoresis was unclear as to monoclonality.  Serum kappa 33.8 lambda 27.4 both minimally elevated with normal ratio 1.23.  Serum viscosity normal 1.6.    -3/4/2022 CT chest abdomen pelvis without contrast: 2.5 mm noncalcified nodule right upper lobe and right lower lobe for which follow-up in 6 months for stability or resolution is suggested.  No suspicious lung nodules to suggest metastasis.  Massive enlargement of the spleen which is stable compared to June 2021.  1.4 cm and 1.5 cm juan luis hepatic adenopathy.  Stable retroperitoneal adenopathy largest 1.5 cm.  1.8 cm stable IVC node.    -3/15/2022 [facility] medical oncology follow-up visit: She is not hyperviscous and I doubt her epistaxis is related to the lymphoma or hyperviscosity and I do not think she has Waldenstrom's.  However, I do think she has massive splenomegaly and while that has been present for quite some time, I nonetheless think that a lot of her abdominal discomfort is due to the massive splenomegaly pushing her bowel to the right and she could have some bowel involvement of lymphoma that we could put her through endoscopy again but she had this back a little over a year ago with no obvious bowel involvement and no obvious colitis.  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  She would not consent to anything stronger than that regardless and it took a little convincing to get her to go along with this plan but she is willing to try provided that she has no major allergic reactions etc.  We will  give first dose in [place] after chemo preparation visit we will use peripheral veins and I will have her follow-up in a few weeks with my nurse practitioner in [place] to make sure she is tolerating this well and to set up follow-up visits in the weeks and months ahead.  Repeat CTs I would ask my nurse practitioner to get ordered for approximately 4 months out from the end of Rituxan.  In the meantime, from the epistaxis standpoint I asked her to follow-up with ENT.   We discussed the protracted immune suppression that would be associated with the Rituxan and the fact that it would likely make vaccinations less helpful but that does not mean she should not get them.  We also discussed the possibility of allergic reactions.  Once all of this is done, she will need to follow-up with whoever is going to take over from Dr. 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    -3/22/2022 through 3/27/2022 inpatient hospitalization for fever after Rituxan initial dose.  No growth on cultures.  CT chest abdomen pelvis other than showing marked splenomegaly and stable retroperitoneal nodes showed nothing else.  She was pancytopenic on admission.  Given 1 unit platelet packed red cell transfusion.  On 3/22/2022 her platelet count was 142,000 and by the evening had dropped to 36,001-day with lactic acidosis.  Hemoglobin dropped to 7.2 x 3 2322 and white count down to 2450 with absolute neutrophil count baldomero of 240 on 3/22/2022 with a neutrophil count that very same day earlier in the day of 2300 before the Rituxan was given.  By 3/27/2022 her white count was 5150, hemoglobin 9.3, platelets 72,000 and neutrophil count was 2840.  During her hospitalization I saw her and we discussed that I would not risk further Rituxan as it does appear that this was an unusually brisk cytokine reaction from Rituxan.  While in the hospital I had Dr. Justin Kemp see  her and he has an appointment to see her on April 12 to check her for fitness for surgery.  Avatrombopag to stimulate marrow production of platelets in preparation for surgery is not generally indicated in the face of nonportal hypertensive thrombocytopenia where her pancytopenia is both due to marrow involvement and also cancer-induced splenomegaly.    -4/12/2022 [facility] medical oncology follow-up: Patient states she saw Dr. Kemp yesterday and he apparently is coordinating the pneumococcal vaccinations and the Haemophilus influenza B and meningococcal vaccinations.  She thinks she got the Pneumovax with Dr. Isidro recently already and that Dr. Kemp is coordinating this with Dr. Isidro but I am not sure of that and I have reached out to Dr. Kemp who is currently scrubbed to make sure this is getting orchestrated.  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  I would never test her with another anti-CD20 antibody like Rituxan or its cousins.  She needs thorough vaccination presplenectomy.  I will defer to Dr. Kemp to orchestrate as I am not sure whether he is having Dr. Isidro do that or whether he himself is ordering this but I will have the patient close that loop with Dr. Kemp to be certain they understand how to proceed.  Her blood counts last week were entirely normal with Dr. Isidro.  The primary reason for splenectomy at this point is not because of her counts but because of the massive splenomegaly causing abdominal discomforts and pushing of her viscera.  Dr. Kemp is aware of the risks of adhesions but will, according to the patient, attempt laparoscopic approach but if that does not work he will convert to an open procedure.  I will see her back in  about a month with blood counts prior to return to see how she is doing postsplenectomy.    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  The histologic appearance and immunohistochemical staining pattern is felt to be most consistent with a marginal zone lymphoma, no evidence of large cell transformation identified.    -9/1/2022 [facility] follow-up: Mahi had open splenectomy on 7/20/2022 with a fairly tumultuous recovery with blood loss, prolonged intubation, pneumothorax, she was discharged on 8/4/2022 to rehab and is currently in rehab but getting stronger every day and hopes to go home this week with her daughter.  She looks great.  I have reviewed her CBC from 8/17/2022 that shows WBC 11.58, hemoglobin 12.3, hematocrit 42.6%, platelet count 512,000.  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  We will continue to monitor with CBC again in 3 months and I have ordered that today.  She had all of her presplenectomy vaccinations, she is waiting for the new COVID booster and will get that as soon as it is available.    -12/1/2022 white count 11,000 hemoglobin 11.6 platelets 446,000 with absolute lymphocyte count 4080 with absolute neutrophil count 4870.    -12/13/2022 [facility] oncology follow-up: Counts are doing well.  No palpable cervical axillary or inguinal adenopathy.  No plan for any further imaging in the absence of symptoms.  Counts doing well postsplenectomy.  We will repeat CBC prior to return to my nurse practitioner in 3 months and we will see her once a quarter.    -3/23/2023 [facility] follow-up: Mahi continues to do well, no evidence of progression of lymphoma.  She has no  palpable lymphadenopathy.  CBC is unremarkable with WBC 11,900, hemoglobin 12.5, hematocrit 38%, platelet count 437,000, ANC 5.35.  She has had no illnesses since we saw her last.  She does have some hair thinning but no hien alopecia.  I have asked her to discuss with her dermatologist any recommendations.  Her nails are short but appear very healthy. She is on levothyroxine for hypothyroidism and this is adjusted and monitored by Dr. Isidro.  We will continue to monitor with CBC in 3 months prior to return and I have ordered that today.    -6/29/2023 [facility] clinic follow-up: Mahi overall seems to be doing well, her current CBC is unremarkable with WBC 10.30, hemoglobin 11.6, hematocrit 32.9%, platelet count 559,000, ANC 4.78.  I am concerned with the bullous skin lesions she is describing and currently has on her left calf.  I have put a picture in her chart under media.  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  The skin lesions have apparently been occurring over what sounds like about 11 years, she has been hospitalized on a few occasions and treated for cellulitis.  She follows with Dr. Vidhya Araya and I have reached out to their office to see if we can get her seen now to biopsy her current skin lesion while it is active.  Her follow-up with Dr. Araya is not until October, I discussed with Mahi that she needs to be seen sooner.  Otherwise we will plan on seeing her back in 3 months for follow-up with repeat CBC and I have ordered that today.  We will see her sooner if she has any concerns and she knows to reach out to us.    -9/28/2023 [facility] clinic follow-up: Mahi overall is doing well, CBC as shown above is unremarkable with WBC 9.83, hemoglobin 12.6, hematocrit 36.5%, platelet count 443,000, ANC 4.58.  She currently thinks that she may have a  stomach bug as she has some intermittent diarrhea with abdominal cramping and her daughter is experiencing the same symptoms.  She is afebrile.  She will monitor and let us know if she has no improvement.  She did follow-up with GI earlier this year to discuss whether or not she would undergo another colonoscopy, she at that time decided that she did not want to undergo further colonoscopy unless it is absolutely necessary.  I discussed with her that if her abdominal symptoms did not resolve she may want to consider but currently she has no anemia or other worrisome symptoms.  At the age of 83 I would not press.  She has been following with dermatology regarding skin lesions, I did review note from office visit 7/5/2023 and biopsies were done but I do not have those pathology reports, we will reach out to try and get those.  We will continue to see her quarterly with CBC.    -1/2/2024 [facility] oncology clinic follow-up: 12/21/2023 CBC normal with unremarkable differential.  White count 10,350 platelets 143,000 hemoglobin 13.2.  No somatic complaints to suggest progressive lymphoma.  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  No rash presently.  Recently exposed to COVID but no symptoms.  No plans for imaging in the absence of symptoms and will check CBC quarterly.    Total time of care today inclusive of time spent today prior to patient's arrival reviewing interval labs and during visit interviewing as to signs or symptoms of her disease and management thereof and monitoring thereof and after visit instituting this plan took 30 minutes of patient care time throughout the day today.  Nelson Castañeda MD    01/02/2024

## 2024-04-02 ENCOUNTER — LAB (OUTPATIENT)
Dept: ONCOLOGY | Facility: HOSPITAL | Age: 84
End: 2024-04-02
Payer: MEDICARE

## 2024-04-02 VITALS
BODY MASS INDEX: 29.21 KG/M2 | TEMPERATURE: 98 F | RESPIRATION RATE: 18 BRPM | HEART RATE: 73 BPM | SYSTOLIC BLOOD PRESSURE: 152 MMHG | WEIGHT: 181 LBS | DIASTOLIC BLOOD PRESSURE: 53 MMHG

## 2024-04-02 DIAGNOSIS — C83.07 MARGINAL ZONE LYMPHOMA OF SPLEEN: ICD-10-CM

## 2024-04-02 PROCEDURE — 36415 COLL VENOUS BLD VENIPUNCTURE: CPT

## 2024-04-04 ENCOUNTER — OFFICE VISIT (OUTPATIENT)
Dept: ONCOLOGY | Facility: CLINIC | Age: 84
End: 2024-04-04
Payer: MEDICARE

## 2024-04-04 VITALS
HEIGHT: 66 IN | DIASTOLIC BLOOD PRESSURE: 73 MMHG | BODY MASS INDEX: 28.93 KG/M2 | HEART RATE: 67 BPM | TEMPERATURE: 97.5 F | SYSTOLIC BLOOD PRESSURE: 150 MMHG | OXYGEN SATURATION: 98 % | WEIGHT: 180 LBS | RESPIRATION RATE: 18 BRPM

## 2024-04-04 DIAGNOSIS — C83.07 MARGINAL ZONE LYMPHOMA OF SPLEEN: Primary | ICD-10-CM

## 2024-04-04 DIAGNOSIS — G44.52 NEW PERSISTENT DAILY HEADACHE: ICD-10-CM

## 2024-04-04 PROCEDURE — 1159F MED LIST DOCD IN RCRD: CPT | Performed by: NURSE PRACTITIONER

## 2024-04-04 PROCEDURE — 1160F RVW MEDS BY RX/DR IN RCRD: CPT | Performed by: NURSE PRACTITIONER

## 2024-04-04 PROCEDURE — 99214 OFFICE O/P EST MOD 30 MIN: CPT | Performed by: NURSE PRACTITIONER

## 2024-04-04 PROCEDURE — 1125F AMNT PAIN NOTED PAIN PRSNT: CPT | Performed by: NURSE PRACTITIONER

## 2024-04-04 NOTE — PROGRESS NOTES
CHIEF COMPLAINT:  Daily headache    Problem List:  Oncology/Hematology History Overview Note   1. 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  2. History of iron deficiency anemia with last colonoscopy 11/2014, accordingto the patient, with a history of ulcerative colitis treated by Dr. Edge.  3. History of basal cell carcinomas.   4. Cataracts.   5. Remote history of deep vein thrombosis with vena cava filter in place.   6. Diverticulitis by history.   7. History of gout.   8. Hypertension.   9. History of 3 different kidney surgeries.   10. Cholecystectomy.   11. Chronic neuropathy.   12. History of ARMAAN.   13. History of back surgeries.   14. History of total hip replacement.   15. History of ankle fracture.   16. History of appendectomy.   17. 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 Edge.  18.  Epistaxis  19.  Stage III chronic kidney disease with hyponatremia on 48 ounce per day fluid restriction per Dr. Wooten 10/11/2022    Oncology history timeline:  a) Splenomegaly on hospitalization 02/2016. She had a week of nausea, vomiting and diarrhea with dehydration. The nausea, vomiting, and dehydration have resolved but on CT they found retroperitoneal adenopathy with some splenomegaly that was not bulky. She denies any ongoing fevers or chills or bed drenching night sweats or unexplained weight loss and no change in the color, caliber or consistency of her stools. She had a slightly elevated beta-2 microglobulin of 3.5 but no monoclonal spike and a white count 5300, hemoglobin 11.5, platelets 158,000, sedimentation rate 45,  C-reactive protein 13.6 and a normal LDH of 318 with normal liver enzymes. Slight hyponatremia with sodium of 134 and hypokalemia with potassium of 3.3 on 02/18/2016. She had slight decrease in immunoglobulin G to 580 and immunoglobulin A to 35 on testing while in the hospital.   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 There were no features to suggest hairy cell lymphoma or a mantle cell lymphoma and no large cell population. This was CD5 negative, CD10 negative, kappa clonal negative and bright for CD20 with dim surface light chain expression and bright cytoplasm, CD45 bright. The CD38 negative for plasmacytic differentiation with a hemoglobin of 11.4, white count 7200 with normal differential, and platelets of 163,000.   c) Liver, spleen ultrasound showed splenomegaly of 19.6 cm maximum dimension with 1.6 cm slightly dilated portal vein.  D)-3/20/2019 Dr. 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  Colonoscopy showed hemorrhoids, diverticuli, 11 mm proximal ascending colon flat polyp.  Dr. Edge relayed to the patient that this was a high-grade dysplasia in the Negrete's for which he is sending to [facility] for ablation as well as needing removal of a tubulovillous adenoma unable to be removed endoscopically.   -3/27/2019 PET shows mild hypermetabolism within the lymph nodes to the left of the proximal third of the esophagus as well as near the tracheal bifurcation and no other area is of abnormal hypermetabolism.  Splenomegaly stable.  She has significant carotid stenosis especially on the right.  -1/12/2021 white count 4200 hemoglobin 10 platelets 114,000 unremarkable differential.  CMP unremarkable  -4/28/2021 Dr. Augustine performed attempted  laparoscopic partial colectomy with subsequent open right hemicolectomy with ileocolonic stenosis but no residual polyp found in the proximal ascending colon after resection of the right colon and a small polyp in the distal part of the ascending colon.  Pathology showed residual adenomatous polyp tubular adenoma without high-grade dysplasia or invasive carcinoma and 13 benign lymph nodes.  Additional colonic segment benign with 4 benign lymph nodes.  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  Get her blood counts today and just prior to a minute evaluation in 3 months.  No plans for scans in the absence of symptoms.  I will keep in mind the potential of palliative splenic radiation if she develops early satiety or other abdominal complaints from splenomegaly.    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    -2/1/2021 PET negative.  Spleen is unremarkable with homogeneous uptake.    -2/5/2021 EGD and colonoscopy with Dr. Edge showed Negrete's esophagus, hemorrhoids, diverticuli, proximal ascending colon polyp.  No mention of any colitis.    -4/28/2021 attempted laparoscopic partial colectomy with subsequent open right hemicolectomy with ileocolonic stenosis but no residual polyp found in the proximal ascending colon after resection of the right colon and a small polyp in the distal part of the ascending colon.  Pathology showed residual adenomatous polyp tubular adenoma without high-grade dysplasia or invasive carcinoma and 13 benign lymph nodes.  Additional colonic segment benign with 4 benign lymph nodes.    -3/3/2022 data:  Hemoglobin hemoglobin 9.8 with MCV of 91.9 and platelets slightly  low 128,000 with normal white count 7100.  Unremarkable differential.    Creatinine 1.5 glucose 160 sodium 129 with potassium 4.7 and chloride 94 otherwise unremarkable CMP.  C-reactive protein normal 1.7.  Sedimentation rate 31, upper limit 30.  INR normal 0.99 with PTT 26.1.  100 mg/dL M spike on SPEP.  Normal quantitative immunoglobulins except for decreased IgE.  Serum immunofixation electrophoresis was unclear as to monoclonality.  Serum kappa 33.8 lambda 27.4 both minimally elevated with normal ratio 1.23.  Serum viscosity normal 1.6.    -3/4/2022 CT chest abdomen pelvis without contrast: 2.5 mm noncalcified nodule right upper lobe and right lower lobe for which follow-up in 6 months for stability or resolution is suggested.  No suspicious lung nodules to suggest metastasis.  Massive enlargement of the spleen which is stable compared to June 2021.  1.4 cm and 1.5 cm juan luis hepatic adenopathy.  Stable retroperitoneal adenopathy largest 1.5 cm.  1.8 cm stable IVC node.    -3/15/2022 [facility] medical oncology follow-up visit: She is not hyperviscous and I doubt her epistaxis is related to the lymphoma or hyperviscosity and I do not think she has Waldenstrom's.  However, I do think she has massive splenomegaly and while that has been present for quite some time, I nonetheless think that a lot of her abdominal discomfort is due to the massive splenomegaly pushing her bowel to the right and she could have some bowel involvement of lymphoma that we could put her through endoscopy again but she had this back a little over a year ago with no obvious bowel involvement and no obvious colitis.  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  She would not consent to anything stronger than that regardless and  it took a little convincing to get her to go along with this plan but she is willing to try provided that she has no major allergic reactions etc.  We will give first dose in [place] after chemo preparation visit we will use peripheral veins and I will have her follow-up in a few weeks with my nurse practitioner in [place] to make sure she is tolerating this well and to set up follow-up visits in the weeks and months ahead.  Repeat CTs I would ask my nurse practitioner to get ordered for approximately 4 months out from the end of Rituxan.  In the meantime, from the epistaxis standpoint I asked her to follow-up with ENT.   We discussed the protracted immune suppression that would be associated with the Rituxan and the fact that it would likely make vaccinations less helpful but that does not mean she should not get them.  We also discussed the possibility of allergic reactions.  Once all of this is done, she will need to follow-up with whoever is going to take over from Dr. 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    -3/22/2022 through 3/27/2022 inpatient hospitalization for fever after Rituxan initial dose.  No growth on cultures.  CT chest abdomen pelvis other than showing marked splenomegaly and stable retroperitoneal nodes showed nothing else.  She was pancytopenic on admission.  Given 1 unit platelet packed red cell transfusion.  On 3/22/2022 her platelet count was 142,000 and by the evening had dropped to 36,001-day with lactic acidosis.  Hemoglobin dropped to 7.2 x 3 2322 and white count down to 2450 with absolute neutrophil count baldomero of 240 on 3/22/2022 with a neutrophil count that very same day earlier in the day of 2300 before the Rituxan was given.  By 3/27/2022 her white count was 5150, hemoglobin 9.3, platelets 72,000 and neutrophil count was 2840.  During her hospitalization I saw her and we discussed that I would not  risk further Rituxan as it does appear that this was an unusually brisk cytokine reaction from Rituxan.  While in the hospital I had Dr. Justin Kemp see her and he has an appointment to see her on April 12 to check her for fitness for surgery.  Avatrombopag to stimulate marrow production of platelets in preparation for surgery is not generally indicated in the face of nonportal hypertensive thrombocytopenia where her pancytopenia is both due to marrow involvement and also cancer-induced splenomegaly.    -4/12/2022 [facility] oncology follow-up: Patient states she saw Dr. Kemp yesterday and he apparently is coordinating the pneumococcal vaccinations and the Haemophilus influenza B and meningococcal vaccinations.  She thinks she got the Pneumovax with Dr. Isidro recently already and that Dr. Kemp is coordinating this with Dr. Isidro but I am not sure of that and I have reached out to Dr. Kemp who is currently scrubbed to make sure this is getting orchestrated.  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  I would never test her with another anti-CD20 antibody like Rituxan or its cousins.  She needs thorough vaccination presplenectomy.  I will defer to Dr. Kemp to orchestrate as I am not sure whether he is having Dr. Isidro do that or whether he himself is ordering this but I will have the patient close that loop with Dr. Kemp to be certain they understand how to proceed.  Her blood counts last week were entirely normal with Dr. Isidro.  The primary reason for splenectomy at this point is not because of her counts but because of the massive splenomegaly causing abdominal discomforts and pushing of her viscera.  Dr. Kemp is aware of the risks of adhesions  but will, according to the patient, attempt laparoscopic approach but if that does not work he will convert to an open procedure.  I will see her back in about a month with blood counts prior to return to see how she is doing postsplenectomy.    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  The histologic appearance and immunohistochemical staining pattern is felt to be most consistent with a marginal zone lymphoma, no evidence of large cell transformation identified.    -9/1/2022 [facility] Oncology clinic follow-up: Mahi had open splenectomy on 7/20/2022 with a fairly tumultuous recovery with blood loss, prolonged intubation, pneumothorax, she was discharged on 8/4/2022 to rehab and is currently in rehab but getting stronger every day and hopes to go home this week with her daughter.  She looks great.  I have reviewed her CBC from 8/17/2022 that shows WBC 11.58, hemoglobin 12.3, hematocrit 42.6%, platelet count 512,000.  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  We will continue to monitor with CBC again in 3 months and I have ordered that today.  She had all of her presplenectomy vaccinations, she is waiting for the new COVID booster and will get that as soon as it is available.    -12/1/2022 white count 11,000 hemoglobin 11.6 platelets 446,000 with absolute lymphocyte count 4080 with absolute neutrophil count 4870.    -12/13/2022 [facility] hematology oncology follow-up: Counts are doing well.  No palpable cervical axillary or inguinal adenopathy.  No plan for any further imaging in the absence of symptoms.  Counts doing well postsplenectomy.  We will repeat CBC prior to return to my nurse practitioner in 3 months and we will see her  once a quarter.    -3/23/2023 [facility] Hematology/Oncology clinic follow-up: Mahi continues to do well, no evidence of progression of lymphoma.  She has no palpable lymphadenopathy.  CBC is unremarkable with WBC 11,900, hemoglobin 12.5, hematocrit 38%, platelet count 437,000, ANC 5.35.  She has had no illnesses since we saw her last.  She does have some hair thinning but no hien alopecia.  I have asked her to discuss with her dermatologist any recommendations.  Her nails are short but appear very healthy. She is on levothyroxine for hypothyroidism and this is adjusted and monitored by Dr. Isidro.  We will continue to monitor with CBC in 3 months prior to return and I have ordered that today.    -6/29/2023 [facility] Hematology/Oncology clinic follow-up: Mahi overall seems to be doing well, her current CBC is unremarkable with WBC 10.30, hemoglobin 11.6, hematocrit 32.9%, platelet count 559,000, ANC 4.78.  I am concerned with the bullous skin lesions she is describing and currently has on her left calf.  I have put a picture in her chart under media.  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  The skin lesions have apparently been occurring over what sounds like about 11 years, she has been hospitalized on a few occasions and treated for cellulitis.  She follows with Dr. Vidhya Araya and I have reached out to their office to see if we can get her seen now to biopsy her current skin lesion while it is active.  Her follow-up with Dr. Araya is not until October, I discussed with Mahi that she needs to be seen sooner.  Otherwise we will plan on seeing her back in 3 months for follow-up with repeat CBC and I have ordered that today.  We will see her sooner if she has any concerns and she knows to reach out to us.    -9/28/2023 [facility] Oncology clinic follow-up: Mahi engle is doing well, CBC as  shown above is unremarkable with WBC 9.83, hemoglobin 12.6, hematocrit 36.5%, platelet count 443,000, ANC 4.58.  She currently thinks that she may have a stomach bug as she has some intermittent diarrhea with abdominal cramping and her daughter is experiencing the same symptoms.  She is afebrile.  She will monitor and let us know if she has no improvement.  She did follow-up with GI earlier this year to discuss whether or not she would undergo another colonoscopy, she at that time decided that she did not want to undergo further colonoscopy unless it is absolutely necessary.  I discussed with her that if her abdominal symptoms did not resolve she may want to consider but currently she has no anemia or other worrisome symptoms.  At the age of 83 I would not press.  She has been following with dermatology regarding skin lesions, I did review note from office visit 7/5/2023 and biopsies were done but I do not have those pathology reports, we will reach out to try and get those.  We will continue to see her quarterly with CBC.    -1/2/2024 [facility] oncology clinic follow-up: 12/21/2023 CBC normal with unremarkable differential.  White count 10,350 platelets 143,000 hemoglobin 13.2.  No somatic complaints to suggest progressive lymphoma.  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  No rash presently.  Recently exposed to COVID but no symptoms. No plans for imaging in the absence of symptoms and will check CBC quarterly.       Marginal zone lymphoma of spleen   4/25/2016 Initial Diagnosis    Lymphoma, low grade     5/30/2017 Imaging    CT chest/abdomen/pelvis no obvious pulmonary nodules identified, no pleural effusions.  Stable appearance of adenopathy within the abdomen compared to earlier study as well as splenomegaly.     12/6/2017 Imaging    CT chest/abdomen/pelvis stable, essentially unchanged prominent mediastinal lymph nodes, unchanged right lower lobe medial basal segmental focal fibrotic  findings.  Scattered noncalcified small lung nodules appear unchanged.  Stable spine or megaly.  Scattered prominent retroperitoneal lymph nodes now appear smaller.  Unchanged spinal findings consistent with diffuse idiopathic skeletal hyperostosis and prior operative intervention at L4-5.       6/8/2018 Imaging    CT chest abdomen and pelvis without contrast: Stable, no significant change in the appearance compared to earlier study from December 6, 2017.     3/1/2019 Imaging    CT chest, abdomen and pelvis impression: No significant interval change from last year.  Stable appearance of the significantly enlarged spleen which measures up to nearly 20 x 12 cm.  Moderate, scattered upper abdominal adenopathy with innumerable scattered gastrohepatic, retroperitoneal, and mesenteric nodes measuring roughly between 1-2 cm are stable as well.  No significant progression.  Mild mediastinal adenopathy appears stable as well.     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  Colonoscopy showed hemorrhoids, diverticuli, 11 mm proximal ascending colon flat polyp.  Dr. 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     3/27/2019 Imaging    PET shows mild hypermetabolism within the lymph nodes to the left of the proximal third of the esophagus as well as near the tracheal bifurcation and no other area is of abnormal hypermetabolism.  Splenomegaly stable.  She has significant carotid stenosis especially on the right.     2/1/2021 Imaging    PET IMPRESSION:  Negative PET/CT scan. No evidence of recurrent or active  lymphoma. Enlargement seen of the spleen.     3/4/2022 Imaging    -3/3/2022 data:  Hemoglobin hemoglobin 9.8 with MCV of 91.9 and platelets slightly low 128,000 with normal white  count 7100.  Unremarkable differential.    Creatinine 1.5 glucose 160 sodium 129 with potassium 4.7 and chloride 94 otherwise unremarkable CMP.  C-reactive protein normal 1.7.  Sedimentation rate 31, upper limit 30.  INR normal 0.99 with PTT 26.1.  100 mg/dL M spike on SPEP.  Normal quantitative immunoglobulins except for decreased IgE.  Serum immunofixation electrophoresis was unclear as to monoclonality.  Serum kappa 33.8 lambda 27.4 both minimally elevated with normal ratio 1.23.  Serum viscosity normal 1.6.    -3/4/2022 CT chest abdomen pelvis without contrast: 2.5 mm noncalcified nodule right upper lobe and right lower lobe for which follow-up in 6 months for stability or resolution is suggested.  No suspicious lung nodules to suggest metastasis.  Massive enlargement of the spleen which is stable compared to June 2021.  1.4 cm and 1.5 cm juan luis hepatic adenopathy.  Stable retroperitoneal adenopathy largest 1.5 cm.  1.8 cm stable IVC node.     3/22/2022 - 3/22/2022 Chemotherapy    OP LYMPHOMA (CLL) RiTUXimab (Weekly X 4)     3/22/2022 Adverse Reaction    -3/22/2022 through 3/27/2022 inpatient hospitalization for fever after Rituxan initial dose.  No growth on cultures.  CT chest abdomen pelvis other than showing marked splenomegaly and stable retroperitoneal nodes showed nothing else.  She was pancytopenic on admission.  Given 1 unit platelet packed red cell transfusion.  On 3/22/2022 her platelet count was 142,000 and by the evening had dropped to 36,001-day with lactic acidosis.  Hemoglobin dropped to 7.2 x 3 2322 and white count down to 2450 with absolute neutrophil count baldomero of 240 on 3/22/2022 with a neutrophil count that very same day earlier in the day of 2300 before the Rituxan was given.  By 3/27/2022 her white count was 5150, hemoglobin 9.3, platelets 72,000 and neutrophil count was 2840.  During her hospitalization I saw her and we discussed that I would not risk further Rituxan as it does appear  that this was an unusually brisk cytokine reaction from Rituxan.  While in the hospital I had Dr. Justin Kemp see her and he has an appointment to see her on April 12 to check her for fitness for surgery.  Avatrombopag to stimulate marrow production of platelets in preparation for surgery is not generally indicated in the face of nonportal hypertensive thrombocytopenia where her pancytopenia is both due to marrow involvement and also cancer-induced splenomegaly.     7/20/2022 Surgery    Surgery       Procedure:  Open splenectomy, distal pancreatectomy              HISTORY OF PRESENT ILLNESS:  The patient is a 83 y.o. female, here for follow up on management of splenic marginal zone lymphoma status post splenectomy July 2022.  Mahi reports that over the past several months she has had a headache almost daily, she states that it is mostly on her left side and seems to be getting worse.  She denies any nausea or vomiting.  She has chronic issues with her bowel and occasional diarrhea, this is not new and not changed over time.  She does feel a little off balance at times.  She denies any vision changes, she is an avid reader.  She states that she is due anytime now for her annual ophthalmology exam.    Past Medical History:   Diagnosis Date    Anemia 09/18/2015    Cataract     h/o    Disease of thyroid gland     Diverticulitis     DVT (deep venous thrombosis)     left lower leg    GERD (gastroesophageal reflux disease)     Gout     History of basal cell carcinoma     History of transfusion 2022    1 unit at [facility], pt denies reactions    Hypertension          IBS (irritable bowel syndrome)     Non Hodgkin's lymphoma     Splenomegaly     Urinary incontinence      Past Surgical History:   Procedure Laterality Date    APPENDECTOMY      BACK SURGERY      lumbar    CHOLECYSTECTOMY      COLON RESECTION  2021    EXPLORATORY LAPAROTOMY N/A 7/21/2022    Procedure: ABDOMINAL WASHOUT WITH CLOSURE;  Surgeon: Jayme Kemp  "MD;  Location:  [facility];  Service: General;  Laterality: N/A;    HYSTERECTOMY      jaja    KIDNEY SURGERY      kidney stones    ORIF ANKLE FRACTURE Right     h/o    SPLENECTOMY N/A 7/20/2022    Procedure: SPLENECTOMY OPEN, DISTAL PANCREATECTOMY;  Surgeon: Jayme Kemp MD;  Location:  [facility];  Service: General;  Laterality: N/A;    TOTAL HIP ARTHROPLASTY Bilateral     h/o       Allergies   Allergen Reactions    Penicillins Swelling and Rash     Passed out    Rituxan [Rituximab] Anaphylaxis    Hydrocodone Itching and Rash     Severe itching    Sulfa Antibiotics Swelling     Lips swell    Bactrim [Sulfamethoxazole-Trimethoprim] Itching     Fever and itching    Chlorhexidine Gluconate Rash    Keflex [Cephalexin] Rash    Latex Rash    Lortab [Hydrocodone-Acetaminophen] Rash    Magnesium Citrate Rash    Neosporin Original [Bacitracin-Neomycin-Polymyxin] Rash       Family History and Social History reviewed and changed as necessary    REVIEW OF SYSTEM:   Persistent daily headache mainly left-sided    PHYSICAL EXAM:  Well-developed, well-nourished appearing female in no distress  No focal neurological deficits.  Pupils equal and reactive  Ambulates without assistant, normal gait  No palpable adenopathy  Respirations regular and unlabored  Abdomen soft, mildly tender on the right upper quadrant    Vitals:    04/04/24 1307   BP: 150/73   Pulse: 67   Resp: 18   Temp: 97.5 °F (36.4 °C)   SpO2: 98%   Weight: 81.6 kg (180 lb)   Height: 167.6 cm (66\")     Vitals:    04/04/24 1307   PainSc:   4   PainLoc: Head  Comment: headache          ECOG score: 2           Vitals reviewed.  Labs reviewed    ECOG: (2) Ambulatory & Capable of Self Care, Unable to Carry Out Work Activity, Up & About Greater Than 50% of Waking Hours    Lab Results   Component Value Date    HGB 13.2 12/21/2023    HCT 38.1 12/21/2023    MCV 94.1 12/21/2023     12/21/2023    WBC 10.35 12/21/2023    NEUTROABS 5.60 12/21/2023    LYMPHSABS CANCELED " 12/21/2023    LYMPHSABS 2.96 12/21/2023    MONOSABS 1.16 (H) 12/21/2023    EOSABS CANCELED 12/21/2023    EOSABS 5.1 12/21/2023    EOSABS 0.53 (H) 12/21/2023    BASOSABS CANCELED 12/21/2023    BASOSABS 0.10 12/21/2023       Lab Results   Component Value Date    GLUCOSE 94 08/04/2022    BUN 17 08/04/2022    CREATININE 0.83 08/04/2022     (L) 08/04/2022    K 5.0 08/04/2022     08/04/2022    CO2 21.0 (L) 08/04/2022    CALCIUM 8.3 (L) 08/04/2022    PROTEINTOT 4.7 (L) 08/04/2022    ALBUMIN 2.70 (L) 08/04/2022    BILITOT 0.4 08/04/2022    ALKPHOS 88 08/04/2022    AST 32 08/04/2022    ALT 20 08/04/2022             ASSESSMENT & PLAN:  1. 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  2. History of iron deficiency anemia with last colonoscopy 11/2014, accordingto the patient, with a history of ulcerative colitis treated by Dr. Edge.  3. History of basal cell carcinomas.   4. Cataracts.   5. Remote history of deep vein thrombosis with vena cava filter in place.   6. Diverticulitis by history.   7. History of gout.   8. Hypertension.   9. History of 3 different kidney surgeries.   10. Cholecystectomy.   11. Chronic neuropathy.   12. History of ARMAAN.   13. History of back surgeries.   14. History of total hip replacement.   15. History of ankle fracture.   16. History of appendectomy.   17. 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 Edge.  18.  Epistaxis  19.  Stage III chronic kidney disease with hyponatremia on 48 ounce per day fluid restriction per Dr. Wooten 10/11/2022  20.  New, persistent daily headache x several months    Oncology  history timeline:  a) Splenomegaly on hospitalization 02/2016. She had a week of nausea, vomiting and diarrhea with dehydration. The nausea, vomiting, and dehydration have resolved but on CT they found retroperitoneal adenopathy with some splenomegaly that was not bulky. She denies any ongoing fevers or chills or bed drenching night sweats or unexplained weight loss and no change in the color, caliber or consistency of her stools. She had a slightly elevated beta-2 microglobulin of 3.5 but no monoclonal spike and a white count 5300, hemoglobin 11.5, platelets 158,000, sedimentation rate 45, C-reactive protein 13.6 and a normal LDH of 318 with normal liver enzymes. Slight hyponatremia with sodium of 134 and hypokalemia with potassium of 3.3 on 02/18/2016. She had slight decrease in immunoglobulin G to 580 and immunoglobulin A to 35 on testing while in the hospital.   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 There were no features to suggest hairy cell lymphoma or a mantle cell lymphoma and no large cell population. This was CD5 negative, CD10 negative, kappa clonal negative and bright for CD20 with dim surface light chain expression and bright cytoplasm, CD45 bright. The CD38 negative for plasmacytic differentiation with a hemoglobin of 11.4, white count 7200 with normal differential, and platelets of 163,000.   c) Liver, spleen ultrasound showed splenomegaly of 19.6 cm maximum dimension with 1.6 cm slightly dilated portal vein.  D)-3/20/2019 Dr. 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  Colonoscopy showed hemorrhoids, diverticuli, 11 mm proximal ascending colon flat polyp.  Dr. Edge relayed to the patient that this was a high-grade dysplasia in the  Negrete's for which he is sending to  for ablation as well as needing removal of a tubulovillous adenoma unable to be removed endoscopically.   -3/27/2019 PET shows mild hypermetabolism within the lymph nodes to the left of the proximal third of the esophagus as well as near the tracheal bifurcation and no other area is of abnormal hypermetabolism.  Splenomegaly stable.  She has significant carotid stenosis especially on the right.  -1/12/2021 white count 4200 hemoglobin 10 platelets 114,000 unremarkable differential.  CMP unremarkable  -4/28/2021 Dr. Augustine performed attempted laparoscopic partial colectomy with subsequent open right hemicolectomy with ileocolonic stenosis but no residual polyp found in the proximal ascending colon after resection of the right colon and a small polyp in the distal part of the ascending colon.  Pathology showed residual adenomatous polyp tubular adenoma without high-grade dysplasia or invasive carcinoma and 13 benign lymph nodes.  Additional colonic segment benign with 4 benign lymph nodes.  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  Get her blood counts today and just prior to a minute evaluation in 3 months.  No plans for scans in the absence of symptoms.  I will keep in mind the potential of palliative splenic radiation if she develops early satiety or other abdominal complaints from splenomegaly.    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    -2/1/2021 PET negative.  Spleen is unremarkable with homogeneous uptake.    -2/5/2021 EGD and colonoscopy with Dr. Edge showed Negrete's esophagus, hemorrhoids, diverticuli, proximal ascending colon polyp.  No  mention of any colitis.    -4/28/2021 attempted laparoscopic partial colectomy with subsequent open right hemicolectomy with ileocolonic stenosis but no residual polyp found in the proximal ascending colon after resection of the right colon and a small polyp in the distal part of the ascending colon.  Pathology showed residual adenomatous polyp tubular adenoma without high-grade dysplasia or invasive carcinoma and 13 benign lymph nodes.  Additional colonic segment benign with 4 benign lymph nodes.    -3/3/2022 data:  Hemoglobin hemoglobin 9.8 with MCV of 91.9 and platelets slightly low 128,000 with normal white count 7100.  Unremarkable differential.    Creatinine 1.5 glucose 160 sodium 129 with potassium 4.7 and chloride 94 otherwise unremarkable CMP.  C-reactive protein normal 1.7.  Sedimentation rate 31, upper limit 30.  INR normal 0.99 with PTT 26.1.  100 mg/dL M spike on SPEP.  Normal quantitative immunoglobulins except for decreased IgE.  Serum immunofixation electrophoresis was unclear as to monoclonality.  Serum kappa 33.8 lambda 27.4 both minimally elevated with normal ratio 1.23.  Serum viscosity normal 1.6.    -3/4/2022 CT chest abdomen pelvis without contrast: 2.5 mm noncalcified nodule right upper lobe and right lower lobe for which follow-up in 6 months for stability or resolution is suggested.  No suspicious lung nodules to suggest metastasis.  Massive enlargement of the spleen which is stable compared to June 2021.  1.4 cm and 1.5 cm juan luis hepatic adenopathy.  Stable retroperitoneal adenopathy largest 1.5 cm.  1.8 cm stable IVC node.    -3/15/2022 [facility] medical oncology follow-up visit: She is not hyperviscous and I doubt her epistaxis is related to the lymphoma or hyperviscosity and I do not think she has Waldenstrom's.  However, I do think she has massive splenomegaly and while that has been present for quite some time, I nonetheless think that a lot of her abdominal discomfort is due to the  massive splenomegaly pushing her bowel to the right and she could have some bowel involvement of lymphoma that we could put her through endoscopy again but she had this back a little over a year ago with no obvious bowel involvement and no obvious colitis.  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  She would not consent to anything stronger than that regardless and it took a little convincing to get her to go along with this plan but she is willing to try provided that she has no major allergic reactions etc.  We will give first dose in [place] after chemo preparation visit we will use peripheral veins and I will have her follow-up in a few weeks with my nurse practitioner in [place] to make sure she is tolerating this well and to set up follow-up visits in the weeks and months ahead.  Repeat CTs I would ask my nurse practitioner to get ordered for approximately 4 months out from the end of Rituxan.  In the meantime, from the epistaxis standpoint I asked her to follow-up with ENT.   We discussed the protracted immune suppression that would be associated with the Rituxan and the fact that it would likely make vaccinations less helpful but that does not mean she should not get them.  We also discussed the possibility of allergic reactions.  Once all of this is done, she will need to follow-up with whoever is going to take over from Dr. 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    -3/22/2022 through 3/27/2022 inpatient hospitalization for fever after Rituxan initial dose.  No growth on cultures.  CT chest abdomen pelvis other than showing marked splenomegaly and stable retroperitoneal nodes showed nothing else.  She  was pancytopenic on admission.  Given 1 unit platelet packed red cell transfusion.  On 3/22/2022 her platelet count was 142,000 and by the evening had dropped to 36,001-day with lactic acidosis.  Hemoglobin dropped to 7.2 x 3 2322 and white count down to 2450 with absolute neutrophil count baldomero of 240 on 3/22/2022 with a neutrophil count that very same day earlier in the day of 2300 before the Rituxan was given.  By 3/27/2022 her white count was 5150, hemoglobin 9.3, platelets 72,000 and neutrophil count was 2840.  During her hospitalization I saw her and we discussed that I would not risk further Rituxan as it does appear that this was an unusually brisk cytokine reaction from Rituxan.  While in the hospital I had Dr. Justin Kemp see her and he has an appointment to see her on April 12 to check her for fitness for surgery.  Avatrombopag to stimulate marrow production of platelets in preparation for surgery is not generally indicated in the face of nonportal hypertensive thrombocytopenia where her pancytopenia is both due to marrow involvement and also cancer-induced splenomegaly.    -4/12/2022 [facility] medical oncology follow-up: Patient states she saw Dr. Kemp yesterday and he apparently is coordinating the pneumococcal vaccinations and the Haemophilus influenza B and meningococcal vaccinations.  She thinks she got the Pneumovax with Dr. Isidro recently already and that Dr. Kemp is coordinating this with Dr. Isidro but I am not sure of that and I have reached out to Dr. Kemp who is currently scrubbed to make sure this is getting orchestrated.  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  I would never test her with another anti-CD20 antibody like Rituxan or its  cousins.  She needs thorough vaccination presplenectomy.  I will defer to Dr. Kemp to orchestrate as I am not sure whether he is having Dr. Isidro do that or whether he himself is ordering this but I will have the patient close that loop with Dr. Kemp to be certain they understand how to proceed.  Her blood counts last week were entirely normal with Dr. Isidro.  The primary reason for splenectomy at this point is not because of her counts but because of the massive splenomegaly causing abdominal discomforts and pushing of her viscera.  Dr. Kemp is aware of the risks of adhesions but will, according to the patient, attempt laparoscopic approach but if that does not work he will convert to an open procedure.  I will see her back in about a month with blood counts prior to return to see how she is doing postsplenectomy.    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  The histologic appearance and immunohistochemical staining pattern is felt to be most consistent with a marginal zone lymphoma, no evidence of large cell transformation identified.    -9/1/2022 [facility] Oncology clinic follow-up: Mahi had open splenectomy on 7/20/2022 with a fairly tumultuous recovery with blood loss, prolonged intubation, pneumothorax, she was discharged on 8/4/2022 to rehab and is currently in rehab but getting stronger every day and hopes to go home this week with her daughter.  She looks great.  I have reviewed her CBC from 8/17/2022 that shows WBC 11.58, hemoglobin 12.3, hematocrit 42.6%, platelet count 512,000.  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  We will continue to monitor with CBC again in 3 months and I have ordered that today.  She  had all of her presplenectomy vaccinations, she is waiting for the new COVID booster and will get that as soon as it is available.    -12/1/2022 white count 11,000 hemoglobin 11.6 platelets 446,000 with absolute lymphocyte count 4080 with absolute neutrophil count 4870.    -12/13/2022 [facility] hematology oncology follow-up: Counts are doing well.  No palpable cervical axillary or inguinal adenopathy.  No plan for any further imaging in the absence of symptoms.  Counts doing well postsplenectomy.  We will repeat CBC prior to return to my nurse practitioner in 3 months and we will see her once a quarter.    -3/23/2023 [facility] Hematology/Oncology clinic follow-up: Mahi continues to do well, no evidence of progression of lymphoma.  She has no palpable lymphadenopathy.  CBC is unremarkable with WBC 11,900, hemoglobin 12.5, hematocrit 38%, platelet count 437,000, ANC 5.35.  She has had no illnesses since we saw her last.  She does have some hair thinning but no hien alopecia.  I have asked her to discuss with her dermatologist any recommendations.  Her nails are short but appear very healthy. She is on levothyroxine for hypothyroidism and this is adjusted and monitored by Dr. Isidro.  We will continue to monitor with CBC in 3 months prior to return and I have ordered that today.    -6/29/2023 [facility] Hematology/Oncology clinic follow-up: Mahi overall seems to be doing well, her current CBC is unremarkable with WBC 10.30, hemoglobin 11.6, hematocrit 32.9%, platelet count 559,000, ANC 4.78.  I am concerned with the bullous skin lesions she is describing and currently has on her left calf.  I have put a picture in her chart under media.  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  The skin lesions have apparently been occurring over what sounds like about 11 years, she has been hospitalized on a few  occasions and treated for cellulitis.  She follows with Dr. Vidhya Araya and I have reached out to their office to see if we can get her seen now to biopsy her current skin lesion while it is active.  Her follow-up with Dr. Araya is not until October, I discussed with Mahi that she needs to be seen sooner.  Otherwise we will plan on seeing her back in 3 months for follow-up with repeat CBC and I have ordered that today.  We will see her sooner if she has any concerns and she knows to reach out to us.    -9/28/2023 [facility] follow-up: Mahi overall is doing well, CBC as shown above is unremarkable with WBC 9.83, hemoglobin 12.6, hematocrit 36.5%, platelet count 443,000, ANC 4.58.  She currently thinks that she may have a stomach bug as she has some intermittent diarrhea with abdominal cramping and her daughter is experiencing the same symptoms.  She is afebrile.  She will monitor and let us know if she has no improvement.  She did follow-up with GI earlier this year to discuss whether or not she would undergo another colonoscopy, she at that time decided that she did not want to undergo further colonoscopy unless it is absolutely necessary.  I discussed with her that if her abdominal symptoms did not resolve she may want to consider but currently she has no anemia or other worrisome symptoms.  At the age of 83 I would not press.  She has been following with dermatology regarding skin lesions, I did review note from office visit 7/5/2023 and biopsies were done but I do not have those pathology reports, we will reach out to try and get those.  We will continue to see her quarterly with CBC.    -1/2/2024 [facility] clinic follow-up: 12/21/2023 CBC normal with unremarkable differential.  White count 10,350 platelets 143,000 hemoglobin 13.2.  No somatic complaints to suggest progressive lymphoma.  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  No rash presently.  Recently exposed to  COVID but no symptoms.  No plans for imaging in the absence of symptoms and will check CBC quarterly.    -4/4/2024 [facility] [place] oncology clinic follow-up: Mahi has had a persistent headache almost daily for the last several months, mainly on her left side without any visual changes or nausea or other associated symptoms.  We discussed that this could be from medication changes, she reports that she increased her mesalamine several weeks ago up to 800 mg twice daily and they seem a little bit worse since that time.  Also could be seasonal allergies, she has tried to take Flonase in the past however does not tolerate it as it causes her to have nosebleeds.  I recommend she try over-the-counter generic Claritin or Zyrtec for seasonal allergies.  I will get a CT of her head without contrast for further evaluation.  She has had no falls or trauma.  It is also about time for her annual ophthalmology exam, she is an avid reader and perhaps may need a change in eyeglass prescription which could also cause a headache.  It would be unusual for her low-grade lymphoma to involve her CNS and I would not want to put her through a lumbar puncture at this point.  Her CBC looks good with WBC of 10.14, hemoglobin 12.2, hematocrit 36.4%, platelet count 461,000.  I will see her back in a few weeks to go over the results of her CT head without contrast.    I spent 31 minutes caring for Mahi on this date of service. This time includes time spent by me in the following activities: preparing for the visit, reviewing tests, performing a medically appropriate examination and/or evaluation, ordering medications, tests, or procedures, documenting information in the medical record, independently interpreting results and communicating that information with the patient/family/caregiver, and care coordination.     Chantell Kyle, APRN    04/04/2024

## 2024-04-05 LAB
BASOPHILS # BLD MANUAL: 0.11 10*3/MM3 (ref 0–0.2)
BASOPHILS NFR BLD MANUAL: 1.1 % (ref 0–1.5)
DIFFERENTIAL COMMENT: ABNORMAL
EOSINOPHIL # BLD MANUAL: 0.85 10*3/MM3 (ref 0–0.4)
EOSINOPHIL NFR BLD MANUAL: 8.4 % (ref 0.3–6.2)
ERYTHROCYTE [DISTWIDTH] IN BLOOD BY AUTOMATED COUNT: 13.2 % (ref 12.3–15.4)
HCT VFR BLD AUTO: 36.4 % (ref 34–46.6)
HGB BLD-MCNC: 12.2 G/DL (ref 12–15.9)
LYMPHOCYTES # BLD MANUAL: 2.88 10*3/MM3 (ref 0.7–3.1)
LYMPHOCYTES NFR BLD MANUAL: 26.3 % (ref 19.6–45.3)
MCH RBC QN AUTO: 31.5 PG (ref 26.6–33)
MCHC RBC AUTO-ENTMCNC: 33.5 G/DL (ref 31.5–35.7)
MCV RBC AUTO: 94.1 FL (ref 79–97)
MONOCYTES # BLD MANUAL: 1.18 10*3/MM3 (ref 0.1–0.9)
MONOCYTES NFR BLD MANUAL: 11.6 % (ref 5–12)
NEUTROPHILS # BLD MANUAL: 5.12 10*3/MM3 (ref 1.7–7)
NEUTROPHILS NFR BLD MANUAL: 50.5 % (ref 42.7–76)
NRBC BLD AUTO-RTO: 0.7 /100 WBC (ref 0–0.2)
PLATELET # BLD AUTO: 461 10*3/MM3 (ref 140–450)
PLATELET BLD QL SMEAR: ABNORMAL
RBC # BLD AUTO: 3.87 10*6/MM3 (ref 3.77–5.28)
RBC MORPH BLD: ABNORMAL
WBC # BLD AUTO: 10.14 10*3/MM3 (ref 3.4–10.8)

## 2024-04-14 ENCOUNTER — HOSPITAL ENCOUNTER (OUTPATIENT)
Dept: CT IMAGING | Facility: HOSPITAL | Age: 84
Discharge: HOME | End: 2024-04-14
Admitting: NURSE PRACTITIONER
Payer: MEDICARE

## 2024-04-14 DIAGNOSIS — C83.07 MARGINAL ZONE LYMPHOMA OF SPLEEN: ICD-10-CM

## 2024-04-14 PROCEDURE — 70450 CT HEAD/BRAIN W/O DYE: CPT

## 2024-04-18 ENCOUNTER — OFFICE VISIT (OUTPATIENT)
Dept: ONCOLOGY | Facility: CLINIC | Age: 84
End: 2024-04-18
Payer: MEDICARE

## 2024-04-18 VITALS
WEIGHT: 179 LBS | RESPIRATION RATE: 18 BRPM | OXYGEN SATURATION: 96 % | BODY MASS INDEX: 28.77 KG/M2 | SYSTOLIC BLOOD PRESSURE: 164 MMHG | TEMPERATURE: 97.8 F | HEART RATE: 61 BPM | HEIGHT: 66 IN | DIASTOLIC BLOOD PRESSURE: 73 MMHG

## 2024-04-18 DIAGNOSIS — C83.07 MARGINAL ZONE LYMPHOMA OF SPLEEN: Primary | ICD-10-CM

## 2024-04-18 PROCEDURE — 1160F RVW MEDS BY RX/DR IN RCRD: CPT | Performed by: NURSE PRACTITIONER

## 2024-04-18 PROCEDURE — 1126F AMNT PAIN NOTED NONE PRSNT: CPT | Performed by: NURSE PRACTITIONER

## 2024-04-18 PROCEDURE — 1159F MED LIST DOCD IN RCRD: CPT | Performed by: NURSE PRACTITIONER

## 2024-04-18 PROCEDURE — 99213 OFFICE O/P EST LOW 20 MIN: CPT | Performed by: NURSE PRACTITIONER

## 2024-04-18 RX ORDER — CLINDAMYCIN HYDROCHLORIDE 150 MG/1
CAPSULE ORAL
COMMUNITY
Start: 2024-04-17

## 2024-04-18 RX ORDER — MESALAMINE 800 MG/1
TABLET, DELAYED RELEASE ORAL
COMMUNITY
Start: 2024-04-16

## 2024-04-18 NOTE — PROGRESS NOTES
CHIEF COMPLAINT: Improving headaches    Problem List:  Oncology/Hematology History Overview Note   1. 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  2. History of iron deficiency anemia with last colonoscopy 11/2014, accordingto the patient, with a history of ulcerative colitis treated by Dr. Edge.  3. History of basal cell carcinomas.   4. Cataracts.   5. Remote history of deep vein thrombosis with vena cava filter in place.   6. Diverticulitis by history.   7. History of gout.   8. Hypertension.   9. History of 3 different kidney surgeries.   10. Cholecystectomy.   11. Chronic neuropathy.   12. History of ARMAAN.   13. History of back surgeries.   14. History of total hip replacement.   15. History of ankle fracture.   16. History of appendectomy.   17. 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 Edge.  18.  Epistaxis  19.  Stage III chronic kidney disease with hyponatremia on 48 ounce per day fluid restriction per Dr. Wooten 10/11/2022    Oncology history timeline:  a) Splenomegaly on hospitalization 02/2016. She had a week of nausea, vomiting and diarrhea with dehydration. The nausea, vomiting, and dehydration have resolved but on CT they found retroperitoneal adenopathy with some splenomegaly that was not bulky. She denies any ongoing fevers or chills or bed drenching night sweats or unexplained weight loss and no change in the color, caliber or consistency of her stools. She had a slightly elevated beta-2 microglobulin of 3.5 but no monoclonal spike and a white count 5300, hemoglobin 11.5, platelets 158,000, sedimentation rate 45,  C-reactive protein 13.6 and a normal LDH of 318 with normal liver enzymes. Slight hyponatremia with sodium of 134 and hypokalemia with potassium of 3.3 on 02/18/2016. She had slight decrease in immunoglobulin G to 580 and immunoglobulin A to 35 on testing while in the hospital.   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 There were no features to suggest hairy cell lymphoma or a mantle cell lymphoma and no large cell population. This was CD5 negative, CD10 negative, kappa clonal negative and bright for CD20 with dim surface light chain expression and bright cytoplasm, CD45 bright. The CD38 negative for plasmacytic differentiation with a hemoglobin of 11.4, white count 7200 with normal differential, and platelets of 163,000.   c) Liver, spleen ultrasound showed splenomegaly of 19.6 cm maximum dimension with 1.6 cm slightly dilated portal vein.  D)-3/20/2019 Dr. 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  Colonoscopy showed hemorrhoids, diverticuli, 11 mm proximal ascending colon flat polyp.  Dr. Edge relayed to the patient that this was a high-grade dysplasia in the Negrete's for which he is sending to [facility] for ablation as well as needing removal of a tubulovillous adenoma unable to be removed endoscopically.   -3/27/2019 PET shows mild hypermetabolism within the lymph nodes to the left of the proximal third of the esophagus as well as near the tracheal bifurcation and no other area is of abnormal hypermetabolism.  Splenomegaly stable.  She has significant carotid stenosis especially on the right.  -1/12/2021 white count 4200 hemoglobin 10 platelets 114,000 unremarkable differential.  CMP unremarkable  -4/28/2021 Dr. Augustine performed attempted  laparoscopic partial colectomy with subsequent open right hemicolectomy with ileocolonic stenosis but no residual polyp found in the proximal ascending colon after resection of the right colon and a small polyp in the distal part of the ascending colon.  Pathology showed residual adenomatous polyp tubular adenoma without high-grade dysplasia or invasive carcinoma and 13 benign lymph nodes.  Additional colonic segment benign with 4 benign lymph nodes.  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  Get her blood counts today and just prior to a minute evaluation in 3 months.  No plans for scans in the absence of symptoms.  I will keep in mind the potential of palliative splenic radiation if she develops early satiety or other abdominal complaints from splenomegaly.    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    -2/1/2021 PET negative.  Spleen is unremarkable with homogeneous uptake.    -2/5/2021 EGD and colonoscopy with Dr. Edge showed Negrete's esophagus, hemorrhoids, diverticuli, proximal ascending colon polyp.  No mention of any colitis.    -4/28/2021 attempted laparoscopic partial colectomy with subsequent open right hemicolectomy with ileocolonic stenosis but no residual polyp found in the proximal ascending colon after resection of the right colon and a small polyp in the distal part of the ascending colon.  Pathology showed residual adenomatous polyp tubular adenoma without high-grade dysplasia or invasive carcinoma and 13 benign lymph nodes.  Additional colonic segment benign with 4 benign lymph nodes.    -3/3/2022 data:  Hemoglobin hemoglobin 9.8 with MCV of 91.9 and platelets slightly  low 128,000 with normal white count 7100.  Unremarkable differential.    Creatinine 1.5 glucose 160 sodium 129 with potassium 4.7 and chloride 94 otherwise unremarkable CMP.  C-reactive protein normal 1.7.  Sedimentation rate 31, upper limit 30.  INR normal 0.99 with PTT 26.1.  100 mg/dL M spike on SPEP.  Normal quantitative immunoglobulins except for decreased IgE.  Serum immunofixation electrophoresis was unclear as to monoclonality.  Serum kappa 33.8 lambda 27.4 both minimally elevated with normal ratio 1.23.  Serum viscosity normal 1.6.    -3/4/2022 CT chest abdomen pelvis without contrast: 2.5 mm noncalcified nodule right upper lobe and right lower lobe for which follow-up in 6 months for stability or resolution is suggested.  No suspicious lung nodules to suggest metastasis.  Massive enlargement of the spleen which is stable compared to June 2021.  1.4 cm and 1.5 cm juan luis hepatic adenopathy.  Stable retroperitoneal adenopathy largest 1.5 cm.  1.8 cm stable IVC node.    -3/15/2022 [facility] medical oncology follow-up visit: She is not hyperviscous and I doubt her epistaxis is related to the lymphoma or hyperviscosity and I do not think she has Waldenstrom's.  However, I do think she has massive splenomegaly and while that has been present for quite some time, I nonetheless think that a lot of her abdominal discomfort is due to the massive splenomegaly pushing her bowel to the right and she could have some bowel involvement of lymphoma that we could put her through endoscopy again but she had this back a little over a year ago with no obvious bowel involvement and no obvious colitis.  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  She would not consent to anything stronger than that regardless and  it took a little convincing to get her to go along with this plan but she is willing to try provided that she has no major allergic reactions etc.  We will give first dose in [place] after chemo preparation visit we will use peripheral veins and I will have her follow-up in a few weeks with my nurse practitioner in [place] to make sure she is tolerating this well and to set up follow-up visits in the weeks and months ahead.  Repeat CTs I would ask my nurse practitioner to get ordered for approximately 4 months out from the end of Rituxan.  In the meantime, from the epistaxis standpoint I asked her to follow-up with ENT.   We discussed the protracted immune suppression that would be associated with the Rituxan and the fact that it would likely make vaccinations less helpful but that does not mean she should not get them.  We also discussed the possibility of allergic reactions.  Once all of this is done, she will need to follow-up with whoever is going to take over from Dr. 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    -3/22/2022 through 3/27/2022 inpatient hospitalization for fever after Rituxan initial dose.  No growth on cultures.  CT chest abdomen pelvis other than showing marked splenomegaly and stable retroperitoneal nodes showed nothing else.  She was pancytopenic on admission.  Given 1 unit platelet packed red cell transfusion.  On 3/22/2022 her platelet count was 142,000 and by the evening had dropped to 36,001-day with lactic acidosis.  Hemoglobin dropped to 7.2 x 3 2322 and white count down to 2450 with absolute neutrophil count baldomero of 240 on 3/22/2022 with a neutrophil count that very same day earlier in the day of 2300 before the Rituxan was given.  By 3/27/2022 her white count was 5150, hemoglobin 9.3, platelets 72,000 and neutrophil count was 2840.  During her hospitalization I saw her and we discussed that I would not  risk further Rituxan as it does appear that this was an unusually brisk cytokine reaction from Rituxan.  While in the hospital I had Dr. Justin Kemp see her and he has an appointment to see her on April 12 to check her for fitness for surgery.  Avatrombopag to stimulate marrow production of platelets in preparation for surgery is not generally indicated in the face of nonportal hypertensive thrombocytopenia where her pancytopenia is both due to marrow involvement and also cancer-induced splenomegaly.    -4/12/2022 [facility] oncology follow-up: Patient states she saw Dr. Kemp yesterday and he apparently is coordinating the pneumococcal vaccinations and the Haemophilus influenza B and meningococcal vaccinations.  She thinks she got the Pneumovax with Dr. Isidro recently already and that Dr. Kemp is coordinating this with Dr. Isidro but I am not sure of that and I have reached out to Dr. Kemp who is currently scrubbed to make sure this is getting orchestrated.  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  I would never test her with another anti-CD20 antibody like Rituxan or its cousins.  She needs thorough vaccination presplenectomy.  I will defer to Dr. Kemp to orchestrate as I am not sure whether he is having Dr. Isidro do that or whether he himself is ordering this but I will have the patient close that loop with Dr. Kemp to be certain they understand how to proceed.  Her blood counts last week were entirely normal with Dr. Isidro.  The primary reason for splenectomy at this point is not because of her counts but because of the massive splenomegaly causing abdominal discomforts and pushing of her viscera.  Dr. Kemp is aware of the risks of adhesions  but will, according to the patient, attempt laparoscopic approach but if that does not work he will convert to an open procedure.  I will see her back in about a month with blood counts prior to return to see how she is doing postsplenectomy.    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  The histologic appearance and immunohistochemical staining pattern is felt to be most consistent with a marginal zone lymphoma, no evidence of large cell transformation identified.    -9/1/2022 [facility] Oncology clinic follow-up: Mahi had open splenectomy on 7/20/2022 with a fairly tumultuous recovery with blood loss, prolonged intubation, pneumothorax, she was discharged on 8/4/2022 to rehab and is currently in rehab but getting stronger every day and hopes to go home this week with her daughter.  She looks great.  I have reviewed her CBC from 8/17/2022 that shows WBC 11.58, hemoglobin 12.3, hematocrit 42.6%, platelet count 512,000.  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  We will continue to monitor with CBC again in 3 months and I have ordered that today.  She had all of her presplenectomy vaccinations, she is waiting for the new COVID booster and will get that as soon as it is available.    -12/1/2022 white count 11,000 hemoglobin 11.6 platelets 446,000 with absolute lymphocyte count 4080 with absolute neutrophil count 4870.    -12/13/2022 [facility] hematology oncology follow-up: Counts are doing well.  No palpable cervical axillary or inguinal adenopathy.  No plan for any further imaging in the absence of symptoms.  Counts doing well postsplenectomy.  We will repeat CBC prior to return to my nurse practitioner in 3 months and we will see her  once a quarter.    -3/23/[facility] [place] [facility]Oncology [facility] follow-up: Mahi continues to do well, no evidence of progression of lymphoma.  She has no palpable lymphadenopathy.  CBC is unremarkable with WBC 11,900, hemoglobin 12.5, hematocrit 38%, platelet count 437,000, ANC 5.35.  She has had no illnesses since we saw her last.  She does have some hair thinning but no hien alopecia.  I have asked her to discuss with her dermatologist any recommendations.  Her nails are short but appear very healthy. She is on levothyroxine for hypothyroidism and this is adjusted and monitored by Dr. Isidro.  We will continue to monitor with CBC in 3 months prior to return and I have ordered that today.    -6/29/2023 [facility] [place] [facility]ematology[facility]Oncology clinic follow-up: Mahi overall seems to be doing well, her current CBC is unremarkable with WBC 10.30, hemoglobin 11.6, hematocrit 32.9%, platelet count 559,000, ANC 4.78.  I am concerned with the bullous skin lesions she is describing and currently has on her left calf.  I have put a picture in her chart under media.  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  The skin lesions have apparently been occurring over what sounds like about 11 years, she has been hospitalized on a few occasions and treated for cellulitis.  She follows with Dr. Vidhya Araya and I have reached out to their office to see if we can get her seen now to biopsy her current skin lesion while it is active.  Her follow-up with Dr. Araya is not until October, I discussed with Mahi that she needs to be seen sooner.  Otherwise we will plan on seeing her back in 3 months for follow-up with repeat CBC and I have ordered that today.  We will see her sooner if she has any concerns and she knows to reach out to us.    -9/28/2023 [facility] [place] Oncology clinic follow-up: Mahi engle is doing well, CBC as  shown above is unremarkable with WBC 9.83, hemoglobin 12.6, hematocrit 36.5%, platelet count 443,000, ANC 4.58.  She currently thinks that she may have a stomach bug as she has some intermittent diarrhea with abdominal cramping and her daughter is experiencing the same symptoms.  She is afebrile.  She will monitor and let us know if she has no improvement.  She did follow-up with GI earlier this year to discuss whether or not she would undergo another colonoscopy, she at that time decided that she did not want to undergo further colonoscopy unless it is absolutely necessary.  I discussed with her that if her abdominal symptoms did not resolve she may want to consider but currently she has no anemia or other worrisome symptoms.  At the age of 83 I would not press.  She has been following with dermatology regarding skin lesions, I did review note from office visit 7/5/2023 and biopsies were done but I do not have those pathology reports, we will reach out to try and get those.  We will continue to see her quarterly with CBC.    -1/2/2024 [facility] clinic follow-up: 12/21/2023 CBC normal with unremarkable differential.  White count 10,350 platelets 143,000 hemoglobin 13.2.  No somatic complaints to suggest progressive lymphoma.  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  No rash presently.  Recently exposed to COVID but no symptoms. No plans for imaging in the absence of symptoms and will check CBC quarterly.    -4/4/2024 [facility]ology clinic follow-up: Mahi has had a persistent headache almost daily for the last several months, mainly on her left side without any visual changes or nausea or other associated symptoms.  We discussed that this could be from medication changes, she reports that she increased her mesalamine several weeks ago up to 800 mg twice daily and they seem a little bit worse since that time.  Also could be seasonal allergies, she has tried to take Flonase in the past  however does not tolerate it as it causes her to have nosebleeds.  I recommend she try over-the-counter generic Claritin or Zyrtec for seasonal allergies.  I will get a CT of her head without contrast for further evaluation.  She has had no falls or trauma.  It is also about time for her annual ophthalmology exam, she is an avid reader and perhaps may need a change in eyeglass prescription which could also cause a headache.  It would be unusual for her low-grade lymphoma to involve her CNS and I would not want to put her through a lumbar puncture at this point.  Her CBC looks good with WBC of 10.14, hemoglobin 12.2, hematocrit 36.4%, platelet count 461,000.  I will see her back in a few weeks to go over the results of her CT head without contrast.    -4/14/2024 CT head negative     Marginal zone lymphoma of spleen   4/25/2016 Initial Diagnosis    Lymphoma, low grade     5/30/2017 Imaging    CT chest/abdomen/pelvis no obvious pulmonary nodules identified, no pleural effusions.  Stable appearance of adenopathy within the abdomen compared to earlier study as well as splenomegaly.     12/6/2017 Imaging    CT chest/abdomen/pelvis stable, essentially unchanged prominent mediastinal lymph nodes, unchanged right lower lobe medial basal segmental focal fibrotic findings.  Scattered noncalcified small lung nodules appear unchanged.  Stable spine or megaly.  Scattered prominent retroperitoneal lymph nodes now appear smaller.  Unchanged spinal findings consistent with diffuse idiopathic skeletal hyperostosis and prior operative intervention at L4-5.       6/8/2018 Imaging    CT chest abdomen and pelvis without contrast: Stable, no significant change in the appearance compared to earlier study from December 6, 2017.     3/1/2019 Imaging    CT chest, abdomen and pelvis impression: No significant interval change from last year.  Stable appearance of the significantly enlarged spleen which measures up to nearly 20 x 12 cm.   Moderate, scattered upper abdominal adenopathy with innumerable scattered gastrohepatic, retroperitoneal, and mesenteric nodes measuring roughly between 1-2 cm are stable as well.  No significant progression.  Mild mediastinal adenopathy appears stable as well.     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  Colonoscopy showed hemorrhoids, diverticuli, 11 mm proximal ascending colon flat polyp.  Dr. 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     3/27/2019 Imaging    PET shows mild hypermetabolism within the lymph nodes to the left of the proximal third of the esophagus as well as near the tracheal bifurcation and no other area is of abnormal hypermetabolism.  Splenomegaly stable.  She has significant carotid stenosis especially on the right.     2/1/2021 Imaging    PET IMPRESSION:  Negative PET/CT scan. No evidence of recurrent or active  lymphoma. Enlargement seen of the spleen.     3/4/2022 Imaging    -3/3/2022 data:  Hemoglobin hemoglobin 9.8 with MCV of 91.9 and platelets slightly low 128,000 with normal white count 7100.  Unremarkable differential.    Creatinine 1.5 glucose 160 sodium 129 with potassium 4.7 and chloride 94 otherwise unremarkable CMP.  C-reactive protein normal 1.7.  Sedimentation rate 31, upper limit 30.  INR normal 0.99 with PTT 26.1.  100 mg/dL M spike on SPEP.  Normal quantitative immunoglobulins except for decreased IgE.  Serum immunofixation electrophoresis was unclear as to monoclonality.  Serum kappa 33.8 lambda 27.4 both minimally elevated with normal ratio 1.23.  Serum viscosity normal 1.6.    -3/4/2022 CT chest abdomen pelvis without contrast: 2.5 mm noncalcified nodule right upper lobe and right lower lobe for which follow-up in 6 months for  stability or resolution is suggested.  No suspicious lung nodules to suggest metastasis.  Massive enlargement of the spleen which is stable compared to June 2021.  1.4 cm and 1.5 cm juan luis hepatic adenopathy.  Stable retroperitoneal adenopathy largest 1.5 cm.  1.8 cm stable IVC node.     3/22/2022 - 3/22/2022 Chemotherapy    OP LYMPHOMA (CLL) RiTUXimab (Weekly X 4)     3/22/2022 Adverse Reaction    -3/22/2022 through 3/27/2022 inpatient hospitalization for fever after Rituxan initial dose.  No growth on cultures.  CT chest abdomen pelvis other than showing marked splenomegaly and stable retroperitoneal nodes showed nothing else.  She was pancytopenic on admission.  Given 1 unit platelet packed red cell transfusion.  On 3/22/2022 her platelet count was 142,000 and by the evening had dropped to 36,001-day with lactic acidosis.  Hemoglobin dropped to 7.2 x 3 2322 and white count down to 2450 with absolute neutrophil count baldomero of 240 on 3/22/2022 with a neutrophil count that very same day earlier in the day of 2300 before the Rituxan was given.  By 3/27/2022 her white count was 5150, hemoglobin 9.3, platelets 72,000 and neutrophil count was 2840.  During her hospitalization I saw her and we discussed that I would not risk further Rituxan as it does appear that this was an unusually brisk cytokine reaction from Rituxan.  While in the hospital I had Dr. Justin Kemp see her and he has an appointment to see her on April 12 to check her for fitness for surgery.  Avatrombopag to stimulate marrow production of platelets in preparation for surgery is not generally indicated in the face of nonportal hypertensive thrombocytopenia where her pancytopenia is both due to marrow involvement and also cancer-induced splenomegaly.     7/20/2022 Surgery    Surgery       Procedure:  Open splenectomy, distal pancreatectomy              HISTORY OF PRESENT ILLNESS:  The patient is a 83 y.o. female, here for follow up on management of  splenic marginal zone lymphoma status post splenectomy July 2022.  I last saw Mahi a few weeks ago for her routine follow-up at which time she reported she had new onset of daily headaches.  We obtained CT of the head without contrast for further evaluation and she is here today with her daughter to go over those results.  Mahi reports that she is feeling better, her headaches are lessening.  She still feels a little off balance at times particularly in the morning when she changes positions getting up.  She is being treated currently for cellulitis in her right second toe.  She reports that she passed 2 clots when blowing her nose recently and it seems her headaches got better after that.      Past Medical History:   Diagnosis Date    Anemia 09/18/2015    Cataract     h/o    Disease of thyroid gland     Diverticulitis     DVT (deep venous thrombosis)     left lower leg    GERD (gastroesophageal reflux disease)     Gout     History of basal cell carcinoma     History of transfusion 2022    1 unit at [facility], pt denies reactions    Hypertension          IBS (irritable bowel syndrome)     Non Hodgkin's lymphoma     Splenomegaly     Urinary incontinence      Past Surgical History:   Procedure Laterality Date    APPENDECTOMY      BACK SURGERY      lumbar    CHOLECYSTECTOMY      COLON RESECTION  2021    EXPLORATORY LAPAROTOMY N/A 7/21/2022    Procedure: ABDOMINAL WASHOUT WITH CLOSURE;  Surgeon: Jayme Kemp MD;  Location:  [facility];  Service: General;  Laterality: N/A;    HYSTERECTOMY      jaja    KIDNEY SURGERY      kidney stones    ORIF ANKLE FRACTURE Right     h/o    SPLENECTOMY N/A 7/20/2022    Procedure: SPLENECTOMY OPEN, DISTAL PANCREATECTOMY;  Surgeon: Jayme Kemp MD;  Location:  [facility];  Service: General;  Laterality: N/A;    TOTAL HIP ARTHROPLASTY Bilateral     h/o       Allergies   Allergen Reactions    Penicillins Swelling and Rash     Passed out    Rituxan [Rituximab] Anaphylaxis     "Hydrocodone Itching and Rash     Severe itching    Sulfa Antibiotics Swelling     Lips swell    Bactrim [Sulfamethoxazole-Trimethoprim] Itching     Fever and itching    Chlorhexidine Gluconate Rash    Keflex [Cephalexin] Rash    Latex Rash    Lortab [Hydrocodone-Acetaminophen] Rash    Magnesium Citrate Rash    Neosporin Original [Bacitracin-Neomycin-Polymyxin] Rash       Family History and Social History reviewed and changed as necessary    REVIEW OF SYSTEM:   Headaches are improving    PHYSICAL EXAM:  Well-developed, well-nourished appearing female in no distress, here with her daughter today  No focal neurological deficits  Needs minimal assistance with ambulation    Vitals:    04/18/24 1311   BP: 164/73   Pulse: 61   Resp: 18   Temp: 97.8 °F (36.6 °C)   SpO2: 96%   Weight: 81.2 kg (179 lb)   Height: 167.6 cm (66\")     Vitals:    04/18/24 1311   PainSc: 0-No pain          ECOG score: 2           Vitals reviewed.  Labs reviewed    ECOG: (2) Ambulatory & Capable of Self Care, Unable to Carry Out Work Activity, Up & About Greater Than 50% of Waking Hours    Lab Results   Component Value Date    HGB 12.2 04/02/2024    HCT 36.4 04/02/2024    MCV 94.1 04/02/2024     (H) 04/02/2024    WBC 10.14 04/02/2024    NEUTROABS 5.12 04/02/2024    LYMPHSABS 2.88 04/02/2024    MONOSABS 1.18 (H) 04/02/2024    EOSABS 8.4 (H) 04/02/2024    EOSABS 0.85 (H) 04/02/2024    BASOSABS 0.11 04/02/2024       Lab Results   Component Value Date    GLUCOSE 94 08/04/2022    BUN 17 08/04/2022    CREATININE 0.83 08/04/2022     (L) 08/04/2022    K 5.0 08/04/2022     08/04/2022    CO2 21.0 (L) 08/04/2022    CALCIUM 8.3 (L) 08/04/2022    PROTEINTOT 4.7 (L) 08/04/2022    ALBUMIN 2.70 (L) 08/04/2022    BILITOT 0.4 08/04/2022    ALKPHOS 88 08/04/2022    AST 32 08/04/2022    ALT 20 08/04/2022             ASSESSMENT & PLAN:  1. 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  2. History of iron deficiency anemia with last colonoscopy 11/2014, accordingto the patient, with a history of ulcerative colitis treated by Dr. Edge.  3. History of basal cell carcinomas.   4. Cataracts.   5. Remote history of deep vein thrombosis with vena cava filter in place.   6. Diverticulitis by history.   7. History of gout.   8. Hypertension.   9. History of 3 different kidney surgeries.   10. Cholecystectomy.   11. Chronic neuropathy.   12. History of ARMAAN.   13. History of back surgeries.   14. History of total hip replacement.   15. History of ankle fracture.   16. History of appendectomy.   17. 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 Edge.  18.  Epistaxis  19.  Stage III chronic kidney disease with hyponatremia on 48 ounce per day fluid restriction per Dr. Wooten 10/11/2022  20.  New, persistent daily headache x several months    Oncology history timeline:  a) Splenomegaly on hospitalization 02/2016. She had a week of nausea, vomiting and diarrhea with dehydration. The nausea, vomiting, and dehydration have resolved but on CT they found retroperitoneal adenopathy with some splenomegaly that was not bulky. She denies any ongoing fevers or chills or bed drenching night sweats or unexplained weight loss and no change in the color, caliber or consistency of her stools. She had a slightly elevated beta-2 microglobulin of 3.5 but no monoclonal spike and a white count 5300, hemoglobin 11.5, platelets 158,000, sedimentation rate 45, C-reactive protein 13.6 and a normal LDH of 318 with normal liver enzymes. Slight hyponatremia with sodium of 134 and hypokalemia with potassium of 3.3 on  02/18/2016. She had slight decrease in immunoglobulin G to 580 and immunoglobulin A to 35 on testing while in the hospital.   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 There were no features to suggest hairy cell lymphoma or a mantle cell lymphoma and no large cell population. This was CD5 negative, CD10 negative, kappa clonal negative and bright for CD20 with dim surface light chain expression and bright cytoplasm, CD45 bright. The CD38 negative for plasmacytic differentiation with a hemoglobin of 11.4, white count 7200 with normal differential, and platelets of 163,000.   c) Liver, spleen ultrasound showed splenomegaly of 19.6 cm maximum dimension with 1.6 cm slightly dilated portal vein.  D)-3/20/2019 Dr. 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  Colonoscopy showed hemorrhoids, diverticuli, 11 mm proximal ascending colon flat polyp.  Dr. 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   -3/27/2019 PET shows mild hypermetabolism within the lymph nodes to the left of the proximal third of the esophagus as well as near the tracheal bifurcation and no other area is of abnormal hypermetabolism.  Splenomegaly stable.  She has significant carotid stenosis especially on the right.  -1/12/2021 white count 4200 hemoglobin 10 platelets 114,000 unremarkable differential.  CMP unremarkable  -4/28/2021 Dr. Augustine performed attempted laparoscopic partial colectomy with subsequent open right hemicolectomy with ileocolonic stenosis but no residual polyp found in the proximal ascending colon after  resection of the right colon and a small polyp in the distal part of the ascending colon.  Pathology showed residual adenomatous polyp tubular adenoma without high-grade dysplasia or invasive carcinoma and 13 benign lymph nodes.  Additional colonic segment benign with 4 benign lymph nodes.  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  Get her blood counts today and just prior to a minute evaluation in 3 months.  No plans for scans in the absence of symptoms.  I will keep in mind the potential of palliative splenic radiation if she develops early satiety or other abdominal complaints from splenomegaly.    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    -2/1/2021 PET negative.  Spleen is unremarkable with homogeneous uptake.    -2/5/2021 EGD and colonoscopy with Dr. Edge showed Negrete's esophagus, hemorrhoids, diverticuli, proximal ascending colon polyp.  No mention of any colitis.    -4/28/2021 attempted laparoscopic partial colectomy with subsequent open right hemicolectomy with ileocolonic stenosis but no residual polyp found in the proximal ascending colon after resection of the right colon and a small polyp in the distal part of the ascending colon.  Pathology showed residual adenomatous polyp tubular adenoma without high-grade dysplasia or invasive carcinoma and 13 benign lymph nodes.  Additional colonic segment benign with 4 benign lymph nodes.    -3/3/2022 data:  Hemoglobin hemoglobin 9.8 with MCV of 91.9 and platelets slightly low 128,000 with normal white count 7100.  Unremarkable differential.    Creatinine 1.5 glucose 160 sodium 129 with potassium 4.7 and chloride 94 otherwise  unremarkable CMP.  C-reactive protein normal 1.7.  Sedimentation rate 31, upper limit 30.  INR normal 0.99 with PTT 26.1.  100 mg/dL M spike on SPEP.  Normal quantitative immunoglobulins except for decreased IgE.  Serum immunofixation electrophoresis was unclear as to monoclonality.  Serum kappa 33.8 lambda 27.4 both minimally elevated with normal ratio 1.23.  Serum viscosity normal 1.6.    -3/4/2022 CT chest abdomen pelvis without contrast: 2.5 mm noncalcified nodule right upper lobe and right lower lobe for which follow-up in 6 months for stability or resolution is suggested.  No suspicious lung nodules to suggest metastasis.  Massive enlargement of the spleen which is stable compared to June 2021.  1.4 cm and 1.5 cm juan lusi hepatic adenopathy.  Stable retroperitoneal adenopathy largest 1.5 cm.  1.8 cm stable IVC node.    -3/15/2022 [facility] medical oncology follow-up visit: She is not hyperviscous and I doubt her epistaxis is related to the lymphoma or hyperviscosity and I do not think she has Waldenstrom's.  However, I do think she has massive splenomegaly and while that has been present for quite some time, I nonetheless think that a lot of her abdominal discomfort is due to the massive splenomegaly pushing her bowel to the right and she could have some bowel involvement of lymphoma that we could put her through endoscopy again but she had this back a little over a year ago with no obvious bowel involvement and no obvious colitis.  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  She would not consent to anything stronger than that regardless and it took a little convincing to get her to go along with this plan but she is willing to try provided that she has no major allergic reactions etc.  We will  give first dose in [place] after chemo preparation visit we will use peripheral veins and I will have her follow-up in a few weeks with my nurse practitioner in [place] to make sure she is tolerating this well and to set up follow-up visits in the weeks and months ahead.  Repeat CTs I would ask my nurse practitioner to get ordered for approximately 4 months out from the end of Rituxan.  In the meantime, from the epistaxis standpoint I asked her to follow-up with ENT.   We discussed the protracted immune suppression that would be associated with the Rituxan and the fact that it would likely make vaccinations less helpful but that does not mean she should not get them.  We also discussed the possibility of allergic reactions.  Once all of this is done, she will need to follow-up with whoever is going to take over from Dr. 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    -3/22/2022 through 3/27/2022 inpatient hospitalization for fever after Rituxan initial dose.  No growth on cultures.  CT chest abdomen pelvis other than showing marked splenomegaly and stable retroperitoneal nodes showed nothing else.  She was pancytopenic on admission.  Given 1 unit platelet packed red cell transfusion.  On 3/22/2022 her platelet count was 142,000 and by the evening had dropped to 36,001-day with lactic acidosis.  Hemoglobin dropped to 7.2 x 3 2322 and white count down to 2450 with absolute neutrophil count baldomero of 240 on 3/22/2022 with a neutrophil count that very same day earlier in the day of 2300 before the Rituxan was given.  By 3/27/2022 her white count was 5150, hemoglobin 9.3, platelets 72,000 and neutrophil count was 2840.  During her hospitalization I saw her and we discussed that I would not risk further Rituxan as it does appear that this was an unusually brisk cytokine reaction from Rituxan.  While in the hospital I had Dr. Justin Kemp see  her and he has an appointment to see her on April 12 to check her for fitness for surgery.  Avatrombopag to stimulate marrow production of platelets in preparation for surgery is not generally indicated in the face of nonportal hypertensive thrombocytopenia where her pancytopenia is both due to marrow involvement and also cancer-induced splenomegaly.    -4/12/2022 [facility] medical oncology follow-up: Patient states she saw Dr. Kemp yesterday and he apparently is coordinating the pneumococcal vaccinations and the Haemophilus influenza B and meningococcal vaccinations.  She thinks she got the Pneumovax with Dr. Isidro recently already and that Dr. Kemp is coordinating this with Dr. Isidro but I am not sure of that and I have reached out to Dr. Kemp who is currently scrubbed to make sure this is getting orchestrated.  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  I would never test her with another anti-CD20 antibody like Rituxan or its cousins.  She needs thorough vaccination presplenectomy.  I will defer to Dr. Kemp to orchestrate as I am not sure whether he is having Dr. Isidro do that or whether he himself is ordering this but I will have the patient close that loop with Dr. Kemp to be certain they understand how to proceed.  Her blood counts last week were entirely normal with Dr. Isidro.  The primary reason for splenectomy at this point is not because of her counts but because of the massive splenomegaly causing abdominal discomforts and pushing of her viscera.  Dr. Kemp is aware of the risks of adhesions but will, according to the patient, attempt laparoscopic approach but if that does not work he will convert to an open procedure.  I will see her back in  about a month with blood counts prior to return to see how she is doing postsplenectomy.    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  The histologic appearance and immunohistochemical staining pattern is felt to be most consistent with a marginal zone lymphoma, no evidence of large cell transformation identified.    -9/1/2022 [facility] follow-up: Mahi had open splenectomy on 7/20/2022 with a fairly tumultuous recovery with blood loss, prolonged intubation, pneumothorax, she was discharged on 8/4/2022 to rehab and is currently in rehab but getting stronger every day and hopes to go home this week with her daughter.  She looks great.  I have reviewed her CBC from 8/17/2022 that shows WBC 11.58, hemoglobin 12.3, hematocrit 42.6%, platelet count 512,000.  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  We will continue to monitor with CBC again in 3 months and I have ordered that today.  She had all of her presplenectomy vaccinations, she is waiting for the new COVID booster and will get that as soon as it is available.    -12/1/2022 white count 11,000 hemoglobin 11.6 platelets 446,000 with absolute lymphocyte count 4080 with absolute neutrophil count 4870.    -12/13/2022 [facility] oncology follow-up: Counts are doing well.  No palpable cervical axillary or inguinal adenopathy.  No plan for any further imaging in the absence of symptoms.  Counts doing well postsplenectomy.  We will repeat CBC prior to return to my nurse practitioner in 3 months and we will see her once a quarter.    -3/23/2023 [facility] follow-up: Mahi continues to do well, no evidence of progression of lymphoma.  She has no  palpable lymphadenopathy.  CBC is unremarkable with WBC 11,900, hemoglobin 12.5, hematocrit 38%, platelet count 437,000, ANC 5.35.  She has had no illnesses since we saw her last.  She does have some hair thinning but no hien alopecia.  I have asked her to discuss with her dermatologist any recommendations.  Her nails are short but appear very healthy. She is on levothyroxine for hypothyroidism and this is adjusted and monitored by Dr. Isidro.  We will continue to monitor with CBC in 3 months prior to return and I have ordered that today.    -6/29/2023 [facility] Hematology/Oncology clinic follow-up: Mahi overall seems to be doing well, her current CBC is unremarkable with WBC 10.30, hemoglobin 11.6, hematocrit 32.9%, platelet count 559,000, ANC 4.78.  I am concerned with the bullous skin lesions she is describing and currently has on her left calf.  I have put a picture in her chart under media.  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  The skin lesions have apparently been occurring over what sounds like about 11 years, she has been hospitalized on a few occasions and treated for cellulitis.  She follows with Dr. Vidhya Araya and I have reached out to their office to see if we can get her seen now to biopsy her current skin lesion while it is active.  Her follow-up with Dr. Araya is not until October, I discussed with Mahi that she needs to be seen sooner.  Otherwise we will plan on seeing her back in 3 months for follow-up with repeat CBC and I have ordered that today.  We will see her sooner if she has any concerns and she knows to reach out to us.    -9/28/2023 [facility] Oncology clinic follow-up: Mahi overall is doing well, CBC as shown above is unremarkable with WBC 9.83, hemoglobin 12.6, hematocrit 36.5%, platelet count 443,000, ANC 4.58.  She currently thinks that she may have a  stomach bug as she has some intermittent diarrhea with abdominal cramping and her daughter is experiencing the same symptoms.  She is afebrile.  She will monitor and let us know if she has no improvement.  She did follow-up with GI earlier this year to discuss whether or not she would undergo another colonoscopy, she at that time decided that she did not want to undergo further colonoscopy unless it is absolutely necessary.  I discussed with her that if her abdominal symptoms did not resolve she may want to consider but currently she has no anemia or other worrisome symptoms.  At the age of 83 I would not press.  She has been following with dermatology regarding skin lesions, I did review note from office visit 7/5/2023 and biopsies were done but I do not have those pathology reports, we will reach out to try and get those.  We will continue to see her quarterly with CBC.    -1/2/2024 [facility] follow-up: 12/21/2023 CBC normal with unremarkable differential.  White count 10,350 platelets 143,000 hemoglobin 13.2.  No somatic complaints to suggest progressive lymphoma.  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  No rash presently.  Recently exposed to COVID but no symptoms.  No plans for imaging in the absence of symptoms and will check CBC quarterly.    -4/4/2024 [facility] clinic follow-up: Mahi has had a persistent headache almost daily for the last several months, mainly on her left side without any visual changes or nausea or other associated symptoms.  We discussed that this could be from medication changes, she reports that she increased her mesalamine several weeks ago up to 800 mg twice daily and they seem a little bit worse since that time.  Also could be seasonal allergies, she has tried to take Flonase in the past however does not tolerate it as it causes her to have nosebleeds.  I recommend she try over-the-counter generic Claritin or Zyrtec for seasonal allergies.   I will get a CT of her head without contrast for further evaluation.  She has had no falls or trauma.  It is also about time for her annual ophthalmology exam, she is an avid reader and perhaps may need a change in eyeglass prescription which could also cause a headache.  It would be unusual for her low-grade lymphoma to involve her CNS and I would not want to put her through a lumbar puncture at this point.  Her CBC looks good with WBC of 10.14, hemoglobin 12.2, hematocrit 36.4%, platelet count 461,000.  I will see her back in a few weeks to go over the results of her CT head without contrast.    -4/14/2024 CT head without contrast negative  -4/18/2024 [facility] clinic follow-up: Mahi is feeling better than when I saw her few weeks ago.  Her headaches are lessening in intensity and occurrence.  Her CT of the head without contrast was unrevealing.  She has no evidence of recurrence or progression of her lymphoma.  Her CBC has been unremarkable.  We will continue surveillance with quarterly CBC.  She will continue to follow with Dr. Isidro for her routine health maintenance and current management of cellulitis of the right second toe and will let me know if she has any concerns prior to follow-up in 3 months.    I spent 20 minutes caring for Mahi on this date of service. This time includes time spent by me in the following activities: preparing for the visit, reviewing tests, performing a medically appropriate examination and/or evaluation, ordering medications, tests, or procedures, and documenting information in the medical record.     Chantell Kyle, APRN    04/18/2024

## 2024-07-09 ENCOUNTER — LAB (OUTPATIENT)
Dept: ONCOLOGY | Facility: HOSPITAL | Age: 84
End: 2024-07-09
Payer: MEDICARE

## 2024-07-09 VITALS
HEART RATE: 68 BPM | BODY MASS INDEX: 28.25 KG/M2 | RESPIRATION RATE: 18 BRPM | SYSTOLIC BLOOD PRESSURE: 120 MMHG | TEMPERATURE: 97.8 F | DIASTOLIC BLOOD PRESSURE: 44 MMHG | WEIGHT: 175 LBS

## 2024-07-09 DIAGNOSIS — C83.07 MARGINAL ZONE LYMPHOMA OF SPLEEN: ICD-10-CM

## 2024-07-09 PROCEDURE — 36415 COLL VENOUS BLD VENIPUNCTURE: CPT

## 2024-07-10 LAB
BASOPHILS # BLD AUTO: 0.14 10*3/MM3 (ref 0–0.2)
BASOPHILS NFR BLD AUTO: 1.3 % (ref 0–1.5)
EOSINOPHIL # BLD AUTO: 0.34 10*3/MM3 (ref 0–0.4)
EOSINOPHIL NFR BLD AUTO: 3.1 % (ref 0.3–6.2)
ERYTHROCYTE [DISTWIDTH] IN BLOOD BY AUTOMATED COUNT: 13.4 % (ref 12.3–15.4)
HCT VFR BLD AUTO: 37.3 % (ref 34–46.6)
HGB BLD-MCNC: 12.8 G/DL (ref 12–15.9)
IMM GRANULOCYTES # BLD AUTO: 0.06 10*3/MM3 (ref 0–0.05)
IMM GRANULOCYTES NFR BLD AUTO: 0.5 % (ref 0–0.5)
LYMPHOCYTES # BLD AUTO: 3.6 10*3/MM3 (ref 0.7–3.1)
LYMPHOCYTES NFR BLD AUTO: 32.8 % (ref 19.6–45.3)
MCH RBC QN AUTO: 32.3 PG (ref 26.6–33)
MCHC RBC AUTO-ENTMCNC: 34.3 G/DL (ref 31.5–35.7)
MCV RBC AUTO: 94.2 FL (ref 79–97)
MONOCYTES # BLD AUTO: 1.18 10*3/MM3 (ref 0.1–0.9)
MONOCYTES NFR BLD AUTO: 10.7 % (ref 5–12)
NEUTROPHILS # BLD AUTO: 5.66 10*3/MM3 (ref 1.7–7)
NEUTROPHILS NFR BLD AUTO: 51.6 % (ref 42.7–76)
NRBC BLD AUTO-RTO: 0.5 /100 WBC (ref 0–0.2)
PLATELET # BLD AUTO: 447 10*3/MM3 (ref 140–450)
RBC # BLD AUTO: 3.96 10*6/MM3 (ref 3.77–5.28)
WBC # BLD AUTO: 10.98 10*3/MM3 (ref 3.4–10.8)

## 2024-07-18 ENCOUNTER — OFFICE VISIT (OUTPATIENT)
Dept: ONCOLOGY | Facility: CLINIC | Age: 84
End: 2024-07-18
Payer: MEDICARE

## 2024-07-18 VITALS
SYSTOLIC BLOOD PRESSURE: 127 MMHG | OXYGEN SATURATION: 92 % | HEART RATE: 72 BPM | BODY MASS INDEX: 27.97 KG/M2 | TEMPERATURE: 98.2 F | HEIGHT: 66 IN | RESPIRATION RATE: 18 BRPM | WEIGHT: 174 LBS | DIASTOLIC BLOOD PRESSURE: 62 MMHG

## 2024-07-18 DIAGNOSIS — C83.07 MARGINAL ZONE LYMPHOMA OF SPLEEN: Primary | ICD-10-CM

## 2024-07-18 PROCEDURE — 1126F AMNT PAIN NOTED NONE PRSNT: CPT | Performed by: NURSE PRACTITIONER

## 2024-07-18 PROCEDURE — 99213 OFFICE O/P EST LOW 20 MIN: CPT | Performed by: NURSE PRACTITIONER

## 2024-07-18 PROCEDURE — 1160F RVW MEDS BY RX/DR IN RCRD: CPT | Performed by: NURSE PRACTITIONER

## 2024-07-18 PROCEDURE — 1159F MED LIST DOCD IN RCRD: CPT | Performed by: NURSE PRACTITIONER

## 2024-07-18 RX ORDER — LISINOPRIL 5 MG/1
5 TABLET ORAL DAILY
COMMUNITY

## 2024-07-18 NOTE — PROGRESS NOTES
CHIEF COMPLAINT: Still having headaches    Problem List:  Oncology/Hematology History Overview Note   1. 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  2. History of iron deficiency anemia with last colonoscopy 11/2014, accordingto the patient, with a history of ulcerative colitis treated by Dr. Edge.  3. History of basal cell carcinomas.   4. Cataracts.   5. Remote history of deep vein thrombosis with vena cava filter in place.   6. Diverticulitis by history.   7. History of gout.   8. Hypertension.   9. History of 3 different kidney surgeries.   10. Cholecystectomy.   11. Chronic neuropathy.   12. History of ARMAAN.   13. History of back surgeries.   14. History of total hip replacement.   15. History of ankle fracture.   16. History of appendectomy.   17. 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 Edge.  18.  Epistaxis  19.  Stage III chronic kidney disease with hyponatremia on 48 ounce per day fluid restriction per Dr. Wooten 10/11/2022    Oncology history timeline:  a) Splenomegaly on hospitalization 02/2016. She had a week of nausea, vomiting and diarrhea with dehydration. The nausea, vomiting, and dehydration have resolved but on CT they found retroperitoneal adenopathy with some splenomegaly that was not bulky. She denies any ongoing fevers or chills or bed drenching night sweats or unexplained weight loss and no change in the color, caliber or consistency of her stools. She had a slightly elevated beta-2 microglobulin of 3.5 but no monoclonal spike and a white count 5300, hemoglobin 11.5, platelets 158,000, sedimentation rate 45,  C-reactive protein 13.6 and a normal LDH of 318 with normal liver enzymes. Slight hyponatremia with sodium of 134 and hypokalemia with potassium of 3.3 on 02/18/2016. She had slight decrease in immunoglobulin G to 580 and immunoglobulin A to 35 on testing while in the hospital.   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 There were no features to suggest hairy cell lymphoma or a mantle cell lymphoma and no large cell population. This was CD5 negative, CD10 negative, kappa clonal negative and bright for CD20 with dim surface light chain expression and bright cytoplasm, CD45 bright. The CD38 negative for plasmacytic differentiation with a hemoglobin of 11.4, white count 7200 with normal differential, and platelets of 163,000.   c) Liver, spleen ultrasound showed splenomegaly of 19.6 cm maximum dimension with 1.6 cm slightly dilated portal vein.  D)-3/20/2019 Dr. 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  Colonoscopy showed hemorrhoids, diverticuli, 11 mm proximal ascending colon flat polyp.  Dr. Edge relayed to the patient that this was a high-grade dysplasia in the Negrete's for which he is sending to [facility] for ablation as well as needing removal of a tubulovillous adenoma unable to be removed endoscopically.   -3/27/2019 PET shows mild hypermetabolism within the lymph nodes to the left of the proximal third of the esophagus as well as near the tracheal bifurcation and no other area is of abnormal hypermetabolism.  Splenomegaly stable.  She has significant carotid stenosis especially on the right.  -1/12/2021 white count 4200 hemoglobin 10 platelets 114,000 unremarkable differential.  CMP unremarkable  -4/28/2021 Dr. Augustine performed attempted  laparoscopic partial colectomy with subsequent open right hemicolectomy with ileocolonic stenosis but no residual polyp found in the proximal ascending colon after resection of the right colon and a small polyp in the distal part of the ascending colon.  Pathology showed residual adenomatous polyp tubular adenoma without high-grade dysplasia or invasive carcinoma and 13 benign lymph nodes.  Additional colonic segment benign with 4 benign lymph nodes.  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  Get her blood counts today and just prior to a minute evaluation in 3 months.  No plans for scans in the absence of symptoms.  I will keep in mind the potential of palliative splenic radiation if she develops early satiety or other abdominal complaints from splenomegaly.    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    -2/1/2021 PET negative.  Spleen is unremarkable with homogeneous uptake.    -2/5/2021 EGD and colonoscopy with Dr. Edge showed Negrete's esophagus, hemorrhoids, diverticuli, proximal ascending colon polyp.  No mention of any colitis.    -4/28/2021 attempted laparoscopic partial colectomy with subsequent open right hemicolectomy with ileocolonic stenosis but no residual polyp found in the proximal ascending colon after resection of the right colon and a small polyp in the distal part of the ascending colon.  Pathology showed residual adenomatous polyp tubular adenoma without high-grade dysplasia or invasive carcinoma and 13 benign lymph nodes.  Additional colonic segment benign with 4 benign lymph nodes.    -3/3/2022 data:  Hemoglobin hemoglobin 9.8 with MCV of 91.9 and platelets slightly  low 128,000 with normal white count 7100.  Unremarkable differential.    Creatinine 1.5 glucose 160 sodium 129 with potassium 4.7 and chloride 94 otherwise unremarkable CMP.  C-reactive protein normal 1.7.  Sedimentation rate 31, upper limit 30.  INR normal 0.99 with PTT 26.1.  100 mg/dL M spike on SPEP.  Normal quantitative immunoglobulins except for decreased IgE.  Serum immunofixation electrophoresis was unclear as to monoclonality.  Serum kappa 33.8 lambda 27.4 both minimally elevated with normal ratio 1.23.  Serum viscosity normal 1.6.    -3/4/2022 CT chest abdomen pelvis without contrast: 2.5 mm noncalcified nodule right upper lobe and right lower lobe for which follow-up in 6 months for stability or resolution is suggested.  No suspicious lung nodules to suggest metastasis.  Massive enlargement of the spleen which is stable compared to June 2021.  1.4 cm and 1.5 cm juan luis hepatic adenopathy.  Stable retroperitoneal adenopathy largest 1.5 cm.  1.8 cm stable IVC node.    -3/15/2022 [facility] medical oncology follow-up visit: She is not hyperviscous and I doubt her epistaxis is related to the lymphoma or hyperviscosity and I do not think she has Waldenstrom's.  However, I do think she has massive splenomegaly and while that has been present for quite some time, I nonetheless think that a lot of her abdominal discomfort is due to the massive splenomegaly pushing her bowel to the right and she could have some bowel involvement of lymphoma that we could put her through endoscopy again but she had this back a little over a year ago with no obvious bowel involvement and no obvious colitis.  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  She would not consent to anything stronger than that regardless and  it took a little convincing to get her to go along with this plan but she is willing to try provided that she has no major allergic reactions etc.  We will give first dose in [place] after chemo preparation visit we will use peripheral veins and I will have her follow-up in a few weeks with my nurse practitioner in [place] to make sure she is tolerating this well and to set up follow-up visits in the weeks and months ahead.  Repeat CTs I would ask my nurse practitioner to get ordered for approximately 4 months out from the end of Rituxan.  In the meantime, from the epistaxis standpoint I asked her to follow-up with ENT.   We discussed the protracted immune suppression that would be associated with the Rituxan and the fact that it would likely make vaccinations less helpful but that does not mean she should not get them.  We also discussed the possibility of allergic reactions.  Once all of this is done, she will need to follow-up with whoever is going to take over from Dr. 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    -3/22/2022 through 3/27/2022 inpatient hospitalization for fever after Rituxan initial dose.  No growth on cultures.  CT chest abdomen pelvis other than showing marked splenomegaly and stable retroperitoneal nodes showed nothing else.  She was pancytopenic on admission.  Given 1 unit platelet packed red cell transfusion.  On 3/22/2022 her platelet count was 142,000 and by the evening had dropped to 36,001-day with lactic acidosis.  Hemoglobin dropped to 7.2 x 3 2322 and white count down to 2450 with absolute neutrophil count baldomero of 240 on 3/22/2022 with a neutrophil count that very same day earlier in the day of 2300 before the Rituxan was given.  By 3/27/2022 her white count was 5150, hemoglobin 9.3, platelets 72,000 and neutrophil count was 2840.  During her hospitalization I saw her and we discussed that I would not  risk further Rituxan as it does appear that this was an unusually brisk cytokine reaction from Rituxan.  While in the hospital I had Dr. Justin Kemp see her and he has an appointment to see her on April 12 to check her for fitness for surgery.  Avatrombopag to stimulate marrow production of platelets in preparation for surgery is not generally indicated in the face of nonportal hypertensive thrombocytopenia where her pancytopenia is both due to marrow involvement and also cancer-induced splenomegaly.    -4/12/2022 [facility] oncology follow-up: Patient states she saw Dr. Kemp yesterday and he apparently is coordinating the pneumococcal vaccinations and the Haemophilus influenza B and meningococcal vaccinations.  She thinks she got the Pneumovax with Dr. Isidro recently already and that Dr. Kemp is coordinating this with Dr. Isidro but I am not sure of that and I have reached out to Dr. Kemp who is currently scrubbed to make sure this is getting orchestrated.  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  I would never test her with another anti-CD20 antibody like Rituxan or its cousins.  She needs thorough vaccination presplenectomy.  I will defer to Dr. Kemp to orchestrate as I am not sure whether he is having Dr. Isidro do that or whether he himself is ordering this but I will have the patient close that loop with Dr. Kemp to be certain they understand how to proceed.  Her blood counts last week were entirely normal with Dr. Isidro.  The primary reason for splenectomy at this point is not because of her counts but because of the massive splenomegaly causing abdominal discomforts and pushing of her viscera.  Dr. Kemp is aware of the risks of adhesions  but will, according to the patient, attempt laparoscopic approach but if that does not work he will convert to an open procedure.  I will see her back in about a month with blood counts prior to return to see how she is doing postsplenectomy.    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  The histologic appearance and immunohistochemical staining pattern is felt to be most consistent with a marginal zone lymphoma, no evidence of large cell transformation identified.    -9/1/2022 [facility] Oncology clinic follow-up: Mahi had open splenectomy on 7/20/2022 with a fairly tumultuous recovery with blood loss, prolonged intubation, pneumothorax, she was discharged on 8/4/2022 to rehab and is currently in rehab but getting stronger every day and hopes to go home this week with her daughter.  She looks great.  I have reviewed her CBC from 8/17/2022 that shows WBC 11.58, hemoglobin 12.3, hematocrit 42.6%, platelet count 512,000.  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  We will continue to monitor with CBC again in 3 months and I have ordered that today.  She had all of her presplenectomy vaccinations, she is waiting for the new COVID booster and will get that as soon as it is available.    -12/1/2022 white count 11,000 hemoglobin 11.6 platelets 446,000 with absolute lymphocyte count 4080 with absolute neutrophil count 4870.    -12/13/2022 [facility] hematology oncology follow-up: Counts are doing well.  No palpable cervical axillary or inguinal adenopathy.  No plan for any further imaging in the absence of symptoms.  Counts doing well postsplenectomy.  We will repeat CBC prior to return to my nurse practitioner in 3 months and we will see her  once a quarter.    -3/23/2023 [facility] Hematology/Oncology clinic follow-up: Mahi continues to do well, no evidence of progression of lymphoma.  She has no palpable lymphadenopathy.  CBC is unremarkable with WBC 11,900, hemoglobin 12.5, hematocrit 38%, platelet count 437,000, ANC 5.35.  She has had no illnesses since we saw her last.  She does have some hair thinning but no hien alopecia.  I have asked her to discuss with her dermatologist any recommendations.  Her nails are short but appear very healthy. She is on levothyroxine for hypothyroidism and this is adjusted and monitored by Dr. Isidro.  We will continue to monitor with CBC in 3 months prior to return and I have ordered that today.    -6/29/2023 [facility] Hematology/Oncology clinic follow-up: Mahi overall seems to be doing well, her current CBC is unremarkable with WBC 10.30, hemoglobin 11.6, hematocrit 32.9%, platelet count 559,000, ANC 4.78.  I am concerned with the bullous skin lesions she is describing and currently has on her left calf.  I have put a picture in her chart under media.  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  The skin lesions have apparently been occurring over what sounds like about 11 years, she has been hospitalized on a few occasions and treated for cellulitis.  She follows with Dr. Vidhya Araya and I have reached out to their office to see if we can get her seen now to biopsy her current skin lesion while it is active.  Her follow-up with Dr. Araya is not until October, I discussed with Mahi that she needs to be seen sooner.  Otherwise we will plan on seeing her back in 3 months for follow-up with repeat CBC and I have ordered that today.  We will see her sooner if she has any concerns and she knows to reach out to us.    -9/28/2023 [facility] Oncology clinic follow-up: Mahi engle is doing well, CBC as  shown above is unremarkable with WBC 9.83, hemoglobin 12.6, hematocrit 36.5%, platelet count 443,000, ANC 4.58.  She currently thinks that she may have a stomach bug as she has some intermittent diarrhea with abdominal cramping and her daughter is experiencing the same symptoms.  She is afebrile.  She will monitor and let us know if she has no improvement.  She did follow-up with GI earlier this year to discuss whether or not she would undergo another colonoscopy, she at that time decided that she did not want to undergo further colonoscopy unless it is absolutely necessary.  I discussed with her that if her abdominal symptoms did not resolve she may want to consider but currently she has no anemia or other worrisome symptoms.  At the age of 83 I would not press.  She has been following with dermatology regarding skin lesions, I did review note from office visit 7/5/2023 and biopsies were done but I do not have those pathology reports, we will reach out to try and get those.  We will continue to see her quarterly with CBC.    -1/2/2024 [facility] follow-up: 12/21/2023 CBC normal with unremarkable differential.  White count 10,350 platelets 143,000 hemoglobin 13.2.  No somatic complaints to suggest progressive lymphoma.  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  No rash presently.  Recently exposed to COVID but no symptoms. No plans for imaging in the absence of symptoms and will check CBC quarterly.    -4/4/2024 [facility]ology clinic follow-up: Mahi has had a persistent headache almost daily for the last several months, mainly on her left side without any visual changes or nausea or other associated symptoms.  We discussed that this could be from medication changes, she reports that she increased her mesalamine several weeks ago up to 800 mg twice daily and they seem a little bit worse since that time.  Also could be seasonal allergies, she has tried to take Flonase in the past  however does not tolerate it as it causes her to have nosebleeds.  I recommend she try over-the-counter generic Claritin or Zyrtec for seasonal allergies.  I will get a CT of her head without contrast for further evaluation.  She has had no falls or trauma.  It is also about time for her annual ophthalmology exam, she is an avid reader and perhaps may need a change in eyeglass prescription which could also cause a headache.  It would be unusual for her low-grade lymphoma to involve her CNS and I would not want to put her through a lumbar puncture at this point.  Her CBC looks good with WBC of 10.14, hemoglobin 12.2, hematocrit 36.4%, platelet count 461,000.  I will see her back in a few weeks to go over the results of her CT head without contrast.    -4/14/2024 CT head negative     Marginal zone lymphoma of spleen   4/25/2016 Initial Diagnosis    Lymphoma, low grade     5/30/2017 Imaging    CT chest/abdomen/pelvis no obvious pulmonary nodules identified, no pleural effusions.  Stable appearance of adenopathy within the abdomen compared to earlier study as well as splenomegaly.     12/6/2017 Imaging    CT chest/abdomen/pelvis stable, essentially unchanged prominent mediastinal lymph nodes, unchanged right lower lobe medial basal segmental focal fibrotic findings.  Scattered noncalcified small lung nodules appear unchanged.  Stable spine or megaly.  Scattered prominent retroperitoneal lymph nodes now appear smaller.  Unchanged spinal findings consistent with diffuse idiopathic skeletal hyperostosis and prior operative intervention at L4-5.       6/8/2018 Imaging    CT chest abdomen and pelvis without contrast: Stable, no significant change in the appearance compared to earlier study from December 6, 2017.     3/1/2019 Imaging    CT chest, abdomen and pelvis impression: No significant interval change from last year.  Stable appearance of the significantly enlarged spleen which measures up to nearly 20 x 12 cm.   Moderate, scattered upper abdominal adenopathy with innumerable scattered gastrohepatic, retroperitoneal, and mesenteric nodes measuring roughly between 1-2 cm are stable as well.  No significant progression.  Mild mediastinal adenopathy appears stable as well.     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  Colonoscopy showed hemorrhoids, diverticuli, 11 mm proximal ascending colon flat polyp.  Dr. 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     3/27/2019 Imaging    PET shows mild hypermetabolism within the lymph nodes to the left of the proximal third of the esophagus as well as near the tracheal bifurcation and no other area is of abnormal hypermetabolism.  Splenomegaly stable.  She has significant carotid stenosis especially on the right.     2/1/2021 Imaging    PET IMPRESSION:  Negative PET/CT scan. No evidence of recurrent or active  lymphoma. Enlargement seen of the spleen.     3/4/2022 Imaging    -3/3/2022 data:  Hemoglobin hemoglobin 9.8 with MCV of 91.9 and platelets slightly low 128,000 with normal white count 7100.  Unremarkable differential.    Creatinine 1.5 glucose 160 sodium 129 with potassium 4.7 and chloride 94 otherwise unremarkable CMP.  C-reactive protein normal 1.7.  Sedimentation rate 31, upper limit 30.  INR normal 0.99 with PTT 26.1.  100 mg/dL M spike on SPEP.  Normal quantitative immunoglobulins except for decreased IgE.  Serum immunofixation electrophoresis was unclear as to monoclonality.  Serum kappa 33.8 lambda 27.4 both minimally elevated with normal ratio 1.23.  Serum viscosity normal 1.6.    -3/4/2022 CT chest abdomen pelvis without contrast: 2.5 mm noncalcified nodule right upper lobe and right lower lobe for which follow-up in 6 months for  stability or resolution is suggested.  No suspicious lung nodules to suggest metastasis.  Massive enlargement of the spleen which is stable compared to June 2021.  1.4 cm and 1.5 cm juan luis hepatic adenopathy.  Stable retroperitoneal adenopathy largest 1.5 cm.  1.8 cm stable IVC node.     3/22/2022 - 3/22/2022 Chemotherapy    OP LYMPHOMA (CLL) RiTUXimab (Weekly X 4)     3/22/2022 Adverse Reaction    -3/22/2022 through 3/27/2022 inpatient hospitalization for fever after Rituxan initial dose.  No growth on cultures.  CT chest abdomen pelvis other than showing marked splenomegaly and stable retroperitoneal nodes showed nothing else.  She was pancytopenic on admission.  Given 1 unit platelet packed red cell transfusion.  On 3/22/2022 her platelet count was 142,000 and by the evening had dropped to 36,001-day with lactic acidosis.  Hemoglobin dropped to 7.2 x 3 2322 and white count down to 2450 with absolute neutrophil count baldomero of 240 on 3/22/2022 with a neutrophil count that very same day earlier in the day of 2300 before the Rituxan was given.  By 3/27/2022 her white count was 5150, hemoglobin 9.3, platelets 72,000 and neutrophil count was 2840.  During her hospitalization I saw her and we discussed that I would not risk further Rituxan as it does appear that this was an unusually brisk cytokine reaction from Rituxan.  While in the hospital I had Dr. Justin Kemp see her and he has an appointment to see her on April 12 to check her for fitness for surgery.  Avatrombopag to stimulate marrow production of platelets in preparation for surgery is not generally indicated in the face of nonportal hypertensive thrombocytopenia where her pancytopenia is both due to marrow involvement and also cancer-induced splenomegaly.     7/20/2022 Surgery    Surgery       Procedure:  Open splenectomy, distal pancreatectomy              HISTORY OF PRESENT ILLNESS:  The patient is a 83 y.o. female, here for follow up on management of  low-grade splenic marginal zone lymphoma status post splenectomy July 2022.  Mahi reports she is doing about the same as when I saw her 3 months ago.  She continues to have difficulty with chronic headaches.  I did do a CT of her head when I saw her in April that showed age-related changes but nothing acute.  She also has generalized fatigue, she thinks that this may be due to new medication she has been on with GI, mesalamine.  She has follow-up in August with GI.  Otherwise she has had no illnesses or infections since we saw her last.  No lymphadenopathy that she is aware of.  Appetite is normal, no change in her bowel or bladder habits.    Past Medical History:   Diagnosis Date    Anemia 09/18/2015    Cataract     h/o    Disease of thyroid gland     Diverticulitis     DVT (deep venous thrombosis)     left lower leg    GERD (gastroesophageal reflux disease)     Gout     History of basal cell carcinoma     History of transfusion 2022    1 unit at [facility], pt denies reactions    Hypertension          IBS (irritable bowel syndrome)     Non Hodgkin's lymphoma     Splenomegaly     Urinary incontinence      Past Surgical History:   Procedure Laterality Date    APPENDECTOMY      BACK SURGERY      lumbar    CHOLECYSTECTOMY      COLON RESECTION  2021    EXPLORATORY LAPAROTOMY N/A 7/21/2022    Procedure: ABDOMINAL WASHOUT WITH CLOSURE;  Surgeon: Jayme Kemp MD;  Location:  [facility];  Service: General;  Laterality: N/A;    HYSTERECTOMY      jaja    KIDNEY SURGERY      kidney stones    ORIF ANKLE FRACTURE Right     h/o    SPLENECTOMY N/A 7/20/2022    Procedure: SPLENECTOMY OPEN, DISTAL PANCREATECTOMY;  Surgeon: Jayme Kemp MD;  Location:  [facility];  Service: General;  Laterality: N/A;    TOTAL HIP ARTHROPLASTY Bilateral     h/o       Allergies   Allergen Reactions    Penicillins Swelling and Rash     Passed out    Rituxan [Rituximab] Anaphylaxis    Hydrocodone Itching and Rash     Severe itching    Sulfa  "Antibiotics Swelling     Lips swell    Bactrim [Sulfamethoxazole-Trimethoprim] Itching     Fever and itching    Chlorhexidine Gluconate Rash    Keflex [Cephalexin] Rash    Latex Rash    Lortab [Hydrocodone-Acetaminophen] Rash    Magnesium Citrate Rash    Neosporin Original [Bacitracin-Neomycin-Polymyxin] Rash       Family History and Social History reviewed and changed as necessary    REVIEW OF SYSTEM:   Chronic headaches and fatigue otherwise no new somatic concerns    PHYSICAL EXAM:  Mahi appears in her usual state of health, she is well-developed, well-nourished appearing female in no distress  Respirations regular and unlabored, lungs clear to auscultation bilaterally  No cervical, supraclavicular, axillary or inguinal adenopathy palpable on exam  Abdomen is nondistended, slightly firm and nontender    Vitals:    07/18/24 1332   BP: 127/62   Pulse: 72   Resp: 18   Temp: 98.2 °F (36.8 °C)   SpO2: 92%   Weight: 78.9 kg (174 lb)   Height: 167.6 cm (66\")     Vitals:    07/18/24 1332   PainSc: 0-No pain   PainLoc: Comment: weakness          ECOG score: 2           Vitals reviewed.  Labs reviewed    ECOG: (2) Ambulatory & Capable of Self Care, Unable to Carry Out Work Activity, Up & About Greater Than 50% of Waking Hours    Lab Results   Component Value Date    HGB 12.8 07/09/2024    HCT 37.3 07/09/2024    MCV 94.2 07/09/2024     07/09/2024    WBC 10.98 (H) 07/09/2024    NEUTROABS 5.66 07/09/2024    LYMPHSABS 3.60 (H) 07/09/2024    MONOSABS 1.18 (H) 07/09/2024    EOSABS 0.34 07/09/2024    BASOSABS 0.14 07/09/2024       Lab Results   Component Value Date    GLUCOSE 94 08/04/2022    BUN 17 08/04/2022    CREATININE 0.83 08/04/2022     (L) 08/04/2022    K 5.0 08/04/2022     08/04/2022    CO2 21.0 (L) 08/04/2022    CALCIUM 8.3 (L) 08/04/2022    PROTEINTOT 4.7 (L) 08/04/2022    ALBUMIN 2.70 (L) 08/04/2022    BILITOT 0.4 08/04/2022    ALKPHOS 88 08/04/2022    AST 32 08/04/2022    ALT 20 08/04/2022         "     ASSESSMENT & PLAN:    1. 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  2. History of iron deficiency anemia with last colonoscopy 11/2014, accordingto the patient, with a history of ulcerative colitis treated by Dr. Edge.    Discussion: Mahi overall continues to do well.  No evidence of recurrent lymphoma.  Her CBC from 7/9/2024 was reviewed today and is unremarkable. WBC is 10.98, hemoglobin 12.8, hematocrit 37.3%, platelet count 447,000, absolute lymphocytes 3.60, ANC 5.66.  We will continue to monitor CBC quarterly.  She does take oral iron daily and is tolerating that and there is no downside she will continue.  She has a follow-up with GI in August, I asked her to discuss with them potential side effects of mesalamine as she has had chronic headaches and a little more fatigue since they have increased the dose.  I did do a CT of her head in April that showed no acute findings.    Return to clinic in 3 months for follow-up    I spent 23 minutes caring for Mahi on this date of service. This time includes time spent by me in the following activities: preparing for the visit, reviewing tests, performing a medically appropriate examination and/or evaluation, ordering medications, tests, or procedures, documenting information in the medical record, and independently interpreting results and communicating that information with the patient/family/caregiver.     Chantell Kyle, APRN    07/18/2024

## 2024-10-03 ENCOUNTER — LAB (OUTPATIENT)
Dept: ONCOLOGY | Facility: HOSPITAL | Age: 84
End: 2024-10-03
Payer: MEDICARE

## 2024-10-03 VITALS
SYSTOLIC BLOOD PRESSURE: 130 MMHG | TEMPERATURE: 97 F | HEART RATE: 69 BPM | DIASTOLIC BLOOD PRESSURE: 48 MMHG | RESPIRATION RATE: 18 BRPM

## 2024-10-03 DIAGNOSIS — C83.07 MARGINAL ZONE LYMPHOMA OF SPLEEN: ICD-10-CM

## 2024-10-03 PROCEDURE — 36415 COLL VENOUS BLD VENIPUNCTURE: CPT

## 2024-10-04 LAB
BASOPHILS # BLD AUTO: 0.13 10*3/MM3 (ref 0–0.2)
BASOPHILS NFR BLD AUTO: 1.1 % (ref 0–1.5)
EOSINOPHIL # BLD AUTO: 0.29 10*3/MM3 (ref 0–0.4)
EOSINOPHIL NFR BLD AUTO: 2.4 % (ref 0.3–6.2)
ERYTHROCYTE [DISTWIDTH] IN BLOOD BY AUTOMATED COUNT: 13.8 % (ref 12.3–15.4)
HCT VFR BLD AUTO: 33.2 % (ref 34–46.6)
HGB BLD-MCNC: 11.7 G/DL (ref 12–15.9)
IMM GRANULOCYTES # BLD AUTO: 0.08 10*3/MM3 (ref 0–0.05)
IMM GRANULOCYTES NFR BLD AUTO: 0.7 % (ref 0–0.5)
LYMPHOCYTES # BLD AUTO: 3.97 10*3/MM3 (ref 0.7–3.1)
LYMPHOCYTES NFR BLD AUTO: 33.3 % (ref 19.6–45.3)
MCH RBC QN AUTO: 33.7 PG (ref 26.6–33)
MCHC RBC AUTO-ENTMCNC: 35.2 G/DL (ref 31.5–35.7)
MCV RBC AUTO: 95.7 FL (ref 79–97)
MONOCYTES # BLD AUTO: 1.28 10*3/MM3 (ref 0.1–0.9)
MONOCYTES NFR BLD AUTO: 10.7 % (ref 5–12)
NEUTROPHILS # BLD AUTO: 6.18 10*3/MM3 (ref 1.7–7)
NEUTROPHILS NFR BLD AUTO: 51.8 % (ref 42.7–76)
NRBC BLD AUTO-RTO: 1.3 /100 WBC (ref 0–0.2)
PLATELET # BLD AUTO: 423 10*3/MM3 (ref 140–450)
RBC # BLD AUTO: 3.47 10*6/MM3 (ref 3.77–5.28)
WBC # BLD AUTO: 11.93 10*3/MM3 (ref 3.4–10.8)

## 2024-10-09 ENCOUNTER — OFFICE VISIT (OUTPATIENT)
Dept: ONCOLOGY | Facility: CLINIC | Age: 84
End: 2024-10-09
Payer: MEDICARE

## 2024-10-09 VITALS
BODY MASS INDEX: 28.77 KG/M2 | DIASTOLIC BLOOD PRESSURE: 66 MMHG | OXYGEN SATURATION: 96 % | HEIGHT: 66 IN | RESPIRATION RATE: 18 BRPM | HEART RATE: 66 BPM | TEMPERATURE: 97.7 F | SYSTOLIC BLOOD PRESSURE: 141 MMHG | WEIGHT: 179 LBS

## 2024-10-09 DIAGNOSIS — C83.07 MARGINAL ZONE LYMPHOMA OF SPLEEN: Primary | ICD-10-CM

## 2024-10-09 PROCEDURE — 1126F AMNT PAIN NOTED NONE PRSNT: CPT | Performed by: NURSE PRACTITIONER

## 2024-10-09 PROCEDURE — 99213 OFFICE O/P EST LOW 20 MIN: CPT | Performed by: NURSE PRACTITIONER

## 2024-10-09 PROCEDURE — 1159F MED LIST DOCD IN RCRD: CPT | Performed by: NURSE PRACTITIONER

## 2024-10-09 PROCEDURE — 1160F RVW MEDS BY RX/DR IN RCRD: CPT | Performed by: NURSE PRACTITIONER

## 2024-10-09 RX ORDER — FUROSEMIDE 40 MG
TABLET ORAL
COMMUNITY
Start: 2024-07-21

## 2024-10-09 RX ORDER — POTASSIUM CHLORIDE 750 MG/1
1 TABLET, EXTENDED RELEASE ORAL
COMMUNITY

## 2024-10-09 NOTE — PROGRESS NOTES
CHIEF COMPLAINT: History of lymphoma    Problem List:  Oncology/Hematology History Overview Note   1. 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  2. History of iron deficiency anemia with last colonoscopy 11/2014, accordingto the patient, with a history of ulcerative colitis treated by Dr. Edge.  3. History of basal cell carcinomas.   4. Cataracts.   5. Remote history of deep vein thrombosis with vena cava filter in place.   6. Diverticulitis by history.   7. History of gout.   8. Hypertension.   9. History of 3 different kidney surgeries.   10. Cholecystectomy.   11. Chronic neuropathy.   12. History of ARMAAN.   13. History of back surgeries.   14. History of total hip replacement.   15. History of ankle fracture.   16. History of appendectomy.   17. 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 Edge.  18.  Epistaxis  19.  Stage III chronic kidney disease with hyponatremia on 48 ounce per day fluid restriction per Dr. Wooten 10/11/2022    Oncology history timeline:  a) Splenomegaly on hospitalization 02/2016. She had a week of nausea, vomiting and diarrhea with dehydration. The nausea, vomiting, and dehydration have resolved but on CT they found retroperitoneal adenopathy with some splenomegaly that was not bulky. She denies any ongoing fevers or chills or bed drenching night sweats or unexplained weight loss and no change in the color, caliber or consistency of her stools. She had a slightly elevated beta-2 microglobulin of 3.5 but no monoclonal spike and a white count 5300, hemoglobin 11.5, platelets 158,000, sedimentation rate 45,  C-reactive protein 13.6 and a normal LDH of 318 with normal liver enzymes. Slight hyponatremia with sodium of 134 and hypokalemia with potassium of 3.3 on 02/18/2016. She had slight decrease in immunoglobulin G to 580 and immunoglobulin A to 35 on testing while in the hospital.   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 There were no features to suggest hairy cell lymphoma or a mantle cell lymphoma and no large cell population. This was CD5 negative, CD10 negative, kappa clonal negative and bright for CD20 with dim surface light chain expression and bright cytoplasm, CD45 bright. The CD38 negative for plasmacytic differentiation with a hemoglobin of 11.4, white count 7200 with normal differential, and platelets of 163,000.   c) Liver, spleen ultrasound showed splenomegaly of 19.6 cm maximum dimension with 1.6 cm slightly dilated portal vein.  D)-3/20/2019 Dr. 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  Colonoscopy showed hemorrhoids, diverticuli, 11 mm proximal ascending colon flat polyp.  Dr. Edge relayed to the patient that this was a high-grade dysplasia in the Negrete's for which he is sending to [facility] for ablation as well as needing removal of a tubulovillous adenoma unable to be removed endoscopically.   -3/27/2019 PET shows mild hypermetabolism within the lymph nodes to the left of the proximal third of the esophagus as well as near the tracheal bifurcation and no other area is of abnormal hypermetabolism.  Splenomegaly stable.  She has significant carotid stenosis especially on the right.  -1/12/2021 white count 4200 hemoglobin 10 platelets 114,000 unremarkable differential.  CMP unremarkable  -4/28/2021 Dr. Augustine performed attempted  laparoscopic partial colectomy with subsequent open right hemicolectomy with ileocolonic stenosis but no residual polyp found in the proximal ascending colon after resection of the right colon and a small polyp in the distal part of the ascending colon.  Pathology showed residual adenomatous polyp tubular adenoma without high-grade dysplasia or invasive carcinoma and 13 benign lymph nodes.  Additional colonic segment benign with 4 benign lymph nodes.  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  Get her blood counts today and just prior to a minute evaluation in 3 months.  No plans for scans in the absence of symptoms.  I will keep in mind the potential of palliative splenic radiation if she develops early satiety or other abdominal complaints from splenomegaly.    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    -2/1/2021 PET negative.  Spleen is unremarkable with homogeneous uptake.    -2/5/2021 EGD and colonoscopy with Dr. Edge showed Negrete's esophagus, hemorrhoids, diverticuli, proximal ascending colon polyp.  No mention of any colitis.    -4/28/2021 attempted laparoscopic partial colectomy with subsequent open right hemicolectomy with ileocolonic stenosis but no residual polyp found in the proximal ascending colon after resection of the right colon and a small polyp in the distal part of the ascending colon.  Pathology showed residual adenomatous polyp tubular adenoma without high-grade dysplasia or invasive carcinoma and 13 benign lymph nodes.  Additional colonic segment benign with 4 benign lymph nodes.    -3/3/2022 data:  Hemoglobin hemoglobin 9.8 with MCV of 91.9 and platelets slightly  low 128,000 with normal white count 7100.  Unremarkable differential.    Creatinine 1.5 glucose 160 sodium 129 with potassium 4.7 and chloride 94 otherwise unremarkable CMP.  C-reactive protein normal 1.7.  Sedimentation rate 31, upper limit 30.  INR normal 0.99 with PTT 26.1.  100 mg/dL M spike on SPEP.  Normal quantitative immunoglobulins except for decreased IgE.  Serum immunofixation electrophoresis was unclear as to monoclonality.  Serum kappa 33.8 lambda 27.4 both minimally elevated with normal ratio 1.23.  Serum viscosity normal 1.6.    -3/4/2022 CT chest abdomen pelvis without contrast: 2.5 mm noncalcified nodule right upper lobe and right lower lobe for which follow-up in 6 months for stability or resolution is suggested.  No suspicious lung nodules to suggest metastasis.  Massive enlargement of the spleen which is stable compared to June 2021.  1.4 cm and 1.5 cm juan luis hepatic adenopathy.  Stable retroperitoneal adenopathy largest 1.5 cm.  1.8 cm stable IVC node.    -3/15/2022 [facility] medical oncology follow-up visit: She is not hyperviscous and I doubt her epistaxis is related to the lymphoma or hyperviscosity and I do not think she has Waldenstrom's.  However, I do think she has massive splenomegaly and while that has been present for quite some time, I nonetheless think that a lot of her abdominal discomfort is due to the massive splenomegaly pushing her bowel to the right and she could have some bowel involvement of lymphoma that we could put her through endoscopy again but she had this back a little over a year ago with no obvious bowel involvement and no obvious colitis.  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  She would not consent to anything stronger than that regardless and  it took a little convincing to get her to go along with this plan but she is willing to try provided that she has no major allergic reactions etc.  We will give first dose in [place] after chemo preparation visit we will use peripheral veins and I will have her follow-up in a few weeks with my nurse practitioner in [place] to make sure she is tolerating this well and to set up follow-up visits in the weeks and months ahead.  Repeat CTs I would ask my nurse practitioner to get ordered for approximately 4 months out from the end of Rituxan.  In the meantime, from the epistaxis standpoint I asked her to follow-up with ENT.   We discussed the protracted immune suppression that would be associated with the Rituxan and the fact that it would likely make vaccinations less helpful but that does not mean she should not get them.  We also discussed the possibility of allergic reactions.  Once all of this is done, she will need to follow-up with whoever is going to take over from Dr. 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    -3/22/2022 through 3/27/2022 inpatient hospitalization for fever after Rituxan initial dose.  No growth on cultures.  CT chest abdomen pelvis other than showing marked splenomegaly and stable retroperitoneal nodes showed nothing else.  She was pancytopenic on admission.  Given 1 unit platelet packed red cell transfusion.  On 3/22/2022 her platelet count was 142,000 and by the evening had dropped to 36,001-day with lactic acidosis.  Hemoglobin dropped to 7.2 x 3 2322 and white count down to 2450 with absolute neutrophil count baldomero of 240 on 3/22/2022 with a neutrophil count that very same day earlier in the day of 2300 before the Rituxan was given.  By 3/27/2022 her white count was 5150, hemoglobin 9.3, platelets 72,000 and neutrophil count was 2840.  During her hospitalization I saw her and we discussed that I would not  risk further Rituxan as it does appear that this was an unusually brisk cytokine reaction from Rituxan.  While in the hospital I had Dr. Justin Kemp see her and he has an appointment to see her on April 12 to check her for fitness for surgery.  Avatrombopag to stimulate marrow production of platelets in preparation for surgery is not generally indicated in the face of nonportal hypertensive thrombocytopenia where her pancytopenia is both due to marrow involvement and also cancer-induced splenomegaly.    -4/12/2022 [facility] oncology follow-up: Patient states she saw Dr. Kemp yesterday and he apparently is coordinating the pneumococcal vaccinations and the Haemophilus influenza B and meningococcal vaccinations.  She thinks she got the Pneumovax with Dr. Isidro recently already and that Dr. Kemp is coordinating this with Dr. Isidro but I am not sure of that and I have reached out to Dr. Kemp who is currently scrubbed to make sure this is getting orchestrated.  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  I would never test her with another anti-CD20 antibody like Rituxan or its cousins.  She needs thorough vaccination presplenectomy.  I will defer to Dr. Kemp to orchestrate as I am not sure whether he is having Dr. Isidro do that or whether he himself is ordering this but I will have the patient close that loop with Dr. Kemp to be certain they understand how to proceed.  Her blood counts last week were entirely normal with Dr. Isidro.  The primary reason for splenectomy at this point is not because of her counts but because of the massive splenomegaly causing abdominal discomforts and pushing of her viscera.  Dr. Kemp is aware of the risks of adhesions  but will, according to the patient, attempt laparoscopic approach but if that does not work he will convert to an open procedure.  I will see her back in about a month with blood counts prior to return to see how she is doing postsplenectomy.    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  The histologic appearance and immunohistochemical staining pattern is felt to be most consistent with a marginal zone lymphoma, no evidence of large cell transformation identified.    -9/1/2022 [facility] Oncology clinic follow-up: Mahi had open splenectomy on 7/20/2022 with a fairly tumultuous recovery with blood loss, prolonged intubation, pneumothorax, she was discharged on 8/4/2022 to rehab and is currently in rehab but getting stronger every day and hopes to go home this week with her daughter.  She looks great.  I have reviewed her CBC from 8/17/2022 that shows WBC 11.58, hemoglobin 12.3, hematocrit 42.6%, platelet count 512,000.  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  We will continue to monitor with CBC again in 3 months and I have ordered that today.  She had all of her presplenectomy vaccinations, she is waiting for the new COVID booster and will get that as soon as it is available.    -12/1/2022 white count 11,000 hemoglobin 11.6 platelets 446,000 with absolute lymphocyte count 4080 with absolute neutrophil count 4870.    -12/13/2022 [facility] hematology oncology follow-up: Counts are doing well.  No palpable cervical axillary or inguinal adenopathy.  No plan for any further imaging in the absence of symptoms.  Counts doing well postsplenectomy.  We will repeat CBC prior to return to my nurse practitioner in 3 months and we will see her  once a quarter.    -3/23/2023 [facility] Hematology/Oncology clinic follow-up: Mahi continues to do well, no evidence of progression of lymphoma.  She has no palpable lymphadenopathy.  CBC is unremarkable with WBC 11,900, hemoglobin 12.5, hematocrit 38%, platelet count 437,000, ANC 5.35.  She has had no illnesses since we saw her last.  She does have some hair thinning but no hien alopecia.  I have asked her to discuss with her dermatologist any recommendations.  Her nails are short but appear very healthy. She is on levothyroxine for hypothyroidism and this is adjusted and monitored by Dr. Isidro.  We will continue to monitor with CBC in 3 months prior to return and I have ordered that today.    -6/29/2023 [facility] Hematology/Oncology clinic follow-up: Mahi overall seems to be doing well, her current CBC is unremarkable with WBC 10.30, hemoglobin 11.6, hematocrit 32.9%, platelet count 559,000, ANC 4.78.  I am concerned with the bullous skin lesions she is describing and currently has on her left calf.  I have put a picture in her chart under media.  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  The skin lesions have apparently been occurring over what sounds like about 11 years, she has been hospitalized on a few occasions and treated for cellulitis.  She follows with Dr. Vidhya Araya and I have reached out to their office to see if we can get her seen now to biopsy her current skin lesion while it is active.  Her follow-up with Dr. Araya is not until October, I discussed with Mahi that she needs to be seen sooner.  Otherwise we will plan on seeing her back in 3 months for follow-up with repeat CBC and I have ordered that today.  We will see her sooner if she has any concerns and she knows to reach out to us.    -9/28/2023 [facility] Oncology clinic follow-up: Mahi engle is doing well, CBC as  shown above is unremarkable with WBC 9.83, hemoglobin 12.6, hematocrit 36.5%, platelet count 443,000, ANC 4.58.  She currently thinks that she may have a stomach bug as she has some intermittent diarrhea with abdominal cramping and her daughter is experiencing the same symptoms.  She is afebrile.  She will monitor and let us know if she has no improvement.  She did follow-up with GI earlier this year to discuss whether or not she would undergo another colonoscopy, she at that time decided that she did not want to undergo further colonoscopy unless it is absolutely necessary.  I discussed with her that if her abdominal symptoms did not resolve she may want to consider but currently she has no anemia or other worrisome symptoms.  At the age of 83 I would not press.  She has been following with dermatology regarding skin lesions, I did review note from office visit 7/5/2023 and biopsies were done but I do not have those pathology reports, we will reach out to try and get those.  We will continue to see her quarterly with CBC.    -1/2/2024 [facility] follow-up: 12/21/2023 CBC normal with unremarkable differential.  White count 10,350 platelets 143,000 hemoglobin 13.2.  No somatic complaints to suggest progressive lymphoma.  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  No rash presently.  Recently exposed to COVID but no symptoms. No plans for imaging in the absence of symptoms and will check CBC quarterly.    -4/4/2024 [facility]ology clinic follow-up: Mahi has had a persistent headache almost daily for the last several months, mainly on her left side without any visual changes or nausea or other associated symptoms.  We discussed that this could be from medication changes, she reports that she increased her mesalamine several weeks ago up to 800 mg twice daily and they seem a little bit worse since that time.  Also could be seasonal allergies, she has tried to take Flonase in the past  however does not tolerate it as it causes her to have nosebleeds.  I recommend she try over-the-counter generic Claritin or Zyrtec for seasonal allergies.  I will get a CT of her head without contrast for further evaluation.  She has had no falls or trauma.  It is also about time for her annual ophthalmology exam, she is an avid reader and perhaps may need a change in eyeglass prescription which could also cause a headache.  It would be unusual for her low-grade lymphoma to involve her CNS and I would not want to put her through a lumbar puncture at this point.  Her CBC looks good with WBC of 10.14, hemoglobin 12.2, hematocrit 36.4%, platelet count 461,000.  I will see her back in a few weeks to go over the results of her CT head without contrast.    -4/14/2024 CT head negative    -7/18/2024 [facility] oncology clinic follow-up: Mahi overall continues to do well.  No evidence of recurrent lymphoma.  Her CBC from 7/9/2024 was reviewed today and is unremarkable. WBC is 10.98, hemoglobin 12.8, hematocrit 37.3%, platelet count 447,000, absolute lymphocytes 3.60, ANC 5.66.  We will continue to monitor CBC quarterly.  She does take oral iron daily and is tolerating that and there is no downside she will continue.  She has a follow-up with GI in August, I asked her to discuss with them potential side effects of mesalamine as she has had chronic headaches and a little more fatigue since they have increased the dose.  I did do a CT of her head in April that showed no acute findings.     Marginal zone lymphoma of spleen   4/25/2016 Initial Diagnosis    Lymphoma, low grade     5/30/2017 Imaging    CT chest/abdomen/pelvis no obvious pulmonary nodules identified, no pleural effusions.  Stable appearance of adenopathy within the abdomen compared to earlier study as well as splenomegaly.     12/6/2017 Imaging    CT chest/abdomen/pelvis stable, essentially unchanged prominent mediastinal lymph nodes, unchanged right lower lobe medial  basal segmental focal fibrotic findings.  Scattered noncalcified small lung nodules appear unchanged.  Stable spine or megaly.  Scattered prominent retroperitoneal lymph nodes now appear smaller.  Unchanged spinal findings consistent with diffuse idiopathic skeletal hyperostosis and prior operative intervention at L4-5.       6/8/2018 Imaging    CT chest abdomen and pelvis without contrast: Stable, no significant change in the appearance compared to earlier study from December 6, 2017.     3/1/2019 Imaging    CT chest, abdomen and pelvis impression: No significant interval change from last year.  Stable appearance of the significantly enlarged spleen which measures up to nearly 20 x 12 cm.  Moderate, scattered upper abdominal adenopathy with innumerable scattered gastrohepatic, retroperitoneal, and mesenteric nodes measuring roughly between 1-2 cm are stable as well.  No significant progression.  Mild mediastinal adenopathy appears stable as well.     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  Colonoscopy showed hemorrhoids, diverticuli, 11 mm proximal ascending colon flat polyp.  Dr. 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     3/27/2019 Imaging    PET shows mild hypermetabolism within the lymph nodes to the left of the proximal third of the esophagus as well as near the tracheal bifurcation and no other area is of abnormal hypermetabolism.  Splenomegaly stable.  She has significant carotid stenosis especially on the right.     2/1/2021 Imaging    PET IMPRESSION:  Negative PET/CT scan. No evidence of recurrent or active  lymphoma. Enlargement seen of the spleen.     3/4/2022 Imaging    -3/3/2022 data:  Hemoglobin hemoglobin 9.8 with MCV of 91.9 and platelets slightly low  128,000 with normal white count 7100.  Unremarkable differential.    Creatinine 1.5 glucose 160 sodium 129 with potassium 4.7 and chloride 94 otherwise unremarkable CMP.  C-reactive protein normal 1.7.  Sedimentation rate 31, upper limit 30.  INR normal 0.99 with PTT 26.1.  100 mg/dL M spike on SPEP.  Normal quantitative immunoglobulins except for decreased IgE.  Serum immunofixation electrophoresis was unclear as to monoclonality.  Serum kappa 33.8 lambda 27.4 both minimally elevated with normal ratio 1.23.  Serum viscosity normal 1.6.    -3/4/2022 CT chest abdomen pelvis without contrast: 2.5 mm noncalcified nodule right upper lobe and right lower lobe for which follow-up in 6 months for stability or resolution is suggested.  No suspicious lung nodules to suggest metastasis.  Massive enlargement of the spleen which is stable compared to June 2021.  1.4 cm and 1.5 cm juan luis hepatic adenopathy.  Stable retroperitoneal adenopathy largest 1.5 cm.  1.8 cm stable IVC node.     3/22/2022 - 3/22/2022 Chemotherapy    OP LYMPHOMA (CLL) RiTUXimab (Weekly X 4)     3/22/2022 Adverse Reaction    -3/22/2022 through 3/27/2022 inpatient hospitalization for fever after Rituxan initial dose.  No growth on cultures.  CT chest abdomen pelvis other than showing marked splenomegaly and stable retroperitoneal nodes showed nothing else.  She was pancytopenic on admission.  Given 1 unit platelet packed red cell transfusion.  On 3/22/2022 her platelet count was 142,000 and by the evening had dropped to 36,001-day with lactic acidosis.  Hemoglobin dropped to 7.2 x 3 2322 and white count down to 2450 with absolute neutrophil count baldomero of 240 on 3/22/2022 with a neutrophil count that very same day earlier in the day of 2300 before the Rituxan was given.  By 3/27/2022 her white count was 5150, hemoglobin 9.3, platelets 72,000 and neutrophil count was 2840.  During her hospitalization I saw her and we discussed that I would not risk further  Rituxan as it does appear that this was an unusually brisk cytokine reaction from Rituxan.  While in the hospital I had Dr. Justin Kemp see her and he has an appointment to see her on April 12 to check her for fitness for surgery.  Avatrombopag to stimulate marrow production of platelets in preparation for surgery is not generally indicated in the face of nonportal hypertensive thrombocytopenia where her pancytopenia is both due to marrow involvement and also cancer-induced splenomegaly.     7/20/2022 Surgery    Surgery       Procedure:  Open splenectomy, distal pancreatectomy              HISTORY OF PRESENT ILLNESS:  The patient is a 84 y.o. 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  Mahi reports overall she has been doing well since we saw her last with no new concerns.  Continues to have occasional headache but she feels these are improving with time.  Also continues to deal with intermittent loose stool that again this is chronic in nature and not worsening with time, she does follow with GI, she continues on mesalamine.  Her appetite has been normal, weight is stable.  No fevers or chills, no bit drenching night sweats.    Past Medical History:   Diagnosis Date    Anemia 09/18/2015    Cataract     h/o    Disease of thyroid gland     Diverticulitis     DVT (deep venous thrombosis)     left lower leg    GERD (gastroesophageal reflux disease)     Gout     History of basal cell carcinoma     History of transfusion 2022    1 unit at [facility], pt denies reactions    Hypertension          IBS (irritable bowel syndrome)     Non Hodgkin's lymphoma     Splenomegaly     Urinary incontinence      Past Surgical History:   Procedure Laterality Date    APPENDECTOMY      BACK SURGERY      lumbar    CHOLECYSTECTOMY      COLON RESECTION  2021    EXPLORATORY LAPAROTOMY N/A 7/21/2022    Procedure: ABDOMINAL WASHOUT WITH CLOSURE;  Surgeon:  "Jayme Kemp MD;  Location:  [facility];  Service: General;  Laterality: N/A;    HYSTERECTOMY      jaja    KIDNEY SURGERY      kidney stones    ORIF ANKLE FRACTURE Right     h/o    SPLENECTOMY N/A 7/20/2022    Procedure: SPLENECTOMY OPEN, DISTAL PANCREATECTOMY;  Surgeon: Jayme Kemp MD;  Location:  [facility];  Service: General;  Laterality: N/A;    TOTAL HIP ARTHROPLASTY Bilateral     h/o       Allergies   Allergen Reactions    Penicillins Swelling and Rash     Passed out    Rituxan [Rituximab] Anaphylaxis    Hydrocodone Itching and Rash     Severe itching    Sulfa Antibiotics Swelling     Lips swell    Bactrim [Sulfamethoxazole-Trimethoprim] Itching     Fever and itching    Chlorhexidine Gluconate Rash    Keflex [Cephalexin] Rash    Latex Rash    Lortab [Hydrocodone-Acetaminophen] Rash    Magnesium Citrate Rash    Neosporin Original [Bacitracin-Neomycin-Polymyxin] Rash       Family History and Social History reviewed and changed as necessary    REVIEW OF SYSTEM:   No new somatic concerns    PHYSICAL EXAM:  Well-developed, well-nourished appearing female in no distress, here with her daughter today  No palpable lymphadenopathy  Respirations regular and unlabored, lungs clear to auscultation bilaterally  Heart regular rate and rhythm    Vitals:    10/09/24 1128   BP: 141/66   Pulse: 66   Resp: 18   Temp: 97.7 °F (36.5 °C)   SpO2: 96%   Weight: 81.2 kg (179 lb)   Height: 167.6 cm (66\")     Vitals:    10/09/24 1128   PainSc: 0-No pain          ECOG score: 1           Vitals reviewed.  Labs reviewed    ECOG: (1) Restricted in Physically Strenuous Activity, Ambulatory & Able to Do Work of Light Nature    Lab Results   Component Value Date    HGB 11.7 (L) 10/03/2024    HCT 33.2 (L) 10/03/2024    MCV 95.7 10/03/2024     10/03/2024    WBC 11.93 (H) 10/03/2024    NEUTROABS 6.18 10/03/2024    LYMPHSABS 3.97 (H) 10/03/2024    MONOSABS 1.28 (H) 10/03/2024    EOSABS 0.29 10/03/2024    BASOSABS 0.13 10/03/2024 "       Lab Results   Component Value Date    GLUCOSE 94 08/04/2022    BUN 17 08/04/2022    CREATININE 0.83 08/04/2022     (L) 08/04/2022    K 5.0 08/04/2022     08/04/2022    CO2 21.0 (L) 08/04/2022    CALCIUM 8.3 (L) 08/04/2022    PROTEINTOT 4.7 (L) 08/04/2022    ALBUMIN 2.70 (L) 08/04/2022    BILITOT 0.4 08/04/2022    ALKPHOS 88 08/04/2022    AST 32 08/04/2022    ALT 20 08/04/2022             ASSESSMENT & PLAN:    1. 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    Discussion: Mahi continues to do well, she has no evidence of recurrent or progressive lymphoma.  Her CBC as shown above is stable.  She has no new worrisome symptoms.  We will continue to monitor CBC quarterly, I will see her back in 6 months for follow-up and sooner if she has any concerns.    I spent 20 minutes caring for Mahi on this date of service. This time includes time spent by me in the following activities: preparing for the visit, reviewing tests, performing a medically appropriate examination and/or evaluation, ordering medications, tests, or procedures, documenting information in the medical record, and independently interpreting results and communicating that information with the patient/family/caregiver.     Chantell Kyle, APRN    10/09/2024

## 2025-01-22 ENCOUNTER — LAB (OUTPATIENT)
Dept: ONCOLOGY | Facility: HOSPITAL | Age: 85
End: 2025-01-22
Payer: MEDICARE

## 2025-01-22 VITALS
SYSTOLIC BLOOD PRESSURE: 145 MMHG | TEMPERATURE: 97.9 F | RESPIRATION RATE: 18 BRPM | HEART RATE: 72 BPM | WEIGHT: 176.2 LBS | DIASTOLIC BLOOD PRESSURE: 63 MMHG | BODY MASS INDEX: 28.44 KG/M2

## 2025-01-22 DIAGNOSIS — C83.07 MARGINAL ZONE LYMPHOMA OF SPLEEN: ICD-10-CM

## 2025-01-22 PROCEDURE — 36415 COLL VENOUS BLD VENIPUNCTURE: CPT

## 2025-01-23 LAB
BASOPHILS # BLD AUTO: 0.14 10*3/MM3 (ref 0–0.2)
BASOPHILS NFR BLD AUTO: 1.3 % (ref 0–1.5)
EOSINOPHIL # BLD AUTO: 0.33 10*3/MM3 (ref 0–0.4)
EOSINOPHIL NFR BLD AUTO: 3.1 % (ref 0.3–6.2)
ERYTHROCYTE [DISTWIDTH] IN BLOOD BY AUTOMATED COUNT: 13.4 % (ref 12.3–15.4)
HCT VFR BLD AUTO: 34 % (ref 34–46.6)
HGB BLD-MCNC: 12.1 G/DL (ref 12–15.9)
IMM GRANULOCYTES # BLD AUTO: 0.04 10*3/MM3 (ref 0–0.05)
IMM GRANULOCYTES NFR BLD AUTO: 0.4 % (ref 0–0.5)
LYMPHOCYTES # BLD AUTO: 3.72 10*3/MM3 (ref 0.7–3.1)
LYMPHOCYTES NFR BLD AUTO: 34.4 % (ref 19.6–45.3)
MCH RBC QN AUTO: 34.1 PG (ref 26.6–33)
MCHC RBC AUTO-ENTMCNC: 35.6 G/DL (ref 31.5–35.7)
MCV RBC AUTO: 95.8 FL (ref 79–97)
MONOCYTES # BLD AUTO: 1.22 10*3/MM3 (ref 0.1–0.9)
MONOCYTES NFR BLD AUTO: 11.3 % (ref 5–12)
NEUTROPHILS # BLD AUTO: 5.36 10*3/MM3 (ref 1.7–7)
NEUTROPHILS NFR BLD AUTO: 49.5 % (ref 42.7–76)
NRBC BLD AUTO-RTO: 2 /100 WBC (ref 0–0.2)
PLATELET # BLD AUTO: 431 10*3/MM3 (ref 140–450)
RBC # BLD AUTO: 3.55 10*6/MM3 (ref 3.77–5.28)
WBC # BLD AUTO: 10.81 10*3/MM3 (ref 3.4–10.8)

## 2025-04-09 ENCOUNTER — LAB (OUTPATIENT)
Dept: ONCOLOGY | Facility: HOSPITAL | Age: 85
End: 2025-04-09
Payer: MEDICARE

## 2025-04-09 VITALS
DIASTOLIC BLOOD PRESSURE: 72 MMHG | BODY MASS INDEX: 28.57 KG/M2 | HEART RATE: 70 BPM | SYSTOLIC BLOOD PRESSURE: 175 MMHG | RESPIRATION RATE: 18 BRPM | WEIGHT: 177 LBS | TEMPERATURE: 97.6 F

## 2025-04-09 DIAGNOSIS — C83.07 MARGINAL ZONE LYMPHOMA OF SPLEEN: ICD-10-CM

## 2025-04-09 PROCEDURE — 36415 COLL VENOUS BLD VENIPUNCTURE: CPT

## 2025-04-10 LAB
BASOPHILS # BLD AUTO: NORMAL 10*3/UL
BASOPHILS # BLD MANUAL: 0.2 10*3/MM3 (ref 0–0.2)
BASOPHILS NFR BLD MANUAL: 2 % (ref 0–1.5)
DIFFERENTIAL COMMENT: ABNORMAL
EOSINOPHIL # BLD AUTO: NORMAL 10*3/UL
EOSINOPHIL # BLD MANUAL: 0.61 10*3/MM3 (ref 0–0.4)
EOSINOPHIL NFR BLD AUTO: NORMAL %
EOSINOPHIL NFR BLD MANUAL: 6.1 % (ref 0.3–6.2)
ERYTHROCYTE [DISTWIDTH] IN BLOOD BY AUTOMATED COUNT: 13.8 % (ref 12.3–15.4)
HCT VFR BLD AUTO: 37.6 % (ref 34–46.6)
HGB BLD-MCNC: 12.6 G/DL (ref 12–15.9)
LYMPHOCYTES # BLD AUTO: NORMAL 10*3/UL
LYMPHOCYTES # BLD MANUAL: 3.88 10*3/MM3 (ref 0.7–3.1)
LYMPHOCYTES NFR BLD AUTO: NORMAL %
LYMPHOCYTES NFR BLD MANUAL: 38.8 % (ref 19.6–45.3)
MCH RBC QN AUTO: 32.4 PG (ref 26.6–33)
MCHC RBC AUTO-ENTMCNC: 33.5 G/DL (ref 31.5–35.7)
MCV RBC AUTO: 96.7 FL (ref 79–97)
MONOCYTES # BLD MANUAL: 1.12 10*3/MM3 (ref 0.1–0.9)
MONOCYTES NFR BLD AUTO: NORMAL %
MONOCYTES NFR BLD MANUAL: 11.2 % (ref 5–12)
NEUTROPHILS # BLD MANUAL: 4.18 10*3/MM3 (ref 1.7–7)
NEUTROPHILS NFR BLD AUTO: NORMAL %
NEUTROPHILS NFR BLD MANUAL: 41.8 % (ref 42.7–76)
PLATELET # BLD AUTO: 444 10*3/MM3 (ref 140–450)
PLATELET BLD QL SMEAR: ABNORMAL
RBC # BLD AUTO: 3.89 10*6/MM3 (ref 3.77–5.28)
RBC MORPH BLD: ABNORMAL
WBC # BLD AUTO: 10 10*3/MM3 (ref 3.4–10.8)

## 2025-04-16 ENCOUNTER — OFFICE VISIT (OUTPATIENT)
Dept: ONCOLOGY | Facility: CLINIC | Age: 85
End: 2025-04-16
Payer: MEDICARE

## 2025-04-16 VITALS
SYSTOLIC BLOOD PRESSURE: 142 MMHG | HEIGHT: 66 IN | TEMPERATURE: 98.2 F | DIASTOLIC BLOOD PRESSURE: 75 MMHG | WEIGHT: 177 LBS | OXYGEN SATURATION: 98 % | RESPIRATION RATE: 18 BRPM | BODY MASS INDEX: 28.45 KG/M2 | HEART RATE: 70 BPM

## 2025-04-16 DIAGNOSIS — C83.07 MARGINAL ZONE LYMPHOMA OF SPLEEN: Primary | ICD-10-CM

## 2025-04-16 PROCEDURE — 1159F MED LIST DOCD IN RCRD: CPT | Performed by: NURSE PRACTITIONER

## 2025-04-16 PROCEDURE — 1160F RVW MEDS BY RX/DR IN RCRD: CPT | Performed by: NURSE PRACTITIONER

## 2025-04-16 PROCEDURE — 99213 OFFICE O/P EST LOW 20 MIN: CPT | Performed by: NURSE PRACTITIONER

## 2025-04-16 PROCEDURE — 1125F AMNT PAIN NOTED PAIN PRSNT: CPT | Performed by: NURSE PRACTITIONER

## 2025-04-16 NOTE — PROGRESS NOTES
CHIEF COMPLAINT: Some discomfort from recent radiation treatment to squamous cell skin cancer lesions on her nose    Problem List:  Oncology/Hematology History Overview Note   1. 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  2. History of iron deficiency anemia with last colonoscopy 11/2014, accordingto the patient, with a history of ulcerative colitis treated by Dr. Edge.  3. History of basal cell carcinomas.   4. Cataracts.   5. Remote history of deep vein thrombosis with vena cava filter in place.   6. Diverticulitis by history.   7. History of gout.   8. Hypertension.   9. History of 3 different kidney surgeries.   10. Cholecystectomy.   11. Chronic neuropathy.   12. History of ARMAAN.   13. History of back surgeries.   14. History of total hip replacement.   15. History of ankle fracture.   16. History of appendectomy.   17. 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 Edge.  18.  Epistaxis  19.  Stage III chronic kidney disease with hyponatremia on 48 ounce per day fluid restriction per Dr. Wooten 10/11/2022  20.  Squamous cell skin cancer    Oncology history timeline:  a) Splenomegaly on hospitalization 02/2016. She had a week of nausea, vomiting and diarrhea with dehydration. The nausea, vomiting, and dehydration have resolved but on CT they found retroperitoneal adenopathy with some splenomegaly that was not bulky. She denies any ongoing fevers or chills or bed drenching night sweats or unexplained weight loss and no change in the color, caliber or consistency of her stools. She had a slightly elevated beta-2 microglobulin of  3.5 but no monoclonal spike and a white count 5300, hemoglobin 11.5, platelets 158,000, sedimentation rate 45, C-reactive protein 13.6 and a normal LDH of 318 with normal liver enzymes. Slight hyponatremia with sodium of 134 and hypokalemia with potassium of 3.3 on 02/18/2016. She had slight decrease in immunoglobulin G to 580 and immunoglobulin A to 35 on testing while in the hospital.   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 There were no features to suggest hairy cell lymphoma or a mantle cell lymphoma and no large cell population. This was CD5 negative, CD10 negative, kappa clonal negative and bright for CD20 with dim surface light chain expression and bright cytoplasm, CD45 bright. The CD38 negative for plasmacytic differentiation with a hemoglobin of 11.4, white count 7200 with normal differential, and platelets of 163,000.   c) Liver, spleen ultrasound showed splenomegaly of 19.6 cm maximum dimension with 1.6 cm slightly dilated portal vein.  D)-3/20/2019 Dr. 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  Colonoscopy showed hemorrhoids, diverticuli, 11 mm proximal ascending colon flat polyp.  Dr. 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   -3/27/2019 PET shows mild hypermetabolism within the lymph nodes to the left of the proximal third of the esophagus as well as near the tracheal bifurcation and no other area is of abnormal hypermetabolism.  Splenomegaly stable.  She has significant carotid stenosis especially on the right.  -1/12/2021 white count 4200 hemoglobin 10  platelets 114,000 unremarkable differential.  CMP unremarkable  -4/28/2021 Dr. Augustine performed attempted laparoscopic partial colectomy with subsequent open right hemicolectomy with ileocolonic stenosis but no residual polyp found in the proximal ascending colon after resection of the right colon and a small polyp in the distal part of the ascending colon.  Pathology showed residual adenomatous polyp tubular adenoma without high-grade dysplasia or invasive carcinoma and 13 benign lymph nodes.  Additional colonic segment benign with 4 benign lymph nodes.  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  Get her blood counts today and just prior to a minute evaluation in 3 months.  No plans for scans in the absence of symptoms.  I will keep in mind the potential of palliative splenic radiation if she develops early satiety or other abdominal complaints from splenomegaly.    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    -2/1/2021 PET negative.  Spleen is unremarkable with homogeneous uptake.    -2/5/2021 EGD and colonoscopy with Dr. Edge showed Negrete's esophagus, hemorrhoids, diverticuli, proximal ascending colon polyp.  No mention of any colitis.    -4/28/2021 attempted laparoscopic partial colectomy with subsequent open right hemicolectomy with ileocolonic stenosis but no residual polyp found in the proximal ascending colon after resection of the right colon and a small polyp in the distal part of the ascending colon.  Pathology showed residual adenomatous polyp tubular adenoma without high-grade dysplasia or invasive carcinoma and 13 benign lymph nodes.  Additional colonic segment benign with  4 benign lymph nodes.    -3/3/2022 data:  Hemoglobin hemoglobin 9.8 with MCV of 91.9 and platelets slightly low 128,000 with normal white count 7100.  Unremarkable differential.    Creatinine 1.5 glucose 160 sodium 129 with potassium 4.7 and chloride 94 otherwise unremarkable CMP.  C-reactive protein normal 1.7.  Sedimentation rate 31, upper limit 30.  INR normal 0.99 with PTT 26.1.  100 mg/dL M spike on SPEP.  Normal quantitative immunoglobulins except for decreased IgE.  Serum immunofixation electrophoresis was unclear as to monoclonality.  Serum kappa 33.8 lambda 27.4 both minimally elevated with normal ratio 1.23.  Serum viscosity normal 1.6.    -3/4/2022 CT chest abdomen pelvis without contrast: 2.5 mm noncalcified nodule right upper lobe and right lower lobe for which follow-up in 6 months for stability or resolution is suggested.  No suspicious lung nodules to suggest metastasis.  Massive enlargement of the spleen which is stable compared to June 2021.  1.4 cm and 1.5 cm juan luis hepatic adenopathy.  Stable retroperitoneal adenopathy largest 1.5 cm.  1.8 cm stable IVC node.    -3/15/2022 [facility] medical oncology follow-up visit: She is not hyperviscous and I doubt her epistaxis is related to the lymphoma or hyperviscosity and I do not think she has Waldenstrom's.  However, I do think she has massive splenomegaly and while that has been present for quite some time, I nonetheless think that a lot of her abdominal discomfort is due to the massive splenomegaly pushing her bowel to the right and she could have some bowel involvement of lymphoma that we could put her through endoscopy again but she had this back a little over a year ago with no obvious bowel involvement and no obvious colitis.  I think it is reasonable given her previous finding of marginal zone lymphoma in the marrow that she likely has splenic marginal zone lymphoma causing splenomegaly and anemia and mild thrombocytopenia and I think it is  reasonable to treat her with Rituxan weekly x4.  She would not consent to anything stronger than that regardless and it took a little convincing to get her to go along with this plan but she is willing to try provided that she has no major allergic reactions etc.  We will give first dose in [place] after chemo preparation visit we will use peripheral veins and I will have her follow-up in a few weeks with my nurse practitioner in [place] to make sure she is tolerating this well and to set up follow-up visits in the weeks and months ahead.  Repeat CTs I would ask my nurse practitioner to get ordered for approximately 4 months out from the end of Rituxan.  In the meantime, from the epistaxis standpoint I asked her to follow-up with ENT.   We discussed the protracted immune suppression that would be associated with the Rituxan and the fact that it would likely make vaccinations less helpful but that does not mean she should not get them.  We also discussed the possibility of allergic reactions.  Once all of this is done, she will need to follow-up with whoever is going to take over from Dr. 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    -3/22/2022 through 3/27/2022 inpatient hospitalization for fever after Rituxan initial dose.  No growth on cultures.  CT chest abdomen pelvis other than showing marked splenomegaly and stable retroperitoneal nodes showed nothing else.  She was pancytopenic on admission.  Given 1 unit platelet packed red cell transfusion.  On 3/22/2022 her platelet count was 142,000 and by the evening had dropped to 36,001-day with lactic acidosis.  Hemoglobin dropped to 7.2 x 3 2322 and white count down to 2450 with absolute neutrophil count baldomero of 240 on 3/22/2022 with a neutrophil count that very same day earlier in the day of 2300 before the Rituxan was given.  By 3/27/2022 her white count was 5150, hemoglobin 9.3,  platelets 72,000 and neutrophil count was 2840.  During her hospitalization I saw her and we discussed that I would not risk further Rituxan as it does appear that this was an unusually brisk cytokine reaction from Rituxan.  While in the hospital I had Dr. Justin Kemp see her and he has an appointment to see her on April 12 to check her for fitness for surgery.  Avatrombopag to stimulate marrow production of platelets in preparation for surgery is not generally indicated in the face of nonportal hypertensive thrombocytopenia where her pancytopenia is both due to marrow involvement and also cancer-induced splenomegaly.    -4/12/2022 [facility] medical oncology follow-up: Patient states she saw Dr. Kemp yesterday and he apparently is coordinating the pneumococcal vaccinations and the Haemophilus influenza B and meningococcal vaccinations.  She thinks she got the Pneumovax with Dr. Isidro recently already and that Dr. Kemp is coordinating this with Dr. Isidro but I am not sure of that and I have reached out to Dr. Kemp who is currently scrubbed to make sure this is getting orchestrated.  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  I would never test her with another anti-CD20 antibody like Rituxan or its cousins.  She needs thorough vaccination presplenectomy.  I will defer to Dr. Kemp to orchestrate as I am not sure whether he is having Dr. Isidro do that or whether he himself is ordering this but I will have the patient close that loop with Dr. Kemp to be certain they understand how to proceed.  Her blood counts last week were entirely normal with Dr. Isidro.  The primary reason for splenectomy at this point is not because of her counts but because of the massive  splenomegaly causing abdominal discomforts and pushing of her viscera.  Dr. Kemp is aware of the risks of adhesions but will, according to the patient, attempt laparoscopic approach but if that does not work he will convert to an open procedure.  I will see her back in about a month with blood counts prior to return to see how she is doing postsplenectomy.    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  The histologic appearance and immunohistochemical staining pattern is felt to be most consistent with a marginal zone lymphoma, no evidence of large cell transformation identified.    -9/1/2022 [facility] Oncology clinic follow-up: Mahi had open splenectomy on 7/20/2022 with a fairly tumultuous recovery with blood loss, prolonged intubation, pneumothorax, she was discharged on 8/4/2022 to rehab and is currently in rehab but getting stronger every day and hopes to go home this week with her daughter.  She looks great.  I have reviewed her CBC from 8/17/2022 that shows WBC 11.58, hemoglobin 12.3, hematocrit 42.6%, platelet count 512,000.  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  We will continue to monitor with CBC again in 3 months and I have ordered that today.  She had all of her presplenectomy vaccinations, she is waiting for the new COVID booster and will get that as soon as it is available.    -12/1/2022 white count 11,000 hemoglobin 11.6 platelets 446,000 with absolute lymphocyte count 4080 with absolute neutrophil count 4870.    -12/13/2022 [facility] hematology oncology follow-up: Counts are doing well.  No palpable cervical axillary or inguinal adenopathy.  No plan for any further imaging in the absence of symptoms.  Counts doing  well postsplenectomy.  We will repeat CBC prior to return to my nurse practitioner in 3 months and we will see her once a quarter.    -3/23/2023 [facility] [place] [facility]ematology[facility]Oncology clinic follow-up: Mahi continues to do well, no evidence of progression of lymphoma.  She has no palpable lymphadenopathy.  CBC is unremarkable with WBC 11,900, hemoglobin 12.5, hematocrit 38%, platelet count 437,000, ANC 5.35.  She has had no illnesses since we saw her last.  She does have some hair thinning but no hien alopecia.  I have asked her to discuss with her dermatologist any recommendations.  Her nails are short but appear very healthy. She is on levothyroxine for hypothyroidism and this is adjusted and monitored by Dr. Isidro.  We will continue to monitor with CBC in 3 months prior to return and I have ordered that today.    -6/29/2023 [facility] [place] Hematology/Oncology clinic follow-up: Mahi overall seems to be doing well, her current CBC is unremarkable with WBC 10.30, hemoglobin 11.6, hematocrit 32.9%, platelet count 559,000, ANC 4.78.  I am concerned with the bullous skin lesions she is describing and currently has on her left calf.  I have put a picture in her chart under media.  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  The skin lesions have apparently been occurring over what sounds like about 11 years, she has been hospitalized on a few occasions and treated for cellulitis.  She follows with Dr. Vidhya Araya and I have reached out to their office to see if we can get her seen now to biopsy her current skin lesion while it is active.  Her follow-up with Dr. Araya is not until October, I discussed with Mahi that she needs to be seen sooner.  Otherwise we will plan on seeing her back in 3 months for follow-up with repeat CBC and I have ordered that today.  We will see her sooner if she has any concerns and  she knows to reach out to us.    -9/28/2023 [facility] Oncology clinic follow-up: Mahi overall is doing well, CBC as shown above is unremarkable with WBC 9.83, hemoglobin 12.6, hematocrit 36.5%, platelet count 443,000, ANC 4.58.  She currently thinks that she may have a stomach bug as she has some intermittent diarrhea with abdominal cramping and her daughter is experiencing the same symptoms.  She is afebrile.  She will monitor and let us know if she has no improvement.  She did follow-up with GI earlier this year to discuss whether or not she would undergo another colonoscopy, she at that time decided that she did not want to undergo further colonoscopy unless it is absolutely necessary.  I discussed with her that if her abdominal symptoms did not resolve she may want to consider but currently she has no anemia or other worrisome symptoms.  At the age of 83 I would not press.  She has been following with dermatology regarding skin lesions, I did review note from office visit 7/5/2023 and biopsies were done but I do not have those pathology reports, we will reach out to try and get those.  We will continue to see her quarterly with CBC.    -1/2/2024 [facility] oncology clinic follow-up: 12/21/2023 CBC normal with unremarkable differential.  White count 10,350 platelets 143,000 hemoglobin 13.2.  No somatic complaints to suggest progressive lymphoma.  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  No rash presently.  Recently exposed to COVID but no symptoms. No plans for imaging in the absence of symptoms and will check CBC quarterly.    -4/4/2024 [facility] oncology clinic follow-up: Mahi has had a persistent headache almost daily for the last several months, mainly on her left side without any visual changes or nausea or other associated symptoms.  We discussed that this could be from medication changes, she reports that she increased her mesalamine several weeks ago up to 800 mg twice daily and they seem a  little bit worse since that time.  Also could be seasonal allergies, she has tried to take Flonase in the past however does not tolerate it as it causes her to have nosebleeds.  I recommend she try over-the-counter generic Claritin or Zyrtec for seasonal allergies.  I will get a CT of her head without contrast for further evaluation.  She has had no falls or trauma.  It is also about time for her annual ophthalmology exam, she is an avid reader and perhaps may need a change in eyeglass prescription which could also cause a headache.  It would be unusual for her low-grade lymphoma to involve her CNS and I would not want to put her through a lumbar puncture at this point.  Her CBC looks good with WBC of 10.14, hemoglobin 12.2, hematocrit 36.4%, platelet count 461,000.  I will see her back in a few weeks to go over the results of her CT head without contrast.    -4/14/2024 CT head negative    -7/18/2024 [facility] oncology clinic follow-up: Mahi overall continues to do well.  No evidence of recurrent lymphoma.  Her CBC from 7/9/2024 was reviewed today and is unremarkable. WBC is 10.98, hemoglobin 12.8, hematocrit 37.3%, platelet count 447,000, absolute lymphocytes 3.60, ANC 5.66.  We will continue to monitor CBC quarterly.  She does take oral iron daily and is tolerating that and there is no downside she will continue.  She has a follow-up with GI in August, I asked her to discuss with them potential side effects of mesalamine as she has had chronic headaches and a little more fatigue since they have increased the dose.  I did do a CT of her head in April that showed no acute findings.     Marginal zone lymphoma of spleen   4/25/2016 Initial Diagnosis    Lymphoma, low grade     5/30/2017 Imaging    CT chest/abdomen/pelvis no obvious pulmonary nodules identified, no pleural effusions.  Stable appearance of adenopathy within the abdomen compared to earlier study as well as splenomegaly.     12/6/2017 Imaging    CT  chest/abdomen/pelvis stable, essentially unchanged prominent mediastinal lymph nodes, unchanged right lower lobe medial basal segmental focal fibrotic findings.  Scattered noncalcified small lung nodules appear unchanged.  Stable spine or megaly.  Scattered prominent retroperitoneal lymph nodes now appear smaller.  Unchanged spinal findings consistent with diffuse idiopathic skeletal hyperostosis and prior operative intervention at L4-5.       6/8/2018 Imaging    CT chest abdomen and pelvis without contrast: Stable, no significant change in the appearance compared to earlier study from December 6, 2017.     3/1/2019 Imaging    CT chest, abdomen and pelvis impression: No significant interval change from last year.  Stable appearance of the significantly enlarged spleen which measures up to nearly 20 x 12 cm.  Moderate, scattered upper abdominal adenopathy with innumerable scattered gastrohepatic, retroperitoneal, and mesenteric nodes measuring roughly between 1-2 cm are stable as well.  No significant progression.  Mild mediastinal adenopathy appears stable as well.     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  Colonoscopy showed hemorrhoids, diverticuli, 11 mm proximal ascending colon flat polyp.  Dr. 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     3/27/2019 Imaging    PET shows mild hypermetabolism within the lymph nodes to the left of the proximal third of the esophagus as well as near the tracheal bifurcation and no other area is of abnormal hypermetabolism.  Splenomegaly stable.  She has significant carotid stenosis especially on the right.     2/1/2021 Imaging    PET IMPRESSION:  Negative PET/CT scan. No evidence of recurrent or active  lymphoma. Enlargement seen of  the spleen.     3/4/2022 Imaging    -3/3/2022 data:  Hemoglobin hemoglobin 9.8 with MCV of 91.9 and platelets slightly low 128,000 with normal white count 7100.  Unremarkable differential.    Creatinine 1.5 glucose 160 sodium 129 with potassium 4.7 and chloride 94 otherwise unremarkable CMP.  C-reactive protein normal 1.7.  Sedimentation rate 31, upper limit 30.  INR normal 0.99 with PTT 26.1.  100 mg/dL M spike on SPEP.  Normal quantitative immunoglobulins except for decreased IgE.  Serum immunofixation electrophoresis was unclear as to monoclonality.  Serum kappa 33.8 lambda 27.4 both minimally elevated with normal ratio 1.23.  Serum viscosity normal 1.6.    -3/4/2022 CT chest abdomen pelvis without contrast: 2.5 mm noncalcified nodule right upper lobe and right lower lobe for which follow-up in 6 months for stability or resolution is suggested.  No suspicious lung nodules to suggest metastasis.  Massive enlargement of the spleen which is stable compared to June 2021.  1.4 cm and 1.5 cm juan luis hepatic adenopathy.  Stable retroperitoneal adenopathy largest 1.5 cm.  1.8 cm stable IVC node.     3/22/2022 - 3/22/2022 Chemotherapy    OP LYMPHOMA (CLL) RiTUXimab (Weekly X 4)     3/22/2022 Adverse Reaction    -3/22/2022 through 3/27/2022 inpatient hospitalization for fever after Rituxan initial dose.  No growth on cultures.  CT chest abdomen pelvis other than showing marked splenomegaly and stable retroperitoneal nodes showed nothing else.  She was pancytopenic on admission.  Given 1 unit platelet packed red cell transfusion.  On 3/22/2022 her platelet count was 142,000 and by the evening had dropped to 36,001-day with lactic acidosis.  Hemoglobin dropped to 7.2 x 3 2322 and white count down to 2450 with absolute neutrophil count baldomero of 240 on 3/22/2022 with a neutrophil count that very same day earlier in the day of 2300 before the Rituxan was given.  By 3/27/2022 her white count was 5150, hemoglobin 9.3, platelets  72,000 and neutrophil count was 2840.  During her hospitalization I saw her and we discussed that I would not risk further Rituxan as it does appear that this was an unusually brisk cytokine reaction from Rituxan.  While in the hospital I had Dr. Justin Kemp see her and he has an appointment to see her on April 12 to check her for fitness for surgery.  Avatrombopag to stimulate marrow production of platelets in preparation for surgery is not generally indicated in the face of nonportal hypertensive thrombocytopenia where her pancytopenia is both due to marrow involvement and also cancer-induced splenomegaly.     7/20/2022 Surgery    Surgery       Procedure:  Open splenectomy, distal pancreatectomy              HISTORY OF PRESENT ILLNESS:  The patient is a 84 y.o. 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  Mhai reports overall she has been feeling well.  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  Otherwise she states that she has had no change in her health since we saw her last.  Her appetite has been good, no change in her bowel or bladder habits.  She continues to follow with GI for management of ulcerative colitis and is doing well on mesalamine other than for the high cost of the medication.  She has had no fevers or chills, no infections since we saw her last.  No bed drenching night sweats.  Her weight is stable.  No new abdominal pain.    Past Medical History:   Diagnosis Date    Anemia 09/18/2015    Cataract     h/o    Disease of thyroid gland     Diverticulitis     DVT (deep venous thrombosis)     left lower leg    GERD (gastroesophageal reflux disease)     Gout     History of basal cell carcinoma     History of transfusion 2022    1 unit at [facility], pt denies reactions    Hypertension          IBS (irritable bowel syndrome)     Non  "Hodgkin's lymphoma     Splenomegaly     Urinary incontinence      Past Surgical History:   Procedure Laterality Date    APPENDECTOMY      BACK SURGERY      lumbar    CHOLECYSTECTOMY      COLON RESECTION  2021    EXPLORATORY LAPAROTOMY N/A 7/21/2022    Procedure: ABDOMINAL WASHOUT WITH CLOSURE;  Surgeon: Jayme Kemp MD;  [facility];  Service: General;  Laterality: N/A;    HYSTERECTOMY      jaja    KIDNEY SURGERY      kidney stones    ORIF ANKLE FRACTURE Right     h/o    SPLENECTOMY N/A 7/20/2022    Procedure: SPLENECTOMY OPEN, DISTAL PANCREATECTOMY;  Surgeon: Jayme Kemp MD;  Location:  [facility] OR;  Service: General;  Laterality: N/A;    TOTAL HIP ARTHROPLASTY Bilateral     h/o       Allergies   Allergen Reactions    Penicillins Swelling and Rash     Passed out    Rituxan [Rituximab] Anaphylaxis    Hydrocodone Itching and Rash     Severe itching    Sulfa Antibiotics Swelling     Lips swell    Bactrim [Sulfamethoxazole-Trimethoprim] Itching     Fever and itching    Chlorhexidine Gluconate Rash    Keflex [Cephalexin] Rash    Latex Rash    Lortab [Hydrocodone-Acetaminophen] Rash    Magnesium Citrate Rash    Neosporin Original [Bacitracin-Neomycin-Polymyxin] Rash       Family History and Social History reviewed and changed as necessary    REVIEW OF SYSTEM:   No new somatic concerns    PHYSICAL EXAM:  Well-developed, well-nourished appearing female in no distress  Respirations regular and unlabored, lungs clear to auscultation bilaterally  Abdomen soft, nondistended  No palpable lymphadenopathy    Vitals:    04/16/25 1127   BP: 142/75   Pulse: 70   Resp: 18   Temp: 98.2 °F (36.8 °C)   SpO2: 98%   Weight: 80.3 kg (177 lb)   Height: 167.6 cm (66\")     Vitals:    04/16/25 1127   PainSc: 4    PainLoc: Face  Comment: nose from treatment          ECOG score: 1           Vitals reviewed.  Labs reviewed    ECOG: (1) Restricted in Physically Strenuous Activity, Ambulatory & Able to Do Work of Light " Nature    Lab Results   Component Value Date    HGB 12.6 04/09/2025    HCT 37.6 04/09/2025    MCV 96.7 04/09/2025     04/09/2025    WBC 10.00 04/09/2025    NEUTROABS 4.18 04/09/2025    LYMPHSABS CANCELED 04/09/2025    LYMPHSABS 3.88 (H) 04/09/2025    MONOSABS 1.12 (H) 04/09/2025    EOSABS CANCELED 04/09/2025    EOSABS 6.1 04/09/2025    EOSABS 0.61 (H) 04/09/2025    BASOSABS CANCELED 04/09/2025    BASOSABS 0.20 04/09/2025       Lab Results   Component Value Date    GLUCOSE 94 08/04/2022    BUN 17 08/04/2022    CREATININE 0.83 08/04/2022     (L) 08/04/2022    K 5.0 08/04/2022     08/04/2022    CO2 21.0 (L) 08/04/2022    CALCIUM 8.3 (L) 08/04/2022    PROTEINTOT 4.7 (L) 08/04/2022    ALBUMIN 2.70 (L) 08/04/2022    BILITOT 0.4 08/04/2022    ALKPHOS 88 08/04/2022    AST 32 08/04/2022    ALT 20 08/04/2022             ASSESSMENT & PLAN:    1. Low-grade splenic marginal zone lymphom with symptomatic bulky splenomegaly with severe cytokine reaction with febrile hypotensive acidosis with first portion of first dose of first day of planned weekly Rituxan x4 with rapid onset pancytopenia.  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  2.  History of basal cell and squamous cell skin cancers  3.  Ulcerative colitis    Discussion: Mahi overall is doing well.  CBC reviewed from 4/9/2025 looks good, WBC normal at 10.0, no anemia hemoglobin is 12.6, hematocrit 37.6%, platelet count 444,000.  She has no new worrisome symptoms of concern for recurrent lymphoma.  We will continue to monitor with CBC prior to return in 6 months.  Ulcerative colitis is under good control, she follows with GI.  She is on mesalamine and hopefully can continue that as it has been helpful however the high cost of the medication is becoming prohibitive, she will discuss with GI on follow-up.  She just completed radiation to squamous cell lesions on her nose and will continue to follow with   Marshal.    Return to clinic in 6 months for follow-up    I spent 21 minutes caring for Mahi on this date of service. This time includes time spent by me in the following activities: preparing for the visit, reviewing tests, obtaining and/or reviewing a separately obtained history, performing a medically appropriate examination and/or evaluation, ordering medications, tests, or procedures, documenting information in the medical record, and independently interpreting results and communicating that information with the patient/family/caregiver.     Chantell Kyle, APRN    04/16/2025

## 2025-05-28 ENCOUNTER — TRANSCRIBE ORDERS (OUTPATIENT)
Dept: CARDIOLOGY | Facility: CLINIC | Age: 85
End: 2025-05-28
Payer: MEDICARE

## 2025-05-28 DIAGNOSIS — I50.9 HEART FAILURE, UNSPECIFIED HF CHRONICITY, UNSPECIFIED HEART FAILURE TYPE: Primary | ICD-10-CM

## 2025-06-03 ENCOUNTER — OFFICE VISIT (OUTPATIENT)
Dept: CARDIOLOGY | Facility: CLINIC | Age: 85
End: 2025-06-03
Payer: MEDICARE

## 2025-06-03 VITALS
OXYGEN SATURATION: 99 % | WEIGHT: 170 LBS | DIASTOLIC BLOOD PRESSURE: 72 MMHG | RESPIRATION RATE: 18 BRPM | SYSTOLIC BLOOD PRESSURE: 140 MMHG | BODY MASS INDEX: 27.32 KG/M2 | HEIGHT: 66 IN | HEART RATE: 72 BPM

## 2025-06-03 DIAGNOSIS — I10 ESSENTIAL (PRIMARY) HYPERTENSION: ICD-10-CM

## 2025-06-03 DIAGNOSIS — I42.0 CARDIOMYOPATHY, DILATED: ICD-10-CM

## 2025-06-03 DIAGNOSIS — R06.02 SHORTNESS OF BREATH: Primary | ICD-10-CM

## 2025-06-03 PROBLEM — R60.0 BILATERAL LEG EDEMA: Status: ACTIVE | Noted: 2025-06-03

## 2025-06-03 PROCEDURE — 1160F RVW MEDS BY RX/DR IN RCRD: CPT | Performed by: INTERNAL MEDICINE

## 2025-06-03 PROCEDURE — 93000 ELECTROCARDIOGRAM COMPLETE: CPT | Performed by: INTERNAL MEDICINE

## 2025-06-03 PROCEDURE — 99204 OFFICE O/P NEW MOD 45 MIN: CPT | Performed by: INTERNAL MEDICINE

## 2025-06-03 PROCEDURE — 1159F MED LIST DOCD IN RCRD: CPT | Performed by: INTERNAL MEDICINE

## 2025-06-03 RX ORDER — BIOTIN 1000 MCG
TABLET,CHEWABLE ORAL DAILY
COMMUNITY

## 2025-06-03 RX ORDER — SACUBITRIL AND VALSARTAN 49; 51 MG/1; MG/1
1 TABLET, FILM COATED ORAL 2 TIMES DAILY
Qty: 180 TABLET | Refills: 2 | Status: SHIPPED | OUTPATIENT
Start: 2025-06-03

## 2025-06-03 RX ORDER — LISINOPRIL 5 MG/1
5 TABLET ORAL DAILY
COMMUNITY
End: 2025-06-03 | Stop reason: ALTCHOICE

## 2025-06-03 RX ORDER — METOPROLOL SUCCINATE 25 MG/1
1 TABLET, EXTENDED RELEASE ORAL DAILY
COMMUNITY

## 2025-06-03 RX ORDER — SPIRONOLACTONE 25 MG/1
25 TABLET ORAL DAILY
Qty: 90 TABLET | Refills: 3 | Status: SHIPPED | OUTPATIENT
Start: 2025-06-03

## 2025-06-03 NOTE — PROGRESS NOTES
[facility]  [facility] CARDIOLOGY  [address] [place] [address]  Dept: [phone]  Dept Fax: [phone]    Mahi Coronel  1940    New Patient Office Note    History of Present Illness:  Mahi Coronel is a 84 y.o. female who presents to the clinic for Establish Care.  Female 84 years old with Hypertension, and chronic legs edema likely lymphedema bilateral in the lower extremities for over 20 years on Lasix 40 mg daily, she has had some worsening of the edema lately and also has noticed some SOB on exertion for the last year  , she denies any chest pain, she was in ER in April due to the edema and BNP was borderline and EKG sinus with LBBB ., CT lungs negative for PE and has mild cardiomegaly and also calcifications of the coronary arteries, she also underwent last month an echo at the hospital that I look up and this showed global Hypokinesis and Ef 35 to 40%, with restrictive filling pattern, , will treat the cardiomyopathy with Entresto  49.51 bid  will d.c lisinopril, will keep Toprol xl 25 mg and add Aldactone 25 mg,  she has history of yeats infections and also UTI so will hold Farxiga at this time , she was emphasized to start taking Entresto 26 hours after last dose Lisinopril, will get a stress nuclear, lexiscan     The following portions of the patient's history were reviewed and updated as appropriate: allergies, current medications, past family history, past medical history, past social history, past surgical history, and problem list.    Medications:  acetaminophen  allopurinol  APPLE CIDER VINEGAR PO  aspirin  Biotin chewable tablet  Entresto tablet  ferrous gluconate tablet  furosemide  gabapentin  levothyroxine  mesalamine  metoprolol succinate XL  nystatin  omeprazole  spironolactone    Subjective  Allergies   Allergen Reactions    Penicillins Swelling and Rash     Passed out    Rituxan [Rituximab] Anaphylaxis    Hydrocodone Itching and Rash     Severe  itching    Sulfa Antibiotics Swelling     Lips swell    Bactrim [Sulfamethoxazole-Trimethoprim] Itching     Fever and itching    Chlorhexidine Gluconate Rash    Keflex [Cephalexin] Rash    Latex Rash    Lortab [Hydrocodone-Acetaminophen] Rash    Magnesium Citrate Rash    Neosporin Original [Bacitracin-Neomycin-Polymyxin] Rash        Past Medical History:   Diagnosis Date    Anemia 09/18/2015    Cataract     h/o    Disease of thyroid gland     Diverticulitis     DVT (deep venous thrombosis)     left lower leg    GERD (gastroesophageal reflux disease)     Gout     History of basal cell carcinoma     History of transfusion 2022    1 unit at [facility], pt denies reactions    Hypertension          IBS (irritable bowel syndrome)     Non Hodgkin's lymphoma     Splenomegaly     Urinary incontinence        Past Surgical History:   Procedure Laterality Date    APPENDECTOMY      BACK SURGERY      lumbar    CHOLECYSTECTOMY      COLON RESECTION  2021    EXPLORATORY LAPAROTOMY N/A 7/21/2022    Procedure: ABDOMINAL WASHOUT WITH CLOSURE;  Surgeon: Jayme Kemp MD;  Location: [facility] OR;  Service: General;  Laterality: N/A;    HYSTERECTOMY      jaja    KIDNEY SURGERY      kidney stones    ORIF ANKLE FRACTURE Right     h/o    SPLENECTOMY N/A 7/20/2022    Procedure: SPLENECTOMY OPEN, DISTAL PANCREATECTOMY;  Surgeon: Jayme Kemp MD;  Location: [facility] OR;  Service: General;  Laterality: N/A;    TOTAL HIP ARTHROPLASTY Bilateral     h/o       Family History   Problem Relation Age of Onset    Heart disease Mother     Stroke Father     Cancer Daughter 44        Breast Cancer     Cancer Other 50        Breast Cancer         Social History     Socioeconomic History    Marital status:    Tobacco Use    Smoking status: Never     Passive exposure: Never    Smokeless tobacco: Never   Vaping Use    Vaping status: Never Used   Substance and Sexual Activity    Alcohol use: Never    Drug use: Never    Sexual activity: Defer  "      Review of Systems   Constitutional: Negative.    HENT: Negative.     Respiratory:  Positive for shortness of breath.    Cardiovascular:  Positive for leg swelling.   Endocrine: Negative.    Genitourinary: Negative.    Musculoskeletal: Negative.    Skin: Negative.    Allergic/Immunologic: Negative.    Neurological: Negative.    Hematological: Negative.    Psychiatric/Behavioral: Negative.         Cardiovascular Procedures    ECHO/MUGA:  STRESS TESTS:   CARDIAC CATH:   DEVICES:   HOLTER:   CT/MRI:   VASCULAR:   CARDIOTHORACIC:     Objective  Vitals:    06/03/25 1509   BP: 140/72   BP Location: Right arm   Patient Position: Sitting   Cuff Size: Adult   Pulse: 72   Resp: 18   SpO2: 99%   Weight: 77.1 kg (170 lb)   Height: 167.6 cm (66\")   PainSc: 0-No pain       Physical Exam  Vitals reviewed.   Constitutional:       Appearance: Healthy appearance. Not in distress.   Neck:      Vascular: No JVR. JVD normal.   Pulmonary:      Effort: Pulmonary effort is normal.      Breath sounds: Normal breath sounds. No wheezing. No rhonchi. No rales.   Chest:      Chest wall: Not tender to palpatation.   Cardiovascular:      PMI at left midclavicular line. Normal rate. Regular rhythm. Normal S1. Normal S2.       Murmurs: There is no murmur.      No gallop.  No click. No rub.   Pulses:     Intact distal pulses.   Edema:     Thigh: bilateral 2+ edema of the thigh.     Pretibial: bilateral 2+ edema of the pretibial area.     Ankle: bilateral 2+ edema of the ankle.     Feet: bilateral 2+ edema of the feet.  Abdominal:      General: Bowel sounds are normal.      Palpations: Abdomen is soft.      Tenderness: There is no abdominal tenderness.   Musculoskeletal: Normal range of motion.         General: No tenderness. Skin:     General: Skin is warm and dry.   Neurological:      General: No focal deficit present.      Mental Status: Alert and oriented to person, place and time.        Diagnostic Data    ECG 12 Lead    Date/Time: 6/3/2025 " 4:27 PM  Performed by: Sergey Burrell MD    Authorized by: Sergey Burrell MD  Comparison: compared with previous ECG from 4/24/2025  Similar to previous ECG  Rhythm: sinus rhythm  Rate: normal  BPM: 70  Conduction: left bundle branch block, bifascicular block and 1st degree AV block  QRS axis: normal    Clinical impression: abnormal EKG        Assessment and Plan  Diagnoses and all orders for this visit:    Shortness of breath- likely related to cardiomyopathy, as well as the worsening edema, will use Entresto 49.51 bid, keep Toprol xl 25mg add Aldactone 25 mg, will also evaluate with a stress lexiscan nuclear, specially she has calcifications of the coronary arteries  -     Stress Test With Myocardial Perfusion One Day; Future    Cardiomyopathy, dilated- Ef 35 to 40%, will start Entresto, instead Lisinopril, keep Toprol add Aldactone and also get a  stress nuclear to evaluate CAD  -     Stress Test With Myocardial Perfusion One Day; Future    Other orders  -     Biotin 1000 MCG chewable tablet; Daily.  -     Discontinue: lisinopril (PRINIVIL,ZESTRIL) 5 MG tablet; Take 1 tablet by mouth Daily.  -     metoprolol succinate XL (TOPROL-XL) 25 MG 24 hr tablet; Take 1 tablet by mouth Daily.  -     sacubitril-valsartan (Entresto) 49-51 MG tablet; Take 1 tablet by mouth 2 (Two) Times a Day.  -     spironolactone (ALDACTONE) 25 MG tablet; Take 1 tablet by mouth Daily.        Return in about 1 month (around 7/3/2025) for Recheck with Dr. Burrell.    Sergey Burrell MD  06/03/2025

## 2025-06-13 ENCOUNTER — TELEPHONE (OUTPATIENT)
Dept: CARDIOLOGY | Facility: CLINIC | Age: 85
End: 2025-06-13
Payer: MEDICARE

## 2025-06-13 NOTE — TELEPHONE ENCOUNTER
Left a message for Ms. Coronel to call back and ask for [place] to discuss nuclear stress test she had done yesterday.

## 2025-06-13 NOTE — TELEPHONE ENCOUNTER
Spoke with Ms. Coronel and advised her that Dr. Burrell was wanting to see if she would be willing to come back in and repeat the stress portion of her nuclear stress test.  Due to your shaking yesterday we did not get the best of pictures.  She advised she would be willing to do that and will wear her gloves this time to help prevent the shaking.  Advised her that the  was out next week for vacation so it will most likely be the following week but they will call you to get you scheduled.  She verbalized understanding.

## 2025-06-16 ENCOUNTER — TELEPHONE (OUTPATIENT)
Dept: CARDIOLOGY | Facility: CLINIC | Age: 85
End: 2025-06-16
Payer: MEDICARE

## 2025-06-16 NOTE — TELEPHONE ENCOUNTER
BP HAS BEEN RUNNING LOW - PT BELIEVES THAT IT IS CAUSING HER TO FEEL WEEK    TAKING HEART MEDICATION TWICE A DAY AND SPIRONOLACTONE ONCE AROUND NOON     HAS BEEN GOING ON FOR A COUPLE OF DAYS      PLEASE ADVISE PT

## 2025-06-16 NOTE — TELEPHONE ENCOUNTER
Ms. Coronel called in just to clarify that her insurance will pay for the second set of stress images that Dr. Burrell wants to have done.  Explained to her that our manager is currently working on making sure that it will before we schedule you.  Our technician is out this week for vacation so it would not be until the following week anyway.  Patient verbalized understanding.    Ms. Coronel also has a question on her spironolactone and her Lasix.  She is down to 161 today which is 9 pounds down since Peri 3.  I advised her that is what the medicine is for is to keep fluid off and she states that she is not having any edema at this time.  She is taking 40 in the a.m. of Lasix and spironolactone 25 mg around 10 AM each day.  She just wants to clarify she should continue these medicines and I advised for her to continue and we would readdress at her next visit with Dr. Burrell.  The last labs we have are from 5/15 showing her creatinine at 1.18 and her potassium level 3.6.  Advised her I would still relay this to Dr. Burrell to make sure he does not want any changes at this time.

## 2025-06-16 NOTE — TELEPHONE ENCOUNTER
Sergey Burrell MD to Leta Shaikh, RN        6/16/25 11:14 AM  Yes, keep taking them   until we see you, your heart is weak, by the test at the hospital, we might need to adjust when will see you. Left a message for pt . Will call tomorrow         Call Answered - spoke with pt and she understands message . she will keep her next apt

## 2025-06-17 ENCOUNTER — TELEPHONE (OUTPATIENT)
Dept: CARDIOLOGY | Facility: CLINIC | Age: 85
End: 2025-06-17
Payer: MEDICARE

## 2025-06-17 NOTE — TELEPHONE ENCOUNTER
Caller: Mahi Coronel    Relationship: SELF    Best call back number: [phone] (home)     What is the best time to reach you: ANYTIME    Who are you requesting to speak with (clinical staff, provider,  specific staff member): YUDELKA OR TREVOR    What was the call regarding: PT MISSED CALL. NO NOTES IN CHART ABOUT A NEW MISSED CALL. PLEASE CALL PT WHEN AVAILABLE.

## 2025-07-07 ENCOUNTER — OFFICE VISIT (OUTPATIENT)
Dept: CARDIOLOGY | Facility: CLINIC | Age: 85
End: 2025-07-07
Payer: MEDICARE

## 2025-07-07 VITALS
HEIGHT: 66 IN | RESPIRATION RATE: 18 BRPM | OXYGEN SATURATION: 99 % | SYSTOLIC BLOOD PRESSURE: 110 MMHG | DIASTOLIC BLOOD PRESSURE: 56 MMHG | BODY MASS INDEX: 26.52 KG/M2 | HEART RATE: 69 BPM | WEIGHT: 165 LBS

## 2025-07-07 DIAGNOSIS — I10 ESSENTIAL (PRIMARY) HYPERTENSION: ICD-10-CM

## 2025-07-07 DIAGNOSIS — I42.0 CARDIOMYOPATHY, DILATED: Primary | ICD-10-CM

## 2025-07-07 DIAGNOSIS — R60.0 BILATERAL LEG EDEMA: ICD-10-CM

## 2025-07-07 PROCEDURE — 99214 OFFICE O/P EST MOD 30 MIN: CPT | Performed by: INTERNAL MEDICINE

## 2025-07-07 PROCEDURE — G2211 COMPLEX E/M VISIT ADD ON: HCPCS | Performed by: INTERNAL MEDICINE

## 2025-07-07 RX ORDER — ASPIRIN 81 MG/1
81 TABLET ORAL DAILY
COMMUNITY

## 2025-07-07 RX ORDER — GLUCOSAMINE/D3/BOSWELLIA SERRA 1500MG-400
TABLET ORAL
COMMUNITY

## 2025-07-07 RX ORDER — FUROSEMIDE 40 MG/1
40 TABLET ORAL EVERY OTHER DAY
Start: 2025-07-07

## 2025-07-07 NOTE — PROGRESS NOTES
[facility]  [facility] CARDIOLOGY  [address] [place] [address]  Dept: [phone]  Dept Fax: [phone]    Mahi Coronel  1940    Follow Up Office Visit Note    History of Present Illness:  Mahi Coronel is a 84 y.o. female who presents to the clinic for Follow-up. Cardiomyopathy- She underwent stress nuclear with normal EF and  negative for ischemia, She feels somewhat better on  Aldactone but also she is dizzy postural she is also on lasix 40 mg daily we check postural BP and she drops the BP from 120.55 to 97.53. at 1 minute, we have advised to take lasix every other day,  and keep Aldactone 25mg, will also get lab today     The following portions of the patient's history were reviewed and updated as appropriate: allergies, current medications, past family history, past medical history, past social history, past surgical history, and problem list.    Medications:  acetaminophen  allopurinol  APPLE CIDER VINEGAR PO  aspirin  Biotin tablet  Entresto tablet  ferrous gluconate tablet  furosemide  gabapentin  levothyroxine  mesalamine  metoprolol succinate XL  nystatin  omeprazole  spironolactone    Subjective  Allergies   Allergen Reactions    Penicillins Swelling and Rash     Passed out    Rituxan [Rituximab] Anaphylaxis    Hydrocodone Itching and Rash     Severe itching    Sulfa Antibiotics Swelling     Lips swell    Bactrim [Sulfamethoxazole-Trimethoprim] Itching     Fever and itching    Chlorhexidine Gluconate Rash    Keflex [Cephalexin] Rash    Latex Rash    Lortab [Hydrocodone-Acetaminophen] Rash    Magnesium Citrate Rash    Neosporin Original [Bacitracin-Neomycin-Polymyxin] Rash        Past Medical History:   Diagnosis Date    Anemia 09/18/2015    Cataract     h/o    Disease of thyroid gland     Diverticulitis     DVT (deep venous thrombosis)     left lower leg    GERD (gastroesophageal reflux disease)     Gout     History of basal cell carcinoma     History of  transfusion 2022    1 unit at [facility], pt denies reactions    Hypertension          IBS (irritable bowel syndrome)     Non Hodgkin's lymphoma     Splenomegaly     Urinary incontinence        Past Surgical History:   Procedure Laterality Date    APPENDECTOMY      BACK SURGERY      lumbar    CHOLECYSTECTOMY      COLON RESECTION  2021    EXPLORATORY LAPAROTOMY N/A 7/21/2022    Procedure: ABDOMINAL WASHOUT WITH CLOSURE;  Surgeon: Jayme Kemp MD;  Location:  [facility] OR;  Service: General;  Laterality: N/A;    HYSTERECTOMY      jaja    KIDNEY SURGERY      kidney stones    ORIF ANKLE FRACTURE Right     h/o    SPLENECTOMY N/A 7/20/2022    Procedure: SPLENECTOMY OPEN, DISTAL PANCREATECTOMY;  Surgeon: Jayme Kemp MD;  Location:  [facility] OR;  Service: General;  Laterality: N/A;    TOTAL HIP ARTHROPLASTY Bilateral     h/o       Family History   Problem Relation Age of Onset    Heart disease Mother     Stroke Father     Cancer Daughter 44        Breast Cancer     Cancer Other 50        Breast Cancer         Social History     Socioeconomic History    Marital status:    Tobacco Use    Smoking status: Never     Passive exposure: Never    Smokeless tobacco: Never   Vaping Use    Vaping status: Never Used   Substance and Sexual Activity    Alcohol use: Never    Drug use: Never    Sexual activity: Defer       Review of Systems   Constitutional: Negative.    HENT: Negative.     Respiratory:  Positive for shortness of breath.    Cardiovascular:  Positive for leg swelling.   Endocrine: Negative.    Genitourinary: Negative.    Musculoskeletal: Negative.    Skin: Negative.    Allergic/Immunologic: Negative.    Neurological:  Positive for dizziness.   Hematological: Negative.    Psychiatric/Behavioral: Negative.       Cardiovascular Procedures    ECHO/MUGA:  STRESS TESTS:   CARDIAC CATH:   DEVICES:   HOLTER:   CT/MRI:   VASCULAR:   CARDIOTHORACIC:     Objective  Vitals:    07/07/25 1125 07/07/25 1126 07/07/25 1127 07/07/25  1128   BP: 120/55 97/53 111/57 110/56   BP Location: Right arm Right arm Right arm Right arm   Patient Position: Lying  Comment: standing up made her very dizzy Standing  Comment: 1 min no dizzy Standing  Comment: 3 min little dizzy Standing  Comment: 5 min no dizzy   Cuff Size: Adult Adult Adult Adult   Pulse: 66 73 66 69   Resp:       SpO2:       Weight:       Height:       PainSc:         Body mass index is 26.63 kg/m².     Physical Exam  Vitals reviewed.   Constitutional:       Appearance: Healthy appearance. Not in distress.   Neck:      Vascular: No JVR. JVD normal.   Pulmonary:      Effort: Pulmonary effort is normal.      Breath sounds: Normal breath sounds. No wheezing. No rhonchi. No rales.   Chest:      Chest wall: Not tender to palpatation.   Cardiovascular:      PMI at left midclavicular line. Normal rate. Regular rhythm. Normal S1. Normal S2.       Murmurs: There is no murmur.      No gallop.  No click. No rub.   Pulses:     Intact distal pulses.   Edema:     Thigh: bilateral 2+ edema of the thigh.     Pretibial: bilateral 2+ edema of the pretibial area.     Ankle: bilateral 2+ edema of the ankle.     Feet: bilateral 2+ edema of the feet.  Abdominal:      General: Bowel sounds are normal.      Palpations: Abdomen is soft.      Tenderness: There is no abdominal tenderness.   Musculoskeletal: Normal range of motion.         General: No tenderness. Skin:     General: Skin is warm and dry.   Neurological:      General: No focal deficit present.      Mental Status: Alert and oriented to person, place and time.        Diagnostic Data  Procedures    Assessment and Plan  Diagnoses and all orders for this visit:    Cardiomyopathy, dilated- The Ef on the nuclear looks normal stress test normal, we will review echo from the Hospital., on Entresto 49.51 bid Toprol xl 25 mg  and Aldactone  -     CBC & Differential  -     Comprehensive Metabolic Panel  -     proBNP    Essential (primary) hypertension- BP is  120.80.on above meds    Bilateral leg edema- seems more lymphedema  Dizziness - her BP drops after standing, will lower lasix to 40 mg every other day , keep Aldactone for now, will get lab    Other orders  -     aspirin 81 MG EC tablet; Take 1 tablet by mouth Daily.  -     Biotin 25305 MCG tablet; Take  by mouth.  -     furosemide (LASIX) 40 MG tablet; Take 1 tablet by mouth Every Other Day. Every other day         Return in about 3 months (around 10/7/2025) for Recheck with Dr. Burrell.    Sergey Burrell MD  07/07/2025

## 2025-07-07 NOTE — PROGRESS NOTES
Venipuncture Blood Specimen Collection  Venipuncture performed in clinic by Leta Shaikh RN with good hemostasis. Patient tolerated the procedure well without complications.   07/07/25   Leta Shaikh RN

## 2025-07-08 ENCOUNTER — RESULTS FOLLOW-UP (OUTPATIENT)
Dept: CARDIOLOGY | Facility: CLINIC | Age: 85
End: 2025-07-08
Payer: MEDICARE

## 2025-07-08 LAB
ALBUMIN SERPL-MCNC: 4.2 G/DL (ref 3.7–4.7)
ALP SERPL-CCNC: 78 IU/L (ref 44–121)
ALT SERPL-CCNC: 15 IU/L (ref 0–32)
AST SERPL-CCNC: 26 IU/L (ref 0–40)
BASOPHILS # BLD AUTO: 0.1 X10E3/UL (ref 0–0.2)
BASOPHILS NFR BLD AUTO: 1 %
BILIRUB SERPL-MCNC: 0.4 MG/DL (ref 0–1.2)
BUN SERPL-MCNC: 41 MG/DL (ref 8–27)
BUN/CREAT SERPL: 33 (ref 12–28)
CALCIUM SERPL-MCNC: 9.9 MG/DL (ref 8.7–10.3)
CHLORIDE SERPL-SCNC: 90 MMOL/L (ref 96–106)
CO2 SERPL-SCNC: 18 MMOL/L (ref 20–29)
CREAT SERPL-MCNC: 1.24 MG/DL (ref 0.57–1)
EGFRCR SERPLBLD CKD-EPI 2021: 42 ML/MIN/1.73
EOSINOPHIL # BLD AUTO: 0.2 X10E3/UL (ref 0–0.4)
EOSINOPHIL NFR BLD AUTO: 2 %
ERYTHROCYTE [DISTWIDTH] IN BLOOD BY AUTOMATED COUNT: 14.4 % (ref 11.7–15.4)
GLOBULIN SER CALC-MCNC: 2.2 G/DL (ref 1.5–4.5)
GLUCOSE SERPL-MCNC: 123 MG/DL (ref 70–99)
HCT VFR BLD AUTO: 37.2 % (ref 34–46.6)
HGB BLD-MCNC: 12.2 G/DL (ref 11.1–15.9)
IMM GRANULOCYTES # BLD AUTO: 0.1 X10E3/UL (ref 0–0.1)
IMM GRANULOCYTES NFR BLD AUTO: 1 %
LYMPHOCYTES # BLD AUTO: 2.2 X10E3/UL (ref 0.7–3.1)
LYMPHOCYTES NFR BLD AUTO: 23 %
MCH RBC QN AUTO: 32.5 PG (ref 26.6–33)
MCHC RBC AUTO-ENTMCNC: 32.8 G/DL (ref 31.5–35.7)
MCV RBC AUTO: 99 FL (ref 79–97)
MONOCYTES # BLD AUTO: 1.2 X10E3/UL (ref 0.1–0.9)
MONOCYTES NFR BLD AUTO: 13 %
NEUTROPHILS # BLD AUTO: 5.9 X10E3/UL (ref 1.4–7)
NEUTROPHILS NFR BLD AUTO: 60 %
NT-PROBNP SERPL-MCNC: 510 PG/ML (ref 0–738)
PLATELET # BLD AUTO: 388 X10E3/UL (ref 150–450)
POTASSIUM SERPL-SCNC: 5.9 MMOL/L (ref 3.5–5.2)
PROT SERPL-MCNC: 6.4 G/DL (ref 6–8.5)
RBC # BLD AUTO: 3.75 X10E6/UL (ref 3.77–5.28)
SODIUM SERPL-SCNC: 123 MMOL/L (ref 134–144)
WBC # BLD AUTO: 9.7 X10E3/UL (ref 3.4–10.8)

## 2025-07-08 NOTE — TELEPHONE ENCOUNTER
Spoke with Ms. Coronel and went over her recent lab work results.  Per Dr. Burrell your creatinine is a little elevated so your kidneys are dry and also your potassium level is increased some.  Dr. Burrell would like you to hold your Lasix and your spironolactone for 1 week and then return next Monday for a lab draw.   Tuesday we will decide how to proceed with your medicines once your lab results are back.  Patient verbalized understanding and understands to eat a low potassium diet as well.

## 2025-07-14 ENCOUNTER — TELEPHONE (OUTPATIENT)
Dept: CARDIOLOGY | Facility: CLINIC | Age: 85
End: 2025-07-14

## 2025-07-14 ENCOUNTER — CLINICAL SUPPORT (OUTPATIENT)
Dept: CARDIOLOGY | Facility: CLINIC | Age: 85
End: 2025-07-14
Payer: MEDICARE

## 2025-07-14 DIAGNOSIS — N18.32 STAGE 3B CHRONIC KIDNEY DISEASE: ICD-10-CM

## 2025-07-14 DIAGNOSIS — I10 ESSENTIAL (PRIMARY) HYPERTENSION: Primary | ICD-10-CM

## 2025-07-14 PROCEDURE — 36415 COLL VENOUS BLD VENIPUNCTURE: CPT | Performed by: INTERNAL MEDICINE

## 2025-07-14 NOTE — PROGRESS NOTES
Venipuncture Blood Specimen Collection  Venipuncture performed in clinic by Shayla Lange RN with good hemostasis. Patient tolerated the procedure well without complications.   07/14/25   Shayla Lange RN

## 2025-07-15 ENCOUNTER — RESULTS FOLLOW-UP (OUTPATIENT)
Dept: CARDIOLOGY | Facility: CLINIC | Age: 85
End: 2025-07-15
Payer: MEDICARE

## 2025-07-15 LAB
BUN SERPL-MCNC: 42 MG/DL (ref 8–27)
BUN/CREAT SERPL: 31 (ref 12–28)
CALCIUM SERPL-MCNC: 9.7 MG/DL (ref 8.7–10.3)
CHLORIDE SERPL-SCNC: 95 MMOL/L (ref 96–106)
CO2 SERPL-SCNC: 17 MMOL/L (ref 20–29)
CREAT SERPL-MCNC: 1.37 MG/DL (ref 0.57–1)
EGFRCR SERPLBLD CKD-EPI 2021: 38 ML/MIN/1.73
GLUCOSE SERPL-MCNC: 99 MG/DL (ref 70–99)
POTASSIUM SERPL-SCNC: 5.2 MMOL/L (ref 3.5–5.2)
SODIUM SERPL-SCNC: 126 MMOL/L (ref 134–144)

## 2025-07-15 NOTE — TELEPHONE ENCOUNTER
Spoke with MsAsher tiffany and advised her that her creatinine level is worse.  She confirms that she did stop her Lasix and her Aldactone.  Advised her also that her sodium levels are running on the low side.  She seems oriented and says she is thinking well today but she does have some confusion but addresses that it is more related to getting confused about all her doctors appointments and things that she needs to do.  I advised her that Dr. Burrell feels like she should go to the ER to be evaluated to figure out why her sodium is low and creatinine continues to go up even after stopping her fluid pills.  She is good with me calling her daughter Tierra to discuss recommendations from Dr. Burrell.    She confirms she is drinking a lot of water her diet is mostly consisting of peanut butter crackers and brittni crackers.  Denies nausea and vomiting however states that her stools have been extremely soft for the last week and has been even worse over the last 2 or 3 days.

## 2025-07-15 NOTE — TELEPHONE ENCOUNTER
Ms. Tierra Daly, daughter, called back.  Discussed the lab results with her explained to her that the creatinine kidney function had increased rather than getting better.  Also her sodium level is now low side explained to her low sodium she can go into a coma have seizures etc.  She states that her mother did this probably 6 to 7 years ago and was admitted to the ICU for 3 to 4 days for low sodium but they never found out the reason for this.  I advised her that her mother sounded very oriented on the phone today but that she had stated she had been having some confusion.  The daughter states that she can be coherent 1 minute and have some confusion the next however not sure this is anything new for her.  I advised her that Dr. Burrell would like her to go to the ER to be evaluated however her daughter advised she cannot do it right now but will try to take her tomorrow.

## 2025-07-15 NOTE — TELEPHONE ENCOUNTER
Caller: Tierra Daly    Relationship to patient: Emergency Contact    Best call back number: [phone]    Patient is needing: RETURNING A CALL TO TREVORAsher RACHEL TO THE OFFICE

## 2025-07-16 ENCOUNTER — HOSPITAL ENCOUNTER (EMERGENCY)
Facility: HOSPITAL | Age: 85
Discharge: HOME | End: 2025-07-16
Attending: EMERGENCY MEDICINE | Admitting: EMERGENCY MEDICINE
Payer: MEDICARE

## 2025-07-16 ENCOUNTER — APPOINTMENT (OUTPATIENT)
Dept: GENERAL RADIOLOGY | Facility: HOSPITAL | Age: 85
End: 2025-07-16
Payer: MEDICARE

## 2025-07-16 ENCOUNTER — APPOINTMENT (OUTPATIENT)
Dept: CT IMAGING | Facility: HOSPITAL | Age: 85
End: 2025-07-16
Payer: MEDICARE

## 2025-07-16 VITALS
HEIGHT: 66 IN | BODY MASS INDEX: 27.16 KG/M2 | RESPIRATION RATE: 16 BRPM | TEMPERATURE: 98.2 F | HEART RATE: 56 BPM | OXYGEN SATURATION: 99 % | DIASTOLIC BLOOD PRESSURE: 57 MMHG | SYSTOLIC BLOOD PRESSURE: 122 MMHG | WEIGHT: 169 LBS

## 2025-07-16 DIAGNOSIS — R42 LIGHTHEADEDNESS: Primary | ICD-10-CM

## 2025-07-16 LAB
ALBUMIN SERPL-MCNC: 4 G/DL (ref 3.5–5.2)
ALBUMIN/GLOB SERPL: 1.8 G/DL
ALP SERPL-CCNC: 65 U/L (ref 39–117)
ALT SERPL W P-5'-P-CCNC: 12 U/L (ref 1–33)
ANION GAP SERPL CALCULATED.3IONS-SCNC: 10 MMOL/L (ref 5–15)
AST SERPL-CCNC: 21 U/L (ref 1–32)
B PARAPERT DNA SPEC QL NAA+PROBE: NOT DETECTED
B PERT DNA SPEC QL NAA+PROBE: NOT DETECTED
BACTERIA UR QL AUTO: ABNORMAL /HPF
BASOPHILS # BLD AUTO: 0.13 10*3/MM3 (ref 0–0.2)
BASOPHILS NFR BLD AUTO: 1.6 % (ref 0–1.5)
BILIRUB SERPL-MCNC: 0.4 MG/DL (ref 0–1.2)
BILIRUB UR QL STRIP: NEGATIVE
BUN SERPL-MCNC: 38 MG/DL (ref 8–23)
BUN/CREAT SERPL: 32.5 (ref 7–25)
C PNEUM DNA NPH QL NAA+NON-PROBE: NOT DETECTED
CALCIUM SPEC-SCNC: 9.4 MG/DL (ref 8.6–10.5)
CHLORIDE SERPL-SCNC: 98 MMOL/L (ref 98–107)
CLARITY UR: CLEAR
CO2 SERPL-SCNC: 21 MMOL/L (ref 22–29)
COLOR UR: YELLOW
CREAT SERPL-MCNC: 1.17 MG/DL (ref 0.57–1)
DEPRECATED RDW RBC AUTO: 51 FL (ref 37–54)
EGFRCR SERPLBLD CKD-EPI 2021: 45.5 ML/MIN/1.73
EOSINOPHIL # BLD AUTO: 0.29 10*3/MM3 (ref 0–0.4)
EOSINOPHIL NFR BLD AUTO: 3.6 % (ref 0.3–6.2)
ERYTHROCYTE [DISTWIDTH] IN BLOOD BY AUTOMATED COUNT: 14.6 % (ref 12.3–15.4)
FLUAV SUBTYP SPEC NAA+PROBE: NOT DETECTED
FLUBV RNA NPH QL NAA+NON-PROBE: NOT DETECTED
GLOBULIN UR ELPH-MCNC: 2.2 GM/DL
GLUCOSE SERPL-MCNC: 106 MG/DL (ref 65–99)
GLUCOSE UR STRIP-MCNC: NEGATIVE MG/DL
HADV DNA SPEC NAA+PROBE: NOT DETECTED
HCOV 229E RNA SPEC QL NAA+PROBE: NOT DETECTED
HCOV HKU1 RNA SPEC QL NAA+PROBE: NOT DETECTED
HCOV NL63 RNA SPEC QL NAA+PROBE: NOT DETECTED
HCOV OC43 RNA SPEC QL NAA+PROBE: NOT DETECTED
HCT VFR BLD AUTO: 33.8 % (ref 34–46.6)
HGB BLD-MCNC: 11.3 G/DL (ref 12–15.9)
HGB UR QL STRIP.AUTO: NEGATIVE
HMPV RNA NPH QL NAA+NON-PROBE: NOT DETECTED
HPIV1 RNA ISLT QL NAA+PROBE: NOT DETECTED
HPIV2 RNA SPEC QL NAA+PROBE: NOT DETECTED
HPIV3 RNA NPH QL NAA+PROBE: NOT DETECTED
HPIV4 P GENE NPH QL NAA+PROBE: NOT DETECTED
HYALINE CASTS UR QL AUTO: ABNORMAL /LPF
IMM GRANULOCYTES # BLD AUTO: 0.02 10*3/MM3 (ref 0–0.05)
IMM GRANULOCYTES NFR BLD AUTO: 0.2 % (ref 0–0.5)
KETONES UR QL STRIP: NEGATIVE
LEUKOCYTE ESTERASE UR QL STRIP.AUTO: ABNORMAL
LYMPHOCYTES # BLD AUTO: 2.61 10*3/MM3 (ref 0.7–3.1)
LYMPHOCYTES NFR BLD AUTO: 32.5 % (ref 19.6–45.3)
M PNEUMO IGG SER IA-ACNC: NOT DETECTED
MCH RBC QN AUTO: 32.1 PG (ref 26.6–33)
MCHC RBC AUTO-ENTMCNC: 33.4 G/DL (ref 31.5–35.7)
MCV RBC AUTO: 96 FL (ref 79–97)
MONOCYTES # BLD AUTO: 1.13 10*3/MM3 (ref 0.1–0.9)
MONOCYTES NFR BLD AUTO: 14.1 % (ref 5–12)
NEUTROPHILS NFR BLD AUTO: 3.85 10*3/MM3 (ref 1.7–7)
NEUTROPHILS NFR BLD AUTO: 48 % (ref 42.7–76)
NITRITE UR QL STRIP: NEGATIVE
NRBC BLD AUTO-RTO: 0.2 /100 WBC (ref 0–0.2)
PH UR STRIP.AUTO: 5.5 [PH] (ref 5–8)
PLATELET # BLD AUTO: 403 10*3/MM3 (ref 140–450)
PMV BLD AUTO: 9.3 FL (ref 6–12)
POTASSIUM SERPL-SCNC: 5.4 MMOL/L (ref 3.5–5.2)
PROT SERPL-MCNC: 6.2 G/DL (ref 6–8.5)
PROT UR QL STRIP: NEGATIVE
RBC # BLD AUTO: 3.52 10*6/MM3 (ref 3.77–5.28)
RBC # UR STRIP: ABNORMAL /HPF
REF LAB TEST METHOD: ABNORMAL
RHINOVIRUS RNA SPEC NAA+PROBE: NOT DETECTED
RSV RNA NPH QL NAA+NON-PROBE: NOT DETECTED
SARS-COV-2 RNA RESP QL NAA+PROBE: NOT DETECTED
SODIUM SERPL-SCNC: 129 MMOL/L (ref 136–145)
SP GR UR STRIP: 1.01 (ref 1–1.03)
SQUAMOUS #/AREA URNS HPF: ABNORMAL /HPF
TROPONIN T SERPL HS-MCNC: 10 NG/L
UROBILINOGEN UR QL STRIP: ABNORMAL
WBC # UR STRIP: ABNORMAL /HPF
WBC NRBC COR # BLD AUTO: 8.03 10*3/MM3 (ref 3.4–10.8)

## 2025-07-16 PROCEDURE — 84484 ASSAY OF TROPONIN QUANT: CPT | Performed by: EMERGENCY MEDICINE

## 2025-07-16 PROCEDURE — 85025 COMPLETE CBC W/AUTO DIFF WBC: CPT | Performed by: EMERGENCY MEDICINE

## 2025-07-16 PROCEDURE — 99284 EMERGENCY DEPT VISIT MOD MDM: CPT

## 2025-07-16 PROCEDURE — 80053 COMPREHEN METABOLIC PANEL: CPT | Performed by: EMERGENCY MEDICINE

## 2025-07-16 PROCEDURE — 93005 ELECTROCARDIOGRAM TRACING: CPT | Performed by: EMERGENCY MEDICINE

## 2025-07-16 PROCEDURE — 0202U NFCT DS 22 TRGT SARS-COV-2: CPT | Performed by: EMERGENCY MEDICINE

## 2025-07-16 PROCEDURE — 25810000003 SODIUM CHLORIDE 0.9 % SOLUTION: Performed by: EMERGENCY MEDICINE

## 2025-07-16 PROCEDURE — 81001 URINALYSIS AUTO W/SCOPE: CPT | Performed by: EMERGENCY MEDICINE

## 2025-07-16 PROCEDURE — 70450 CT HEAD/BRAIN W/O DYE: CPT

## 2025-07-16 PROCEDURE — 71045 X-RAY EXAM CHEST 1 VIEW: CPT

## 2025-07-16 RX ORDER — SODIUM CHLORIDE 0.9 % (FLUSH) 0.9 %
10 SYRINGE (ML) INJECTION AS NEEDED
Status: DISCONTINUED | OUTPATIENT
Start: 2025-07-16 | End: 2025-07-16 | Stop reason: HOSPADM

## 2025-07-16 RX ADMIN — SODIUM CHLORIDE 1000 ML: 9 INJECTION, SOLUTION INTRAVENOUS at 08:15

## 2025-07-16 NOTE — DISCHARGE INSTRUCTIONS
Drink plenty of fluids.    Consume a drink with electrolytes in it.    Follow-up with syncope clinic for further outpatient evaluation.

## 2025-07-16 NOTE — Clinical Note
[facility] EMERGENCY DEPARTMENT  [address]  [facility] [address]  Phone: [phone]    Mahi Coronel was seen and treated in our emergency department on 7/16/2025.  She may return to work on 07/18/2025.         Thank you for choosing [facility].    Oracio Anderson MD

## 2025-07-16 NOTE — ED PROVIDER NOTES
Subjective   History of Present Illness  84-year-old female who presents for evaluation of lightheadedness.  She reports that she has noticed this for at least 1 to 2 months.  She actually feels like it slightly better today.  She actually called and told to come to the ER because a sodium level 2 days ago was recorded at 126.  However on the seventh, roughly 10 days ago was 123.  In looking at her previous history she has a sodium that runs between 125-133 at baseline over the last 3 years.  She has been previously admitted for this in the past.  She denies fever.  No infectious symptoms.  No sick contacts.  No chest pain or abdominal pain.  No dysuria or urinary frequency.  No diarrhea or blood in the stool.  No other acute complaints.      Review of Systems   Constitutional:  Negative for chills, fatigue and fever.   HENT:  Negative for congestion, ear pain, postnasal drip, sinus pressure and sore throat.    Eyes:  Negative for pain, redness and visual disturbance.   Respiratory:  Negative for cough, chest tightness and shortness of breath.    Cardiovascular:  Negative for chest pain, palpitations and leg swelling.   Gastrointestinal:  Negative for abdominal pain, anal bleeding, blood in stool, diarrhea, nausea and vomiting.   Endocrine: Negative for polydipsia and polyuria.   Genitourinary:  Negative for difficulty urinating, dysuria, frequency and urgency.   Musculoskeletal:  Negative for arthralgias, back pain and neck pain.   Skin:  Negative for pallor and rash.   Allergic/Immunologic: Negative for environmental allergies and immunocompromised state.   Neurological:  Positive for light-headedness. Negative for dizziness, weakness and headaches.   Hematological:  Negative for adenopathy.   Psychiatric/Behavioral:  Negative for confusion, self-injury and suicidal ideas. The patient is not nervous/anxious.    All other systems reviewed and are negative.      Past Medical History:   Diagnosis Date    Anemia  09/18/2015    Cataract     h/o    Disease of thyroid gland     Diverticulitis     DVT (deep venous thrombosis)     left lower leg    GERD (gastroesophageal reflux disease)     Gout     History of basal cell carcinoma     History of transfusion 2022    1 unit at [facility], pt denies reactions    Hypertension          IBS (irritable bowel syndrome)     Non Hodgkin's lymphoma     Splenomegaly     Urinary incontinence        Allergies   Allergen Reactions    Penicillins Swelling and Rash     Passed out    Rituxan [Rituximab] Anaphylaxis    Hydrocodone Itching and Rash     Severe itching    Sulfa Antibiotics Swelling     Lips swell    Bactrim [Sulfamethoxazole-Trimethoprim] Itching     Fever and itching    Chlorhexidine Gluconate Rash    Keflex [Cephalexin] Rash    Latex Rash    Lortab [Hydrocodone-Acetaminophen] Rash    Magnesium Citrate Rash    Neosporin Original [Bacitracin-Neomycin-Polymyxin] Rash       Past Surgical History:   Procedure Laterality Date    APPENDECTOMY      BACK SURGERY      lumbar    CHOLECYSTECTOMY      COLON RESECTION  2021    EXPLORATORY LAPAROTOMY N/A 7/21/2022    Procedure: ABDOMINAL WASHOUT WITH CLOSURE;  Surgeon: Jayme Kemp MD;  Location:  [facility] OR;  Service: General;  Laterality: N/A;    HYSTERECTOMY      jaja    KIDNEY SURGERY      kidney stones    ORIF ANKLE FRACTURE Right     h/o    SPLENECTOMY N/A 7/20/2022    Procedure: SPLENECTOMY OPEN, DISTAL PANCREATECTOMY;  Surgeon: Jayme Kemp MD;  Location:  [facility];  Service: General;  Laterality: N/A;    TOTAL HIP ARTHROPLASTY Bilateral     h/o       Family History   Problem Relation Age of Onset    Heart disease Mother     Stroke Father     Cancer Daughter 44        Breast Cancer     Cancer Other 50        Breast Cancer        Social History     Socioeconomic History    Marital status:    Tobacco Use    Smoking status: Never     Passive exposure: Never    Smokeless tobacco: Never   Vaping Use    Vaping status: Never Used    Substance and Sexual Activity    Alcohol use: Never    Drug use: Never    Sexual activity: Defer           Objective   Physical Exam  Vitals and nursing note reviewed.   Constitutional:       General: She is not in acute distress.     Appearance: Normal appearance. She is well-developed. She is not toxic-appearing or diaphoretic.   HENT:      Head: Normocephalic and atraumatic.      Right Ear: External ear normal.      Left Ear: External ear normal.      Nose: Nose normal.   Eyes:      General: Lids are normal.      Pupils: Pupils are equal, round, and reactive to light.   Neck:      Trachea: No tracheal deviation.   Cardiovascular:      Rate and Rhythm: Normal rate and regular rhythm.      Pulses: No decreased pulses.      Heart sounds: Normal heart sounds. No murmur heard.     No friction rub. No gallop.   Pulmonary:      Effort: Pulmonary effort is normal. No respiratory distress.      Breath sounds: Normal breath sounds. No decreased breath sounds, wheezing, rhonchi or rales.   Abdominal:      General: Bowel sounds are normal.      Palpations: Abdomen is soft.      Tenderness: There is no abdominal tenderness. There is no guarding or rebound.   Musculoskeletal:         General: No deformity. Normal range of motion.      Cervical back: Normal range of motion and neck supple.   Lymphadenopathy:      Cervical: No cervical adenopathy.   Skin:     General: Skin is warm and dry.      Findings: No rash.   Neurological:      Mental Status: She is alert and oriented to person, place, and time.      Cranial Nerves: No cranial nerve deficit.      Sensory: No sensory deficit.   Psychiatric:         Speech: Speech normal.         Behavior: Behavior normal.         Thought Content: Thought content normal.         Judgment: Judgment normal.         Procedures           ED Course                                                       Medical Decision Making  Differential includes adverse medication effect, intracranial  hemorrhage, dehydration, electrolyte abnormality, acute infection, other unspecified etiology.    Urine does not show signs of infection.  Viral respiratory panel is negative.    Chest x-ray interpreted by me shows normal heart size and clear lungs.    CT scan head without contrast interpreted by me is negative for intracranial hemorrhage or mass.    Labs show normal white count, baseline hemoglobin 11.3, improving sodium to 129.    Potassium within previous expected range with no associated EKG changes.    The patient was given IV fluids here.  She was observed and remained stable with normal vital signs.    She will be discharged with outpatient referral.    Problems Addressed:  Lightheadedness: complicated acute illness or injury with systemic symptoms    Amount and/or Complexity of Data Reviewed  External Data Reviewed: labs, radiology and ECG.  Labs: ordered. Decision-making details documented in ED Course.  Radiology: ordered and independent interpretation performed. Decision-making details documented in ED Course.  ECG/medicine tests: ordered and independent interpretation performed. Decision-making details documented in ED Course.     Details: EKG performed 7/16/2025 at 8:01 AM shows sinus bradycardia, 1st degree AV block, left bundle branch block with appropriate discordance, normal QTc, normal QRS, no acute ischemic changes.        Final diagnoses:   Lightheadedness       ED Disposition  ED Disposition       ED Disposition   Discharge    Condition   Stable    Comment   --               [facility] CARDIOLOGY  [address]  [facility] [address] [phone]  Schedule an appointment as soon as possible for a visit       Suzanne Carmen MD  [address] [facility] [address] [phone]    Schedule an appointment as soon as possible for a visit            Medication List      No changes were made to your prescriptions during this visit.             Oracio Anderson MD  07/19/25 0821

## 2025-07-21 LAB
QT INTERVAL: 484 MS
QTC INTERVAL: 458 MS

## 2025-08-01 ENCOUNTER — OFFICE VISIT (OUTPATIENT)
Dept: CARDIOLOGY | Facility: CLINIC | Age: 85
End: 2025-08-01
Payer: MEDICARE

## 2025-08-01 VITALS
HEART RATE: 72 BPM | HEIGHT: 66 IN | DIASTOLIC BLOOD PRESSURE: 70 MMHG | WEIGHT: 172 LBS | RESPIRATION RATE: 18 BRPM | OXYGEN SATURATION: 99 % | SYSTOLIC BLOOD PRESSURE: 130 MMHG | BODY MASS INDEX: 27.64 KG/M2

## 2025-08-01 DIAGNOSIS — I42.0 CARDIOMYOPATHY, DILATED: ICD-10-CM

## 2025-08-01 DIAGNOSIS — I10 ESSENTIAL (PRIMARY) HYPERTENSION: Primary | ICD-10-CM

## 2025-08-01 DIAGNOSIS — I45.2 BIFASCICULAR BLOCK: ICD-10-CM

## 2025-08-01 PROBLEM — R06.02 SHORTNESS OF BREATH: Status: RESOLVED | Noted: 2025-06-03 | Resolved: 2025-08-01

## 2025-08-01 PROCEDURE — 99214 OFFICE O/P EST MOD 30 MIN: CPT | Performed by: INTERNAL MEDICINE

## 2025-08-01 PROCEDURE — 93000 ELECTROCARDIOGRAM COMPLETE: CPT | Performed by: INTERNAL MEDICINE

## 2025-08-01 RX ORDER — METOPROLOL SUCCINATE 25 MG/1
25 TABLET, EXTENDED RELEASE ORAL DAILY
Start: 2025-08-01

## 2025-08-01 RX ORDER — ACETAMINOPHEN 325 MG/1
325 TABLET ORAL EVERY 6 HOURS PRN
COMMUNITY

## 2025-08-01 NOTE — PROGRESS NOTES
[facility]  [facility] CARDIOLOGY  [address] [place] [address]  Dept: [phone]  Dept Fax: [phone]    Mahi Coronel  1940    Follow Up Office Visit Note    History of Present Illness:  Mahi Coronel is a 84 y.o. female who presents to the clinic for  cardiomyopathy- Dilated with normal EF on nuclear, she seems doing better on Entresto 49,51 bid plus Toprol xl 25 mg the diuretics were stopped due to low sodium and high K, now are normal, denies any major SOB, has chronic edema lower extremities, likely non cardiac,  BP is 135.80 will keep same meds    The following portions of the patient's history were reviewed and updated as appropriate: allergies, current medications, past family history, past medical history, past social history, past surgical history, and problem list.    Medications:  acetaminophen  allopurinol  APPLE CIDER VINEGAR PO  aspirin  Biotin tablet  Entresto tablet  ferrous gluconate tablet  gabapentin  levothyroxine  mesalamine  metoprolol succinate XL  omeprazole    Subjective  Allergies   Allergen Reactions    Penicillins Swelling and Rash     Passed out    Rituxan [Rituximab] Anaphylaxis    Hydrocodone Itching and Rash     Severe itching    Sulfa Antibiotics Swelling     Lips swell    Bactrim [Sulfamethoxazole-Trimethoprim] Itching     Fever and itching    Chlorhexidine Gluconate Rash    Keflex [Cephalexin] Rash    Latex Rash    Lortab [Hydrocodone-Acetaminophen] Rash    Magnesium Citrate Rash    Neosporin Original [Bacitracin-Neomycin-Polymyxin] Rash        Past Medical History:   Diagnosis Date    Anemia 09/18/2015    Cataract     h/o    Disease of thyroid gland     Diverticulitis     DVT (deep venous thrombosis)     left lower leg    GERD (gastroesophageal reflux disease)     Gout     History of basal cell carcinoma     History of transfusion 2022    1 unit at [facility], pt denies reactions    Hypertension          IBS (irritable bowel syndrome)      "Non Hodgkin's lymphoma     Splenomegaly     Urinary incontinence        Past Surgical History:   Procedure Laterality Date    APPENDECTOMY      BACK SURGERY      lumbar    CHOLECYSTECTOMY      COLON RESECTION  2021    EXPLORATORY LAPAROTOMY N/A 7/21/2022    Procedure: ABDOMINAL WASHOUT WITH CLOSURE;  Surgeon: Jayme Kemp MD;  Location: [facility] OR;  Service: General;  Laterality: N/A;    HYSTERECTOMY      jaja    KIDNEY SURGERY      kidney stones    ORIF ANKLE FRACTURE Right     h/o    SPLENECTOMY N/A 7/20/2022    Procedure: SPLENECTOMY OPEN, DISTAL PANCREATECTOMY;  Surgeon: Jayme Kemp MD;  Location: [facility] OR;  Service: General;  Laterality: N/A;    TOTAL HIP ARTHROPLASTY Bilateral     h/o       Family History   Problem Relation Age of Onset    Heart disease Mother     Stroke Father     Cancer Daughter 44        Breast Cancer     Cancer Other 50        Breast Cancer         Social History     Socioeconomic History    Marital status:    Tobacco Use    Smoking status: Never     Passive exposure: Never    Smokeless tobacco: Never   Vaping Use    Vaping status: Never Used   Substance and Sexual Activity    Alcohol use: Never    Drug use: Never    Sexual activity: Defer       Review of Systems   Constitutional: Negative.    HENT: Negative.     Respiratory: Negative.     Cardiovascular: Negative.    Endocrine: Negative.    Genitourinary: Negative.    Musculoskeletal: Negative.    Skin: Negative.    Allergic/Immunologic: Negative.    Neurological: Negative.    Hematological: Negative.    Psychiatric/Behavioral: Negative.         Cardiovascular Procedures    ECHO/MUGA:  STRESS TESTS:   CARDIAC CATH:   DEVICES:   HOLTER:   CT/MRI:   VASCULAR:   CARDIOTHORACIC:     Objective  Vitals:    08/01/25 1532   BP: 130/70   BP Location: Left arm   Patient Position: Sitting   Cuff Size: Adult   Pulse: 72   Resp: 18   SpO2: 99%   Weight: 78 kg (172 lb)   Height: 167.6 cm (66\")   PainSc: 0-No pain     Body mass " index is 27.76 kg/m².     Physical Exam  Vitals reviewed.   Constitutional:       Appearance: Healthy appearance. Not in distress.   Neck:      Vascular: No JVR. JVD normal.   Pulmonary:      Effort: Pulmonary effort is normal.      Breath sounds: Normal breath sounds. No wheezing. No rhonchi. No rales.   Chest:      Chest wall: Not tender to palpatation.   Cardiovascular:      PMI at left midclavicular line. Normal rate. Regular rhythm. Normal S1. Normal S2.       Murmurs: There is no murmur.      No gallop.  No click. No rub.   Pulses:     Intact distal pulses.   Edema:     Peripheral edema absent.   Abdominal:      General: Bowel sounds are normal.      Palpations: Abdomen is soft.      Tenderness: There is no abdominal tenderness.   Musculoskeletal: Normal range of motion.         General: No tenderness. Skin:     General: Skin is warm and dry.   Neurological:      General: No focal deficit present.      Mental Status: Alert and oriented to person, place and time.        Diagnostic Data    ECG 12 Lead    Date/Time: 8/1/2025 4:09 PM  Performed by: Sergey Burrell MD    Authorized by: Sergey Burrell MD  Comparison: compared with previous ECG from 7/21/2025  Similar to previous ECG  Rhythm: sinus rhythm  Rate: normal  BPM: 64  Conduction: incomplete left bundle branch block and 1st degree AV block  QRS axis: normal    Clinical impression: abnormal EKG        Assessment and Plan  Diagnoses and all orders for this visit:    Essential (primary) hypertension- BP is  135.80, on Entresto 49.51 bid, and Toprol xl 25 mg  -     Cancel: Basic Metabolic Panel    Cardiomyopathy, dilated- with normal Ef by nuclear will keep Toprol xl 25 mg and also Entresto, off aldactone and Lasix due to low sodium  -     Cancel: Basic Metabolic Panel  Bifascicular block- LBBB and First degree AV block, no changes HR 64, no syncope, dizziness    Other orders  -     acetaminophen (TYLENOL) 325 MG tablet; Take 1 tablet by mouth  Every 6 (Six) Hours As Needed for Mild Pain.  -     metoprolol succinate XL (TOPROL-XL) 25 MG 24 hr tablet; Take 1 tablet by mouth Daily.         Return in about 3 months (around 11/1/2025) for Recheck with Dr. Burrell.    Sergey Burrell MD  08/01/2025

## (undated) DEVICE — SUT SILK 3/0 SH CR8 30IN C017D

## (undated) DEVICE — PROXIMATE RH ROTATING HEAD SKIN STAPLERS (35 WIDE) CONTAINS 35 STAINLESS STEEL STAPLES: Brand: PROXIMATE

## (undated) DEVICE — FOGARTY - HYDRAGRIP SURGICAL - CLAMP INSERTS: Brand: FOGARTY SOFTJAW

## (undated) DEVICE — TBG PENCL TELESCP MEGADYNE SMOKE EVAC 10FT

## (undated) DEVICE — SUT SILK 0 CT1 18IN 424H

## (undated) DEVICE — LEX GENERAL ABDOMINAL SPLIT: Brand: MEDLINE INDUSTRIES, INC.

## (undated) DEVICE — PATIENT RETURN ELECTRODE, SINGLE-USE, CONTACT QUALITY MONITORING, ADULT, WITH 9FT CORD, FOR PATIENTS WEIGING OVER 33LBS. (15KG): Brand: MEGADYNE

## (undated) DEVICE — JP PERF DRN SIL FLT 10MM FULL: Brand: CARDINAL HEALTH

## (undated) DEVICE — ECHELON FLEX POWERED PLUS LONG ARTICULATING ENDOSCOPIC LINEAR CUTTER, 60MM: Brand: ECHELON FLEX

## (undated) DEVICE — APPL CHLORAPREP W/TINT 26ML BLU

## (undated) DEVICE — SUT PROLN 2/0 PC3 8833H

## (undated) DEVICE — ANTIBACTERIAL UNDYED BRAIDED (POLYGLACTIN 910), SYNTHETIC ABSORBABLE SUTURE: Brand: COATED VICRYL

## (undated) DEVICE — TRAP FLD MINIVAC MEGADYNE 100ML

## (undated) DEVICE — PK ATS CUST W CARDIOTOMY RESEVOIR

## (undated) DEVICE — ENSEAL 20 CM SHAFT, LARGE JAW: Brand: ENSEAL X1

## (undated) DEVICE — HLDR CATH FOL STATLOCK SWVL TRICOT

## (undated) DEVICE — SUT PROLN 3/0 SH D/A 36IN 8522H

## (undated) DEVICE — UNDERGLV SURG BIOGEL INDICATOR LF PF 7.5

## (undated) DEVICE — SUT ETHLN 2/0 PS 18IN 585H

## (undated) DEVICE — SUT SILK 3/0 TIES 18IN A184H

## (undated) DEVICE — SUT PDS LP 1 TP1 96IN VIO PDP880GA

## (undated) DEVICE — TOTAL TRAY, 16FR 10ML SIL FOLEY, URN: Brand: MEDLINE

## (undated) DEVICE — GLV SURG BIOGEL LTX PF 7

## (undated) DEVICE — SPNG LAP PREWSH SFTPK 18X18IN STRL PK/5

## (undated) DEVICE — CLTH CLENS READYCLEANSE PERI CARE PK/5

## (undated) DEVICE — ENSEAL X1 TISSUE SEALER, CURVED JAW, 25 CM SHAFT LENGTH: Brand: ENSEAL

## (undated) DEVICE — SUT SILK 2/0 SH 30IN K833H

## (undated) DEVICE — SUT MNCRYL PLS ANTIB UD 4/0 PS2 18IN